# Patient Record
Sex: FEMALE | Race: WHITE | NOT HISPANIC OR LATINO | Employment: OTHER | URBAN - METROPOLITAN AREA
[De-identification: names, ages, dates, MRNs, and addresses within clinical notes are randomized per-mention and may not be internally consistent; named-entity substitution may affect disease eponyms.]

---

## 2023-10-05 ENCOUNTER — HOSPITAL ENCOUNTER (INPATIENT)
Facility: HOSPITAL | Age: 78
LOS: 7 days | Discharge: HOME | DRG: 690 | End: 2023-10-12
Attending: PHYSICAL MEDICINE & REHABILITATION | Admitting: PHYSICAL MEDICINE & REHABILITATION
Payer: MEDICARE

## 2023-10-05 DIAGNOSIS — M79.605 PAIN IN BOTH LOWER EXTREMITIES: Chronic | ICD-10-CM

## 2023-10-05 DIAGNOSIS — M79.604 PAIN IN BOTH LOWER EXTREMITIES: Chronic | ICD-10-CM

## 2023-10-05 DIAGNOSIS — R31.9 HEMATURIA, UNSPECIFIED TYPE: Chronic | ICD-10-CM

## 2023-10-05 DIAGNOSIS — R53.81 DEBILITY: Primary | ICD-10-CM

## 2023-10-05 PROCEDURE — 1180000001 HC REHAB PRIVATE ROOM DAILY

## 2023-10-05 SDOH — SOCIAL STABILITY: SOCIAL INSECURITY: DO YOU FEEL ANYONE HAS EXPLOITED OR TAKEN ADVANTAGE OF YOU FINANCIALLY OR OF YOUR PERSONAL PROPERTY?: NO

## 2023-10-05 SDOH — SOCIAL STABILITY: SOCIAL INSECURITY: DO YOU FEEL UNSAFE GOING BACK TO THE PLACE WHERE YOU ARE LIVING?: NO

## 2023-10-05 SDOH — SOCIAL STABILITY: SOCIAL INSECURITY: ABUSE: ADULT

## 2023-10-05 SDOH — SOCIAL STABILITY: SOCIAL INSECURITY: ARE YOU OR HAVE YOU BEEN THREATENED OR ABUSED PHYSICALLY, EMOTIONALLY, OR SEXUALLY BY ANYONE?: NO

## 2023-10-05 SDOH — SOCIAL STABILITY: SOCIAL INSECURITY: HAS ANYONE EVER THREATENED TO HURT YOUR FAMILY OR YOUR PETS?: NO

## 2023-10-05 SDOH — SOCIAL STABILITY: SOCIAL INSECURITY: ARE THERE ANY APPARENT SIGNS OF INJURIES/BEHAVIORS THAT COULD BE RELATED TO ABUSE/NEGLECT?: NO

## 2023-10-05 SDOH — SOCIAL STABILITY: SOCIAL INSECURITY: HAVE YOU HAD THOUGHTS OF HARMING ANYONE ELSE?: NO

## 2023-10-05 SDOH — SOCIAL STABILITY: SOCIAL INSECURITY: WERE YOU ABLE TO COMPLETE ALL THE BEHAVIORAL HEALTH SCREENINGS?: YES

## 2023-10-05 SDOH — SOCIAL STABILITY: SOCIAL INSECURITY: DOES ANYONE TRY TO KEEP YOU FROM HAVING/CONTACTING OTHER FRIENDS OR DOING THINGS OUTSIDE YOUR HOME?: NO

## 2023-10-05 ASSESSMENT — LIFESTYLE VARIABLES
HOW MANY STANDARD DRINKS CONTAINING ALCOHOL DO YOU HAVE ON A TYPICAL DAY: PATIENT DOES NOT DRINK
AUDIT-C TOTAL SCORE: 0
SUBSTANCE_ABUSE_PAST_12_MONTHS: NO
SKIP TO QUESTIONS 9-10: 1
AUDIT-C TOTAL SCORE: 0
HOW OFTEN DO YOU HAVE 6 OR MORE DRINKS ON ONE OCCASION: NEVER
PRESCIPTION_ABUSE_PAST_12_MONTHS: NO
HOW OFTEN DO YOU HAVE A DRINK CONTAINING ALCOHOL: NEVER

## 2023-10-05 ASSESSMENT — COLUMBIA-SUICIDE SEVERITY RATING SCALE - C-SSRS
2. HAVE YOU ACTUALLY HAD ANY THOUGHTS OF KILLING YOURSELF?: NO
1. IN THE PAST MONTH, HAVE YOU WISHED YOU WERE DEAD OR WISHED YOU COULD GO TO SLEEP AND NOT WAKE UP?: NO
6. HAVE YOU EVER DONE ANYTHING, STARTED TO DO ANYTHING, OR PREPARED TO DO ANYTHING TO END YOUR LIFE?: NO

## 2023-10-05 ASSESSMENT — PATIENT HEALTH QUESTIONNAIRE - PHQ9
SUM OF ALL RESPONSES TO PHQ9 QUESTIONS 1 & 2: 0
1. LITTLE INTEREST OR PLEASURE IN DOING THINGS: NOT AT ALL
2. FEELING DOWN, DEPRESSED OR HOPELESS: NOT AT ALL

## 2023-10-05 ASSESSMENT — PAIN SCALES - GENERAL: PAINLEVEL_OUTOF10: 2

## 2023-10-05 ASSESSMENT — PAIN - FUNCTIONAL ASSESSMENT: PAIN_FUNCTIONAL_ASSESSMENT: 0-10

## 2023-10-05 NOTE — PREADMISSION SCREENING NOTE
Physical Medicine and Rehabilitation  Preadmission Screening    Rehab Physician's Review and Admission Determination:  Gabriella Haskins is a 78 y.o. female who needs acute inpatient rehabilitation in order to achieve the functional goals outlined below. She requires close rehabilitation physician monitoring and management due to her complex medical conditions and co-morbidities with the primary indication of:  Rehab Admission Indication: 0009 Cardiac: CARDIAC ARREST . Comorbid conditions that will impact course of rehabilitation: Comorbid Conditions in addition to those listed above GOUT,HTN,HYPOTHYROIDISM,OSTEOPENIA, . She requires 24-hour rehabilitation nursing to manage bowel and bladder function, nutrition and fluid intake, pulmonary hygiene, pain control, safety, and medication management. In addition, rehabilitation nursing will reiterate and reinforce therapy skills and equipment use, including ADLs, as well as provide education to the patient and family. Gabriella Haskins is willing to participate in and is able to tolerate the proposed plan of care.    History of Present Illness: 78 YR OLD PRESENTS TO ED W/ C/O HEMTURIA AND RT FLANF PAIN. FOUND TO HAVE A UTI. CYSTOSCOPY SHOWED CYSTITIS,CYSTOCELE,MILD RT HYDRONEPHROSIS. SHE WENT INTO A FIB AND WAS CARDIOVERTED. NEXT MORING BECAME BRAYCARDIC AND WENT INTO CARDIAC ARREST.ECHO FAIRLY NORMAL. NUCLEAR STESS TEST NEG.    Prior Functional Status:  Lives with Spouse  Self-Care: IND    Ambulation: IND  Stairs: IND  Cognition: AOX3  Wheelchair: N/A  Devices: N/A    Current Functional Status:  Eating: set up  Oral hygiene: min  Toileting: MAX-MOCK  Bathing: UPPER MIN, LOWER MAX  Upper body dressing: MIN  Lower body dressing: MAX  Bed Mobility: MIN  Transfers: MIN,AMB 40 FEET MIN W/ WHEELED WALKER  Bladder and Bowel: UNKNOWN  Cognition: A&OX3    Rehabilitation Goals and Plan:  Expected level of improvement: EXCELLENT  Likelihood of reaching these goals:  excellent.  Therapy treatments needed to achieve these goals: physical therapy, occupational therapy, nursing, aide, and  .  Barriers to achieving these goals:  NONE    Expected length of stay: 1 week(s)    When medically stable, anticipated discharge disposition: home with home health care  The potential to achieve that is excellent.

## 2023-10-05 NOTE — LETTER
2023    Patient: Gabriella Haskins   YOB: 1945   Admission Date: 10/5/2023 11:51 PM     The attached team conference was conducted while Gabriella Haskins was admitted at our facility.     Please call Dept: [phone] with any questions or concerns.    No name on file  [facility] PHYSICAL MEDICINE AND REHABILITATION  [address] [facility] [id number] [phone]    The following plan is in draft form.  Please refer to the current version for the most up-to-date information.                Inpatient Rehab Team Conference 10/6/23   Effective from: 10/6/2023  Effective to: 10/12/2023    Draft  Plan ID: [id number]               Participants as of 10/6/2023      Name Type Comments Contact Info    Sirisha Soto MD Attending Provider  [phone]          Patient Demographics       Patient Name  Gabriella Haskins Legal Sex  Female   1945 [facility]  [id number] Address  [address] [place] [id number] Phone  [phone] (Home)          Current Problems             Noted    Debility 10/6/2023     Care Plan Problems/Goals       0 of 8 Goals Met 0 of 8 Met       Progressing (8)        Not fall by end of shift (Fall/Injury)      Be free from injury by end of the shift (Fall/Injury)      Verbalize understanding of personal risk factors for fall in the hospital (Fall/Injury)      Takes deep breaths with improved pain control throughout the shift (Pain)      Turns in bed with improved pain control throughout the shift (Pain)      Prevent/minimize sheer/friction injuries (Skin)      Promote/optimize nutrition (Skin)      Promote skin healing (Skin)                          Team Discussion     No Team Discussion note has been created for the current plan.

## 2023-10-06 PROBLEM — R53.81 DEBILITY: Status: ACTIVE | Noted: 2023-10-06

## 2023-10-06 LAB
ANION GAP SERPL CALC-SCNC: 13 MMOL/L (ref 10–20)
BUN SERPL-MCNC: 22 MG/DL (ref 6–23)
CALCIUM SERPL-MCNC: 8.6 MG/DL (ref 8.6–10.3)
CHLORIDE SERPL-SCNC: 99 MMOL/L (ref 98–107)
CO2 SERPL-SCNC: 28 MMOL/L (ref 21–32)
CREAT SERPL-MCNC: 1.38 MG/DL (ref 0.5–1.05)
ERYTHROCYTE [DISTWIDTH] IN BLOOD BY AUTOMATED COUNT: 12.3 % (ref 11.5–14.5)
GFR SERPL CREATININE-BSD FRML MDRD: 40 ML/MIN/1.73M*2
GLUCOSE SERPL-MCNC: 121 MG/DL (ref 74–99)
HCT VFR BLD AUTO: 30.5 % (ref 36–46)
HGB BLD-MCNC: 9.7 G/DL (ref 12–16)
MCH RBC QN AUTO: 28.8 PG (ref 26–34)
MCHC RBC AUTO-ENTMCNC: 31.8 G/DL (ref 32–36)
MCV RBC AUTO: 91 FL (ref 80–100)
NRBC BLD-RTO: 0 /100 WBCS (ref 0–0)
PLATELET # BLD AUTO: 292 X10*3/UL (ref 150–450)
PMV BLD AUTO: 9.1 FL (ref 7.5–11.5)
POTASSIUM SERPL-SCNC: 4.8 MMOL/L (ref 3.5–5.3)
RBC # BLD AUTO: 3.37 X10*6/UL (ref 4–5.2)
SODIUM SERPL-SCNC: 135 MMOL/L (ref 136–145)
WBC # BLD AUTO: 16.1 X10*3/UL (ref 4.4–11.3)

## 2023-10-06 PROCEDURE — 82374 ASSAY BLOOD CARBON DIOXIDE: CPT | Performed by: PHYSICAL MEDICINE & REHABILITATION

## 2023-10-06 PROCEDURE — 97530 THERAPEUTIC ACTIVITIES: CPT | Mod: GP

## 2023-10-06 PROCEDURE — 97166 OT EVAL MOD COMPLEX 45 MIN: CPT | Mod: GO

## 2023-10-06 PROCEDURE — 1180000001 HC REHAB PRIVATE ROOM DAILY

## 2023-10-06 PROCEDURE — 36415 COLL VENOUS BLD VENIPUNCTURE: CPT | Performed by: PHYSICAL MEDICINE & REHABILITATION

## 2023-10-06 PROCEDURE — 97116 GAIT TRAINING THERAPY: CPT | Mod: GP

## 2023-10-06 PROCEDURE — 2500000001 HC RX 250 WO HCPCS SELF ADMINISTERED DRUGS (ALT 637 FOR MEDICARE OP): Performed by: PHYSICAL MEDICINE & REHABILITATION

## 2023-10-06 PROCEDURE — 97530 THERAPEUTIC ACTIVITIES: CPT | Mod: GO

## 2023-10-06 PROCEDURE — 2500000004 HC RX 250 GENERAL PHARMACY W/ HCPCS (ALT 636 FOR OP/ED): Performed by: PHYSICAL MEDICINE & REHABILITATION

## 2023-10-06 PROCEDURE — 97535 SELF CARE MNGMENT TRAINING: CPT | Mod: GO

## 2023-10-06 PROCEDURE — 97162 PT EVAL MOD COMPLEX 30 MIN: CPT | Mod: GP

## 2023-10-06 PROCEDURE — 97110 THERAPEUTIC EXERCISES: CPT | Mod: GP

## 2023-10-06 PROCEDURE — 99222 1ST HOSP IP/OBS MODERATE 55: CPT | Performed by: PHYSICAL MEDICINE & REHABILITATION

## 2023-10-06 PROCEDURE — 3490 HC RX 250 GENERAL PHARMACY W/ HCPCS (ALT 636 FOR OP/ED): Performed by: PHYSICAL MEDICINE & REHABILITATION

## 2023-10-06 PROCEDURE — 85027 COMPLETE CBC AUTOMATED: CPT | Performed by: PHYSICAL MEDICINE & REHABILITATION

## 2023-10-06 RX ORDER — ACETAMINOPHEN 160 MG/5ML
650 SOLUTION ORAL EVERY 4 HOURS PRN
Status: DISCONTINUED | OUTPATIENT
Start: 2023-10-06 | End: 2023-10-12 | Stop reason: HOSPADM

## 2023-10-06 RX ORDER — KETOCONAZOLE 20 MG/G
CREAM TOPICAL 2 TIMES DAILY
Status: DISCONTINUED | OUTPATIENT
Start: 2023-10-06 | End: 2023-10-12 | Stop reason: HOSPADM

## 2023-10-06 RX ORDER — ALUMINUM HYDROXIDE, MAGNESIUM HYDROXIDE, AND SIMETHICONE 1200; 120; 1200 MG/30ML; MG/30ML; MG/30ML
30 SUSPENSION ORAL EVERY 6 HOURS PRN
Status: DISCONTINUED | OUTPATIENT
Start: 2023-10-06 | End: 2023-10-12 | Stop reason: HOSPADM

## 2023-10-06 RX ORDER — OXYCODONE AND ACETAMINOPHEN 5; 325 MG/1; MG/1
1 TABLET ORAL EVERY 6 HOURS PRN
COMMUNITY
Start: 2023-10-05 | End: 2023-10-12 | Stop reason: HOSPADM

## 2023-10-06 RX ORDER — TALC
3 POWDER (GRAM) TOPICAL NIGHTLY PRN
Status: DISCONTINUED | OUTPATIENT
Start: 2023-10-06 | End: 2023-10-12 | Stop reason: HOSPADM

## 2023-10-06 RX ORDER — CIPROFLOXACIN 250 MG/1
250 TABLET, FILM COATED ORAL 2 TIMES DAILY
Status: ON HOLD | COMMUNITY
Start: 2023-10-05 | End: 2023-10-11 | Stop reason: SDUPTHER

## 2023-10-06 RX ORDER — LEVOTHYROXINE SODIUM 112 UG/1
112 TABLET ORAL
Status: DISCONTINUED | OUTPATIENT
Start: 2023-10-06 | End: 2023-10-12 | Stop reason: HOSPADM

## 2023-10-06 RX ORDER — BISACODYL 5 MG
10 TABLET, DELAYED RELEASE (ENTERIC COATED) ORAL DAILY PRN
Status: DISCONTINUED | OUTPATIENT
Start: 2023-10-06 | End: 2023-10-12 | Stop reason: HOSPADM

## 2023-10-06 RX ORDER — LEVOTHYROXINE SODIUM 112 UG/1
TABLET ORAL
COMMUNITY
Start: 2023-05-09

## 2023-10-06 RX ORDER — METOPROLOL TARTRATE 25 MG/1
12.5 TABLET, FILM COATED ORAL 2 TIMES DAILY
Status: DISCONTINUED | OUTPATIENT
Start: 2023-10-06 | End: 2023-10-12 | Stop reason: HOSPADM

## 2023-10-06 RX ORDER — LORATADINE 10 MG/1
10 TABLET ORAL DAILY
Status: DISCONTINUED | OUTPATIENT
Start: 2023-10-06 | End: 2023-10-12 | Stop reason: HOSPADM

## 2023-10-06 RX ORDER — COLCHICINE 0.6 MG/1
1 TABLET ORAL ONCE
COMMUNITY
Start: 2023-10-06 | End: 2024-06-06 | Stop reason: ALTCHOICE

## 2023-10-06 RX ORDER — FERROUS SULFATE 325(65) MG
65 TABLET ORAL EVERY OTHER DAY
Status: DISCONTINUED | OUTPATIENT
Start: 2023-10-06 | End: 2023-10-12 | Stop reason: HOSPADM

## 2023-10-06 RX ORDER — METOPROLOL TARTRATE 25 MG/1
12.5 TABLET, FILM COATED ORAL 2 TIMES DAILY
COMMUNITY
Start: 2023-10-05 | End: 2023-10-12 | Stop reason: HOSPADM

## 2023-10-06 RX ORDER — SODIUM CHLORIDE 50 MG/ML
1 SOLUTION/ DROPS OPHTHALMIC EVERY 4 HOURS
Status: DISCONTINUED | OUTPATIENT
Start: 2023-10-06 | End: 2023-10-08

## 2023-10-06 RX ORDER — BISACODYL 10 MG/1
10 SUPPOSITORY RECTAL DAILY PRN
Status: DISCONTINUED | OUTPATIENT
Start: 2023-10-06 | End: 2023-10-12 | Stop reason: HOSPADM

## 2023-10-06 RX ORDER — SODIUM CHLORIDE 50 MG/ML
SOLUTION/ DROPS OPHTHALMIC
COMMUNITY
End: 2024-06-06 | Stop reason: ALTCHOICE

## 2023-10-06 RX ORDER — COLCHICINE 0.6 MG/1
0.6 TABLET ORAL ONCE
Status: DISCONTINUED | OUTPATIENT
Start: 2023-10-06 | End: 2023-10-12 | Stop reason: HOSPADM

## 2023-10-06 RX ORDER — ACETAMINOPHEN 325 MG/1
650 TABLET ORAL EVERY 6 HOURS PRN
Status: DISCONTINUED | OUTPATIENT
Start: 2023-10-06 | End: 2023-10-12 | Stop reason: HOSPADM

## 2023-10-06 RX ORDER — PREDNISONE 20 MG/1
20 TABLET ORAL DAILY
Status: DISCONTINUED | OUTPATIENT
Start: 2023-10-06 | End: 2023-10-12 | Stop reason: HOSPADM

## 2023-10-06 RX ORDER — SENNOSIDES 8.6 MG/1
1 TABLET ORAL NIGHTLY
Status: DISCONTINUED | OUTPATIENT
Start: 2023-10-06 | End: 2023-10-12 | Stop reason: HOSPADM

## 2023-10-06 RX ORDER — PREDNISONE 20 MG/1
20 TABLET ORAL 2 TIMES DAILY
COMMUNITY
Start: 2023-10-05 | End: 2023-10-12 | Stop reason: HOSPADM

## 2023-10-06 RX ORDER — OXYCODONE AND ACETAMINOPHEN 5; 325 MG/1; MG/1
1 TABLET ORAL EVERY 6 HOURS PRN
Status: DISCONTINUED | OUTPATIENT
Start: 2023-10-06 | End: 2023-10-12 | Stop reason: HOSPADM

## 2023-10-06 RX ORDER — CIPROFLOXACIN 500 MG/1
250 TABLET ORAL 2 TIMES DAILY
Status: DISCONTINUED | OUTPATIENT
Start: 2023-10-06 | End: 2023-10-12 | Stop reason: HOSPADM

## 2023-10-06 RX ORDER — FERROUS SULFATE 325(65) MG
325 TABLET ORAL EVERY OTHER DAY
COMMUNITY
Start: 2023-10-06 | End: 2024-06-06 | Stop reason: ALTCHOICE

## 2023-10-06 RX ADMIN — Medication 3 MG: at 22:04

## 2023-10-06 RX ADMIN — LORATADINE 10 MG: 10 TABLET ORAL at 09:13

## 2023-10-06 RX ADMIN — METOPROLOL TARTRATE 12.5 MG: 25 TABLET, FILM COATED ORAL at 09:15

## 2023-10-06 RX ADMIN — SENNOSIDES 8.6 MG: 8.6 TABLET, FILM COATED ORAL at 22:00

## 2023-10-06 RX ADMIN — OXYCODONE HYDROCHLORIDE AND ACETAMINOPHEN 1 TABLET: 5; 325 TABLET ORAL at 06:43

## 2023-10-06 RX ADMIN — LEVOTHYROXINE SODIUM 112 MCG: 0.11 TABLET ORAL at 09:16

## 2023-10-06 RX ADMIN — KETOCONAZOLE: 20 CREAM TOPICAL at 22:01

## 2023-10-06 RX ADMIN — CIPROFLOXACIN 250 MG: 500 TABLET, FILM COATED ORAL at 18:15

## 2023-10-06 RX ADMIN — PREDNISONE 20 MG: 20 TABLET ORAL at 09:14

## 2023-10-06 RX ADMIN — METOPROLOL TARTRATE 12.5 MG: 25 TABLET, FILM COATED ORAL at 22:00

## 2023-10-06 RX ADMIN — SODIUM CHLORIDE 1 DROP: 50 SOLUTION OPHTHALMIC at 14:41

## 2023-10-06 RX ADMIN — FERROUS SULFATE TAB 325 MG (65 MG ELEMENTAL FE) 65 MG OF IRON: 325 (65 FE) TAB at 09:13

## 2023-10-06 RX ADMIN — SODIUM CHLORIDE 1 DROP: 50 SOLUTION OPHTHALMIC at 09:15

## 2023-10-06 RX ADMIN — SODIUM CHLORIDE 1 DROP: 50 SOLUTION OPHTHALMIC at 18:19

## 2023-10-06 RX ADMIN — CIPROFLOXACIN 250 MG: 500 TABLET, FILM COATED ORAL at 09:12

## 2023-10-06 RX ADMIN — ACETAMINOPHEN 650 MG: 325 TABLET ORAL at 18:17

## 2023-10-06 RX ADMIN — SODIUM CHLORIDE 1 DROP: 50 SOLUTION OPHTHALMIC at 22:01

## 2023-10-06 SDOH — ECONOMIC STABILITY: TRANSPORTATION INSECURITY
IN THE PAST 12 MONTHS, HAS LACK OF TRANSPORTATION KEPT YOU FROM MEETINGS, WORK, OR FROM GETTING THINGS NEEDED FOR DAILY LIVING?: PATIENT DECLINED

## 2023-10-06 SDOH — ECONOMIC STABILITY: INCOME INSECURITY: HOW HARD IS IT FOR YOU TO PAY FOR THE VERY BASICS LIKE FOOD, HOUSING, MEDICAL CARE, AND HEATING?: PATIENT DECLINED

## 2023-10-06 SDOH — ECONOMIC STABILITY: TRANSPORTATION INSECURITY
IN THE PAST 12 MONTHS, HAS THE LACK OF TRANSPORTATION KEPT YOU FROM MEDICAL APPOINTMENTS OR FROM GETTING MEDICATIONS?: PATIENT DECLINED

## 2023-10-06 SDOH — ECONOMIC STABILITY: HOUSING INSECURITY
IN THE LAST 12 MONTHS, WAS THERE A TIME WHEN YOU DID NOT HAVE A STEADY PLACE TO SLEEP OR SLEPT IN A SHELTER (INCLUDING NOW)?: PATIENT REFUSED

## 2023-10-06 SDOH — ECONOMIC STABILITY: INCOME INSECURITY: IN THE LAST 12 MONTHS, WAS THERE A TIME WHEN YOU WERE NOT ABLE TO PAY THE MORTGAGE OR RENT ON TIME?: PATIENT REFUSED

## 2023-10-06 SDOH — ECONOMIC STABILITY: HOUSING INSECURITY: IN THE LAST 12 MONTHS, HOW MANY PLACES HAVE YOU LIVED?: 0

## 2023-10-06 ASSESSMENT — ENCOUNTER SYMPTOMS
WEAKNESS: 1
FREQUENCY: 0
NUMBNESS: 0
HEADACHES: 0
FEVER: 0
ENDOCRINE NEGATIVE: 1
PALPITATIONS: 0
DIZZINESS: 0
PSYCHIATRIC NEGATIVE: 1
JOINT SWELLING: 0
DYSURIA: 0
CONSTIPATION: 0
CHEST TIGHTNESS: 0
COUGH: 0
MYALGIAS: 0
SHORTNESS OF BREATH: 0
SPEECH DIFFICULTY: 0
FATIGUE: 0
VOMITING: 0
TROUBLE SWALLOWING: 0
ACTIVITY CHANGE: 1
ABDOMINAL PAIN: 0
DIARRHEA: 0
DIFFICULTY URINATING: 0
NAUSEA: 0
ARTHRALGIAS: 0

## 2023-10-06 ASSESSMENT — BRIEF INTERVIEW FOR MENTAL STATUS (BIMS)
GENERAL FUNCTIONAL COGNITION RATING: INDEPENDENT
ASKED TO RECALL BLUE: YES, NO CUE REQUIRED
WHAT YEAR IS IT: CORRECT
BIMS SUMMARY SCORE: 14
WHAT DAY OF THE WEEK IS IT: INCORRECT
WHAT MONTH IS IT: ACCURATE WITHIN 5 DAYS
GENERAL MEMORY AND RECALL ABILITY: CURRENT SEASON;LOCATION OF OWN ROOM;STAFF NAMES AND FACES;RECOGNIZES APPROPRIATE HEALTHCARE SETTING
ASKED TO RECALL BED: YES, NO CUE REQUIRED
ASKED TO RECALL SOCK: YES, NO CUE REQUIRED
COGNITIVE PATTERN ASSESSMENT USED: STAFF ASSESSMENT
INITIAL REPETITION OF BED BLUE SOCK - FIRST ATTEMPT: 3

## 2023-10-06 ASSESSMENT — PAIN SCALES - GENERAL
PAINLEVEL_OUTOF10: 7
PAINLEVEL_OUTOF10: 0 - NO PAIN

## 2023-10-06 ASSESSMENT — PAIN - FUNCTIONAL ASSESSMENT
PAIN_FUNCTIONAL_ASSESSMENT: 0-10

## 2023-10-06 ASSESSMENT — ACTIVITIES OF DAILY LIVING (ADL)
BATHING_ASSISTANCE: MODERATE
HOME_MANAGEMENT_TIME_ENTRY: 35

## 2023-10-06 NOTE — PROGRESS NOTES
Occupational Therapy    OT Treatment    Patient Name: Gabriella Haskins  MRN: [id number]  Today's Date: 10/6/2023  Time Calculation  Start Time: 0915  Stop Time: 1000  Time Calculation (min): 45 min         Subjective   General:  OT Received On: 10/06/23    Family/Caregiver Present: No  Prior to Session Communication: Bedside nurse  Patient Position Received: Up in chair       Pain:  Pain Assessment: 0-10  Pain Score: 0 - No pain  Pain Location:  (rt flank)        Objective    Activities of Daily Living: Grooming  Grooming Level of Assistance:  (sba at sink seated in w/c, completes oral care, brushing hair, deodorant application)  Grooming Comments: slow process    UE Bathing  UE Bathing Level of Assistance: Minimum assistance (assist needed for bathing back also)    LE Dressing  LE Dressing: Yes (pt needed min a for socks donning, had difficulty maintaining rt le crossed to reach foot; doffing sba; shoes lle min/mod a and shoe horn, rt shoe unable to fully fit d/t swelling in feet, rt shoe felt tight and removed, as well.)    Functional Standing Tolerance:  Time: 3:00  Functional Standing Tolerance Comments: static standing at table top with bilat ue support at cga to promote indep in adl's    Transfers: Transfers  Transfer:  (sit to stand min/mod a and mod cues for reducing retropulsion)          EDUCATION:  Education  Individual(s) Educated: Patient  Education Provided: Fall precautons (on adaptive equipment for le dressing- dressing stick, shoe horn, reacher)  Patient Response to Education: Patient/Caregiver Verbalized Understanding of Information  Education Comment:  (needs review and cues for application)    Goals:  Encounter Problems       Encounter Problems (Active)       Bathing       LTG - Patient will utilize adaptive techniques to bathe body sba (Progressing)       Start:  10/06/23    Expected End:  10/13/23            STG - Patient will bathe body min a (Progressing)       Start:  10/06/23    Expected End:   10/11/23               Dressing Upper Extremities       LTG - Patient will complete upper body dressing setup (Progressing)       Start:  10/06/23    Expected End:  10/13/23            STG - Patient will dress upper body sba (Progressing)       Start:  10/06/23    Expected End:  10/11/23               Dressings Lower Extremities       LTG - Patient will dress lower body sba (Progressing)       Start:  10/06/23    Expected End:  10/13/23            STG - Patient will complete lower body dressing min a (Progressing)       Start:  10/06/23    Expected End:  10/11/23               Grooming       LTG - Patient will complete daily grooming tasks setup (Progressing)       Start:  10/06/23    Expected End:  10/13/23            STG - Patient completes grooming supervision (Progressing)       Start:  10/06/23    Expected End:  10/11/23               Instrumental Activities of Daily Living       LTG - Patient will complete simple meal preparation activities sba at walker level (Progressing)       Start:  10/06/23    Expected End:  10/13/23                 Mobility       LTG - Pt to complete functional mobility via sba using wwalker (Progressing)       Start:  10/06/23    Expected End:  10/13/23            STG -Pt to complete functional mobility via cga (Progressing)       Start:  10/06/23    Expected End:  10/11/23               Toileting       LTG - Patient will complete daily toileting tasks sba (Progressing)       Start:  10/06/23    Expected End:  10/13/23            STG - Patient will complete toileting tasks with min a (Progressing)       Start:  10/06/23    Expected End:  10/11/23               Transfers       LTG - Patient will transfer to commode sba (Progressing)       Start:  10/06/23    Expected End:  10/13/23            LTG - Patient will transfer to tub/shower/dme cga (Progressing)       Start:  10/06/23    Expected End:  10/13/23            STG - Patient will perform toilet transfer cga (Progressing)        Start:  10/06/23    Expected End:  10/11/23            STG - Patient will perform tub/shower transfer min/mod a (Progressing)       Start:  10/06/23    Expected End:  10/11/23

## 2023-10-06 NOTE — PROGRESS NOTES
10/6/23:  Spoke with patient bedside, introduced self and explained role. She [place]  [place] laundry in the basement.  Patient states her  is in good health and can assist her at discharge.  They both drive.  They have a son that lives in the area but works during the day. Prior to this admission the patient was independent at home without a device.  Her PCP is Dr. Eric Hodge with [facility].  Discussed average LOS 7-10 days depending on progress and goals.  Discussed HHC at DC if necessary.  Will continue to update patient regarding DC planning. -Darek Cope RN    10/9/23:  Spoke with patient bedside and discussed DC Thursday 10/12. She is agreeable.  Spoke with patient's  Ritchie per patient request.  Scheduled therapy teaching for Wednesday 10/11 at 1pm. -Darek Cope RN

## 2023-10-06 NOTE — PROGRESS NOTES
Physical Therapy    Physical Therapy Evaluation    Patient Name: Gabriella Haskins  MRN: [id number]  Today's Date: 10/6/2023   Time Calculation  Start Time: 0830  Stop Time: 0915  Time Calculation (min): 45 min    Assessment/Plan   PT Assessment  PT Assessment Results: Decreased strength, Decreased endurance, Impaired balance, Decreased mobility, Decreased safety awareness, Decreased cognition  Rehab Prognosis: Good  Barriers to Discharge:  (Impaired memory and cognition)  Evaluation/Treatment Tolerance: Patient tolerated treatment well  Strengths: Housing layout, Premorbid level of function, Rehab experience, Support of Caregivers  End of Session Communication: Bedside nurse  Assessment Comment:  (Anticipate pt to be discharged home with SBA to supervision at the walker level.)  End of Session Patient Position: Up in chair, Alarm on  IP OR SWING BED PT PLAN  Inpatient or Swing Bed: Inpatient  PT Plan  Treatment/Interventions: Bed mobility, Transfer training, Gait training, Stair training, Balance training, Strengthening, Endurance training, Therapeutic exercise, Therapeutic activity, Home exercise program  PT Plan: Skilled PT  PT Frequency: 5 times per week (6 times prn)  PT Discharge Recommendations:  (Home health care PT as needed.  Will require spouse assist with higher level functional activities.)  Equipment Recommended upon Discharge: Wheeled walker  PT Recommended Transfer Status:  (At this time, pt requires mod assist overall)      Subjective   General Visit Information:  General  Reason for Referral: Debility  Referred By: Marii  Past Medical History Relevant to Rehab:  (Pt adm to acute 9/28/23 with right flank pain, hematuria and weakness.  Dx with UTI.  During acute stay, pt went into a-fib and was cardioverted.  Next morning, pt became bradycardic and went into cardiac arrest.)  Patient Position Received: Bed, 2 rail up  General Comment:  (Pt is forgetful and requires cues for following  directions.)  Home Living:  Home Living  Type of Home:  (Ranch style home)  Lives With:  (Pt lives with spouse who is in good health and does not use a device.  Spouse does drive.  Son lives nearby and works full-time.)  Home Adaptive Equipment: Cane  Home Access:  (Front entrance has 3 entry stairs with rail, a landing and then one step into home.  Back entry has 2 + 2 stairs with no rails.)  Prior Level of Function:  Prior Function Per Pt/Caregiver Report  Level of [place]:  (Pt independent in the home PTA.)  Prior Function Comments:  (Pt was driving occasionally prior to admission.  Did get out of the house on occasion.)  Precautions:  Precautions  Precautions Comment:  (Fall precautions)  Vital Signs:       Objective   Pain:  Pain Assessment  Pain Score:  (Pt reporting no pain at start of session.  1/10 pain after session. Pt did receive pain medication prior to session.)  Cognition:  Cognition  Overall Cognitive Status:  (Pt requires repetition and cues for following directions.)  Memory:  (Memory impairment noted)    General Assessments:                     Sensation  Sensation Comment:  (Pt reports tingling at hands due to medication side-effect.)    Coordination  Movements are Fluid and Coordinated: Yes    Static Sitting Balance  Static Sitting-Level of Assistance: Independent  Dynamic Sitting Balance  Dynamic Sitting-Comments: CGA    Static Standing Balance  Static Standing-Level of Assistance:  (Up to mod assist with walker due to retro LOB)  Dynamic Standing Balance  Dynamic Standing-Comments:  (With walker and min/mod assist)  Functional Assessments:  Bed Mobility  Bed Mobility:  (Bed mobility with mod assist.)    Transfers  Transfer:  (Sit to stand and pivot transfers with up to mod assist due to repeated loss of balance posteriorly.)    Ambulation/Gait Training  Ambulation/Gait Training Performed:  (Pt amb 20 ft with wheeled walker and min assist.  Slow gait speed.)    Stairs  Stairs:  (Unsafe to  attempt at eval.)  Extremity/Trunk Assessments:  RUE   RUE : Exceptions to WFL (Active right shld flex to ~115 degrees.  Distally, RUE ROM is WFL.  Right shld strength 3+/5 at available range.  Distally, RUE strength is 4-/5.)  LUE   LUE:  (Left shld flex to ~130 degrees.  Distally, LUE ROM WFL.  Left shld strength 4-/5 at available range.  Distally, LUE strength is grossly 4-/5)  RLE   RLE :  (RLE ROM WFL.  Right hip strength 4-/5, knee strength WFL and right ankle 4-/5.)  LLE   LLE :  (LLE ROM WFL.  Left hip strength 4-/5, knee strength WFL and ankle 3+/5.)    Encounter Problems       Encounter Problems (Active)       Mobility       LTG - Bed mobility mod independent (Progressing)       Start:  10/06/23    Expected End:  10/13/23            LTG - Transfers with supervision. (Progressing)       Start:  10/06/23    Expected End:  10/13/23            LTG - Pt will amb 100 ft with wheeled walker and supervision (Progressing)       Start:  10/06/23    Expected End:  10/13/23            LTG - Pt will negotiate 5 stairs with rails and CGA (Progressing)       Start:  10/06/23    Expected End:  10/13/23            STG - Bed mobility with min assist. (Progressing)       Start:  10/06/23    Expected End:  10/10/23            STG - Transfers with CGA (Progressing)       Start:  10/06/23    Expected End:  10/10/23            STG - Pt will amb 50 ft x3 with wheeled walker and CGA (Progressing)       Start:  10/06/23    Expected End:  10/10/23            STG -Pt will negotiate 3 stairs with rails and min assist. (Progressing)       Start:  10/06/23                   Education Documentation  Mobility Training, taught by Yamilet Contreras, PT at 10/6/2023 10:15 AM.  Learner: Patient  Readiness: Acceptance  Method: Explanation, Demonstration  Response: Needs Reinforcement

## 2023-10-06 NOTE — PROGRESS NOTES
Physical Therapy    Physical Therapy Treatment    Patient Name: Gabriella Haskins  MRN: [id number]  Today's Date: 10/6/2023  Time Calculation  Start Time: 1045  Stop Time: 1130  Time Calculation (min): 45 min       Assessment/Plan   PT Assessment  PT Assessment Results: Decreased strength, Decreased endurance, Impaired balance, Decreased mobility, Decreased safety awareness, Decreased cognition  Rehab Prognosis: Good  Barriers to Discharge:  (Impaired memory and cognition)  Evaluation/Treatment Tolerance: Patient tolerated treatment well  Strengths: Housing layout, Premorbid level of function, Rehab experience, Support of Caregivers  Assessment Comment:  (Anticipate pt to be discharged home with SBA to supervision at the walker level.)  End of Session Patient Position: Up in chair, Alarm on     PT Plan  Treatment/Interventions: Bed mobility, Transfer training, Gait training, Stair training, Balance training, Strengthening, Endurance training, Therapeutic exercise, Therapeutic activity, Home exercise program  PT Plan: Skilled PT  PT Frequency: 5 times per week (6 times prn)  PT Discharge Recommendations:  (Home health care PT as needed.  Will require spouse assist with higher level functional activities.)  Equipment Recommended upon Discharge: Wheeled walker  PT Recommended Transfer Status:  (At this time, pt requires mod assist overall)      General Visit Information:      General  Reason for Referral: Debility  Referred By: Marii  Past Medical History Relevant to Rehab:  (Pt adm to acute 9/28/23 with right flank pain, hematuria and weakness.  Dx with UTI.  During acute stay, pt went into a-fib and was cardioverted.  Next morning, pt became bradycardic and went into cardiac arrest.)  General Comment:  (Pt is forgetful and requires cues for following directions.)    Subjective   Precautions:  Precautions  Precautions Comment:  (Fall precautions)  Vital Signs:       Objective   Pain:  Pain Assessment  Pain Score: 0 -  No pain       Treatments:  Therapeutic Exercise  Therapeutic Exercise Performed:  (Pt performed seated BLE ther ex 2 x 10 reps each.  Ex performed to improve strength and mobility.)    Ambulation/Gait Training  Ambulation/Gait Training Performed:  (Pt amb ~35 ft x 3 with wheeled walker and min assist.  Focusing on approach steps.)  Transfers  Transfer:  (Sit to stand transfer training from various surfaces and heights with min assist.)    EDUCATION:  Education  Education Comment:  (Pt educated on condition, recovery process, rehab process, safe mobility techniques, treatment intervention and goals of treatment.)    Encounter Problems       Encounter Problems (Active)       Mobility       LTG - Bed mobility mod independent (Progressing)       Start:  10/06/23            LTG - Transfers with supervision. (Progressing)       Start:  10/06/23            LTG - Pt will amb 100 ft with wheeled walker and supervision (Progressing)       Start:  10/06/23            LTG - Pt will negotiate 5 stairs with rails and CGA (Progressing)       Start:  10/06/23            STG - Bed mobility with min assist. (Progressing)       Start:  10/06/23            STG - Transfers with CGA (Progressing)       Start:  10/06/23            STG - Pt will amb 50 ft x3 with wheeled walker and CGA (Progressing)       Start:  10/06/23            STG -Pt will negotiate 3 stairs with rails and min assist. (Progressing)       Start:  10/06/23

## 2023-10-06 NOTE — CONSULTS
Wound Ostomy Consultation        1. Chief Complaint: wound check and evaluation   2. History of Present Illness:   Bilateral buttock/sacrum fungal infection with satellite lesions    3. Past Medical History: Reviewed   4. Past Surgical History: Reviewed  5. Allergies:     Reviewed  6. Social/Family History: Reviewed  7. Medications Reviewed:    Reviewed  8. System Review: system review was performed with these findings:   Integumentary: Will be described below    9. Nutritional Status:   Appetite: fair   Diet: regular with supplements     10.  Physical Examination:   Constitutional: About stated age and well nourished; No weight loss/fevers/chills.    Psych: Alert, oriented to person/place/time, intact memory; normal limit affect/judgment/insight   Respiratory: Symmetrical expansion/effort; No cough/shortness of breath   Cardiovascular: No chest pain;   Neuro: Normal limit sensation  Musculoskeletal: Normal limit symmetry/range of motion;   Integumentary: Abnormal skin color, No dry/scaly/cracked skin; No ecchymotic areas; No friable skin; + fungal rash with satellite lesions, red intact skin, patient complains of pruritus. No rash/lesions/excoriation/burns; No diaphoresis; No jaundice, kenn, pallor skin;    Ears: Normal limit hearing  Neck: Normal limit appearance/movements; Trachea midline;     11. Wound Pain: No pain/discomfort    12. Data Reviewed: chart reviewed      Assessment/Plan/Treatment Recommendations:     Bilateral buttock/sacrum: wash with soap and water, apply ketoconazole 2% cream, 2 times a day   Turn as per policy offloading from pressure sites(s)  Nutrition following   Interventions as per [place] Scale are in place    Education provided; Treatment demonstrated; Questions answered  D/W RN / MD  Orders received     I have spent 10 minutes with the patient for physical and wound assessment, wound cleaning, dressing demonstration and answering questions. More than 50% of the time spent with the  patient included education/counselling/coordination of care.     Danielle Herrera RN WOCN

## 2023-10-06 NOTE — CARE PLAN
Problem: Dressings Lower Extremities  Goal: LTG - Patient will dress lower body sba  Outcome: Progressing  Goal: STG - Patient will complete lower body dressing min a  Outcome: Progressing     Problem: Dressing Upper Extremities  Goal: LTG - Patient will complete upper body dressing setup  Outcome: Progressing  Goal: STG - Patient will dress upper body sba  Outcome: Progressing     Problem: Grooming  Goal: LTG - Patient will complete daily grooming tasks setup  Outcome: Progressing  Goal: STG - Patient completes grooming supervision  Outcome: Progressing     Problem: Instrumental Activities of Daily Living  Goal: LTG - Patient will complete simple meal preparation activities sba at walker level  Outcome: Progressing     Problem: Toileting  Goal: LTG - Patient will complete daily toileting tasks sba  Outcome: Progressing  Goal: STG - Patient will complete toileting tasks with min a  Outcome: Progressing     Problem: Transfers  Goal: LTG - Patient will transfer to commode sba  Outcome: Progressing  Goal: LTG - Patient will transfer to tub/shower/dme cga  Outcome: Progressing  Goal: STG - Patient will perform toilet transfer cga  Outcome: Progressing  Goal: STG - Patient will perform tub/shower transfer min/mod a  Outcome: Progressing     Problem: Mobility  Goal: LTG - Pt to complete functional mobility via sba using wwalker  Outcome: Progressing  Goal: STG -Pt to complete functional mobility via cga  Outcome: Progressing     Problem: Bathing  Goal: LTG - Patient will utilize adaptive techniques to bathe body sba  Outcome: Progressing  Goal: STG - Patient will bathe body min a  Outcome: Progressing

## 2023-10-06 NOTE — H&P
Admission diagnosis:    History Of Present Illness  Gabriella Haskins is a 78 y.o. female presenting with debility.    78 YR OLD PRESENTS TO ED W/ C/O HEMTURIA AND RT FLANF PAIN. FOUND TO HAVE A UTI. CYSTOSCOPY SHOWED CYSTITIS,CYSTOCELE,MILD RT HYDRONEPHROSIS. SHE WENT INTO A FIB AND WAS CARDIOVERTED. NEXT MORING BECAME BRAYCARDIC AND WENT INTO CARDIAC ARREST.ECHO FAIRLY NORMAL. NUCLEAR STESS TEST NEG.     Prior Functional Status:  Lives with Spouse  Self-Care: IND     Ambulation: IND  Stairs: IND  Cognition: AOX3    In the hospital she was min to max a for functional mobility.    Past Medical History  She has a past medical history of Arthritis and History of transfusion.    Surgical History  She has a past surgical history that includes Shoulder surgery.     Social History  She reports that she has never smoked. She has never used smokeless tobacco. No history on file for alcohol use and drug use.     Allergies  Azithromycin, Codeine, Conjugated estrogens, Erythromycin, Lisinopril, Cephalexin, Doxycycline, Indomethacin, Bactrim [sulfamethoxazole-trimethoprim], and Amoxicillin    Medications  Current Facility-Administered Medications   Medication Dose Route Frequency Provider Last Rate Last Admin    acetaminophen (Tylenol) oral liquid 650 mg  650 mg oral q4h PRN Sirisha Soto MD        Or    acetaminophen (Tylenol) tablet 650 mg  650 mg oral q6h PRN Sirisha Soto MD        alum-mag hydroxide-simeth (Mylanta) 200-200-20 mg/5 mL oral suspension 30 mL  30 mL oral q6h PRN Sirisha Soot MD        bisacodyl (Dulcolax) EC tablet 10 mg  10 mg oral Daily PRN Sirisha Soto MD        bisacodyl (Dulcolax) suppository 10 mg  10 mg rectal Daily PRN Sirisha Soto MD        ciprofloxacin (Cipro) tablet 250 mg  250 mg oral BID Sirisha Soto MD   250 mg at 10/06/23 0912    colchicine (gout) tablet 0.6 mg  0.6 mg oral Once Sirisha HELMS  MD Brittany        ferrous sulfate 325 (65 Fe) MG tablet 65 mg of iron  65 mg of iron oral Every other day Sirisha SCOOTER Soto MD   65 mg of iron at 10/06/23 0913    levothyroxine (Synthroid, Levoxyl) tablet 112 mcg  112 mcg oral Daily Sirisha A MD Brittany   112 mcg at 10/06/23 0916    loratadine (Claritin) tablet 10 mg  10 mg oral Daily Sirisha A MD Brittany   10 mg at 10/06/23 0913    melatonin tablet 3 mg  3 mg oral Nightly PRN Sirisha Soto MD        metoprolol tartrate (Lopressor) tablet 12.5 mg  12.5 mg oral BID Sirisha SCOOTER Soto MD   12.5 mg at 10/06/23 0915    oxyCODONE-acetaminophen (Percocet) 5-325 mg per tablet 1 tablet  1 tablet oral q6h PRN Sirisha Soto MD   1 tablet at 10/06/23 0643    predniSONE (Deltasone) tablet 20 mg  20 mg oral Daily Sirisha SCOOTER Soto MD   20 mg at 10/06/23 0914    sennosides (Senokot) tablet 8.6 mg  1 tablet oral Nightly Sirishaalyssa Soto MD        sodium chloride (Adelita 128) 5 % ophthalmic solution 1 drop  1 drop Both Eyes q4h Sirishaizabela Soto MD   1 drop at 10/06/23 1441        Labs  Admission on 10/05/2023   Component Date Value Ref Range Status    WBC 10/06/2023 16.1 (H)  4.4 - 11.3 x10*3/uL Final    nRBC 10/06/2023 0.0  0.0 - 0.0 /100 WBCs Final    RBC 10/06/2023 3.37 (L)  4.00 - 5.20 x10*6/uL Final    Hemoglobin 10/06/2023 9.7 (L)  12.0 - 16.0 g/dL Final    Hematocrit 10/06/2023 30.5 (L)  36.0 - 46.0 % Final    MCV 10/06/2023 91  80 - 100 fL Final    MCH 10/06/2023 28.8  26.0 - 34.0 pg Final    MCHC 10/06/2023 31.8 (L)  32.0 - 36.0 g/dL Final    RDW 10/06/2023 12.3  11.5 - 14.5 % Final    Platelets 10/06/2023 292  150 - 450 x10*3/uL Final    MPV 10/06/2023 9.1  7.5 - 11.5 fL Final    Glucose 10/06/2023 121 (H)  74 - 99 mg/dL Final    Sodium 10/06/2023 135 (L)  136 - 145 mmol/L Final    Potassium 10/06/2023 4.8  3.5 - 5.3 mmol/L Final    MILD  "HEMOLYSIS DETECTED. The result may be falsely elevated due to hemolysis or other interferents. Clinical correlation is recommended. Repeat testing may be considered.    Chloride 10/06/2023 99  98 - 107 mmol/L Final    Bicarbonate 10/06/2023 28  21 - 32 mmol/L Final    Anion Gap 10/06/2023 13  10 - 20 mmol/L Final    Urea Nitrogen 10/06/2023 22  6 - 23 mg/dL Final    Creatinine 10/06/2023 1.38 (H)  0.50 - 1.05 mg/dL Final    eGFR 10/06/2023 40 (L)  >60 mL/min/1.73m*2 Final    Calculations of estimated GFR are performed using the 2021 CKD-EPI Study Refit equation without the race variable for the IDMS-Traceable creatinine methods.  https://jasn.asnjournals.org/content/early/2021/09/22/ASN.7988148192    Calcium 10/06/2023 8.6  8.6 - 10.3 mg/dL Final        Last Recorded Vitals  Blood pressure 176/82, pulse 76, temperature 36.8 °C (98.2 °F), resp. rate 18, height 1.676 m (5' 5.98\"), weight 75.5 kg (166 lb 7.2 oz), SpO2 93 %.    Review of Systems   Constitutional:  Positive for activity change. Negative for fatigue and fever.   HENT: Negative.  Negative for trouble swallowing.    Respiratory:  Negative for cough, chest tightness and shortness of breath.    Cardiovascular:  Negative for chest pain, palpitations and leg swelling.   Gastrointestinal:  Negative for abdominal pain, constipation, diarrhea, nausea and vomiting.   Endocrine: Negative.    Genitourinary:  Negative for difficulty urinating, dysuria, frequency and urgency.   Musculoskeletal:  Negative for arthralgias, gait problem, joint swelling and myalgias.   Skin: Negative.    Neurological:  Positive for weakness. Negative for dizziness, speech difficulty, numbness and headaches.   Psychiatric/Behavioral: Negative.          Physical Exam  Constitutional:       Appearance: Normal appearance. She is normal weight.   HENT:      Head: Normocephalic and atraumatic.   Eyes:      Extraocular Movements: Extraocular movements intact.      Conjunctiva/sclera: Conjunctivae " normal.      Pupils: Pupils are equal, round, and reactive to light.   Cardiovascular:      Rate and Rhythm: Normal rate and regular rhythm.   Pulmonary:      Effort: Pulmonary effort is normal.      Breath sounds: Normal breath sounds.   Abdominal:      General: Bowel sounds are normal. There is no distension.      Palpations: Abdomen is soft.      Tenderness: There is no abdominal tenderness. There is no guarding.   Musculoskeletal:         General: No swelling or tenderness.      Comments: ROM is WFL   Skin:     General: Skin is warm and dry.   Neurological:      General: No focal deficit present.      Mental Status: She is alert and oriented to person, place, and time. Mental status is at baseline.   Psychiatric:         Mood and Affect: Mood normal.     Mccarthy in place. Hematuria present    Assessment/Plan   Principal Problem:    Debility    UTI  -continue with Cipro and complete treatment    H/o gout  -colchicine treatment due to ankle pain and presumed gout  Prednisone for pain    Hypothyroidism  -continue with synthroid    HTN  -continue with Beta blocker and follow BP         Bowel/Bladder  -facilitate daily active BM   -continence of bladder per timed void schedule and rehab nursing  -check PVRs and treat appropriately    DVT prophylaxis  -SCDs and ambulation  -Chemoprophylaxis for DVT until ambulating >200 feet    FEN  -follow BMP and treat appropriately  -monitor oral intake daily    Physician Physical Assessment/ Post admission Evaluation (RODDY)     I have reviewed the preadmission rehabilitation screening. There have been no relevant changes since the preadmission screening;    Rehab Plan of Care   The Interdisciplinary Team will work collaboratively to address the patient problems and goals to be managed by the Individualized Interdisciplinary Plan of Care. See the IPOC note for a complete review of the plan of care.    Medical Necessity:  Gabriella Jozef requires, and is capable of participating in,  an intensive and coordinated interdisciplinary acute inpatient rehabilitation program. She requires close rehabilitation physician monitoring and management to monitor her complex medical conditions and coordinate rehabilitation care. The patient's rehabilitation goals and medical complexity cannot adequately be managed in a less intensive setting. Potential risks for clinical complications include: UTI with the mejia and DVT prohylaxis.    LEONELA is 7 days home at mod I.    Medical Prognosis:  Good for continued progress and participation with therapy.      Total time spent with patient 65 minutes with discussion with family, ambulation around the unit, review of history physical exam and documentation along with plan of care.    Plan of care developed today after review of team notes.  Discussed plan of care with patient and family.      Sirisha Soto MD

## 2023-10-06 NOTE — PROGRESS NOTES
Occupational Therapy    Evaluation    Patient Name: Gabriella Haskins  MRN: [id number]  Today's Date: 10/6/2023  Time Calculation  Start Time: 0830  Stop Time: 0915  Time Calculation (min): 45 min        Assessment:  Prognosis: Good    Plan:  Treatment Interventions:  (adl training, functional transfer/mobility training, balance/standing tolerance, strengthening/ther ex, activity tolerance, pt/family education, safety training)  OT Frequency: 5 times per week  OT Discharge Recommendations:  (and Saturday prn)  Equipment Recommended upon Discharge: Wheeled walker, Bedside commode  Treatment Interventions:  (adl training, functional transfer/mobility training, balance/standing tolerance, strengthening/ther ex, activity tolerance, pt/family education, safety training)      Subjective     General:  General  Reason for Referral: OT eval and treat d/t Debility  Referred By: Dr. Colvin  Past Medical History Relevant to Rehab: hx: gout, htn, hypothyroidism, osteopenia, rt shoulder arthroplasty   Pt adm to acute 9/28/23 with right flank pain, hematuria and weakness. Dx with UTI.Cystoscopy. During acute stay, pt went into a-fib and was cardioverted. Next morning, pt became bradycardic and went into cardiac arrest    Family/Caregiver Present: No  Prior to Session Communication: Bedside nurse  General Comment: cues needed to follow some directions, forgetful    Precautions:  Medical Precautions: Cardiac precautions, Fall precautions  Precautions Comment: jackie       Pain:  Pain Assessment  Pain Assessment: 0-10  Pain Score: 0 - No pain  Pain Location:  (rt side/flank)  Clinical Progression:  (1-2/10 after movement)  Pain Interventions:  (rn had issued pain meds)    Objective   Cognition:  Overall Cognitive Status:  (repetition needed for following directions at times; O x3, not to day of week or date)           Home Living:  Type of Home: House, 1 floor with basement laundry; 1+1 step to enter, no rails  Lives With: Spouse  Home  Adaptive Equipment: Cane, Reacher  Bathroom Shower/Tub: Tub/shower unit, Curtain  Bathroom Toilet: Handicapped height toilet  Bathroom Equipment: Tub transfer bench available, Hand-held shower hose    Prior Function:  Level of [place]: Independent with ADLs and functional transfers  Homemaking Assistance:  (shares clean/cooking/laundry with tracey;  does grocery shopping and most driving)  Ambulatory Assistance: Independent  Vocational: Retired  Hand Dominance: Right         ADL:  Eating Assistance: Independent  Grooming Assistance: Stand by assist  Bathing Assistance: Moderate assist  UE Dressing Assistance: Minimal assist  LE Dressing Assistance: Maximal max a  Toileting Assistance with Device:  mod/max a  Activity Tolerance:   fair     Bed Mobility/Transfers: Bed Mobility  Bed Mobility:  (mod a)   and Transfers  Transfer:  (sit to stand mod a from edge of bed, multiple posterior lob noted; pivot/chair/toilet mod a via wwalker, mod cues for techs and hand placement)        Vision:Vision - Basic Assessment  Current Vision: Wears glasses all the time  Visual History:  (reports needs cataract sx)  Sensation:  Sensation Comment: reports tinging in hands d/t medication      Extremities:  RUE :  (rt shoulder arom~115 with h/o rt shoulder arthroplasty; distally wfl; rt shoulder 3+/5 within available rom, elbow 4-/5) and LUE   LUE:  (lt shoulder arom ~130 deg with 4-/5 mmt, elbow wfl arom, 4-/5 mmt)        Education Documentation  Precautions, taught by Sylvia Brooks OT at 10/6/2023 10:50 AM.  Learner: Patient  Readiness: Acceptance  Method: Explanation, Demonstration  Response: Needs Reinforcement, Verbalizes Understanding        Education Comments  Pt reports no concerns regarding sex/intimacy as related to diagnosis         EDUCATION:  Education  Individual(s) Educated: Patient  Education Provided: POC discussed and agreed upon, Fall precautons (transfer safety techs)    Goals:  Encounter Problems        Encounter Problems (Active)       Bathing       LTG - Patient will utilize adaptive techniques to bathe body sba (Progressing)       Start:  10/06/23    Expected End:  10/13/23            STG - Patient will bathe body min a (Progressing)       Start:  10/06/23    Expected End:  10/11/23               Dressing Upper Extremities       LTG - Patient will complete upper body dressing setup (Progressing)       Start:  10/06/23    Expected End:  10/13/23            STG - Patient will dress upper body sba (Progressing)       Start:  10/06/23    Expected End:  10/11/23               Dressings Lower Extremities       LTG - Patient will dress lower body sba (Progressing)       Start:  10/06/23    Expected End:  10/13/23            STG - Patient will complete lower body dressing min a (Progressing)       Start:  10/06/23    Expected End:  10/11/23               Grooming       LTG - Patient will complete daily grooming tasks setup (Progressing)       Start:  10/06/23    Expected End:  10/13/23            STG - Patient completes grooming supervision (Progressing)       Start:  10/06/23    Expected End:  10/11/23               Instrumental Activities of Daily Living       LTG - Patient will complete simple meal preparation activities sba at walker level (Progressing)       Start:  10/06/23    Expected End:  10/13/23                 Mobility       LTG - Pt to complete functional mobility via sba using wwalker (Progressing)       Start:  10/06/23    Expected End:  10/13/23            STG -Pt to complete functional mobility via cga (Progressing)       Start:  10/06/23    Expected End:  10/11/23               Toileting       LTG - Patient will complete daily toileting tasks sba (Progressing)       Start:  10/06/23    Expected End:  10/13/23            STG - Patient will complete toileting tasks with min a (Progressing)       Start:  10/06/23    Expected End:  10/11/23               Transfers       LTG - Patient will transfer to  commode sba (Progressing)       Start:  10/06/23    Expected End:  10/13/23            LTG - Patient will transfer to tub/shower/dme cga (Progressing)       Start:  10/06/23    Expected End:  10/13/23            STG - Patient will perform toilet transfer cga (Progressing)       Start:  10/06/23    Expected End:  10/11/23            STG - Patient will perform tub/shower transfer min/mod a (Progressing)       Start:  10/06/23    Expected End:  10/11/23

## 2023-10-06 NOTE — PROGRESS NOTES
Physical Therapy    Pivot transfer training with walker and min assist.  Mod cues for technique.  Sit to supine with mod assist x1 and max cues for technique.  Pt educated on mobility techniques with visual demonstration provided.

## 2023-10-06 NOTE — CONSULTS
"Nutrition Assessment Note  Assessment    Assessment:  Reason for Assessment  Reason for Assessment: Admission nursing screening (acute rehab; MST=0)    Pt was sitting in chair at bedside.  Admitted for debility; s/p Cardiac arrest;   Pmhx includes gout, HTN, hypothyroidism, osteopenia, UTI  Pt reports she intentionally lost weight over the past year by not snacking as much.   Denies chewing or swallowing problems     RECOMMENDATIONS:  1) Diet as ordered   2) Ensure high protein daily until intakes established (For an additional 160 kcals, 16 gm protein each)    3) consider daily MVI for 100% RDIs vitamins and minerals   4) Reweigh at least weekly       History:  s/p Cardiac arrest;   Pmhx includes gout, HTN, hypothyroidism, osteopenia, UTI  Food and Nutrient History  Energy Intake:  (not established yet; pt reports fair appetite)  Food and Nutrient History: Denies chewing or swallowing problems. Pt reports she would drink Gatorade at home.     Anthropometrics:  Height: 167.6 cm (5' 5.98\")  Weight: 75.5 kg (166 lb 7.2 oz)  BMI (Calculated): 26.88    Weight Change: 0    Weight Change  Weight History / % Weight Change: Pt reports a year ago she stopped snacking as much to help lose weight.  She notes she was losing ~1-2lbs per month and notes last week she weighed 157lbs.  Significant Weight Loss: No       IBW/kg (Dietitian Calculated): 59.1 kg  Percent of IBW: 127 %     Energy Needs:  Calculated Energy Needs Using Equations  Height: 167.6 cm (5' 5.98\")    Estimated Energy Needs  Total Energy Estimated Needs (kCal):  (0929-8825 kcals)  Total Estimated Energy Need per Day (kCal/kg):  (27-30 IBW)    Estimated Protein Needs  Total Protein Estimated Needs (g):  (65-77 g)  Total Protein Estimated Needs (g/kg):  (1.1-1.3 g/kg IBW)    Estimated Fluid Needs  Method for Estimating Needs: 1ml/kcal or per MD      Nutrition Focused Physical Findings:  Subcutaneous Fat Loss  Orbital Fat Pads: Well nourshed (slightly bulging fat " pads)  Buccal Fat Pads: Well nourished (full, rounded cheeks)  Triceps: Well nourished (ample fat tissue)    Muscle Wasting  Temporalis: Well nourished (well-defined muscle)  Pectoralis (Clavicular Region): Mild-Moderate (some protrusion of clavicle)  Deltoid/Trapezius: Mild-Moderate (slight protrusion of acromion process)  Interosseous: Well nourished (muscle bulges)  Trapezius/Infraspinatus/Supraspinatus (Scapular Region): Well nourished (bones not prominent, muscle taut)  Quadriceps: Well nourished (well developed, well rounded)  Gastrocnemius: Well nourished (well developed bulbous muscle)    Edema  Edema: none      Physical Findings (Nutrition Deficiency/Toxicity)  Skin:  (stage 1 coccyx)    Diagnosis   Diagnosis:  Malnutrition Diagnosis  Patient has Malnutrition Diagnosis: No    Patient has Nutrition Diagnosis: Yes  Nutrition Diagnosis 1: Increased nutrient needs  Diagnosis Status (1): New  Related to (1): physiological causes increasing nutrient needs  As Evidenced by (1): acute rehab demands and to maintain skin integrity     Interventions/Recommendations   Interventions/Recommendations:  Nutrition Prescription  Individualized Nutrition Prescription Provided for : Regular diet    Food and/or Nutrient Delivery Interventions  Interventions: Meals and snacks, Vitamin supplement therapy    Meals and Snacks: General healthful diet  Goal: consume >75% of meals       Vitamin Supplement Therapy: Other (Comment) (FeSO4 ordered)    Additional Interventions: will order ONS until meal intakes established       Coordination of Nutrition Care by a Nutrition Professional  Collaboration and Referral of Nutrition Care:  (patient)    Education Documentation  No documentation found.      Monitoring and Evaluation   Monitoring/Evaluation:  Food and Nutrient Related History    Other: monitor skin integrity    Follow Up  Time Spent (min): 45 minutes  Last Date of Nutrition Visit: 10/06/23  Nutrition Follow-Up Needed?: 5-7  days  Follow up Comment: 10/13 TG

## 2023-10-06 NOTE — CARE PLAN
Problem: Mobility  Goal: LTG - Bed mobility mod independent  Outcome: Progressing  Goal: LTG - Transfers with supervision.  Outcome: Progressing  Goal: LTG - Pt will amb 100 ft with wheeled walker and supervision  Outcome: Progressing  Goal: LTG - Pt will negotiate 5 stairs with rails and CGA  Outcome: Progressing  Goal: STG - Bed mobility with min assist.  Outcome: Progressing  Goal: STG - Transfers with CGA  Outcome: Progressing  Goal: STG - Pt will amb 50 ft x3 with wheeled walker and CGA  Outcome: Progressing  Goal: STG -Pt will negotiate 3 stairs with rails and min assist.  Outcome: Progressing

## 2023-10-07 PROBLEM — M79.606 LEG PAIN: Chronic | Status: ACTIVE | Noted: 2023-10-07

## 2023-10-07 PROBLEM — R31.9 HEMATURIA: Chronic | Status: ACTIVE | Noted: 2023-10-07

## 2023-10-07 PROCEDURE — 2500000004 HC RX 250 GENERAL PHARMACY W/ HCPCS (ALT 636 FOR OP/ED): Performed by: PHYSICAL MEDICINE & REHABILITATION

## 2023-10-07 PROCEDURE — 97110 THERAPEUTIC EXERCISES: CPT | Mod: GP

## 2023-10-07 PROCEDURE — 97110 THERAPEUTIC EXERCISES: CPT | Mod: GO

## 2023-10-07 PROCEDURE — 97116 GAIT TRAINING THERAPY: CPT | Mod: GP

## 2023-10-07 PROCEDURE — 1280000001 HC REHAB SEMI-PRIVATE ROOM DAILY

## 2023-10-07 PROCEDURE — 99231 SBSQ HOSP IP/OBS SF/LOW 25: CPT | Performed by: PHYSICAL MEDICINE & REHABILITATION

## 2023-10-07 PROCEDURE — 2500000001 HC RX 250 WO HCPCS SELF ADMINISTERED DRUGS (ALT 637 FOR MEDICARE OP): Performed by: PHYSICAL MEDICINE & REHABILITATION

## 2023-10-07 PROCEDURE — 97535 SELF CARE MNGMENT TRAINING: CPT | Mod: GO

## 2023-10-07 PROCEDURE — 97530 THERAPEUTIC ACTIVITIES: CPT | Mod: GO

## 2023-10-07 PROCEDURE — 3490 HC RX 250 GENERAL PHARMACY W/ HCPCS (ALT 636 FOR OP/ED): Performed by: PHYSICAL MEDICINE & REHABILITATION

## 2023-10-07 RX ADMIN — OXYCODONE HYDROCHLORIDE AND ACETAMINOPHEN 1 TABLET: 5; 325 TABLET ORAL at 23:08

## 2023-10-07 RX ADMIN — KETOCONAZOLE: 20 CREAM TOPICAL at 21:01

## 2023-10-07 RX ADMIN — CIPROFLOXACIN 250 MG: 500 TABLET, FILM COATED ORAL at 17:24

## 2023-10-07 RX ADMIN — Medication 3 MG: at 21:01

## 2023-10-07 RX ADMIN — PREDNISONE 20 MG: 20 TABLET ORAL at 08:26

## 2023-10-07 RX ADMIN — LORATADINE 10 MG: 10 TABLET ORAL at 08:26

## 2023-10-07 RX ADMIN — CIPROFLOXACIN 250 MG: 500 TABLET, FILM COATED ORAL at 06:25

## 2023-10-07 RX ADMIN — SENNOSIDES 8.6 MG: 8.6 TABLET, FILM COATED ORAL at 21:01

## 2023-10-07 RX ADMIN — KETOCONAZOLE: 20 CREAM TOPICAL at 08:30

## 2023-10-07 RX ADMIN — SODIUM CHLORIDE 1 DROP: 50 SOLUTION OPHTHALMIC at 21:01

## 2023-10-07 RX ADMIN — SODIUM CHLORIDE 1 DROP: 50 SOLUTION OPHTHALMIC at 10:00

## 2023-10-07 RX ADMIN — LEVOTHYROXINE SODIUM 112 MCG: 0.11 TABLET ORAL at 06:25

## 2023-10-07 RX ADMIN — SODIUM CHLORIDE 1 DROP: 50 SOLUTION OPHTHALMIC at 15:28

## 2023-10-07 RX ADMIN — METOPROLOL TARTRATE 12.5 MG: 25 TABLET, FILM COATED ORAL at 21:01

## 2023-10-07 RX ADMIN — METOPROLOL TARTRATE 12.5 MG: 25 TABLET, FILM COATED ORAL at 08:25

## 2023-10-07 RX ADMIN — SODIUM CHLORIDE 1 DROP: 50 SOLUTION OPHTHALMIC at 06:26

## 2023-10-07 RX ADMIN — SODIUM CHLORIDE 1 DROP: 50 SOLUTION OPHTHALMIC at 17:24

## 2023-10-07 ASSESSMENT — ACTIVITIES OF DAILY LIVING (ADL)
BATHING_LEVEL_OF_ASSISTANCE: MINIMUM ASSISTANCE
HOME_MANAGEMENT_TIME_ENTRY: 35
BATHING_COMMENTS: BUTTOCKS AND PERI AREA

## 2023-10-07 ASSESSMENT — PAIN - FUNCTIONAL ASSESSMENT
PAIN_FUNCTIONAL_ASSESSMENT: 0-10

## 2023-10-07 ASSESSMENT — PAIN SCALES - GENERAL
PAINLEVEL_OUTOF10: 0 - NO PAIN
PAINLEVEL_OUTOF10: 6
PAINLEVEL_OUTOF10: 0 - NO PAIN
PAINLEVEL_OUTOF10: 0 - NO PAIN

## 2023-10-07 ASSESSMENT — PAIN DESCRIPTION - DESCRIPTORS: DESCRIPTORS: DULL

## 2023-10-07 NOTE — PROGRESS NOTES
"Gabriella Haskins is a 78 y.o. female on day 2 of admission presenting with Debility.    Subjective   Patient seen today in therapy.  Ambulating with a walker and going up and down stairs.  Will need education regards to utilization of a Mejia catheter throughout her stay and for discharge.    Pain improved in the right lower extremity.  Continue current medications.       Objective         Physical Exam  Constitutional:       Appearance: Normal appearance. She is normal weight.   HENT:      Head: Normocephalic and atraumatic.   Eyes:      Extraocular Movements: Extraocular movements intact.      Conjunctiva/sclera: Conjunctivae normal.      Pupils: Pupils are equal, round, and reactive to light.   Cardiovascular:      Rate and Rhythm: Normal rate and regular rhythm.   Pulmonary:      Effort: Pulmonary effort is normal.      Breath sounds: Normal breath sounds.   Abdominal:      General: Bowel sounds are normal. There is no distension.      Palpations: Abdomen is soft.      Tenderness: There is no abdominal tenderness. There is no guarding.   Musculoskeletal:         General: No swelling or tenderness.      Comments: ROM is WFL   Skin:     General: Skin is warm and dry.   Neurological:      General: No focal deficit present.      Mental Status: She is alert and oriented to person, place, and time. Mental status is at baseline.   Psychiatric:         Mood and Affect: Mood normal.      Mejia in place. Hematuria present  Function: CGA to min A for mejia management   Assessment/Plan        Last Recorded Vitals  Blood pressure 135/64, pulse 67, temperature 36.9 °C (98.4 °F), resp. rate 16, height 1.676 m (5' 5.98\"), weight 75.5 kg (166 lb 7.2 oz), SpO2 95 %.    Therapy notes from last 24 hours reviewed.    Relevant Results              No results found for this or any previous visit (from the past 24 hour(s)).      Scheduled medications  ciprofloxacin, 250 mg, oral, BID  colchicine (gout), 0.6 mg, oral, Once  ferrous " sulfate, 65 mg of iron, oral, Every other day  ketoconazole, , Topical, BID  levothyroxine, 112 mcg, oral, Daily  loratadine, 10 mg, oral, Daily  metoprolol tartrate, 12.5 mg, oral, BID  predniSONE, 20 mg, oral, Daily  sennosides, 1 tablet, oral, Nightly  sodium chloride, 1 drop, Both Eyes, q4h      Continuous medications     PRN medications  PRN medications: acetaminophen **OR** acetaminophen, alum-mag hydroxide-simeth, bisacodyl, bisacodyl, melatonin, oxyCODONE-acetaminophen     No results found for this or any previous visit (from the past 24 hour(s)).              Assessment/Plan   Principal Problem:    Debility  Active Problems:    Hematuria    Leg pain      7/7  -Patient here due to general debility after hospital stay with a Mccarthy catheter.  -Will be discharged home with the Mccarthy catheter.  Education regarding care  -Hematuria of unclear etiology.  Recheck H&H  -Pain in the left lower extremity improved with the prednisone and colchicine.       I spent 25 minutes in the professional and overall care of this patient.      Sirisha Soto MD

## 2023-10-07 NOTE — PROGRESS NOTES
Occupational Therapy    OT Treatment    Patient Name: Gabriella Haskins  MRN: [id number]  Today's Date: 10/7/2023  Time Calculation  Start Time: 1000  Stop Time: 1045  Time Calculation (min): 45 min             Subjective   General:  OT Received On: 10/07/23  Patient Position Received: Up in chair  General Comment: mejia catheter    Pain:  Pain Assessment  Pain Assessment: 0-10  Pain Score: 0 - No pain    Objective    Activities of Daily Living: Toileting  Toileting Level of Assistance: Minimum assistance, Moderate assistance  Toileting Comments: for clothing management and hygiene while standing    Functional Standing Tolerance:  Time: 5:10  Activity:  (bilateral ue support; cga)    Bed Mobility/Transfers: Transfers  Transfer:  (min assist sit to stand transfers)            Therapy/Activity: Therapeutic Exercise  Therapeutic Exercise Performed: Yes  Patient completes bilateral UE therapeutic exercise program using one pound weights, 10 reps x 3 sets x 4 exercises, with supervision to promote improved independence with ADL's and transfers.         EDUCATION:  Education  Individual(s) Educated: Patient  Education Provided:  (energy conservation techniques)  Patient Response to Education: Patient/Caregiver Performed Return Demonstration of Exercises/Activities, Patient/Caregiver Verbalized Understanding of Information  Education Comment: patient educated on bilateral UE HEP    Goals:  Encounter Problems       Encounter Problems (Active)       Bathing       LTG - Patient will utilize adaptive techniques to bathe body sba (Progressing)       Start:  10/06/23    Expected End:  10/13/23            STG - Patient will bathe body min a (Progressing)       Start:  10/06/23    Expected End:  10/11/23               Dressing Upper Extremities       LTG - Patient will complete upper body dressing setup (Progressing)       Start:  10/06/23    Expected End:  10/13/23            STG - Patient will dress upper body sba (Progressing)   Pt reports SROM at home around 1400 clear fluids and later noticed the fluid was green-tinged. Fluid on chux noted to be Meconium.         Start:  10/06/23    Expected End:  10/11/23               Dressings Lower Extremities       LTG - Patient will dress lower body sba (Progressing)       Start:  10/06/23    Expected End:  10/13/23            STG - Patient will complete lower body dressing min a (Progressing)       Start:  10/06/23    Expected End:  10/11/23               Grooming       LTG - Patient will complete daily grooming tasks setup (Progressing)       Start:  10/06/23    Expected End:  10/13/23            STG - Patient completes grooming supervision (Progressing)       Start:  10/06/23    Expected End:  10/11/23               Instrumental Activities of Daily Living       LTG - Patient will complete simple meal preparation activities sba at walker level (Progressing)       Start:  10/06/23    Expected End:  10/13/23                 Mobility       LTG - Pt to complete functional mobility via sba using wwalker (Progressing)       Start:  10/06/23    Expected End:  10/13/23            STG -Pt to complete functional mobility via cga (Progressing)       Start:  10/06/23    Expected End:  10/11/23               Toileting       LTG - Patient will complete daily toileting tasks sba (Progressing)       Start:  10/06/23    Expected End:  10/13/23            STG - Patient will complete toileting tasks with min a (Progressing)       Start:  10/06/23    Expected End:  10/11/23               Transfers       LTG - Patient will transfer to commode sba (Progressing)       Start:  10/06/23    Expected End:  10/13/23            LTG - Patient will transfer to tub/shower/dme cga (Progressing)       Start:  10/06/23    Expected End:  10/13/23            STG - Patient will perform toilet transfer cga (Progressing)       Start:  10/06/23    Expected End:  10/11/23            STG - Patient will perform tub/shower transfer min/mod a (Progressing)       Start:  10/06/23    Expected End:  10/11/23

## 2023-10-07 NOTE — NURSING NOTE
7:53 PM Assumed care of pt. Nursing report received from GEETA Tomas. Pt resting comfortably in recliner at this time.     11:04 PM Nursing report given to JOHANNA Monique.

## 2023-10-07 NOTE — PROGRESS NOTES
Occupational Therapy    OT Treatment    Patient Name: Gabriella Haskins  MRN: [id number]  Today's Date: 10/7/2023  Time Calculation  Start Time: 0830  Stop Time: 0915  Time Calculation (min): 45 min                Subjective   General:  OT Received On: 10/07/23  Patient Position Received: Up in chair  General Comment: mejia catheter       Pain:  Pain Assessment  Pain Assessment: 0-10  Pain Score: 0 - No pain    Objective    Activities of Daily Living: Grooming  Grooming Level of Assistance: Setup  Grooming Where Assessed: Wheelchair  Grooming Comments: to brush teeth, wash face, comb hair, and apply deodorant    UE Bathing  UE Bathing Level of Assistance: Close supervision  UE Bathing Where Assessed: Wheelchair    LE Bathing  LE Bathing Level of Assistance: Minimum assistance  LE Bathing Where Assessed:  (standing at commode using wall grab bar to steady self)  LE Bathing Comments: buttocks and jeb area    UE Dressing  UE Dressing Level of Assistance: Setup  UE Dressing Where Assessed: Wheelchair    LE Dressing  LE Dressing:  (min assist to don pants over feet while seated in wheelchair; dependent to thread catheter through pant leg; min assist to pull pants up/down over hips while standing)    Toileting  Toileting Level of Assistance: Minimum assistance, Moderate assistance  Toileting Comments: for clothing management and hygiene while standing       Bed Mobility/Transfers: Transfers  Transfer:  (min assist for transfers to/from 3-in-1 commode and wheelchair)            EDUCATION:  Education  Individual(s) Educated: Patient  Patient Response to Education: Patient/Caregiver Verbalized Understanding of Information, Patient/Caregiver   Education Comment: patient educated on safe transfers and ADLs    Goals:  Encounter Problems       Encounter Problems (Active)       Bathing       LTG - Patient will utilize adaptive techniques to bathe body sba (Progressing)       Start:  10/06/23    Expected End:  10/13/23             STG - Patient will bathe body min a (Progressing)       Start:  10/06/23    Expected End:  10/11/23               Dressing Upper Extremities       LTG - Patient will complete upper body dressing setup (Progressing)       Start:  10/06/23    Expected End:  10/13/23            STG - Patient will dress upper body sba (Progressing)       Start:  10/06/23    Expected End:  10/11/23               Dressings Lower Extremities       LTG - Patient will dress lower body sba (Progressing)       Start:  10/06/23    Expected End:  10/13/23            STG - Patient will complete lower body dressing min a (Progressing)       Start:  10/06/23    Expected End:  10/11/23               Grooming       LTG - Patient will complete daily grooming tasks setup (Progressing)       Start:  10/06/23    Expected End:  10/13/23            STG - Patient completes grooming supervision (Progressing)       Start:  10/06/23    Expected End:  10/11/23               Instrumental Activities of Daily Living       LTG - Patient will complete simple meal preparation activities sba at walker level (Progressing)       Start:  10/06/23    Expected End:  10/13/23                 Mobility       LTG - Pt to complete functional mobility via sba using wwalker (Progressing)       Start:  10/06/23    Expected End:  10/13/23            STG -Pt to complete functional mobility via cga (Progressing)       Start:  10/06/23    Expected End:  10/11/23               Toileting       LTG - Patient will complete daily toileting tasks sba (Progressing)       Start:  10/06/23    Expected End:  10/13/23            STG - Patient will complete toileting tasks with min a (Progressing)       Start:  10/06/23    Expected End:  10/11/23               Transfers       LTG - Patient will transfer to commode sba (Progressing)       Start:  10/06/23    Expected End:  10/13/23            LTG - Patient will transfer to tub/shower/dme cga (Progressing)       Start:  10/06/23    Expected End:   10/13/23            STG - Patient will perform toilet transfer cga (Progressing)       Start:  10/06/23    Expected End:  10/11/23            STG - Patient will perform tub/shower transfer min/mod a (Progressing)       Start:  10/06/23    Expected End:  10/11/23

## 2023-10-07 NOTE — PROGRESS NOTES
Physical Therapy    Physical Therapy Treatment    Patient Name: Gabriella Haskins  MRN: [id number]  Today's Date: 10/7/2023  Time Calculation  Start Time: 1045  Stop Time: 1130  Time Calculation (min): 45 min       Assessment/Plan   PT Assessment  PT Assessment Results: Decreased strength, Decreased endurance, Impaired balance, Decreased mobility, Decreased safety awareness, Decreased cognition  Rehab Prognosis: Good  Barriers to Discharge:  (Impaired memory and cognition)  Evaluation/Treatment Tolerance: Patient tolerated treatment well  Strengths: Housing layout, Premorbid level of function, Rehab experience, Support of Caregivers  End of Session Communication: Bedside nurse  Assessment Comment:  (Anticipate pt to be discharged home with SBA to supervision at the walker level.)  End of Session Patient Position: Up in chair, Alarm on     PT Plan  Treatment/Interventions: Bed mobility, Transfer training, Gait training, Stair training, Balance training, Strengthening, Endurance training, Therapeutic exercise, Therapeutic activity, Home exercise program  PT Plan: Skilled PT  PT Frequency: 5 times per week (6 times prn)  PT Discharge Recommendations:  (Home health care PT as needed.  Will require spouse assist with higher level functional activities.)  Equipment Recommended upon Discharge: Wheeled walker  PT Recommended Transfer Status:  (At this time, pt requires mod assist overall)      General Visit Information:      General  Patient Position Received: Up in chair  General Comment: mejia catheter    Subjective   Precautions:  Precautions  Precautions Comment:  (Fall precautions)  Vital Signs:       Objective   Pain:  Pain Assessment  Pain Assessment: 0-10  Pain Score: 0 - No pain  Cognition:  Cognition  Overall Cognitive Status: Within Functional Limits  Postural Control:     Extremity/Trunk Assessments:                    Activity Tolerance:     Treatments:  Therapeutic Exercise  Therapeutic Exercise Performed: Yes  "(Seated HR/TR, ADD ball squeeze, LAQ, glute set, seated march x20 B.)                                  Ambulation/Gait Training  Ambulation/Gait Training Performed: Yes (Ambulated 100' x3, with 2WW, with min Ax1.)  Transfers  Transfer: Yes (Sit to stand CGA. Stand to sit CGA.)    Stairs  Stairs: Yes (2x nonreciporcal ascend/descend 3x 4\" steps, with mod Ax1, with VC for sequencing. Attempted reciporcal stair negotiation, but patient unable to complete this date secondary to LE weakness, retropulsion, and fear of falling.)                Outcome Measures:           Education Documentation  No documentation found.  Education Comments  No comments found.        OP EDUCATION:  Education  Individual(s) Educated: Patient  Education Provided: Body Mechanics, Fall Risk, Home Safety, Posture  Education Comment:  (Pt educated on condition, recovery process, rehab process, safe mobility techniques, treatment intervention and goals of treatment.)    Encounter Problems       Encounter Problems (Active)       Mobility       LTG - Bed mobility mod independent (Progressing)       Start:  10/06/23    Expected End:  10/13/23            LTG - Transfers with supervision. (Progressing)       Start:  10/06/23    Expected End:  10/13/23            LTG - Pt will amb 100 ft with wheeled walker and supervision (Progressing)       Start:  10/06/23    Expected End:  10/13/23            LTG - Pt will negotiate 5 stairs with rails and CGA (Progressing)       Start:  10/06/23    Expected End:  10/13/23            STG - Bed mobility with min assist. (Progressing)       Start:  10/06/23    Expected End:  10/10/23            STG - Transfers with CGA (Progressing)       Start:  10/06/23    Expected End:  10/10/23            STG - Pt will amb 50 ft x3 with wheeled walker and CGA (Progressing)       Start:  10/06/23    Expected End:  10/10/23            STG -Pt will negotiate 3 stairs with rails and min assist. (Progressing)       Start:  10/06/23    " Expected End:  10/10/23

## 2023-10-07 NOTE — PROGRESS NOTES
Physical Therapy    Physical Therapy Treatment    Patient Name: Gabriella Haskins  MRN: [id number]  Today's Date: 10/7/2023  Time Calculation  Start Time: 0915  Stop Time: 1000  Time Calculation (min): 45 min       Assessment/Plan   PT Assessment  PT Assessment Results: Decreased strength, Decreased endurance, Impaired balance, Decreased mobility, Decreased safety awareness, Decreased cognition  Rehab Prognosis: Good  Barriers to Discharge:  (Impaired memory and cognition)  Evaluation/Treatment Tolerance: Patient tolerated treatment well  Strengths: Housing layout, Premorbid level of function, Rehab experience, Support of Caregivers  End of Session Communication: Bedside nurse  Assessment Comment:  (Anticipate pt to be discharged home with SBA to supervision at the walker level.)  End of Session Patient Position: Up in chair, Alarm on     PT Plan  Treatment/Interventions: Bed mobility, Transfer training, Gait training, Stair training, Balance training, Strengthening, Endurance training, Therapeutic exercise, Therapeutic activity, Home exercise program  PT Plan: Skilled PT  PT Frequency: 5 times per week (6 times prn)  PT Discharge Recommendations:  (Home health care PT as needed.  Will require spouse assist with higher level functional activities.)  Equipment Recommended upon Discharge: Wheeled walker  PT Recommended Transfer Status:  (At this time, pt requires mod assist overall)      General Visit Information:      General  Patient Position Received: Up in chair  General Comment: mejia catheter    Subjective   Precautions:  Precautions  Precautions Comment:  (Fall precautions)  Vital Signs:       Objective   Pain:  Pain Assessment  Pain Assessment: 0-10  Pain Score: 0 - No pain  Cognition:  Cognition  Overall Cognitive Status: Within Functional Limits  Postural Control:     Extremity/Trunk Assessments:                    Activity Tolerance:     Treatments:  Therapeutic Exercise  Therapeutic Exercise Performed: Yes  "(Seated glute set, HR/TR, LAQ, march, ADD ball squeeze x20 B.)                             Ambulation/Gait Training  Ambulation/Gait Training Performed: Yes (Patient ambulated 100' x2, with 2WW, with min A x1.)  Transfers  Transfer: Yes (Sit to stand with CGA, with 2WW. Stand to sit with CGA, with 2WW.)    Stairs  Stairs: Yes (2x nonreciporcal ascend/descend 4\" stairs, with B rail, with mod Ax1, with VC for sequencing, with line management for mejia catheter.)                Outcome Measures:           Education Documentation  No documentation found.  Education Comments  No comments found.        OP EDUCATION:  Education  Individual(s) Educated: Patient  Education Provided: Body Mechanics, Fall Risk, Home Safety, Posture  Education Comment:  (Pt educated on condition, recovery process, rehab process, safe mobility techniques, treatment intervention and goals of treatment.)    Encounter Problems       Encounter Problems (Active)       Mobility       LTG - Bed mobility mod independent (Progressing)       Start:  10/06/23    Expected End:  10/13/23            LTG - Transfers with supervision. (Progressing)       Start:  10/06/23    Expected End:  10/13/23            LTG - Pt will amb 100 ft with wheeled walker and supervision (Progressing)       Start:  10/06/23    Expected End:  10/13/23            LTG - Pt will negotiate 5 stairs with rails and CGA (Progressing)       Start:  10/06/23    Expected End:  10/13/23            STG - Bed mobility with min assist. (Progressing)       Start:  10/06/23    Expected End:  10/10/23            STG - Transfers with CGA (Progressing)       Start:  10/06/23    Expected End:  10/10/23            STG - Pt will amb 50 ft x3 with wheeled walker and CGA (Progressing)       Start:  10/06/23    Expected End:  10/10/23            STG -Pt will negotiate 3 stairs with rails and min assist. (Progressing)       Start:  10/06/23    Expected End:  10/10/23               Mobility       LTG - Pt to " complete functional mobility via sba using wwalker (Progressing)       Start:  10/06/23    Expected End:  10/13/23            STG -Pt to complete functional mobility via cga (Progressing)       Start:  10/06/23    Expected End:  10/11/23               Transfers       LTG - Patient will transfer to commode sba (Progressing)       Start:  10/06/23    Expected End:  10/13/23            LTG - Patient will transfer to tub/shower/dme cga (Progressing)       Start:  10/06/23    Expected End:  10/13/23            STG - Patient will perform toilet transfer cga (Progressing)       Start:  10/06/23    Expected End:  10/11/23            STG - Patient will perform tub/shower transfer min/mod a (Progressing)       Start:  10/06/23    Expected End:  10/11/23

## 2023-10-07 NOTE — CARE PLAN
The patient's goals for the shift include      The clinical goals for the shift include WILL USE CALL LIGHT FOR ALL MOBILITY/ASSIST NEEDS AND WAIT FOR STAFF TO RESPOND.      Problem: Fall/Injury  Goal: Not fall by end of shift  Outcome: Met  Goal: Be free from injury by end of the shift  Outcome: Met  Goal: Verbalize understanding of personal risk factors for fall in the hospital  Outcome: Met     Problem: Pain  Goal: Takes deep breaths with improved pain control throughout the shift  Outcome: Met  Goal: Turns in bed with improved pain control throughout the shift  Outcome: Met     Problem: Skin  Goal: Prevent/minimize sheer/friction injuries  Outcome: Met  Goal: Promote/optimize nutrition  Outcome: Met  Goal: Promote skin healing  Outcome: Met

## 2023-10-08 LAB
ANION GAP SERPL CALC-SCNC: 9 MMOL/L (ref 10–20)
BASOPHILS # BLD MANUAL: 0 X10*3/UL (ref 0–0.1)
BASOPHILS NFR BLD MANUAL: 0 %
BUN SERPL-MCNC: 25 MG/DL (ref 6–23)
CALCIUM SERPL-MCNC: 8.6 MG/DL (ref 8.6–10.3)
CHLORIDE SERPL-SCNC: 100 MMOL/L (ref 98–107)
CO2 SERPL-SCNC: 30 MMOL/L (ref 21–32)
CREAT SERPL-MCNC: 1.57 MG/DL (ref 0.5–1.05)
EOSINOPHIL # BLD MANUAL: 0.17 X10*3/UL (ref 0–0.4)
EOSINOPHIL NFR BLD MANUAL: 1 %
ERYTHROCYTE [DISTWIDTH] IN BLOOD BY AUTOMATED COUNT: 12.6 % (ref 11.5–14.5)
GFR SERPL CREATININE-BSD FRML MDRD: 34 ML/MIN/1.73M*2
GLUCOSE SERPL-MCNC: 88 MG/DL (ref 74–99)
HCT VFR BLD AUTO: 26.7 % (ref 36–46)
HGB BLD-MCNC: 8.4 G/DL (ref 12–16)
IMM GRANULOCYTES # BLD AUTO: 1.13 X10*3/UL (ref 0–0.5)
IMM GRANULOCYTES NFR BLD AUTO: 6.7 % (ref 0–0.9)
LYMPHOCYTES # BLD MANUAL: 1.51 X10*3/UL (ref 0.8–3)
LYMPHOCYTES NFR BLD MANUAL: 9 %
MCH RBC QN AUTO: 28.2 PG (ref 26–34)
MCHC RBC AUTO-ENTMCNC: 31.5 G/DL (ref 32–36)
MCV RBC AUTO: 90 FL (ref 80–100)
METAMYELOCYTES # BLD MANUAL: 0.34 X10*3/UL
METAMYELOCYTES NFR BLD MANUAL: 2 %
MONOCYTES # BLD MANUAL: 1.01 X10*3/UL (ref 0.05–0.8)
MONOCYTES NFR BLD MANUAL: 6 %
MYELOCYTES # BLD MANUAL: 0.34 X10*3/UL
MYELOCYTES NFR BLD MANUAL: 2 %
NEUTROPHILS # BLD MANUAL: 13.44 X10*3/UL (ref 1.6–5.5)
NEUTS BAND # BLD MANUAL: 0.5 X10*3/UL (ref 0–0.5)
NEUTS BAND NFR BLD MANUAL: 3 %
NEUTS SEG # BLD MANUAL: 12.94 X10*3/UL (ref 1.6–5)
NEUTS SEG NFR BLD MANUAL: 77 %
NRBC BLD-RTO: 0 /100 WBCS (ref 0–0)
OVALOCYTES BLD QL SMEAR: ABNORMAL
PLATELET # BLD AUTO: 290 X10*3/UL (ref 150–450)
PMV BLD AUTO: 8.6 FL (ref 7.5–11.5)
POTASSIUM SERPL-SCNC: 4.1 MMOL/L (ref 3.5–5.3)
RBC # BLD AUTO: 2.98 X10*6/UL (ref 4–5.2)
RBC MORPH BLD: ABNORMAL
SODIUM SERPL-SCNC: 135 MMOL/L (ref 136–145)
TOTAL CELLS COUNTED BLD: 100
WBC # BLD AUTO: 16.8 X10*3/UL (ref 4.4–11.3)

## 2023-10-08 PROCEDURE — 36415 COLL VENOUS BLD VENIPUNCTURE: CPT | Performed by: PHYSICAL MEDICINE & REHABILITATION

## 2023-10-08 PROCEDURE — 85027 COMPLETE CBC AUTOMATED: CPT | Performed by: PHYSICAL MEDICINE & REHABILITATION

## 2023-10-08 PROCEDURE — 2500000004 HC RX 250 GENERAL PHARMACY W/ HCPCS (ALT 636 FOR OP/ED): Performed by: PHYSICAL MEDICINE & REHABILITATION

## 2023-10-08 PROCEDURE — 2500000001 HC RX 250 WO HCPCS SELF ADMINISTERED DRUGS (ALT 637 FOR MEDICARE OP): Performed by: PHYSICAL MEDICINE & REHABILITATION

## 2023-10-08 PROCEDURE — 3490 HC RX 250 GENERAL PHARMACY W/ HCPCS (ALT 636 FOR OP/ED): Performed by: PHYSICAL MEDICINE & REHABILITATION

## 2023-10-08 PROCEDURE — 1280000001 HC REHAB SEMI-PRIVATE ROOM DAILY

## 2023-10-08 PROCEDURE — 82374 ASSAY BLOOD CARBON DIOXIDE: CPT | Performed by: PHYSICAL MEDICINE & REHABILITATION

## 2023-10-08 PROCEDURE — 85007 BL SMEAR W/DIFF WBC COUNT: CPT | Performed by: PHYSICAL MEDICINE & REHABILITATION

## 2023-10-08 RX ADMIN — SENNOSIDES 8.6 MG: 8.6 TABLET, FILM COATED ORAL at 20:42

## 2023-10-08 RX ADMIN — SODIUM CHLORIDE 1 DROP: 50 SOLUTION OPHTHALMIC at 02:19

## 2023-10-08 RX ADMIN — OXYCODONE HYDROCHLORIDE AND ACETAMINOPHEN 1 TABLET: 5; 325 TABLET ORAL at 06:02

## 2023-10-08 RX ADMIN — METOPROLOL TARTRATE 12.5 MG: 25 TABLET, FILM COATED ORAL at 09:10

## 2023-10-08 RX ADMIN — CIPROFLOXACIN 250 MG: 500 TABLET, FILM COATED ORAL at 18:32

## 2023-10-08 RX ADMIN — LORATADINE 10 MG: 10 TABLET ORAL at 09:10

## 2023-10-08 RX ADMIN — FERROUS SULFATE TAB 325 MG (65 MG ELEMENTAL FE) 65 MG OF IRON: 325 (65 FE) TAB at 09:10

## 2023-10-08 RX ADMIN — SODIUM CHLORIDE 1 DROP: 50 SOLUTION OPHTHALMIC at 05:50

## 2023-10-08 RX ADMIN — SODIUM CHLORIDE 1 DROP: 20 SOLUTION OPHTHALMIC at 22:45

## 2023-10-08 RX ADMIN — LEVOTHYROXINE SODIUM 112 MCG: 0.11 TABLET ORAL at 05:50

## 2023-10-08 RX ADMIN — SODIUM CHLORIDE 1 DROP: 20 SOLUTION OPHTHALMIC at 14:45

## 2023-10-08 RX ADMIN — KETOCONAZOLE: 20 CREAM TOPICAL at 09:11

## 2023-10-08 RX ADMIN — METOPROLOL TARTRATE 12.5 MG: 25 TABLET, FILM COATED ORAL at 20:45

## 2023-10-08 RX ADMIN — CIPROFLOXACIN 250 MG: 500 TABLET, FILM COATED ORAL at 05:50

## 2023-10-08 RX ADMIN — Medication 3 MG: at 20:42

## 2023-10-08 RX ADMIN — PREDNISONE 20 MG: 20 TABLET ORAL at 09:10

## 2023-10-08 RX ADMIN — KETOCONAZOLE: 20 CREAM TOPICAL at 20:47

## 2023-10-08 ASSESSMENT — PAIN - FUNCTIONAL ASSESSMENT
PAIN_FUNCTIONAL_ASSESSMENT: 0-10

## 2023-10-08 ASSESSMENT — PAIN SCALES - GENERAL
PAINLEVEL_OUTOF10: 5 - MODERATE PAIN
PAINLEVEL_OUTOF10: 0 - NO PAIN
PAINLEVEL_OUTOF10: 0 - NO PAIN
PAINLEVEL_OUTOF10: 7

## 2023-10-08 NOTE — CARE PLAN
The patient's goals for the shift include  decreased right flank pain  Problem: Fall/Injury  Goal: Not fall by end of shift  Outcome: Progressing  Goal: Be free from injury by end of the shift  Outcome: Progressing  Goal: Verbalize understanding of personal risk factors for fall in the hospital  Outcome: Progressing  Goal: Verbalize understanding of risk factor reduction measures to prevent injury from fall in the home  Outcome: Progressing  Goal: Use assistive devices by end of the shift  Outcome: Progressing  Goal: Pace activities to prevent fatigue by end of the shift  Outcome: Progressing     Problem: Indwelling Catheter Maintenance  Goal: I will have no complications from indwelling catheter  Outcome: Progressing  Goal: Absence of fever/infection during anticipated neutropenic period  Outcome: Progressing     Problem: Pain - Adult  Goal: Verbalizes/displays adequate comfort level or baseline comfort level  Outcome: Progressing     Problem: Infection - Adult  Goal: Absence of infection at discharge  Outcome: Progressing  Goal: Absence of infection during hospitalization  Outcome: Progressing  Goal: Absence of fever/infection during anticipated neutropenic period  Outcome: Progressing     Problem: Safety - Adult  Goal: Free from fall injury  Outcome: Progressing     Problem: Chronic Conditions and Co-morbidities  Goal: Patient's chronic conditions and co-morbidity symptoms are monitored and maintained or improved  Outcome: Progressing       The clinical goals for the shift include pt. will maintain patent indwelling mejia catheter thruout shift

## 2023-10-08 NOTE — CARE PLAN
Problem: Dressings Lower Extremities  Goal: LTG - Patient will dress lower body sba  Outcome: Progressing     Problem: Dressing Upper Extremities  Goal: LTG - Patient will complete upper body dressing setup  Outcome: Progressing  Goal: STG - Patient will dress upper body sba  Outcome: Progressing     Problem: Grooming  Goal: LTG - Patient will complete daily grooming tasks setup  Outcome: Progressing  Goal: STG - Patient completes grooming supervision  Outcome: Progressing     Problem: Toileting  Goal: LTG - Patient will complete daily toileting tasks sba  Outcome: Progressing  Goal: STG - Patient will complete toileting tasks with min a  Outcome: Progressing     Problem: Indwelling Catheter Maintenance  Goal: I will have no complications from indwelling catheter  Outcome: Progressing  Goal: Absence of fever/infection during anticipated neutropenic period  Outcome: Progressing      The clinical goals for the shift include Pain management and safety

## 2023-10-09 PROCEDURE — 2500000004 HC RX 250 GENERAL PHARMACY W/ HCPCS (ALT 636 FOR OP/ED): Performed by: PHYSICAL MEDICINE & REHABILITATION

## 2023-10-09 PROCEDURE — 3490 HC RX 250 GENERAL PHARMACY W/ HCPCS (ALT 636 FOR OP/ED): Performed by: PHYSICAL MEDICINE & REHABILITATION

## 2023-10-09 PROCEDURE — 97116 GAIT TRAINING THERAPY: CPT | Mod: GP

## 2023-10-09 PROCEDURE — 2500000001 HC RX 250 WO HCPCS SELF ADMINISTERED DRUGS (ALT 637 FOR MEDICARE OP): Performed by: PHYSICAL MEDICINE & REHABILITATION

## 2023-10-09 PROCEDURE — 97530 THERAPEUTIC ACTIVITIES: CPT | Mod: GO

## 2023-10-09 PROCEDURE — 97110 THERAPEUTIC EXERCISES: CPT | Mod: GO

## 2023-10-09 PROCEDURE — 97110 THERAPEUTIC EXERCISES: CPT | Mod: GP

## 2023-10-09 PROCEDURE — 97535 SELF CARE MNGMENT TRAINING: CPT | Mod: GO

## 2023-10-09 PROCEDURE — 1280000001 HC REHAB SEMI-PRIVATE ROOM DAILY

## 2023-10-09 RX ADMIN — LEVOTHYROXINE SODIUM 112 MCG: 0.11 TABLET ORAL at 06:32

## 2023-10-09 RX ADMIN — LORATADINE 10 MG: 10 TABLET ORAL at 08:29

## 2023-10-09 RX ADMIN — SODIUM CHLORIDE 1 DROP: 20 SOLUTION OPHTHALMIC at 18:45

## 2023-10-09 RX ADMIN — SODIUM CHLORIDE 1 DROP: 20 SOLUTION OPHTHALMIC at 14:45

## 2023-10-09 RX ADMIN — CIPROFLOXACIN 250 MG: 500 TABLET, FILM COATED ORAL at 17:55

## 2023-10-09 RX ADMIN — CIPROFLOXACIN 250 MG: 500 TABLET, FILM COATED ORAL at 06:32

## 2023-10-09 RX ADMIN — SENNOSIDES 8.6 MG: 8.6 TABLET, FILM COATED ORAL at 20:35

## 2023-10-09 RX ADMIN — SODIUM CHLORIDE 1 DROP: 20 SOLUTION OPHTHALMIC at 06:45

## 2023-10-09 RX ADMIN — Medication 3 MG: at 20:35

## 2023-10-09 RX ADMIN — SODIUM CHLORIDE 1 DROP: 20 SOLUTION OPHTHALMIC at 02:45

## 2023-10-09 RX ADMIN — KETOCONAZOLE: 20 CREAM TOPICAL at 08:30

## 2023-10-09 RX ADMIN — METOPROLOL TARTRATE 12.5 MG: 25 TABLET, FILM COATED ORAL at 08:29

## 2023-10-09 RX ADMIN — PREDNISONE 20 MG: 20 TABLET ORAL at 08:29

## 2023-10-09 RX ADMIN — SODIUM CHLORIDE 1 DROP: 20 SOLUTION OPHTHALMIC at 10:45

## 2023-10-09 RX ADMIN — METOPROLOL TARTRATE 12.5 MG: 25 TABLET, FILM COATED ORAL at 20:35

## 2023-10-09 RX ADMIN — SODIUM CHLORIDE 1 DROP: 20 SOLUTION OPHTHALMIC at 22:45

## 2023-10-09 ASSESSMENT — COGNITIVE AND FUNCTIONAL STATUS - GENERAL: MOBILITY SCORE: 24

## 2023-10-09 ASSESSMENT — PAIN - FUNCTIONAL ASSESSMENT
PAIN_FUNCTIONAL_ASSESSMENT: 0-10

## 2023-10-09 ASSESSMENT — ACTIVITIES OF DAILY LIVING (ADL): HOME_MANAGEMENT_TIME_ENTRY: 30

## 2023-10-09 ASSESSMENT — PAIN SCALES - GENERAL
PAINLEVEL_OUTOF10: 0 - NO PAIN

## 2023-10-09 NOTE — PROGRESS NOTES
Occupational Therapy    OT Treatment    Patient Name: Gabriella Haskins  MRN: [id number]  Today's Date: 10/9/2023  Time Calculation  Start Time: 0755  Stop Time: 0825  Time Calculation (min): 30 min       Subjective   General:  OT Received On: 10/09/23  General Comment: catheter    Objective    Activities of Daily Living: Grooming  Grooming Comments:  (brushes teeth, washes face, and brushes hair with supervision)    UE Dressing  UE Dressing Comments: Supervision    LE Dressing  LE Dressing:  (Dons pants over feet/up legs with Min A; dependent for threading catheter through, pulls up over hips with Min A standing at grab bar in bathroom)  Functional Standing Tolerance:     Bed Mobility/Transfers: Transfers  Transfer:  (CGA)    EDUCATION:  Education provided on safety during dressing.  Goals:  Encounter Problems       Encounter Problems (Active)       Bathing       LTG - Patient will utilize adaptive techniques to bathe body sba (Progressing)       Start:  10/06/23    Expected End:  10/13/23            STG - Patient will bathe body min a (Progressing)       Start:  10/06/23    Expected End:  10/11/23               Dressing Upper Extremities       LTG - Patient will complete upper body dressing setup (Progressing)       Start:  10/06/23    Expected End:  10/13/23            STG - Patient will dress upper body sba (Progressing)       Start:  10/06/23    Expected End:  10/11/23               Dressings Lower Extremities       LTG - Patient will dress lower body sba (Progressing)       Start:  10/06/23    Expected End:  10/13/23            STG - Patient will complete lower body dressing min a (Progressing)       Start:  10/06/23    Expected End:  10/11/23               Grooming       LTG - Patient will complete daily grooming tasks setup (Progressing)       Start:  10/06/23    Expected End:  10/13/23            STG - Patient completes grooming supervision (Progressing)       Start:  10/06/23    Expected End:  10/11/23                Instrumental Activities of Daily Living       LTG - Patient will complete simple meal preparation activities sba at walker level (Progressing)       Start:  10/06/23    Expected End:  10/13/23                     Mobility       LTG - Pt to complete functional mobility via sba using wwalker (Progressing)       Start:  10/06/23    Expected End:  10/13/23            STG -Pt to complete functional mobility via cga (Progressing)       Start:  10/06/23    Expected End:  10/11/23               Toileting       LTG - Patient will complete daily toileting tasks sba (Progressing)       Start:  10/06/23    Expected End:  10/13/23            STG - Patient will complete toileting tasks with min a (Progressing)       Start:  10/06/23    Expected End:  10/11/23               Transfers       LTG - Patient will transfer to commode sba (Progressing)       Start:  10/06/23    Expected End:  10/13/23            LTG - Patient will transfer to tub/shower/dme cga (Progressing)       Start:  10/06/23    Expected End:  10/13/23            STG - Patient will perform toilet transfer cga (Progressing)       Start:  10/06/23    Expected End:  10/11/23            STG - Patient will perform tub/shower transfer min/mod a (Progressing)       Start:  10/06/23    Expected End:  10/11/23

## 2023-10-09 NOTE — PROGRESS NOTES
Physical Therapy    Physical Therapy Treatment    Patient Name: Gabriella Haskins  MRN: [id number]  Today's Date: 10/9/2023  Time Calculation  Start Time: 1300  Stop Time: 1345  Time Calculation (min): 45 min       Assessment/Plan   PT Assessment  PT Assessment Results: Decreased strength, Decreased endurance, Impaired balance, Decreased mobility, Decreased safety awareness, Decreased cognition  Rehab Prognosis: Good  Barriers to Discharge:  (Impaired memory and cognition)  Evaluation/Treatment Tolerance: Patient tolerated treatment well  Strengths: Housing layout, Premorbid level of function, Rehab experience, Support of Caregivers  End of Session Communication: Bedside nurse  Assessment Comment:  (Anticipate pt to be discharged home with SBA to supervision at the walker level.)  End of Session Patient Position: Up in chair, Alarm on     PT Plan  Treatment/Interventions: Bed mobility, Transfer training, Gait training, Stair training, Balance training, Strengthening, Endurance training, Therapeutic exercise, Therapeutic activity, Home exercise program  PT Plan: Skilled PT  PT Frequency: 5 times per week (6 times prn)  PT Discharge Recommendations:  (Home health care PT as needed.  Will require spouse assist with higher level functional activities.)  Equipment Recommended upon Discharge: Wheeled walker  PT Recommended Transfer Status:  (At this time, pt requires mod assist overall)      General Visit Information:   PT  Visit  PT Received On: 10/09/23  Response to Previous Treatment: Patient with no complaints from previous session.  General  General Comment: Pt received in therapy gym sitting in W/C. Pleasant and agreeable to therapy, reports being slightly fatigued. Mccarthy attached throughout.    Subjective   Precautions:  Precautions  Precautions Comment: Fall risk, Mccarthy  Vital Signs:       Objective   Pain:  Pain Assessment  Pain Assessment: 0-10  Pain Score: 0 - No pain  Clinical Progression: Not changed               Treatments:  Therapeutic Exercise  Therapeutic Exercise Performed: Yes (Seated BLE therex x15 reps each with 2#: heel/toe raises, LAQs, March, hip add ball squeeze, GS. Standing BLE therex x 10 reps R/L with BUE holding onto hemibar: hip abd, flex, ext; (seated rest break); heel raises, toe raises, HS curls, partial squats.)    Ambulation/Gait Training  Ambulation/Gait Training Performed: Yes (150'x1 with FWW at SBA/CGA; demo fluid, step through gait, staying within frame of walker.)  Transfers  Transfer: Yes (SBA/CGA for multiple sit<>stand transfers at W/C with proper hand placement noted. Pt using momentum to assist into standing.)          Education Documentation  No documentation found.  Education Comments  No comments found.        EDUCATION:  Education  Individual(s) Educated: Patient  Education Provided: Body Mechanics, Fall Risk, Home Safety, Posture  Education Comment:  (Provided cues/demo for proper form with standing therex and edu on benefit)    Encounter Problems       Encounter Problems (Active)       Mobility       LTG - Bed mobility mod independent (Progressing)       Start:  10/06/23    Expected End:  10/13/23            LTG - Transfers with supervision. (Progressing)       Start:  10/06/23    Expected End:  10/13/23            LTG - Pt will amb 100 ft with wheeled walker and supervision (Progressing)       Start:  10/06/23    Expected End:  10/13/23            LTG - Pt will negotiate 5 stairs with rails and CGA (Progressing)       Start:  10/06/23    Expected End:  10/13/23            STG - Bed mobility with min assist. (Progressing)       Start:  10/06/23    Expected End:  10/10/23            STG - Transfers with CGA (Progressing)       Start:  10/06/23    Expected End:  10/10/23            STG - Pt will amb 50 ft x3 with wheeled walker and CGA (Progressing)       Start:  10/06/23    Expected End:  10/10/23            STG -Pt will negotiate 3 stairs with rails and min assist. (Progressing)        Start:  10/06/23    Expected End:  10/10/23

## 2023-10-09 NOTE — PROGRESS NOTES
Occupational Therapy    OT Treatment    Patient Name: Gabriella Haskins  MRN: [id number]  Today's Date: 10/9/2023  Time Calculation  Start Time: 1345  Stop Time: 1430  Time Calculation (min): 45 min                Subjective   General:  OT Received On: 10/09/23  Patient Position Received:  (patient received in wheelchair from PT; at EOS, patient seated in wheelchair, chair alarm engaged, call bell within reach, all needs met)  General Comment: mejia catheter in place throughout    Pain:  Pain Assessment  Pain Assessment: 0-10  Pain Score: 0 - No pain    Objective        Transfers  Transfer:  (completed SPT from wheelchair to recliner chair with WW and CGA and then stand step transfer from recliner chair to wheelchair with support of WW and CGA for safety)       Therapy/Activity: Therapeutic Exercise  Therapeutic Exercise Performed: Yes  Therapeutic Exercise Activity 1: patient completed BUE ther ex with 2 lb free weights and 15 reps x 2 for all planes completed including bicep curls. internal/external rotation, shoulder flexion, shoulder crossovers, and chest punches. tolerated well overall, required rest breaks between sets due to fatigue. completed to increase UB strength for ADLs and transfers/mobility.      Education Documentation  No documentation found.  Education Comments  No comments found.        OP EDUCATION:  Education  Individual(s) Educated: Patient  Education Provided:  (transfer training)  Patient Response to Education: Patient/Caregiver Performed Return Demonstration of Exercises/Activities, Patient/Caregiver Verbalized Understanding of Information      Goals:  Encounter Problems       Encounter Problems (Active)       Bathing       LTG - Patient will utilize adaptive techniques to bathe body sba (Progressing)       Start:  10/06/23    Expected End:  10/13/23            STG - Patient will bathe body min a (Progressing)       Start:  10/06/23    Expected End:  10/11/23               Dressing Upper  Extremities       LTG - Patient will complete upper body dressing setup (Progressing)       Start:  10/06/23    Expected End:  10/13/23            STG - Patient will dress upper body sba (Progressing)       Start:  10/06/23    Expected End:  10/11/23               Dressings Lower Extremities       LTG - Patient will dress lower body sba (Progressing)       Start:  10/06/23    Expected End:  10/13/23            STG - Patient will complete lower body dressing min a (Progressing)       Start:  10/06/23    Expected End:  10/11/23               Grooming       LTG - Patient will complete daily grooming tasks setup (Progressing)       Start:  10/06/23    Expected End:  10/13/23            STG - Patient completes grooming supervision (Progressing)       Start:  10/06/23    Expected End:  10/11/23               Instrumental Activities of Daily Living       LTG - Patient will complete simple meal preparation activities sba at walker level (Progressing)       Start:  10/06/23    Expected End:  10/13/23                       Mobility       LTG - Pt to complete functional mobility via sba using wwalker (Progressing)       Start:  10/06/23    Expected End:  10/13/23            STG -Pt to complete functional mobility via cga (Progressing)       Start:  10/06/23    Expected End:  10/11/23               Toileting       LTG - Patient will complete daily toileting tasks sba (Progressing)       Start:  10/06/23    Expected End:  10/13/23            STG - Patient will complete toileting tasks with min a (Progressing)       Start:  10/06/23    Expected End:  10/11/23               Transfers       LTG - Patient will transfer to commode sba (Progressing)       Start:  10/06/23    Expected End:  10/13/23            LTG - Patient will transfer to tub/shower/dme cga (Progressing)       Start:  10/06/23    Expected End:  10/13/23            STG - Patient will perform toilet transfer cga (Progressing)       Start:  10/06/23    Expected End:   10/11/23            STG - Patient will perform tub/shower transfer min/mod a (Progressing)       Start:  10/06/23    Expected End:  10/11/23

## 2023-10-09 NOTE — CARE PLAN
Problem: Dressings Lower Extremities  Goal: LTG - Patient will dress lower body sba  Outcome: Progressing  Goal: STG - Patient will complete lower body dressing min a  Outcome: Progressing     Problem: Dressing Upper Extremities  Goal: LTG - Patient will complete upper body dressing setup  Outcome: Progressing  Goal: STG - Patient will dress upper body sba  Outcome: Progressing     Problem: Grooming  Goal: LTG - Patient will complete daily grooming tasks setup  Outcome: Progressing  Goal: STG - Patient completes grooming supervision  Outcome: Progressing     Problem: Instrumental Activities of Daily Living  Goal: LTG - Patient will complete simple meal preparation activities sba at walker level  Outcome: Progressing     Problem: Toileting  Goal: LTG - Patient will complete daily toileting tasks sba  Outcome: Progressing  Goal: STG - Patient will complete toileting tasks with min a  Outcome: Progressing     Problem: Bathing  Goal: LTG - Patient will utilize adaptive techniques to bathe body sba  Outcome: Progressing  Goal: STG - Patient will bathe body min a  Outcome: Progressing     Problem: Indwelling Catheter Maintenance  Goal: I will have no complications from indwelling catheter  Outcome: Progressing  Goal: Absence of fever/infection during anticipated neutropenic period  Outcome: Progressing     Problem: Fall/Injury  Goal: Not fall by end of shift  Outcome: Progressing  Goal: Be free from injury by end of the shift  Outcome: Progressing  Goal: Verbalize understanding of personal risk factors for fall in the hospital  Outcome: Progressing  Goal: Verbalize understanding of risk factor reduction measures to prevent injury from fall in the home  Outcome: Progressing  Goal: Use assistive devices by end of the shift  Outcome: Progressing  Goal: Pace activities to prevent fatigue by end of the shift  Outcome: Progressing     Problem: Pain - Adult  Goal: Verbalizes/displays adequate comfort level or baseline comfort  level  Outcome: Progressing     Problem: Infection - Adult  Goal: Absence of infection at discharge  Outcome: Progressing  Goal: Absence of infection during hospitalization  Outcome: Progressing  Goal: Absence of fever/infection during anticipated neutropenic period  Outcome: Progressing     Problem: Safety - Adult  Goal: Free from fall injury  Outcome: Progressing     Problem: Discharge Planning  Goal: Discharge to home or other facility with appropriate resources  Outcome: Progressing     Problem: Chronic Conditions and Co-morbidities  Goal: Patient's chronic conditions and co-morbidity symptoms are monitored and maintained or improved  Outcome: Progressing   The patient's goals for the shift include      The clinical goals for the shift include pt. will maintain patent indwelling mejia catheter thruout shift    Over the shift, the patient did not make progress toward the following goals. Barriers to progression include medical barrier. Recommendations to address these barriers include: continue education q shift.

## 2023-10-09 NOTE — PROGRESS NOTES
Physical Therapy    Physical Therapy Treatment    Patient Name: Gabriella Haskins  MRN: [id number]  Today's Date: 10/9/2023  Time Calculation  Start Time: 0915  Stop Time: 1000  Time Calculation (min): 45 min       Assessment/Plan   PT Assessment  PT Assessment Results: Decreased strength, Decreased endurance, Impaired balance, Decreased mobility, Decreased safety awareness, Decreased cognition  Rehab Prognosis: Good  Barriers to Discharge:  (Impaired memory and cognition)  Evaluation/Treatment Tolerance: Patient tolerated treatment well  Strengths: Housing layout, Premorbid level of function, Rehab experience, Support of Caregivers  End of Session Communication: Bedside nurse  Assessment Comment:  (Anticipate pt to be discharged home with SBA to supervision at the walker level.)  End of Session Patient Position: Up in chair, Alarm on     PT Plan  Treatment/Interventions: Bed mobility, Transfer training, Gait training, Stair training, Balance training, Strengthening, Endurance training, Therapeutic exercise, Therapeutic activity, Home exercise program  PT Plan: Skilled PT  PT Frequency: 5 times per week (6 times prn)  PT Discharge Recommendations:  (Home health care PT as needed.  Will require spouse assist with higher level functional activities.)  Equipment Recommended upon Discharge: Wheeled walker  PT Recommended Transfer Status:  (At this time, pt requires mod assist overall)      General Visit Information:   PT  Visit  PT Received On: 10/09/23  Response to Previous Treatment: Patient with no complaints from previous session.  General  General Comment: Pt received in therapy gym sitting in W/C. Pleasant and agreeable to therapy. Mccarthy attached throughout.    Subjective   Precautions:  Precautions  Precautions Comment: Fall risk, Mccarthy  Vital Signs:       Objective   Pain:  Pain Assessment  Pain Assessment: 0-10  Pain Score: 0 - No pain  Cognition:          Treatments:  Therapeutic Exercise  Therapeutic Exercise  Performed: Yes (Seated BLE therex x 10 reps with 2# ankle weights: heel/toe raises, LAQs, Marches)         Ambulation/Gait Training  Ambulation/Gait Training Performed: Yes (CGA with FWW, amb 200'x1 in gym and then 100'x1 after stair training. Pt demo fluid, step through gait pattern.)  Transfers  Transfer: Yes (CGA for sit<>stand transfers at W/C to FWW; demo proper hand placement. Using momentum to get into standing.)    Stairs  Stairs: Yes (Pt able to negotiate 5 steps x 2 using B HRs and non recip stair pattern, leading up with stronger RLE and weaker LLE down. Pt states that she would prefer using back entrance to get into her house with 2+2 steps while holding onto doorframe for assist.)          Education Documentation  No documentation found.  Education Comments  No comments found.      EDUCATION:  Education  Individual(s) Educated: Patient  Education Provided: Body Mechanics, Fall Risk, Home Safety, Posture  Education Comment: Reviewed proper stair negotiation sequencing and technique for most efficent and safe entry into pt's home.    Encounter Problems       Encounter Problems (Active)       Mobility       LTG - Bed mobility mod independent (Progressing)       Start:  10/06/23    Expected End:  10/13/23            LTG - Transfers with supervision. (Progressing)       Start:  10/06/23    Expected End:  10/13/23            LTG - Pt will amb 100 ft with wheeled walker and supervision (Progressing)       Start:  10/06/23    Expected End:  10/13/23            LTG - Pt will negotiate 5 stairs with rails and CGA (Progressing)       Start:  10/06/23    Expected End:  10/13/23            STG - Bed mobility with min assist. (Progressing)       Start:  10/06/23    Expected End:  10/10/23            STG - Transfers with CGA (Progressing)       Start:  10/06/23    Expected End:  10/10/23            STG - Pt will amb 50 ft x3 with wheeled walker and CGA (Progressing)       Start:  10/06/23    Expected End:  10/10/23             STG -Pt will negotiate 3 stairs with rails and min assist. (Progressing)       Start:  10/06/23    Expected End:  10/10/23               Mobility       LTG - Pt to complete functional mobility via sba using wwalker (Progressing)       Start:  10/06/23    Expected End:  10/13/23            STG -Pt to complete functional mobility via cga (Progressing)       Start:  10/06/23    Expected End:  10/11/23

## 2023-10-09 NOTE — CARE PLAN
Problem: Bathing  Goal: LTG - Patient will utilize adaptive techniques to bathe body sba  Outcome: Progressing  Goal: STG - Patient will bathe body min a  Outcome: Progressing     Problem: Indwelling Catheter Maintenance  Goal: I will have no complications from indwelling catheter  Outcome: Progressing  Goal: Absence of fever/infection during anticipated neutropenic period  Outcome: Progressing     Problem: Fall/Injury  Goal: Not fall by end of shift  Outcome: Progressing  Goal: Be free from injury by end of the shift  Outcome: Progressing  Goal: Verbalize understanding of personal risk factors for fall in the hospital  Outcome: Progressing  Goal: Verbalize understanding of risk factor reduction measures to prevent injury from fall in the home  Outcome: Progressing  Goal: Use assistive devices by end of the shift  Outcome: Progressing  Goal: Pace activities to prevent fatigue by end of the shift  Outcome: Progressing     Problem: Pain - Adult  Goal: Verbalizes/displays adequate comfort level or baseline comfort level  Outcome: Progressing     Problem: Infection - Adult  Goal: Absence of infection at discharge  Outcome: Progressing  Goal: Absence of infection during hospitalization  Outcome: Progressing  Goal: Absence of fever/infection during anticipated neutropenic period  Outcome: Progressing     Problem: Safety - Adult  Goal: Free from fall injury  Outcome: Progressing     Problem: Discharge Planning  Goal: Discharge to home or other facility with appropriate resources  Outcome: Progressing     Problem: Chronic Conditions and Co-morbidities  Goal: Patient's chronic conditions and co-morbidity symptoms are monitored and maintained or improved  Outcome: Progressing     Problem: Fall/Injury  Goal: Not fall by end of shift  Outcome: Progressing  Goal: Be free from injury by end of the shift  Outcome: Progressing  Goal: Verbalize understanding of personal risk factors for fall in the hospital  Outcome:  Progressing  Goal: Verbalize understanding of risk factor reduction measures to prevent injury from fall in the home  Outcome: Progressing  Goal: Use assistive devices by end of the shift  Outcome: Progressing  Goal: Pace activities to prevent fatigue by end of the shift  Outcome: Progressing     Problem: Pain  Goal: Takes deep breaths with improved pain control throughout the shift  Outcome: Progressing  Goal: Turns in bed with improved pain control throughout the shift  Outcome: Progressing  Goal: Walks with improved pain control throughout the shift  Outcome: Progressing  Goal: Performs ADL's with improved pain control throughout shift  Outcome: Progressing  Goal: Participates in PT with improved pain control throughout the shift  Outcome: Progressing  Goal: Free from opioid side effects throughout the shift  Outcome: Progressing  Goal: Free from acute confusion related to pain meds throughout the shift  Outcome: Progressing     Problem: Skin  Goal: Decreased wound size/increased tissue granulation at next dressing change  Outcome: Progressing  Goal: Participates in plan/prevention/treatment measures  Outcome: Progressing  Goal: Prevent/manage excess moisture  Outcome: Progressing  Goal: Prevent/minimize sheer/friction injuries  Outcome: Progressing  Goal: Promote/optimize nutrition  Outcome: Progressing  Goal: Promote skin healing  Outcome: Progressing   The patient's goals for the shift include      The clinical goals for the shift include Pain management and safety

## 2023-10-09 NOTE — PROGRESS NOTES
"Gabriella Haskins is a 78 y.o. female on day 4 of admission presenting with Debility.    Subjective   Patient seen today in room.  She is not in any distress or pain.  She is concerned because she is still having hematuria in her Mejia catheter.  She denies chest pain or shortness of breath.  10 review of systems are negative       Objective       Physical Exam  Constitutional:       Appearance: Normal appearance.  She has a Mejia catheter draining bloody urine  HENT:      Head: Normocephalic and atraumatic.   Eyes:      Extraocular Movements: Extraocular movements intact.   No scleral redness  Cardiovascular:      Rate and Rhythm: Normal rate and regular rhythm, no gallops or murmurs  Pulmonary:      Effort: Pulmonary effort is normal.      Breath sounds: Normal breath sounds.   Abdominal:      General: Bowel sounds are normal. There is no distension.      Palpations: Abdomen is soft. ,  There is no tenderness or organomegaly    Musculoskeletal:         General: No swelling or tenderness.      Comments: ROM is WFL  1+ lower extremity edema is present  Skin:     General: Skin is warm and dry.   Neurological:      General: No focal deficit present.      Mental Status: She is alert and oriented to person, place, and time. Mental status is at baseline.   Psychiatric:         Mood and Affect: Mood normal.      Mejia in place. Hematuria present  Function: CGA to min A for mejia management   Assessment/Plan        Last Recorded Vitals  Blood pressure 150/70, pulse 74, temperature 36.4 °C (97.5 °F), temperature source Temporal, resp. rate 18, height 1.676 m (5' 5.98\"), weight 75.5 kg (166 lb 7.2 oz), SpO2 96 %.    Therapy notes from last 24 hours reviewed.    Relevant Results        Scheduled medications  ciprofloxacin, 250 mg, oral, BID  colchicine (gout), 0.6 mg, oral, Once  ferrous sulfate, 65 mg of iron, oral, Every other day  ketoconazole, , Topical, BID  levothyroxine, 112 mcg, oral, Daily  loratadine, 10 mg, oral, " Daily  metoprolol tartrate, 12.5 mg, oral, BID  predniSONE, 20 mg, oral, Daily  sennosides, 1 tablet, oral, Nightly  sodium chloride, 1 drop, Both Eyes, q4h      Continuous medications     PRN medications  PRN medications: acetaminophen **OR** acetaminophen, alum-mag hydroxide-simeth, bisacodyl, bisacodyl, melatonin, oxyCODONE-acetaminophen     No results found for this or any previous visit (from the past 24 hour(s)).              Assessment/Plan   Principal Problem:    Debility  Active Problems:    Hematuria    Leg pain      7/7  -Patient here due to general debility after hospital stay with a Mccarthy catheter.  -Will be discharged home with the Mccarthy catheter.  Education regarding care  -Hematuria of unclear etiology.  Recheck H&H  -Pain in the left lower extremity improved with the prednisone and colchicine.       I spent 25 minutes in the professional and overall care of this patient.      Kary Gallardo, DO    10/9  Patient is doing well.  She continues with hematuria.  She does have 1+ edema however her creatinine is elevated.  We will repeat a BMP and use BRANDY hose or Tubigrip's as tolerated  BP is 150/70, continue Metroprolol  Right lower extremity pain continue colchicine  Urinary tract infection continue Cipro  She is overall functioning at a contact-guard to min assist level  Time spent reviewing records, examining patient, and documentation is 25 minutes

## 2023-10-09 NOTE — PROGRESS NOTES
Occupational Therapy    OT Treatment    Patient Name: Gabriella Haskins  MRN: [id number]  Today's Date: 10/9/2023  Time Calculation  Start Time: 0830  Stop Time: 0915  Time Calculation (min): 45 min         Assessment:        Plan:          Subjective   General:  OT Received On: 10/09/23     Vital Signs:     Pain:  Pain Assessment  Pain Assessment: 0-10  Pain Score: 0 - No pain    Objective        Education  Individual(s) Educated: Patient  Education Provided: Completed walker safety with verbal/ written instructions and demonstrations expressing good understanding and carryover.     Goals:  Encounter Problems       Encounter Problems (Active)       Bathing       LTG - Patient will utilize adaptive techniques to bathe body sba (Progressing)       Start:  10/06/23    Expected End:  10/13/23            STG - Patient will bathe body min a (Progressing)       Start:  10/06/23    Expected End:  10/11/23               Dressing Upper Extremities       LTG - Patient will complete upper body dressing setup (Progressing)       Start:  10/06/23    Expected End:  10/13/23            STG - Patient will dress upper body sba (Progressing)       Start:  10/06/23    Expected End:  10/11/23               Dressings Lower Extremities       LTG - Patient will dress lower body sba (Progressing)       Start:  10/06/23    Expected End:  10/13/23            STG - Patient will complete lower body dressing min a (Progressing)       Start:  10/06/23    Expected End:  10/11/23               Grooming       LTG - Patient will complete daily grooming tasks setup (Progressing)       Start:  10/06/23    Expected End:  10/13/23            STG - Patient completes grooming supervision (Progressing)       Start:  10/06/23    Expected End:  10/11/23               Instrumental Activities of Daily Living       LTG - Patient will complete simple meal preparation activities sba at walker level (Progressing)       Start:  10/06/23    Expected End:  10/13/23                        Mobility       LTG - Pt to complete functional mobility via sba using wwalker (Progressing)       Start:  10/06/23    Expected End:  10/13/23            STG -Pt to complete functional mobility via cga (Progressing)       Start:  10/06/23    Expected End:  10/11/23               Toileting       LTG - Patient will complete daily toileting tasks sba (Progressing)       Start:  10/06/23    Expected End:  10/13/23            STG - Patient will complete toileting tasks with min a (Progressing)       Start:  10/06/23    Expected End:  10/11/23               Transfers       LTG - Patient will transfer to commode sba (Progressing)       Start:  10/06/23    Expected End:  10/13/23            LTG - Patient will transfer to tub/shower/dme cga (Progressing)       Start:  10/06/23    Expected End:  10/13/23            STG - Patient will perform toilet transfer cga (Progressing)       Start:  10/06/23    Expected End:  10/11/23            STG - Patient will perform tub/shower transfer min/mod a (Progressing)       Start:  10/06/23    Expected End:  10/11/23

## 2023-10-10 LAB
ANION GAP SERPL CALC-SCNC: 11 MMOL/L (ref 10–20)
BUN SERPL-MCNC: 22 MG/DL (ref 6–23)
CALCIUM SERPL-MCNC: 8.6 MG/DL (ref 8.6–10.3)
CHLORIDE SERPL-SCNC: 101 MMOL/L (ref 98–107)
CO2 SERPL-SCNC: 30 MMOL/L (ref 21–32)
CREAT SERPL-MCNC: 1.3 MG/DL (ref 0.5–1.05)
GFR SERPL CREATININE-BSD FRML MDRD: 42 ML/MIN/1.73M*2
GLUCOSE SERPL-MCNC: 86 MG/DL (ref 74–99)
POTASSIUM SERPL-SCNC: 3.9 MMOL/L (ref 3.5–5.3)
SODIUM SERPL-SCNC: 138 MMOL/L (ref 136–145)

## 2023-10-10 PROCEDURE — 36415 COLL VENOUS BLD VENIPUNCTURE: CPT | Performed by: PHYSICAL MEDICINE & REHABILITATION

## 2023-10-10 PROCEDURE — 97530 THERAPEUTIC ACTIVITIES: CPT | Mod: GO

## 2023-10-10 PROCEDURE — 1280000001 HC REHAB SEMI-PRIVATE ROOM DAILY

## 2023-10-10 PROCEDURE — 97116 GAIT TRAINING THERAPY: CPT | Mod: GP

## 2023-10-10 PROCEDURE — 97535 SELF CARE MNGMENT TRAINING: CPT | Mod: GO

## 2023-10-10 PROCEDURE — 97110 THERAPEUTIC EXERCISES: CPT | Mod: GP

## 2023-10-10 PROCEDURE — 80048 BASIC METABOLIC PNL TOTAL CA: CPT | Performed by: PHYSICAL MEDICINE & REHABILITATION

## 2023-10-10 PROCEDURE — 3490 HC RX 250 GENERAL PHARMACY W/ HCPCS (ALT 636 FOR OP/ED): Performed by: PHYSICAL MEDICINE & REHABILITATION

## 2023-10-10 PROCEDURE — 99231 SBSQ HOSP IP/OBS SF/LOW 25: CPT | Performed by: PHYSICAL MEDICINE & REHABILITATION

## 2023-10-10 PROCEDURE — 51702 INSERT TEMP BLADDER CATH: CPT

## 2023-10-10 PROCEDURE — 97530 THERAPEUTIC ACTIVITIES: CPT | Mod: GP

## 2023-10-10 PROCEDURE — 97110 THERAPEUTIC EXERCISES: CPT | Mod: GO

## 2023-10-10 PROCEDURE — 2500000001 HC RX 250 WO HCPCS SELF ADMINISTERED DRUGS (ALT 637 FOR MEDICARE OP): Performed by: PHYSICAL MEDICINE & REHABILITATION

## 2023-10-10 PROCEDURE — 2500000004 HC RX 250 GENERAL PHARMACY W/ HCPCS (ALT 636 FOR OP/ED): Performed by: PHYSICAL MEDICINE & REHABILITATION

## 2023-10-10 RX ADMIN — SODIUM CHLORIDE 1 DROP: 20 SOLUTION OPHTHALMIC at 16:00

## 2023-10-10 RX ADMIN — KETOCONAZOLE: 20 CREAM TOPICAL at 21:03

## 2023-10-10 RX ADMIN — METOPROLOL TARTRATE 12.5 MG: 25 TABLET, FILM COATED ORAL at 08:20

## 2023-10-10 RX ADMIN — CIPROFLOXACIN 250 MG: 500 TABLET, FILM COATED ORAL at 06:05

## 2023-10-10 RX ADMIN — LEVOTHYROXINE SODIUM 112 MCG: 0.11 TABLET ORAL at 06:05

## 2023-10-10 RX ADMIN — SODIUM CHLORIDE 1 DROP: 20 SOLUTION OPHTHALMIC at 22:45

## 2023-10-10 RX ADMIN — PREDNISONE 20 MG: 20 TABLET ORAL at 08:21

## 2023-10-10 RX ADMIN — SODIUM CHLORIDE 1 DROP: 20 SOLUTION OPHTHALMIC at 06:45

## 2023-10-10 RX ADMIN — SODIUM CHLORIDE 1 DROP: 20 SOLUTION OPHTHALMIC at 02:45

## 2023-10-10 RX ADMIN — LORATADINE 10 MG: 10 TABLET ORAL at 08:21

## 2023-10-10 RX ADMIN — METOPROLOL TARTRATE 12.5 MG: 25 TABLET, FILM COATED ORAL at 21:08

## 2023-10-10 RX ADMIN — KETOCONAZOLE: 20 CREAM TOPICAL at 08:23

## 2023-10-10 RX ADMIN — CIPROFLOXACIN 250 MG: 500 TABLET, FILM COATED ORAL at 15:57

## 2023-10-10 RX ADMIN — SENNOSIDES 8.6 MG: 8.6 TABLET, FILM COATED ORAL at 21:00

## 2023-10-10 RX ADMIN — FERROUS SULFATE TAB 325 MG (65 MG ELEMENTAL FE) 65 MG OF IRON: 325 (65 FE) TAB at 08:20

## 2023-10-10 SDOH — ECONOMIC STABILITY: TRANSPORTATION INSECURITY
IN THE PAST 12 MONTHS, HAS LACK OF TRANSPORTATION KEPT YOU FROM MEETINGS, WORK, OR FROM GETTING THINGS NEEDED FOR DAILY LIVING?: NO

## 2023-10-10 SDOH — ECONOMIC STABILITY: TRANSPORTATION INSECURITY
IN THE PAST 12 MONTHS, HAS THE LACK OF TRANSPORTATION KEPT YOU FROM MEDICAL APPOINTMENTS OR FROM GETTING MEDICATIONS?: NO

## 2023-10-10 ASSESSMENT — BRIEF INTERVIEW FOR MENTAL STATUS (BIMS)
WHAT MONTH IS IT: ACCURATE WITHIN 5 DAYS
WHAT DAY OF THE WEEK IS IT: CORRECT
BIMS SUMMARY SCORE: 99
COGNITIVE PATTERN ASSESSMENT USED: STAFF ASSESSMENT
WHAT YEAR IS IT: CORRECT

## 2023-10-10 ASSESSMENT — COGNITIVE AND FUNCTIONAL STATUS - GENERAL
CLIMB 3 TO 5 STEPS WITH RAILING: A LOT
STANDING UP FROM CHAIR USING ARMS: A LITTLE
WALKING IN HOSPITAL ROOM: A LITTLE
MOBILITY SCORE: 19
MOVING TO AND FROM BED TO CHAIR: A LITTLE
MOBILITY SCORE: 22
WALKING IN HOSPITAL ROOM: A LITTLE
CLIMB 3 TO 5 STEPS WITH RAILING: A LITTLE

## 2023-10-10 ASSESSMENT — PAIN - FUNCTIONAL ASSESSMENT
PAIN_FUNCTIONAL_ASSESSMENT: 0-10

## 2023-10-10 ASSESSMENT — PAIN SCALES - GENERAL
PAINLEVEL_OUTOF10: 2
PAINLEVEL_OUTOF10: 0 - NO PAIN

## 2023-10-10 ASSESSMENT — ACTIVITIES OF DAILY LIVING (ADL)
BATHING_LEVEL_OF_ASSISTANCE: CONTACT GUARD
HOME_MANAGEMENT_TIME_ENTRY: 30

## 2023-10-10 ASSESSMENT — PAIN DESCRIPTION - DESCRIPTORS: DESCRIPTORS: ACHING

## 2023-10-10 NOTE — PROGRESS NOTES
Physical Therapy    Physical Therapy Treatment    Patient Name: Gabriella Haskins  MRN: [id number]  Today's Date: 10/10/2023  Time Calculation  Start Time: 0915  Stop Time: 1000  Time Calculation (min): 45 min       Assessment/Plan   PT Assessment  PT Assessment Results: Decreased strength, Decreased endurance, Impaired balance, Decreased mobility, Decreased safety awareness, Decreased cognition  Rehab Prognosis: Good  Barriers to Discharge:  (Impaired memory and cognition)  Evaluation/Treatment Tolerance: Patient tolerated treatment well  Strengths: Housing layout, Premorbid level of function, Rehab experience, Support of Caregivers  End of Session Communication: Bedside nurse  Assessment Comment:  (Anticipate pt to be discharged home with SBA to supervision at the walker level.)  End of Session Patient Position: Up in chair, Alarm on     PT Plan  Treatment/Interventions: Bed mobility, Transfer training, Gait training, Stair training, Balance training, Strengthening, Endurance training, Therapeutic exercise, Therapeutic activity, Home exercise program  PT Plan: Skilled PT  PT Frequency: 5 times per week (6 times prn)  PT Discharge Recommendations:  (Home health care PT as needed.  Will require spouse assist with higher level functional activities.)  Equipment Recommended upon Discharge: Wheeled walker  PT Recommended Transfer Status:  (At this time, pt requires mod assist overall)      General Visit Information:      General  Patient Position Received: Up in chair, Alarm on  General Comment: Pt reports she feels ready for upcoming discharge, no concerns.  Will need walker for home going.  PT to order for pt    Subjective   Precautions:  Precautions  Medical Precautions:  (mejia)  Precautions Comment: Fall risk, Mejia  Vital Signs:       Objective   Pain:  Pain Assessment  Pain Assessment: 0-10  Pain Score: 0 - No pain  Cognition:     Treatments:  Therapeutic Exercise  Therapeutic Exercise Performed: Yes  Pt performs  seated BLE therex 10 x 2 reps: ankle pumps, LAQs, marching, hip abd with ball; in order to improve strength related to transfers, gait, and balance.      Ambulation/Gait Training  Ambulation/Gait Training Performed:  (Pt ambulates 200 ft with FWW and supervision.  Pt ambulates 150 ft x 2 over tile, carpet and threhsolds with FWW and supervision.  Pt ambulates 50 ft x 2 without assistive device with CGA. Pt reports feeling increased unsteadiness without FWW.)  Transfers  Transfer:  (supervision)      Education Documentation  Pt educated on therapeutic exercises, including optimal techniques to maximize function.  Pt educated on techniques for transfers and gait training with device in order to maximize safety and independence.    Education  Individual(s) Educated: Patient  Education Provided: Body Mechanics, Fall Risk, Home Safety, Posture  Education Comment:  (Provided cues/demo for proper form with standing therex and edu on benefit)    Encounter Problems       Encounter Problems (Active)       Mobility       LTG - Bed mobility mod independent (Progressing)       Start:  10/06/23    Expected End:  10/13/23            LTG - Transfers with supervision. (Progressing)       Start:  10/06/23    Expected End:  10/13/23            LTG - Pt will amb 100 ft with wheeled walker and supervision (Progressing)       Start:  10/06/23    Expected End:  10/13/23            LTG - Pt will negotiate 5 stairs with rails and CGA (Progressing)       Start:  10/06/23    Expected End:  10/13/23            STG - Bed mobility with min assist. (Progressing)       Start:  10/06/23    Expected End:  10/10/23            STG - Transfers with CGA (Progressing)       Start:  10/06/23    Expected End:  10/10/23            STG - Pt will amb 50 ft x3 with wheeled walker and CGA (Progressing)       Start:  10/06/23    Expected End:  10/10/23            STG -Pt will negotiate 3 stairs with rails and min assist. (Progressing)       Start:  10/06/23     Expected End:  10/10/23

## 2023-10-10 NOTE — PROGRESS NOTES
Occupational Therapy    OT Treatment    Patient Name: Gabriella Haskins  MRN: [id number]  Today's Date: 10/10/2023  Time Calculation  Start Time: 1345  Stop Time: 1430  Time Calculation (min): 45 min         Subjective   General:  OT Received On: 10/10/23    Family/Caregiver Present: No    Patient Position Received: Up in chair, Alarm on       Pain:  Pain Assessment  Pain Assessment: 0-10  Pain Score: 0 - No pain  Pain Location:  (rt flank)    Objective           Therapy/Activity: Therapeutic Exercise  Therapeutic Exercise Performed: Yes  Therapeutic Exercise Activity 1: patient completed BUE ther ex with 2 lb free weights,10 reps x1-3 sets for 9 planes completed including bicep curls. internal/external rotation, shoulder flexion, shoulder crossovers, and chest punches. tolerated well overall, required rest breaks between sets due to fatigue, cues for modifications as needed; completed to increase UB strength for ADLs and transfers/transfers/mobility for home going.             EDUCATION:  Education  Individual(s) Educated: Patient  Education Provided:  (home ther ex program; dme/ae hand outs, on commode setup/easy clean up)  Patient Response to Education: Patient/Caregiver Verbalized Understanding of Information  Education Comment: will continue to review further for discharge    Goals:  Encounter Problems       Encounter Problems (Active)       Bathing       LTG - Patient will utilize adaptive techniques to bathe body sba (Progressing)       Start:  10/06/23    Expected End:  10/13/23            STG - Patient will bathe body min a (Progressing)       Start:  10/06/23    Expected End:  10/11/23               Dressing Upper Extremities       LTG - Patient will complete upper body dressing setup (Progressing)       Start:  10/06/23    Expected End:  10/13/23            STG - Patient will dress upper body sba (Progressing)       Start:  10/06/23    Expected End:  10/11/23               Dressings Lower Extremities        LTG - Patient will dress lower body sba (Progressing)       Start:  10/06/23    Expected End:  10/13/23            STG - Patient will complete lower body dressing min a (Progressing)       Start:  10/06/23    Expected End:  10/11/23               Grooming       LTG - Patient will complete daily grooming tasks setup (Progressing)       Start:  10/06/23    Expected End:  10/13/23            STG - Patient completes grooming supervision (Progressing)       Start:  10/06/23    Expected End:  10/11/23               Instrumental Activities of Daily Living       LTG - Patient will complete simple meal preparation activities sba at walker level (Progressing)       Start:  10/06/23    Expected End:  10/13/23                 Mobility       LTG - Pt to complete functional mobility via sba using wwalker (Progressing)       Start:  10/06/23    Expected End:  10/13/23            STG -Pt to complete functional mobility via cga (Progressing)       Start:  10/06/23    Expected End:  10/11/23               Toileting       LTG - Patient will complete daily toileting tasks sba (Progressing)       Start:  10/06/23    Expected End:  10/13/23            STG - Patient will complete toileting tasks with min a (Progressing)       Start:  10/06/23    Expected End:  10/11/23               Transfers       LTG - Patient will transfer to commode sba (Progressing)       Start:  10/06/23    Expected End:  10/13/23            LTG - Patient will transfer to tub/shower/dme cga (Progressing)       Start:  10/06/23    Expected End:  10/13/23            STG - Patient will perform toilet transfer cga (Progressing)       Start:  10/06/23    Expected End:  10/11/23            STG - Patient will perform tub/shower transfer min/mod a (Progressing)       Start:  10/06/23    Expected End:  10/11/23

## 2023-10-10 NOTE — PROGRESS NOTES
Physical Therapy    Physical Therapy Treatment    Patient Name: Gabriella Haskins  MRN: [id number]  Today's Date: 10/10/2023  Time Calculation  Start Time: 1300  Stop Time: 1345  Time Calculation (min): 45 min       Assessment/Plan   PT Assessment  PT Assessment Results: Decreased strength, Decreased endurance, Impaired balance, Decreased mobility, Decreased safety awareness, Decreased cognition  Rehab Prognosis: Good  Barriers to Discharge:  (Impaired memory and cognition)  Evaluation/Treatment Tolerance: Patient tolerated treatment well  Strengths: Housing layout, Premorbid level of function, Rehab experience, Support of Caregivers  End of Session Communication: Bedside nurse  Assessment Comment:  (Anticipate pt to be discharged home with SBA to supervision at the walker level.)  End of Session Patient Position: Up in chair, Alarm on     PT Plan  Treatment/Interventions: Bed mobility, Transfer training, Gait training, Stair training, Balance training, Strengthening, Endurance training, Therapeutic exercise, Therapeutic activity, Home exercise program  PT Plan: Skilled PT  PT Frequency: 5 times per week (6 times prn)  PT Discharge Recommendations:  (Home health care PT as needed.  Will require spouse assist with higher level functional activities.)  Equipment Recommended upon Discharge: Wheeled walker  PT Recommended Transfer Status:  (At this time, pt requires mod assist overall)      General Visit Information:      General  Patient Position Received: Up in chair, Alarm on  General Comment: Pt reports she feels ready for upcoming discharge, no concerns.  Will need walker for home going.  PT to order for pt    Subjective   Precautions:  Precautions  Medical Precautions:  (mejia)  Precautions Comment: Fall risk, Mejia  Vital Signs:       Objective   Pain:  Pain Assessment  Pain Assessment: 0-10  Pain Score: 0 - No pain  Cognition:       Treatments:  Therapeutic Activity  Therapeutic Activity Performed:  (Pt ambulates  with FWW finding 10/10 cones with supervision.  Pt completes task in 5:39 minutes.  Pt ambulates 50 ft with FWW and picks up 3 rings from floor with reacher with supervision.)    Bed Mobility  Bed Mobility:  (Pt performs sit/supine on ADL bed with supervision, min cues throughout.)    Ambulation/Gait Training  Ambulation/Gait Training Performed:  (Pt ambulates 200 ft, 200 ft, 200 ft with FWW and supervision.)  Transfers  Transfer:  (supervision)        Education Documentation  Pt educated on techniques for transfers and gait training with device in order to maximize safety and independence.    Education  Individual(s) Educated: Patient  Education Provided: Body Mechanics, Fall Risk, Home Safety, Posture  Education Comment:  (Provided cues/demo for proper form with standing therex and edu on benefit)    Encounter Problems       Encounter Problems (Active)       Mobility       LTG - Bed mobility mod independent (Progressing)       Start:  10/06/23    Expected End:  10/13/23            LTG - Transfers with supervision. (Progressing)       Start:  10/06/23    Expected End:  10/13/23            LTG - Pt will amb 100 ft with wheeled walker and supervision (Progressing)       Start:  10/06/23    Expected End:  10/13/23            LTG - Pt will negotiate 5 stairs with rails and CGA (Progressing)       Start:  10/06/23    Expected End:  10/13/23            STG - Bed mobility with min assist. (Progressing)       Start:  10/06/23    Expected End:  10/10/23            STG - Transfers with CGA (Progressing)       Start:  10/06/23    Expected End:  10/10/23            STG - Pt will amb 50 ft x3 with wheeled walker and CGA (Progressing)       Start:  10/06/23    Expected End:  10/10/23            STG -Pt will negotiate 3 stairs with rails and min assist. (Progressing)       Start:  10/06/23    Expected End:  10/10/23

## 2023-10-10 NOTE — PROGRESS NOTES
"  Gabriella Haskins is a 78 y.o. female on day 5 of admission presenting with Debility.    Subjective   Patient was seen today in physical therapy.  Ambulating with Supervision.  She is able to manage with the catheter.  We did discuss follow-up with urology after discharge at the [facility].    Patient denies chest pain or shortness of breath.  No lightheadedness or dizziness.  No nausea or vomiting.       Objective         Physical Exam  Constitutional:       Appearance: Normal appearance. She is normal weight.   HENT:      Head: Normocephalic and atraumatic.   Eyes:      Extraocular Movements: Extraocular movements intact.      Conjunctiva/sclera: Conjunctivae normal.      Pupils: Pupils are equal, round, and reactive to light.   Cardiovascular:      Rate and Rhythm: Normal rate and regular rhythm.   Pulmonary:      Effort: Pulmonary effort is normal.      Breath sounds: Normal breath sounds.   Abdominal:      General: Bowel sounds are normal. There is no distension.      Palpations: Abdomen is soft.      Tenderness: There is no abdominal tenderness. There is no guarding.   Musculoskeletal:         General: No swelling or tenderness.      Comments: ROM is WFL   Skin:     General: Skin is warm and dry.   Neurological:      General: No focal deficit present.      Mental Status: She is alert and oriented to person, place, and time. Mental status is at baseline.   Psychiatric:         Mood and Affect: Mood normal.      Mccarthy in place. Hematuria present     Function: Supervision  Assessment/Plan        Last Recorded Vitals  Blood pressure 156/67, pulse 75, temperature 36.7 °C (98.1 °F), temperature source Temporal, resp. rate 18, height 1.676 m (5' 5.98\"), weight 75.5 kg (166 lb 7.2 oz), SpO2 95 %.    Therapy notes from last 24 hours reviewed.    Relevant Results                  Scheduled medications  ciprofloxacin, 250 mg, oral, BID  colchicine (gout), 0.6 mg, oral, Once  ferrous sulfate, 65 mg of iron, oral, " Every other day  ketoconazole, , Topical, BID  levothyroxine, 112 mcg, oral, Daily  loratadine, 10 mg, oral, Daily  metoprolol tartrate, 12.5 mg, oral, BID  predniSONE, 20 mg, oral, Daily  sennosides, 1 tablet, oral, Nightly  sodium chloride, 1 drop, Both Eyes, q4h      Continuous medications     PRN medications  PRN medications: acetaminophen **OR** acetaminophen, alum-mag hydroxide-simeth, bisacodyl, bisacodyl, melatonin, oxyCODONE-acetaminophen     Results for orders placed or performed during the hospital encounter of 10/05/23 (from the past 24 hour(s))   Basic Metabolic Panel   Result Value Ref Range    Glucose 86 74 - 99 mg/dL    Sodium 138 136 - 145 mmol/L    Potassium 3.9 3.5 - 5.3 mmol/L    Chloride 101 98 - 107 mmol/L    Bicarbonate 30 21 - 32 mmol/L    Anion Gap 11 10 - 20 mmol/L    Urea Nitrogen 22 6 - 23 mg/dL    Creatinine 1.30 (H) 0.50 - 1.05 mg/dL    eGFR 42 (L) >60 mL/min/1.73m*2    Calcium 8.6 8.6 - 10.3 mg/dL                 Assessment/Plan   Principal Problem:    Debility  Active Problems:    Hematuria    Leg pain      Principal Problem:    Debility     UTI  -continue with Cipro and complete treatment     H/o gout  -colchicine treatment due to ankle pain and presumed gout  Prednisone for pain     Hypothyroidism  -continue with synthroid     HTN  -continue with Beta blocker and follow BP              Bowel/Bladder  -facilitate daily active BM   -continence of bladder per timed void schedule and rehab nursing  -check PVRs and treat appropriately     DVT prophylaxis  -SCDs and ambulation  -Chemoprophylaxis for DVT until ambulating >200 feet     FEN  -follow BMP and treat appropriately  -monitor oral intake daily    10/10  -Patient treated for debility due to generalized weakness.  -Functioning in a supervised level  -Able to manage her Mccarthy catheter  -We will need chronic follow-up with urology after discharge due to ongoing hematuria  -H&H evaluation.       I spent 2 5 minutes in the professional  and overall care of this patient.      Sirisha Soto MD

## 2023-10-10 NOTE — PROGRESS NOTES
Physical Therapy    Bed mobility training:  Supine to sit with SBA with head of bed elevated and using bed rail.  Increased time and effort.  Pivot transfer training with walker and up to min assist due to loss of balance posteriorly.  Pt educated on safe mobility techniques.

## 2023-10-10 NOTE — PROGRESS NOTES
Occupational Therapy    OT Treatment    Patient Name: Gabriella Haskins  MRN: [id number]  Today's Date: 10/10/2023  Time Calculation  Start Time: 0755  Stop Time: 0830  Time Calculation (min): 35 min           Subjective   General:  OT Received On: 10/10/23    Family/Caregiver Present: No  Prior to Session Communication: Bedside nurse  Patient Position Received: Alarm on, Up in chair  General Comment: cues needed to follow some directions, forgetful       Pain:  Pain Assessment  Pain Assessment: 0-10  Pain Score: 0 - No pain      Objective    Activities of Daily Living: Grooming  Grooming Level of Assistance: Setup  Grooming Where Assessed: Sitting sinkside (brushing teeth, brushing hair)  Grooming Comments: slow process    UE Bathing  UE Bathing Level of Assistance: Setup    LE Bathing  LE Bathing Level of Assistance: Contact guard    UE Dressing  UE Dressing Level of Assistance: Setup (bra and overhead shirt)    LE Dressing  LE Dressing:  (undergarment and pants over feet and threading catheter min a and mod cues for tech, sba over hips)    Toileting  Toileting Level of Assistance:  (cga except for catheter care)    Bed Mobility/Transfers: Transfers  Transfer:  (sit to stand sba, multiple attempts during adl's)             EDUCATION:  Education  Individual(s) Educated: Patient  Education Provided:  (adl techs, catheter mgmt during dressing; jeb hygiene importance)  Patient Response to Education:  (pt still asking for directions to manange catheter)  Education Comment: still requires cues    Goals:  Encounter Problems       Encounter Problems (Active)       Bathing       LTG - Patient will utilize adaptive techniques to bathe body sba (Progressing)       Start:  10/06/23    Expected End:  10/13/23            STG - Patient will bathe body min a (Progressing)       Start:  10/06/23    Expected End:  10/11/23               Dressing Upper Extremities       LTG - Patient will complete upper body dressing setup  (Progressing)       Start:  10/06/23    Expected End:  10/13/23            STG - Patient will dress upper body sba (Progressing)       Start:  10/06/23    Expected End:  10/11/23               Dressings Lower Extremities       LTG - Patient will dress lower body sba (Progressing)       Start:  10/06/23    Expected End:  10/13/23            STG - Patient will complete lower body dressing min a (Progressing)       Start:  10/06/23    Expected End:  10/11/23               Grooming       LTG - Patient will complete daily grooming tasks setup (Progressing)       Start:  10/06/23    Expected End:  10/13/23            STG - Patient completes grooming supervision (Progressing)       Start:  10/06/23    Expected End:  10/11/23               Instrumental Activities of Daily Living       LTG - Patient will complete simple meal preparation activities sba at walker level (Progressing)       Start:  10/06/23    Expected End:  10/13/23                 Mobility       LTG - Pt to complete functional mobility via sba using wwalker (Progressing)       Start:  10/06/23    Expected End:  10/13/23            STG -Pt to complete functional mobility via cga (Progressing)       Start:  10/06/23    Expected End:  10/11/23               Toileting       LTG - Patient will complete daily toileting tasks sba (Progressing)       Start:  10/06/23    Expected End:  10/13/23            STG - Patient will complete toileting tasks with min a (Progressing)       Start:  10/06/23    Expected End:  10/11/23               Transfers       LTG - Patient will transfer to commode sba (Progressing)       Start:  10/06/23    Expected End:  10/13/23            LTG - Patient will transfer to tub/shower/dme cga (Progressing)       Start:  10/06/23    Expected End:  10/13/23            STG - Patient will perform toilet transfer cga (Progressing)       Start:  10/06/23    Expected End:  10/11/23            STG - Patient will perform tub/shower transfer min/mod a  (Progressing)       Start:  10/06/23    Expected End:  10/11/23

## 2023-10-10 NOTE — PROGRESS NOTES
Physical Therapy    Bed mobility training:  Supine to sit with SBA with head of bed elevated and using bed rail.  Pivot transfer training with walker and up to min assist due to loss of balance posteriorly.  Pt educated on safe mobility techniques.

## 2023-10-10 NOTE — CARE PLAN
The patient's goals for the shift include      The clinical goals for the shift include Pain management and safety    Over the shift, the patient did not make progress toward the following goals. Barriers to progression include hip fx. Recommendations to address these barriers include provide comfort.

## 2023-10-10 NOTE — PROGRESS NOTES
Occupational Therapy    OT Treatment    Patient Name: Gabriella Haskins  MRN: [id number]  Today's Date: 10/10/2023  Time Calculation  Start Time: 1000  Stop Time: 1045  Time Calculation (min): 45 min         Subjective   General:  OT Received On: 10/10/23    Family/Caregiver Present: No  Position Received: Alarm on, Up in chair  General Comment:  (at end of session, pt up in chair, call light and phone in reach, all needs met)     Pain:  Pain Assessment  Pain Assessment: 0-10  Pain Score: 0 - No pain      Objective    Bed Mobility/Transfers: Transfers  Transfer:  (sit to stand supervision)    Toilet Transfers  Toilet Transfers Comments: supervision via wwalker, commode used (discussed home going needs, has higher toilet but no rail/grab bars, pt reports would like to have commode ordered, OT to order today)  Tub Transfers  Tub Transfers Comments: cga (tub bench with tub mounted grab bar used, pt has bench, may have tub grab bar (reports will have  look for it), min cues for tech overall)       IADL's:  Pt completes kitchen access, fridge/beverage access, transport using walker bag via wwalker, sba and min cues for walker safety techs overall; problem solving home setup and recommendations; reports  will assist.       Therapy/Activity: Therapeutic Activity  Therapeutic Activity Performed:  (funct mobility via ww during item retrieval off floor using reacher sba overall, min cues for walker safety techs; educated on 26 vs 30 inch reacher, pt reports has an old reacher, may need to purchase new one. Will issue info on vendor options)       EDUCATION:  Education  Individual(s) Educated: Patient  Education Provided:  (on dme/ae, walker safety techs, home going recommendations)  Patient Response to Education: Patient/Caregiver Verbalized Understanding of Information  Education Comment: will continue to review further for discharge    Goals:  Encounter Problems       Encounter Problems (Active)       Bathing        LTG - Patient will utilize adaptive techniques to bathe body sba (Progressing)       Start:  10/06/23    Expected End:  10/13/23            STG - Patient will bathe body min a (Progressing)       Start:  10/06/23    Expected End:  10/11/23               Dressing Upper Extremities       LTG - Patient will complete upper body dressing setup (Progressing)       Start:  10/06/23    Expected End:  10/13/23            STG - Patient will dress upper body sba (Progressing)       Start:  10/06/23    Expected End:  10/11/23               Dressings Lower Extremities       LTG - Patient will dress lower body sba (Progressing)       Start:  10/06/23    Expected End:  10/13/23            STG - Patient will complete lower body dressing min a (Progressing)       Start:  10/06/23    Expected End:  10/11/23               Grooming       LTG - Patient will complete daily grooming tasks setup (Progressing)       Start:  10/06/23    Expected End:  10/13/23            STG - Patient completes grooming supervision (Progressing)       Start:  10/06/23    Expected End:  10/11/23               Instrumental Activities of Daily Living       LTG - Patient will complete simple meal preparation activities sba at walker level (Progressing)       Start:  10/06/23    Expected End:  10/13/23                 Mobility       LTG - Pt to complete functional mobility via sba using wwalker (Progressing)       Start:  10/06/23    Expected End:  10/13/23            STG -Pt to complete functional mobility via cga (Progressing)       Start:  10/06/23    Expected End:  10/11/23               Toileting       LTG - Patient will complete daily toileting tasks sba (Progressing)       Start:  10/06/23    Expected End:  10/13/23            STG - Patient will complete toileting tasks with min a (Progressing)       Start:  10/06/23    Expected End:  10/11/23               Transfers       LTG - Patient will transfer to commode sba (Progressing)       Start:  10/06/23     Expected End:  10/13/23            LTG - Patient will transfer to tub/shower/dme cga (Progressing)       Start:  10/06/23    Expected End:  10/13/23            STG - Patient will perform toilet transfer cga (Progressing)       Start:  10/06/23    Expected End:  10/11/23            STG - Patient will perform tub/shower transfer min/mod a (Progressing)       Start:  10/06/23    Expected End:  10/11/23

## 2023-10-10 NOTE — CARE PLAN
The patient's goals for the shift include      The clinical goals for the shift include pt will not fall by the end of the shift.      Problem: Fall/Injury  Goal: Not fall by end of shift  Outcome: Progressing  Goal: Be free from injury by end of the shift  Outcome: Progressing  Goal: Verbalize understanding of personal risk factors for fall in the hospital  Outcome: Progressing  Goal: Verbalize understanding of risk factor reduction measures to prevent injury from fall in the home  Outcome: Progressing  Goal: Use assistive devices by end of the shift  Outcome: Progressing  Goal: Pace activities to prevent fatigue by end of the shift  Outcome: Progressing     Problem: Pain  Goal: Takes deep breaths with improved pain control throughout the shift  Outcome: Progressing  Goal: Turns in bed with improved pain control throughout the shift  Outcome: Progressing  Goal: Walks with improved pain control throughout the shift  Outcome: Progressing  Goal: Performs ADL's with improved pain control throughout shift  Outcome: Progressing  Goal: Participates in PT with improved pain control throughout the shift  Outcome: Progressing  Goal: Free from opioid side effects throughout the shift  Outcome: Progressing  Goal: Free from acute confusion related to pain meds throughout the shift  Outcome: Progressing     Problem: Skin  Goal: Prevent/minimize sheer/friction injuries  Recent Flowsheet Documentation  Taken 10/10/2023 1411 by Dianne Leo RN  Prevent/minimize sheer/friction injuries:   Use pull sheet   Turn/reposition every 2 hours/use positioning/transfer devices   Increase activity/out of bed for meals   HOB 30 degrees or less  Goal: Promote/optimize nutrition  Recent Flowsheet Documentation  Taken 10/10/2023 1411 by Dianne Leo RN  Promote/optimize nutrition:   Offer water/supplements/favorite foods   Consume > 50% meals/supplements   Monitor/record intake including meals     Problem: Skin  Goal: Decreased  wound size/increased tissue granulation at next dressing change  Outcome: Progressing  Flowsheets (Taken 10/10/2023 1411)  Decreased wound size/increased tissue granulation at next dressing change:   Promote sleep for wound healing   Utilize specialty bed per algorithm  Goal: Participates in plan/prevention/treatment measures  Outcome: Progressing  Flowsheets (Taken 10/10/2023 1411)  Participates in plan/prevention/treatment measures:   Increase activity/out of bed for meals   Discuss with provider PT/OT consult   Elevate heels  Goal: Prevent/manage excess moisture  Outcome: Progressing  Flowsheets (Taken 10/10/2023 1411)  Prevent/manage excess moisture:   Moisturize dry skin   Follow provider orders for dressing changes  Goal: Prevent/minimize sheer/friction injuries  Outcome: Progressing  Flowsheets (Taken 10/10/2023 1411)  Prevent/minimize sheer/friction injuries:   Use pull sheet   Turn/reposition every 2 hours/use positioning/transfer devices   Increase activity/out of bed for meals   HOB 30 degrees or less  Goal: Promote/optimize nutrition  Outcome: Progressing  Flowsheets (Taken 10/10/2023 1411)  Promote/optimize nutrition:   Offer water/supplements/favorite foods   Consume > 50% meals/supplements   Monitor/record intake including meals  Goal: Promote skin healing  Outcome: Progressing  Flowsheets (Taken 10/6/2023 0158 by Renetta Ramsay RN)  Promote skin healing: Turn/reposition every 2 hours/use positioning/transfer devices     Problem: Indwelling Catheter Maintenance  Goal: I will have no complications from indwelling catheter  Outcome: Progressing  Flowsheets (Taken 10/10/2023 1411)  Resident will have no complications from indwelling catheter:   Resident will take medications as prescribed   Keep call light in easy reach   Nursing staff will observe for signs and symptoms of urinary tract infection: change in color and clarity of urine, foul smelling urine, reports of pain or burning at catheter site,  and report to nurse   Offer and encourage fluids during and between meals for hydration support   Nursing staff to offer toilet every two hours and as needed   Nursing staff will ensure mejia catheter leg strap is in use   Nursing staff will change mejia catheter and urinary bag as needed  Goal: Absence of fever/infection during anticipated neutropenic period  Outcome: Progressing  Flowsheets (Taken 10/10/2023 1411)  Absence of fever/infection during anticipated neutropenic period: Monitor white blood cell count     Problem: Infection - Adult  Goal: Absence of infection at discharge  Outcome: Progressing  Goal: Absence of infection during hospitalization  Outcome: Progressing  Goal: Absence of fever/infection during anticipated neutropenic period  Outcome: Progressing  Flowsheets (Taken 10/10/2023 1411)  Absence of fever/infection during anticipated neutropenic period: Monitor white blood cell count     Problem: Safety - Adult  Goal: Free from fall injury  Outcome: Progressing     Problem: Chronic Conditions and Co-morbidities  Goal: Patient's chronic conditions and co-morbidity symptoms are monitored and maintained or improved  Outcome: Progressing

## 2023-10-11 LAB
BASOPHILS # BLD AUTO: 0.09 X10*3/UL (ref 0–0.1)
BASOPHILS NFR BLD AUTO: 0.5 %
EOSINOPHIL # BLD AUTO: 0.13 X10*3/UL (ref 0–0.4)
EOSINOPHIL NFR BLD AUTO: 0.7 %
ERYTHROCYTE [DISTWIDTH] IN BLOOD BY AUTOMATED COUNT: 13.7 % (ref 11.5–14.5)
HCT VFR BLD AUTO: 31.9 % (ref 36–46)
HGB BLD-MCNC: 9.8 G/DL (ref 12–16)
IMM GRANULOCYTES # BLD AUTO: 0.78 X10*3/UL (ref 0–0.5)
IMM GRANULOCYTES NFR BLD AUTO: 4 % (ref 0–0.9)
LYMPHOCYTES # BLD AUTO: 2.97 X10*3/UL (ref 0.8–3)
LYMPHOCYTES NFR BLD AUTO: 15.1 %
MCH RBC QN AUTO: 29 PG (ref 26–34)
MCHC RBC AUTO-ENTMCNC: 30.7 G/DL (ref 32–36)
MCV RBC AUTO: 94 FL (ref 80–100)
MONOCYTES # BLD AUTO: 1.07 X10*3/UL (ref 0.05–0.8)
MONOCYTES NFR BLD AUTO: 5.5 %
NEUTROPHILS # BLD AUTO: 14.58 X10*3/UL (ref 1.6–5.5)
NEUTROPHILS NFR BLD AUTO: 74.2 %
NRBC BLD-RTO: 0 /100 WBCS (ref 0–0)
PLATELET # BLD AUTO: 393 X10*3/UL (ref 150–450)
PMV BLD AUTO: 8.2 FL (ref 7.5–11.5)
RBC # BLD AUTO: 3.38 X10*6/UL (ref 4–5.2)
WBC # BLD AUTO: 19.6 X10*3/UL (ref 4.4–11.3)

## 2023-10-11 PROCEDURE — 97110 THERAPEUTIC EXERCISES: CPT | Mod: GP

## 2023-10-11 PROCEDURE — 97530 THERAPEUTIC ACTIVITIES: CPT | Mod: GO

## 2023-10-11 PROCEDURE — 85025 COMPLETE CBC W/AUTO DIFF WBC: CPT | Performed by: PHYSICAL MEDICINE & REHABILITATION

## 2023-10-11 PROCEDURE — 36415 COLL VENOUS BLD VENIPUNCTURE: CPT | Performed by: PHYSICAL MEDICINE & REHABILITATION

## 2023-10-11 PROCEDURE — 2500000004 HC RX 250 GENERAL PHARMACY W/ HCPCS (ALT 636 FOR OP/ED): Performed by: PHYSICAL MEDICINE & REHABILITATION

## 2023-10-11 PROCEDURE — 2500000001 HC RX 250 WO HCPCS SELF ADMINISTERED DRUGS (ALT 637 FOR MEDICARE OP): Performed by: PHYSICAL MEDICINE & REHABILITATION

## 2023-10-11 PROCEDURE — 97530 THERAPEUTIC ACTIVITIES: CPT | Mod: GP

## 2023-10-11 PROCEDURE — 97535 SELF CARE MNGMENT TRAINING: CPT | Mod: GO

## 2023-10-11 PROCEDURE — 51702 INSERT TEMP BLADDER CATH: CPT

## 2023-10-11 PROCEDURE — 1280000001 HC REHAB SEMI-PRIVATE ROOM DAILY

## 2023-10-11 PROCEDURE — 3490 HC RX 250 GENERAL PHARMACY W/ HCPCS (ALT 636 FOR OP/ED): Performed by: PHYSICAL MEDICINE & REHABILITATION

## 2023-10-11 PROCEDURE — 97116 GAIT TRAINING THERAPY: CPT | Mod: GP

## 2023-10-11 RX ORDER — KETOCONAZOLE 20 MG/G
CREAM TOPICAL 2 TIMES DAILY
Start: 2023-10-11

## 2023-10-11 RX ORDER — CIPROFLOXACIN 250 MG/1
250 TABLET, FILM COATED ORAL 2 TIMES DAILY
Qty: 4 TABLET | Refills: 0 | Status: SHIPPED | OUTPATIENT
Start: 2023-10-11 | End: 2023-10-13

## 2023-10-11 RX ORDER — METOPROLOL TARTRATE 25 MG/1
12.5 TABLET, FILM COATED ORAL 2 TIMES DAILY
Qty: 30 TABLET | Refills: 0 | Status: SHIPPED | OUTPATIENT
Start: 2023-10-11 | End: 2024-06-06

## 2023-10-11 RX ADMIN — SODIUM CHLORIDE 1 DROP: 20 SOLUTION OPHTHALMIC at 14:45

## 2023-10-11 RX ADMIN — KETOCONAZOLE: 20 CREAM TOPICAL at 21:07

## 2023-10-11 RX ADMIN — SENNOSIDES 8.6 MG: 8.6 TABLET, FILM COATED ORAL at 21:00

## 2023-10-11 RX ADMIN — CIPROFLOXACIN 250 MG: 500 TABLET, FILM COATED ORAL at 06:25

## 2023-10-11 RX ADMIN — CIPROFLOXACIN 250 MG: 500 TABLET, FILM COATED ORAL at 17:51

## 2023-10-11 RX ADMIN — LORATADINE 10 MG: 10 TABLET ORAL at 09:15

## 2023-10-11 RX ADMIN — Medication 3 MG: at 21:00

## 2023-10-11 RX ADMIN — METOPROLOL TARTRATE 12.5 MG: 25 TABLET, FILM COATED ORAL at 09:15

## 2023-10-11 RX ADMIN — SODIUM CHLORIDE 1 DROP: 20 SOLUTION OPHTHALMIC at 10:45

## 2023-10-11 RX ADMIN — SODIUM CHLORIDE 1 DROP: 20 SOLUTION OPHTHALMIC at 06:45

## 2023-10-11 RX ADMIN — KETOCONAZOLE: 20 CREAM TOPICAL at 11:53

## 2023-10-11 RX ADMIN — SODIUM CHLORIDE 1 DROP: 20 SOLUTION OPHTHALMIC at 22:45

## 2023-10-11 RX ADMIN — METOPROLOL TARTRATE 12.5 MG: 25 TABLET, FILM COATED ORAL at 21:00

## 2023-10-11 RX ADMIN — PREDNISONE 20 MG: 20 TABLET ORAL at 09:16

## 2023-10-11 RX ADMIN — LEVOTHYROXINE SODIUM 112 MCG: 0.11 TABLET ORAL at 06:26

## 2023-10-11 ASSESSMENT — ACTIVITIES OF DAILY LIVING (ADL)
BATHING_LEVEL_OF_ASSISTANCE: DISTANT SUPERVISION
BATHING_WHERE_ASSESSED: SHOWER
HOME_MANAGEMENT_TIME_ENTRY: 30
HOME_MANAGEMENT_TIME_ENTRY: 13

## 2023-10-11 ASSESSMENT — PAIN - FUNCTIONAL ASSESSMENT
PAIN_FUNCTIONAL_ASSESSMENT: 0-10

## 2023-10-11 ASSESSMENT — PAIN SCALES - GENERAL
PAINLEVEL_OUTOF10: 0 - NO PAIN

## 2023-10-11 NOTE — PROGRESS NOTES
Occupational Therapy    OT Treatment    Patient Name: Gabriella Haskins  MRN: [id number]  Today's Date: 10/11/2023  Time Calculation  Start Time: 1000  Stop Time: 1045  Time Calculation (min): 45 min           Subjective   General:  OT Received On: 10/11/23    General Comment: catheter in place       Pain:  Pain Assessment  Pain Assessment: 0-10  Pain Score: 0 - No pain        Objective    Activities of Daily Living: Grooming  Grooming Level of Assistance: mod I (sitting)        UE Bathing  UE Bathing Level of Assistance: Modified independent  UE Bathing Where Assessed: Shower (sitting on bench)    LE Bathing  LE Bathing Level of Assistance: Distant supervision  LE Bathing Where Assessed: Shower (sitting on bench)    UE Dressing  UE Dressing Level of Assistance: Mod I    LE Dressing  LE Dressing:  (supervision with catheter threading) for pants and undergarments; socks doffing and donning setup; shoes too tight to attempt; donning knee hi sagar hose max a    Toileting  Toileting Level of Assistance:  (supervision with exception of catheter)    Bed Mobility/Transfers: Shower Transfers  Shower Transfers Comments: sba/cga with grab bar/bench        EDUCATION:  Education  Individual(s) Educated: Patient  Education Provided: Fall precautons (dme for bathroom/safe techs/catheter mgmt)  Patient Response to Education: Patient/Caregiver Verbalized Understanding of Information      Goals:  Encounter Problems       Encounter Problems (Active)       Bathing       LTG - Patient will utilize adaptive techniques to bathe body sba (Progressing)       Start:  10/06/23    Expected End:  10/13/23            STG - Patient will bathe body min a (Progressing)       Start:  10/06/23    Expected End:  10/11/23               Dressing Upper Extremities       LTG - Patient will complete upper body dressing setup (Progressing)       Start:  10/06/23    Expected End:  10/13/23            STG - Patient will dress upper body sba (Progressing)        Start:  10/06/23    Expected End:  10/11/23               Dressings Lower Extremities       LTG - Patient will dress lower body sba (Progressing)       Start:  10/06/23    Expected End:  10/13/23            STG - Patient will complete lower body dressing min a (Progressing)       Start:  10/06/23    Expected End:  10/11/23               Grooming       LTG - Patient will complete daily grooming tasks setup (Progressing)       Start:  10/06/23    Expected End:  10/13/23            STG - Patient completes grooming supervision (Progressing)       Start:  10/06/23    Expected End:  10/11/23               Instrumental Activities of Daily Living       LTG - Patient will complete simple meal preparation activities sba at walker level (Progressing)       Start:  10/06/23    Expected End:  10/13/23                 Mobility       LTG - Pt to complete functional mobility via sba using wwalker (Progressing)       Start:  10/06/23    Expected End:  10/13/23            STG -Pt to complete functional mobility via cga (Progressing)       Start:  10/06/23    Expected End:  10/11/23               Toileting       LTG - Patient will complete daily toileting tasks sba (Progressing)       Start:  10/06/23    Expected End:  10/13/23            STG - Patient will complete toileting tasks with min a (Progressing)       Start:  10/06/23    Expected End:  10/11/23               Transfers       LTG - Patient will transfer to commode sba (Progressing)       Start:  10/06/23    Expected End:  10/13/23            LTG - Patient will transfer to tub/shower/dme cga (Progressing)       Start:  10/06/23    Expected End:  10/13/23            STG - Patient will perform toilet transfer cga (Progressing)       Start:  10/06/23    Expected End:  10/11/23            STG - Patient will perform tub/shower transfer min/mod a (Progressing)       Start:  10/06/23    Expected End:  10/11/23

## 2023-10-11 NOTE — CARE PLAN
The patient's goals for the shift include      The clinical goals for the shift include pt. will begin to  verbalize understanding of care for mejia catheter at home    Over the shift, the patient MADE progress toward  PREVIOUS goals.

## 2023-10-11 NOTE — PROGRESS NOTES
Physical Therapy    Physical Therapy Treatment    Patient Name: Gabriella Haskins  MRN: [id number]  Today's Date: 10/11/2023  Time Calculation  Start Time: 0915  Stop Time: 1000  Time Calculation (min): 45 min       Assessment/Plan   PT Assessment  PT Assessment Results: Decreased strength, Decreased endurance, Impaired balance, Decreased mobility, Decreased safety awareness, Decreased cognition  Rehab Prognosis: Good  Barriers to Discharge:  (Impaired memory and cognition)  Evaluation/Treatment Tolerance: Patient tolerated treatment well  Strengths: Housing layout, Premorbid level of function, Rehab experience, Support of Caregivers  End of Session Communication: Bedside nurse  Assessment Comment:  (Anticipate pt to be discharged home with SBA to supervision at the walker level.)  End of Session Patient Position: Up in chair, Alarm on     PT Plan  Treatment/Interventions: Bed mobility, Transfer training, Gait training, Stair training, Balance training, Strengthening, Endurance training, Therapeutic exercise, Therapeutic activity, Home exercise program  PT Plan: Skilled PT  PT Frequency: 5 times per week (6 times prn)  PT Discharge Recommendations:  (Home health care PT as needed.  Will require spouse assist with higher level functional activities.)  Equipment Recommended upon Discharge: Wheeled walker  PT Recommended Transfer Status:  (At this time, pt requires mod assist overall)      General Visit Information:      General  Patient Position Received: Up in chair, Alarm on  General Comment: Pt reports she feels good today, no complaints. Feeling ready for d/c. FWW issued to pt and adjusted by PT to appropriate height    Subjective   Precautions:  Precautions  Medical Precautions:  (jackie)  Precautions Comment: jackie    Objective   Pain:  Pain Assessment  Pain Assessment: 0-10  Pain Score: 0 - No pain  Cognition:       Activity Tolerance:     Treatments:  Therapeutic Exercise  Therapeutic Exercise Performed: Yes  Pt  performs BLE seated AROM exercises: marching, LAQ, hip add isometrics with ball, hs curls, AP 2 x 10 reps each in order to improve strength and endurance for improvement in functional mobility and transfers.       Bed Mobility  Bed Mobility:  (Pt performs sit/supine independently)    Ambulation/Gait Training  Ambulation/Gait Training Performed:  (Pt ambulates 200 ft, 200 ft over tile carpet and thresholds; 200 ft with FWW and mod I; Pt ambulates 50 ft with FWW and weaves in and out of 5 standing bolsters x 2 with mod I.)  Transfers  Transfer:  (Pt performs sit/stand with mod I)      Education Documentation  Pt educated on therapeutic exercises, including optimal techniques to maximize function.  Pt give handout for seated home exercise program for home going.  Pt educated on frequent ambulation at home to continue to improve strength and endurance.  Pt verbalizes understanding.         OP EDUCATION:  Education  Individual(s) Educated: Patient  Education Provided: Body Mechanics, Fall Risk, Home Safety, Posture  Education Comment:  (Provided cues/demo for proper form with standing therex and edu on benefit)    Encounter Problems       Encounter Problems (Active)       Mobility       LTG - Bed mobility mod independent (Progressing)       Start:  10/06/23    Expected End:  10/13/23            LTG - Transfers with supervision. (Progressing)       Start:  10/06/23    Expected End:  10/13/23            LTG - Pt will amb 100 ft with wheeled walker and supervision (Progressing)       Start:  10/06/23    Expected End:  10/13/23            LTG - Pt will negotiate 5 stairs with rails and CGA (Progressing)       Start:  10/06/23    Expected End:  10/13/23            STG - Bed mobility with min assist. (Met)       Start:  10/06/23    Expected End:  10/10/23    Resolved:  10/11/23         STG - Transfers with CGA (Met)       Start:  10/06/23    Expected End:  10/10/23    Resolved:  10/11/23         STG - Pt will amb 50 ft x3 with  wheeled walker and CGA (Met)       Start:  10/06/23    Expected End:  10/10/23    Resolved:  10/11/23         STG -Pt will negotiate 3 stairs with rails and min assist. (Met)       Start:  10/06/23    Expected End:  10/10/23    Resolved:  10/11/23

## 2023-10-11 NOTE — PROGRESS NOTES
Physical Therapy    Physical Therapy Treatment    Patient Name: Gabriella Haskins  MRN: [id number]  Today's Date: 10/11/2023  Time Calculation  Start Time: 0750  Stop Time: 0758  Time Calculation (min): 8 min       Assessment/Plan   PT Assessment  PT Assessment Results: Decreased strength, Decreased endurance, Impaired balance, Decreased mobility, Decreased safety awareness, Decreased cognition  Rehab Prognosis: Good  Barriers to Discharge:  (Impaired memory and cognition)  Evaluation/Treatment Tolerance: Patient tolerated treatment well  Strengths: Housing layout, Premorbid level of function, Rehab experience, Support of Caregivers  End of Session Communication: Bedside nurse  Assessment Comment:  (Anticipate pt to be discharged home with SBA to supervision at the walker level.)  End of Session Patient Position: Up in chair, Alarm on     PT Plan  Treatment/Interventions: Bed mobility, Transfer training, Gait training, Stair training, Balance training, Strengthening, Endurance training, Therapeutic exercise, Therapeutic activity, Home exercise program  PT Plan: Skilled PT  PT Frequency: 5 times per week (6 times prn)  PT Discharge Recommendations:  (Home health care PT as needed.  Will require spouse assist with higher level functional activities.)  Equipment Recommended upon Discharge: Wheeled walker  PT Recommended Transfer Status:  (At this time, pt requires mod assist overall)      General Visit Information:           Subjective   Precautions:  Precautions  Medical Precautions:  (mejia)           Treatments:  Bed Mobility  Bed Mobility:  (bed mob supine to sit indep with bed flat and without rail.)    Transfers  Transfer:  (Transfer training stand pivot bed to w/c with ww and supervision.)        Education Documentation  Patient educated in correct bed mobility with instruction for proper trunk positioning, upper and lower body placement and positioning.  Patient educated in safe and proper transfer techniques  including proper positioning and hand/foot placement to maximize safety and functional independence.           Encounter Problems       Encounter Problems (Active)       Mobility       LTG - Bed mobility mod independent (Progressing)       Start:  10/06/23    Expected End:  10/13/23            LTG - Transfers with supervision. (Progressing)       Start:  10/06/23    Expected End:  10/13/23            LTG - Pt will amb 100 ft with wheeled walker and supervision (Progressing)       Start:  10/06/23    Expected End:  10/13/23            LTG - Pt will negotiate 5 stairs with rails and CGA (Progressing)       Start:  10/06/23    Expected End:  10/13/23            STG - Bed mobility with min assist. (Met)       Start:  10/06/23    Expected End:  10/10/23    Resolved:  10/11/23         STG - Transfers with CGA (Met)       Start:  10/06/23    Expected End:  10/10/23    Resolved:  10/11/23         STG - Pt will amb 50 ft x3 with wheeled walker and CGA (Met)       Start:  10/06/23    Expected End:  10/10/23    Resolved:  10/11/23         STG -Pt will negotiate 3 stairs with rails and min assist. (Met)       Start:  10/06/23    Expected End:  10/10/23    Resolved:  10/11/23

## 2023-10-11 NOTE — CARE PLAN
Problem: Skin  Goal: Decreased wound size/increased tissue granulation at next dressing change  Outcome: Progressing  Flowsheets (Taken 10/10/2023 1411 by Dianne Leo RN)  Decreased wound size/increased tissue granulation at next dressing change:   Promote sleep for wound healing   Utilize specialty bed per algorithm  Goal: Participates in plan/prevention/treatment measures  Outcome: Progressing  Flowsheets (Taken 10/10/2023 1411 by Dianne Loe RN)  Participates in plan/prevention/treatment measures:   Increase activity/out of bed for meals   Discuss with provider PT/OT consult   Elevate heels  Goal: Prevent/manage excess moisture  Outcome: Progressing  Flowsheets (Taken 10/10/2023 1411 by Dianne Leo RN)  Prevent/manage excess moisture:   Moisturize dry skin   Follow provider orders for dressing changes  Goal: Prevent/minimize sheer/friction injuries  Outcome: Progressing  Flowsheets (Taken 10/10/2023 1411 by Dianne Leo RN)  Prevent/minimize sheer/friction injuries:   Use pull sheet   Turn/reposition every 2 hours/use positioning/transfer devices   Increase activity/out of bed for meals   HOB 30 degrees or less  Goal: Promote/optimize nutrition  Outcome: Progressing  Flowsheets (Taken 10/10/2023 1411 by Dianne Leo RN)  Promote/optimize nutrition:   Offer water/supplements/favorite foods   Consume > 50% meals/supplements   Monitor/record intake including meals  Goal: Promote skin healing  Outcome: Progressing  Flowsheets (Taken 10/6/2023 0158 by Renetta Ramsay RN)  Promote skin healing: Turn/reposition every 2 hours/use positioning/transfer devices

## 2023-10-11 NOTE — PROGRESS NOTES
Occupational Therapy    OT Treatment    Patient Name: Gabriella Haskins  MRN: [id number]  Today's Date: 10/11/2023  Time Calculation  Start Time: 1345  Stop Time: 1420  Time Calculation (min): 35 min         Subjective   General:  OT Received On: 10/11/23    Family/Caregiver Present: Yes    Patient Position Received: Up in chair, Alarm on  General Comment:  in for discharge teaching    Pain:  Pain Assessment  Pain Assessment: 0-10  Pain Score: 0 - No pain        Objective    Transfers:   Toilet Transfers: Modified independence  Toilet Transfers Comments:  (educated pt/ on commode uses, adjustments, setup, easy clean up if next to bed or over toilet; commode issued)    Tub Transfers: Supervision (using bench and tub mounted grab bar via wwalker)  Tub Transfers Comments:  ( educated on dme and vendor options, issued coupon for tub mounted grab bar, has extended tub bench)       EDUCATION:  Education  Individual(s) Educated: Patient, Spouse  Education Provided: Fall precautons (home safety, walker safety/use of walker bag and reacher for safe retrieval;   on all adl levels of assist, ue indep; le supervision, occas cues for threading catheter properly; recommend reacher purchase.); functional mobility via wwalker distal supervision, simple mobility mod I;  Home Program: [facility], Strengthening (bilat ue ther ex program handout issued)  Patient/Caregiver Demonstrated Understanding: yes  Patient Response to Education: Patient/Caregiver Verbalized Understanding of Information  Education Comment: will continue to review further for discharge    Goals:  Encounter Problems       Encounter Problems (Active)       Bathing       LTG - Patient will utilize adaptive techniques to bathe body sba (Progressing)       Start:  10/06/23    Expected End:  10/13/23            STG - Patient will bathe body min a (Progressing)       Start:  10/06/23    Expected End:  10/11/23               Dressing Upper Extremities       LTG  - Patient will complete upper body dressing setup (Progressing)       Start:  10/06/23    Expected End:  10/13/23            STG - Patient will dress upper body sba (Progressing)       Start:  10/06/23    Expected End:  10/11/23               Dressings Lower Extremities       LTG - Patient will dress lower body sba (Progressing)       Start:  10/06/23    Expected End:  10/13/23            STG - Patient will complete lower body dressing min a (Progressing)       Start:  10/06/23    Expected End:  10/11/23               Grooming       LTG - Patient will complete daily grooming tasks setup (Progressing)       Start:  10/06/23    Expected End:  10/13/23            STG - Patient completes grooming supervision (Progressing)       Start:  10/06/23    Expected End:  10/11/23               Instrumental Activities of Daily Living       LTG - Patient will complete simple meal preparation activities sba at walker level (Progressing)       Start:  10/06/23    Expected End:  10/13/23                 Mobility       LTG - Pt to complete functional mobility via sba using wwalker (Progressing)       Start:  10/06/23    Expected End:  10/13/23            STG -Pt to complete functional mobility via cga (Progressing)       Start:  10/06/23    Expected End:  10/11/23               Toileting       LTG - Patient will complete daily toileting tasks sba (Progressing)       Start:  10/06/23    Expected End:  10/13/23            STG - Patient will complete toileting tasks with min a (Progressing)       Start:  10/06/23    Expected End:  10/11/23               Transfers       LTG - Patient will transfer to commode sba (Progressing)       Start:  10/06/23    Expected End:  10/13/23            LTG - Patient will transfer to tub/shower/dme cga (Progressing)       Start:  10/06/23    Expected End:  10/13/23            STG - Patient will perform toilet transfer cga (Progressing)       Start:  10/06/23    Expected End:  10/11/23            STG -  Patient will perform tub/shower transfer min/mod a (Progressing)       Start:  10/06/23    Expected End:  10/11/23

## 2023-10-11 NOTE — PROGRESS NOTES
Occupational Therapy    OT Treatment    Patient Name: Gabriella Haskins  MRN: [id number]  Today's Date: 10/11/2023  Time Calculation  Start Time: 0755  Stop Time: 0818  Time Calculation (min): 23 min           Subjective   General:  OT Received On: 10/11/23    Family/Caregiver Present: No    Patient Position Received: Up in chair, Alarm on  General Comment:  (pt with call light and phone within reach at end of session)       Pain:  Pain Assessment  Pain Assessment: 0-10  Pain Score: 0 - No pain      Objective    Activities of Daily Living: Grooming  Grooming Level of Assistance:  (supervision at sink, wwwalker level; oral care, brushing hair, washing face)  Grooming Where Assessed: Standing sinkside  Grooming Comments: slow process    LE Dressing  LE Dressing: Yes (supervision for donning pants and mgmt/threading of catheter)    Bed Mobility/Transfers: Transfers  Transfer:  (sit to stand supervision)    Toilet Transfers  Toilet Transfers Comments: supervision via wwalker            Therapy/Activity: Therapeutic Activity  Therapeutic Activity Performed:  (completes wwalker functional mobility in room, accessing closet for clothing retrieval, transport supervision)         EDUCATION:  Education  Individual(s) Educated: Patient  Education Provided: Fall precautons (homegoing recommendations; commode issued and educated on setup)  Patient Response to Education: Patient/Caregiver Verbalized Understanding of Information      Goals:  Encounter Problems       Encounter Problems (Active)       Bathing       LTG - Patient will utilize adaptive techniques to bathe body sba (Progressing)       Start:  10/06/23    Expected End:  10/13/23            STG - Patient will bathe body min a (Progressing)       Start:  10/06/23    Expected End:  10/11/23               Dressing Upper Extremities       LTG - Patient will complete upper body dressing setup (Progressing)       Start:  10/06/23    Expected End:  10/13/23            STG -  Patient will dress upper body sba (Progressing)       Start:  10/06/23    Expected End:  10/11/23               Dressings Lower Extremities       LTG - Patient will dress lower body sba (Progressing)       Start:  10/06/23    Expected End:  10/13/23            STG - Patient will complete lower body dressing min a (Progressing)       Start:  10/06/23    Expected End:  10/11/23               Grooming       LTG - Patient will complete daily grooming tasks setup (Progressing)       Start:  10/06/23    Expected End:  10/13/23            STG - Patient completes grooming supervision (Progressing)       Start:  10/06/23    Expected End:  10/11/23               Instrumental Activities of Daily Living       LTG - Patient will complete simple meal preparation activities sba at walker level (Progressing)       Start:  10/06/23    Expected End:  10/13/23                 Mobility       LTG - Pt to complete functional mobility via sba using wwalker (Progressing)       Start:  10/06/23    Expected End:  10/13/23            STG -Pt to complete functional mobility via cga (Progressing)       Start:  10/06/23    Expected End:  10/11/23               Toileting       LTG - Patient will complete daily toileting tasks sba (Progressing)       Start:  10/06/23    Expected End:  10/13/23            STG - Patient will complete toileting tasks with min a (Progressing)       Start:  10/06/23    Expected End:  10/11/23               Transfers       LTG - Patient will transfer to commode sba (Progressing)       Start:  10/06/23    Expected End:  10/13/23            LTG - Patient will transfer to tub/shower/dme cga (Progressing)       Start:  10/06/23    Expected End:  10/13/23            STG - Patient will perform toilet transfer cga (Progressing)       Start:  10/06/23    Expected End:  10/11/23            STG - Patient will perform tub/shower transfer min/mod a (Progressing)       Start:  10/06/23    Expected End:  10/11/23

## 2023-10-11 NOTE — INDIVIDUALIZED OVERALL PLAN OF CARE NOTE
Individualized Plan of Care:     Primary rehabilitation diagnosis: debility     [facility] code:  R53.81     Estimated length of stay:   1 week     Medical functional prognosis is is good to be discharged home in 1 week at an overall modified independent level and up to contact-guard assist for higher functional mobility       Patient/family anticipated outcome:  Home as independent as possible     Functional outcome/goal:  Bed mobility: Modified independent  Transfer sit to stand : Modified independent  Ambulation: Modified independent  Upper extremity dressing: Modified independent  Lower extremity dressing: Modified independent  Stairs: 5 rails with contact-guard assist  communicate needs and wants as independent as able.       Anticipated interventions:  Therapeutic exercises  Gait Training  Transfer training  Exercises to improve balance and mobility  ADL retraining for grooming, bathing, ADL activities  Exercises to improve strength and endurance            Required therapy:  Physical therapy 90 minutes 5 days/week.  Occupational therapy 90 minutes 5 days/week.       Patient has good potential to be discharged home at a modified independent level.  She does have urinary retention and will require urology follow-up after discharge.  She will be seen by a physician to manage her medical problems    Individualized physician plan of care  Principal Problem:    Debility     UTI  -continue with Cipro and complete treatment     H/o gout  -colchicine treatment due to ankle pain and presumed gout  Prednisone for pain     Hypothyroidism  -continue with synthroid     HTN  -continue with Beta blocker and follow BP              Bowel/Bladder  -facilitate daily active BM   -continence of bladder per timed void schedule and rehab nursing  -check PVRs and treat appropriately     DVT prophylaxis  -SCDs and ambulation  -Chemoprophylaxis for DVT until ambulating >200 feet     FEN  -follow BMP and treat appropriately  -monitor oral  intake daily

## 2023-10-11 NOTE — PROGRESS NOTES
"  Gabriella Haskins is a 78 y.o. female on day 6 of admission presenting with Debility.    Subjective   Patient was seen today in physical therapy.  Ambulating with Supervision.  She is able to manage with the catheter.  We did discuss follow-up with urology after discharge at the [facility].  She has completed a course of Cipro as well as prednisone.    Patient denies chest pain or shortness of breath.  No lightheadedness or dizziness.  No nausea or vomiting.       Objective         Physical Exam  Constitutional:       Appearance: Normal appearance.  She is not in any distress extraocular muscles are intact  HENT:      Head: Normocephalic and atraumatic.   Eyes:   , Extraocular muscles are intact, there is no scleral redness  Cardiovascular:      Rate and Rhythm: Normal rate and regular rhythm, there is no murmurs or gallops  Pulmonary:      Effort: Pulmonary effort is normal.      Breath sounds: Normal breath sounds.   Abdominal:      General: Bowel sounds are normal. There is no distension.   No abdominal pain, no guarding or rigidity  Musculoskeletal:         General: No swelling or tenderness.      Comments: ROM is WFL   Skin:     General: Skin is warm and dry, no rashes  Neurological:      General: No focal deficit present.      Mental Status: She is alert and oriented to person, place, and time. Mental status is at baseline.   Psychiatric:         Mood and Affect: Mood normal.      Mccarthy in place. Hematuria present     Function: Supervision  Assessment/Plan        Last Recorded Vitals  Blood pressure 124/57, pulse 62, temperature 37.8 °C (100 °F), resp. rate 18, height 1.676 m (5' 5.98\"), weight 75.5 kg (166 lb 7.2 oz), SpO2 97 %.    Therapy notes from last 24 hours reviewed.    Relevant Results                  Scheduled medications  ciprofloxacin, 250 mg, oral, BID  colchicine (gout), 0.6 mg, oral, Once  ferrous sulfate, 65 mg of iron, oral, Every other day  ketoconazole, , Topical, BID  levothyroxine, " 112 mcg, oral, Daily  loratadine, 10 mg, oral, Daily  metoprolol tartrate, 12.5 mg, oral, BID  predniSONE, 20 mg, oral, Daily  sennosides, 1 tablet, oral, Nightly  sodium chloride, 1 drop, Both Eyes, q4h      Continuous medications     PRN medications  PRN medications: acetaminophen **OR** acetaminophen, alum-mag hydroxide-simeth, bisacodyl, bisacodyl, melatonin, oxyCODONE-acetaminophen     Results for orders placed or performed during the hospital encounter of 10/05/23 (from the past 24 hour(s))   CBC and Auto Differential   Result Value Ref Range    WBC 19.6 (H) 4.4 - 11.3 x10*3/uL    nRBC 0.0 0.0 - 0.0 /100 WBCs    RBC 3.38 (L) 4.00 - 5.20 x10*6/uL    Hemoglobin 9.8 (L) 12.0 - 16.0 g/dL    Hematocrit 31.9 (L) 36.0 - 46.0 %    MCV 94 80 - 100 fL    MCH 29.0 26.0 - 34.0 pg    MCHC 30.7 (L) 32.0 - 36.0 g/dL    RDW 13.7 11.5 - 14.5 %    Platelets 393 150 - 450 x10*3/uL    MPV 8.2 7.5 - 11.5 fL    Neutrophils % 74.2 40.0 - 80.0 %    Immature Granulocytes %, Automated 4.0 (H) 0.0 - 0.9 %    Lymphocytes % 15.1 13.0 - 44.0 %    Monocytes % 5.5 2.0 - 10.0 %    Eosinophils % 0.7 0.0 - 6.0 %    Basophils % 0.5 0.0 - 2.0 %    Neutrophils Absolute 14.58 (H) 1.60 - 5.50 x10*3/uL    Immature Granulocytes Absolute, Automated 0.78 (H) 0.00 - 0.50 x10*3/uL    Lymphocytes Absolute 2.97 0.80 - 3.00 x10*3/uL    Monocytes Absolute 1.07 (H) 0.05 - 0.80 x10*3/uL    Eosinophils Absolute 0.13 0.00 - 0.40 x10*3/uL    Basophils Absolute 0.09 0.00 - 0.10 x10*3/uL   SST TOP   Result Value Ref Range    Extra Tube Hold for add-ons.                  Assessment/Plan   Principal Problem:    Debility  Active Problems:    Hematuria    Leg pain      Principal Problem:    Debility     UTI  -continue with Cipro and complete treatment     H/o gout  -colchicine treatment due to ankle pain and presumed gout  Prednisone for pain     Hypothyroidism  -continue with synthroid     HTN  -continue with Beta blocker and follow BP               Bowel/Bladder  -facilitate daily active BM   -continence of bladder per timed void schedule and rehab nursing  -check PVRs and treat appropriately     DVT prophylaxis  -SCDs and ambulation  -Chemoprophylaxis for DVT until ambulating >200 feet     FEN  -follow BMP and treat appropriately  -monitor oral intake daily    10/10  -Patient treated for debility due to generalized weakness.  -Functioning in a supervised level  -Able to manage her Mccarthy catheter  -We will need chronic follow-up with urology after discharge due to ongoing hematuria  -H&H evaluation.       I spent 2 5 minutes in the professional and overall care of this patient.      Kary NIA Gallardo, DO  10/11/23  Patient to be discharged in a.m., will follow-up with urology for further management.  She knows to seek emergent care if she does develop a fever  We will recheck her CBC and a BMP in 1 week with results to her family doctor  Hypertension BP is 124/67 significant will continue Metroprolol 12.5 twice daily  We will continue colchicine after discharge has completed the prednisone course  Time spent reviewing records, examining patient, and documentation is 26 minutes

## 2023-10-11 NOTE — CARE PLAN
Problem: Indwelling Catheter Maintenance  Goal: Absence of fever/infection during anticipated neutropenic period  Outcome: Progressing     Problem: Pain - Adult  Goal: Verbalizes/displays adequate comfort level or baseline comfort level  Outcome: Progressing     Problem: Infection - Adult  Goal: Absence of infection at discharge  Outcome: Progressing  Goal: Absence of infection during hospitalization  Outcome: Progressing  Goal: Absence of fever/infection during anticipated neutropenic period  Outcome: Progressing     Problem: Safety - Adult  Goal: Free from fall injury  Outcome: Progressing     Problem: Discharge Planning  Goal: Discharge to home or other facility with appropriate resources  Outcome: Progressing   The patient's goals for the shift include  to participate in scheduled therapy sessions    The clinical goals for the shift include up in chair for all meals

## 2023-10-11 NOTE — PROGRESS NOTES
Physical Therapy    Physical Therapy Treatment    Patient Name: Gabriella Haskins  MRN: [id number]  Today's Date: 10/11/2023  Time Calculation  Start Time: 1300  Stop Time: 1345  Time Calculation (min): 45 min       Assessment/Plan   PT Assessment  PT Assessment Results: Decreased strength, Decreased endurance, Impaired balance, Decreased mobility, Decreased safety awareness, Decreased cognition  Rehab Prognosis: Good  Barriers to Discharge:  (Impaired memory and cognition)  Evaluation/Treatment Tolerance: Patient tolerated treatment well  Strengths: Housing layout, Premorbid level of function, Rehab experience, Support of Caregivers  End of Session Communication: Bedside nurse  Assessment Comment:  (Anticipate pt to be discharged home with SBA to supervision at the walker level.)  End of Session Patient Position: Up in chair, Alarm on     PT Plan  Treatment/Interventions: Bed mobility, Transfer training, Gait training, Stair training, Balance training, Strengthening, Endurance training, Therapeutic exercise, Therapeutic activity, Home exercise program  PT Plan: Skilled PT  PT Frequency: 5 times per week (6 times prn)  PT Discharge Recommendations:  (Home health care PT as needed.  Will require spouse assist with higher level functional activities.)  Equipment Recommended upon Discharge: Wheeled walker  PT Recommended Transfer Status:  (At this time, pt requires mod assist overall)      General Visit Information:      General  Family/Caregiver Present: Yes  Caregiver Feedback:  (Pt's  Ritchie present for family training this date.)  Patient Position Received: Up in chair, Alarm on  General Comment: Pt reports she feels good today, no complaints. Feeling ready for d/c. FWW issued to pt and adjusted by PT to appropriate height    Subjective   Precautions:  Precautions  Medical Precautions:  (jackie)  Precautions Comment: jackie    Objective   Pain:  Pain Assessment  Pain Assessment: 0-10  Pain Score: 0 - No  pain    Treatments:  Bed Mobility  Bed Mobility:  (Pt performs bed mobility independently on/off ADL bed)    Ambulation/Gait Training  Ambulation/Gait Training Performed:  (Pt ambulates 150 ft, 200 ft, 150 ft with FWW and mod I)  Transfers  Transfer:  (Pt performs transfers with mod I)    Stairs  Stairs:  (Pt ascends/descends 5 steps x 3 with BHR and SBA)      Education Documentation  Family teaching completed with spouse. Spouse educated on safety recommendations as well as safe mobility techniques related to chair transfers, car transfers, gait, and stairs. Spouse appears to have good understanding of pt's needs at discharge.  Educated on management of catheter with regards to mobility for home going.  Discussed with pt and  no further need for follow up PT at this time.     Education  Individual(s) Educated: Patient, Spouse  Education Provided: Home Exercise Program, Home Safety      Encounter Problems       Encounter Problems (Active)       Mobility       LTG - Bed mobility mod independent (Progressing)       Start:  10/06/23    Expected End:  10/13/23            LTG - Transfers with supervision. (Progressing)       Start:  10/06/23    Expected End:  10/13/23            LTG - Pt will amb 100 ft with wheeled walker and supervision (Progressing)       Start:  10/06/23    Expected End:  10/13/23            LTG - Pt will negotiate 5 stairs with rails and CGA (Progressing)       Start:  10/06/23    Expected End:  10/13/23            STG - Bed mobility with min assist. (Met)       Start:  10/06/23    Expected End:  10/10/23    Resolved:  10/11/23         STG - Transfers with CGA (Met)       Start:  10/06/23    Expected End:  10/10/23    Resolved:  10/11/23         STG - Pt will amb 50 ft x3 with wheeled walker and CGA (Met)       Start:  10/06/23    Expected End:  10/10/23    Resolved:  10/11/23         STG -Pt will negotiate 3 stairs with rails and min assist. (Met)       Start:  10/06/23    Expected End:   10/10/23    Resolved:  10/11/23

## 2023-10-11 NOTE — CARE PLAN
Patient achieved 4/4 LTGs and 4/4 STGs established at initial evaluation.  Patient discharged home at mod I walker level with SBA for stair negotiation.  Family teaching done with spouse on 10/11/23.  No further follow up PT needed at this time.     Problem: Mobility  Goal: LTG - Bed mobility mod independent  Outcome: Met  Goal: LTG - Transfers with supervision.  Outcome: Met  Goal: LTG - Pt will amb 100 ft with wheeled walker and supervision  Outcome: Met  Goal: LTG - Pt will negotiate 5 stairs with rails and CGA  Outcome: Met  Goal: STG - Bed mobility with min assist.  Outcome: Met  Goal: STG - Transfers with CGA  Outcome: Met  Goal: STG - Pt will amb 50 ft x3 with wheeled walker and CGA  Outcome: Met  Goal: STG -Pt will negotiate 3 stairs with rails and min assist.  Outcome: Met

## 2023-10-12 VITALS
OXYGEN SATURATION: 96 % | WEIGHT: 166.45 LBS | SYSTOLIC BLOOD PRESSURE: 142 MMHG | RESPIRATION RATE: 18 BRPM | HEART RATE: 65 BPM | DIASTOLIC BLOOD PRESSURE: 63 MMHG | BODY MASS INDEX: 26.75 KG/M2 | TEMPERATURE: 97.2 F | HEIGHT: 66 IN

## 2023-10-12 PROCEDURE — 97530 THERAPEUTIC ACTIVITIES: CPT | Mod: GP

## 2023-10-12 PROCEDURE — 51702 INSERT TEMP BLADDER CATH: CPT

## 2023-10-12 PROCEDURE — 2500000004 HC RX 250 GENERAL PHARMACY W/ HCPCS (ALT 636 FOR OP/ED): Performed by: PHYSICAL MEDICINE & REHABILITATION

## 2023-10-12 PROCEDURE — 2500000001 HC RX 250 WO HCPCS SELF ADMINISTERED DRUGS (ALT 637 FOR MEDICARE OP): Performed by: PHYSICAL MEDICINE & REHABILITATION

## 2023-10-12 PROCEDURE — 99239 HOSP IP/OBS DSCHRG MGMT >30: CPT | Performed by: PHYSICAL MEDICINE & REHABILITATION

## 2023-10-12 PROCEDURE — 3490 HC RX 250 GENERAL PHARMACY W/ HCPCS (ALT 636 FOR OP/ED): Performed by: PHYSICAL MEDICINE & REHABILITATION

## 2023-10-12 RX ORDER — FUROSEMIDE 20 MG/1
10 TABLET ORAL DAILY
Qty: 15 TABLET | Refills: 0 | Status: SHIPPED | OUTPATIENT
Start: 2023-10-12 | End: 2023-11-07

## 2023-10-12 RX ADMIN — SODIUM CHLORIDE 1 DROP: 20 SOLUTION OPHTHALMIC at 02:45

## 2023-10-12 RX ADMIN — FERROUS SULFATE TAB 325 MG (65 MG ELEMENTAL FE) 65 MG OF IRON: 325 (65 FE) TAB at 09:27

## 2023-10-12 RX ADMIN — LEVOTHYROXINE SODIUM 112 MCG: 0.11 TABLET ORAL at 05:34

## 2023-10-12 RX ADMIN — CIPROFLOXACIN 250 MG: 500 TABLET, FILM COATED ORAL at 05:33

## 2023-10-12 RX ADMIN — SODIUM CHLORIDE 1 DROP: 20 SOLUTION OPHTHALMIC at 11:01

## 2023-10-12 RX ADMIN — SODIUM CHLORIDE 1 DROP: 20 SOLUTION OPHTHALMIC at 06:28

## 2023-10-12 RX ADMIN — METOPROLOL TARTRATE 12.5 MG: 25 TABLET, FILM COATED ORAL at 09:27

## 2023-10-12 RX ADMIN — KETOCONAZOLE: 20 CREAM TOPICAL at 09:27

## 2023-10-12 RX ADMIN — LORATADINE 10 MG: 10 TABLET ORAL at 09:26

## 2023-10-12 RX ADMIN — PREDNISONE 20 MG: 20 TABLET ORAL at 09:26

## 2023-10-12 ASSESSMENT — PAIN - FUNCTIONAL ASSESSMENT: PAIN_FUNCTIONAL_ASSESSMENT: 0-10

## 2023-10-12 ASSESSMENT — PAIN SCALES - GENERAL: PAINLEVEL_OUTOF10: 0 - NO PAIN

## 2023-10-12 NOTE — PROGRESS NOTES
Physical Therapy    Time in: 1215  Time out: 1225     Patient performs sit/stand from wheelchair, ambulates 5 ft without assistive device to car over concrete with CGA.  Patient performs car transfer with supervision and  present.  Cues needed for proper technique and safety.

## 2023-10-12 NOTE — CARE PLAN
The patient's goals for the shift include  discharge home today    The clinical goals for the shift include patient's urine will be less hematuria by the end of the shift.

## 2023-10-12 NOTE — CARE PLAN
The patient's goals for the shift include      The clinical goals for the shift include up in chair for all meals    Over the shift, the patient did not make progress toward the following goals. Barriers to progression include patient is still on PT. Recommendations to address these barriers include continue education.

## 2023-10-12 NOTE — CARE PLAN
Problem: Fall/Injury  Goal: Not fall by end of shift  Outcome: Progressing  Goal: Be free from injury by end of the shift  Outcome: Progressing  Goal: Verbalize understanding of personal risk factors for fall in the hospital  Outcome: Progressing  Goal: Verbalize understanding of risk factor reduction measures to prevent injury from fall in the home  Outcome: Progressing  Goal: Use assistive devices by end of the shift  Outcome: Progressing  Goal: Pace activities to prevent fatigue by end of the shift  Outcome: Progressing     Problem: Fall/Injury  Goal: Not fall by end of shift  Outcome: Progressing  Goal: Be free from injury by end of the shift  Outcome: Progressing  Goal: Verbalize understanding of personal risk factors for fall in the hospital  Outcome: Progressing  Goal: Verbalize understanding of risk factor reduction measures to prevent injury from fall in the home  Outcome: Progressing  Goal: Use assistive devices by end of the shift  Outcome: Progressing  Goal: Pace activities to prevent fatigue by end of the shift  Outcome: Progressing   The patient's goals for the shift include      The clinical goals for the shift include up in chair for all meals    Over the shift, the patient did not make progress toward the following goals. Barriers to progression include patient is still receiving therapy. Recommendations to address these barriers include continue with Physical therapy.

## 2023-10-12 NOTE — DISCHARGE SUMMARY
Discharge Diagnosis  Debility    Issues Requiring Follow-Up  none    Discharge Meds     Your medication list        START taking these medications        Instructions Last Dose Given Next Dose Due   ketoconazole 2 % cream  Commonly known as: NIZOral      Apply topically 2 times a day.              CHANGE how you take these medications        Instructions Last Dose Given Next Dose Due   metoprolol tartrate 25 mg tablet  Commonly known as: Lopressor  What changed: when to take this      Take 0.5 tablets (12.5 mg) by mouth 2 times a day.              CONTINUE taking these medications        Instructions Last Dose Given Next Dose Due   ciprofloxacin 250 mg tablet  Commonly known as: Cipro      Take 1 tablet (250 mg) by mouth 2 times a day for 2 days.       colchicine (gout) 0.6 mg tablet           ferrous sulfate 325 (65 Fe) MG tablet           levothyroxine 112 mcg tablet  Commonly known as: Synthroid, Levoxyl           sodium chloride 5 % ophthalmic solution  Commonly known as: Adelita 128           ZyrTEC 10 mg capsule  Generic drug: cetirizine                  STOP taking these medications      oxyCODONE-acetaminophen 5-325 mg tablet  Commonly known as: Percocet        predniSONE 20 mg tablet  Commonly known as: Deltasone                  Where to Get Your Medications        These medications were sent to [facility]/pharmacy #[id number] [address] AT [address] OF [place] [address]      Phone: [phone]   ciprofloxacin 250 mg tablet  metoprolol tartrate 25 mg tablet       Information about where to get these medications is not yet available    Ask your nurse or doctor about these medications  ketoconazole 2 % cream         Test Results Pending At Discharge  Pending Labs       Order Current Status    Extra Tubes In process    Lavender Top In process    Extra Tubes Preliminary result    SST TOP Preliminary result            Hospital Course   Gabriella Haskins is a 78 y.o. female  presenting with debility.     78 YR OLD PRESENTS TO ED W/ C/O HEMTURIA AND RT FLANF PAIN. FOUND TO HAVE A UTI. CYSTOSCOPY SHOWED CYSTITIS,CYSTOCELE,MILD RT HYDRONEPHROSIS. SHE WENT INTO A FIB AND WAS CARDIOVERTED. NEXT MORING BECAME BRAYCARDIC AND WENT INTO CARDIAC ARREST.ECHO FAIRLY NORMAL. NUCLEAR STESS TEST NEG.    She participated in 3 hours of PT/OT daily and improved to the point she was mod I overall. She remained with a mejia with hematuria that will be followed up as an outpatient with urology. Her H/H remained stable. Ongoing monitoring of her BNP and CBC.     She will have [facility] for overall management at the time of DC. She reached her goals at DC home.    Pertinent Physical Exam At Time of Discharge    Physical Exam  Constitutional:       Appearance: Normal appearance. She is normal weight.   HENT:      Head: Normocephalic and atraumatic.   Eyes:      Extraocular Movements: Extraocular movements intact.      Conjunctiva/sclera: Conjunctivae normal.      Pupils: Pupils are equal, round, and reactive to light.   Cardiovascular:      Rate and Rhythm: Normal rate and regular rhythm.   Pulmonary:      Effort: Pulmonary effort is normal.      Breath sounds: Normal breath sounds.   Abdominal:      General: Bowel sounds are normal. There is no distension.      Palpations: Abdomen is soft.      Tenderness: There is no abdominal tenderness. There is no guarding.   Musculoskeletal:         General: No swelling or tenderness.      Comments: ROM is WFL   Skin:     General: Skin is warm and dry.   Neurological:      General: No focal deficit present.      Mental Status: She is alert and oriented to person, place, and time. Mental status is at baseline.   Psychiatric:         Mood and Affect: Mood normal.     Outpatient Follow-Up  Follow up with urology and PCP at the time of DC.      Sirisha Soto MD

## 2023-10-12 NOTE — CARE PLAN
Pt met 9/9 ltg from [facility], pt discharged home at mod I for commode transfers and simple mobility, sba/s for tub transfers, functional mobility at wwalker, and le adl's; discharge teaching completed with  on 10/11/23; no OT follow up recommended at this time.    Problem: Dressings Lower Extremities  Goal: LTG - Patient will dress lower body sba  Outcome: Met  Goal: STG - Patient will complete lower body dressing min a  Outcome: Met     Problem: Dressing Upper Extremities  Goal: LTG - Patient will complete upper body dressing setup  Outcome: Met  Goal: STG - Patient will dress upper body sba  Outcome: Met     Problem: Instrumental Activities of Daily Living  Goal: LTG - Patient will complete simple meal preparation activities sba at walker level  Outcome: Met     Problem: Toileting  Goal: LTG - Patient will complete daily toileting tasks sba  Outcome: Met  Goal: STG - Patient will complete toileting tasks with min a  Outcome: Met     Problem: Transfers  Goal: LTG - Patient will transfer to commode sba  Outcome: Met  Goal: LTG - Patient will transfer to tub/shower/dme cga  Outcome: Met  Goal: STG - Patient will perform toilet transfer cga  Outcome: Met  Goal: STG - Patient will perform tub/shower transfer min/mod a  Outcome: Met     Problem: Mobility  Goal: LTG - Pt to complete functional mobility via sba using wwalker  Outcome: Met  Goal: STG -Pt to complete functional mobility via cga  Outcome: Met     Problem: Bathing  Goal: LTG - Patient will utilize adaptive techniques to bathe body sba  Outcome: Met  Goal: STG - Patient will bathe body min a  Outcome: Met

## 2023-10-15 PROBLEM — M10.9 GOUT: Status: ACTIVE | Noted: 2023-10-15

## 2023-10-15 PROBLEM — I48.91 ATRIAL FIBRILLATION (MULTI): Status: ACTIVE | Noted: 2023-10-15

## 2023-11-03 DIAGNOSIS — M79.605 PAIN IN BOTH LOWER EXTREMITIES: Chronic | ICD-10-CM

## 2023-11-03 DIAGNOSIS — M79.604 PAIN IN BOTH LOWER EXTREMITIES: Chronic | ICD-10-CM

## 2023-11-07 RX ORDER — FUROSEMIDE 20 MG/1
TABLET ORAL
Qty: 45 TABLET | Refills: 1 | Status: SHIPPED | OUTPATIENT
Start: 2023-11-07 | End: 2024-06-06 | Stop reason: ALTCHOICE

## 2023-12-13 LAB
HOLD SPECIMEN: NORMAL
HOLD SPECIMEN: NORMAL

## 2024-05-29 ENCOUNTER — HOSPITAL ENCOUNTER (INPATIENT)
Facility: HOSPITAL | Age: 79
LOS: 2 days | Discharge: HOME HEALTH | DRG: 087 | End: 2024-05-31
Attending: STUDENT IN AN ORGANIZED HEALTH CARE EDUCATION/TRAINING PROGRAM | Admitting: SURGERY
Payer: MEDICARE

## 2024-05-29 ENCOUNTER — APPOINTMENT (OUTPATIENT)
Dept: RADIOLOGY | Facility: HOSPITAL | Age: 79
DRG: 087 | End: 2024-05-29
Payer: MEDICARE

## 2024-05-29 DIAGNOSIS — S06.5XAA SDH (SUBDURAL HEMATOMA) (MULTI): Primary | ICD-10-CM

## 2024-05-29 DIAGNOSIS — S51.012A SKIN TEAR OF LEFT ELBOW WITHOUT COMPLICATION, INITIAL ENCOUNTER: ICD-10-CM

## 2024-05-29 DIAGNOSIS — R91.1 LUNG NODULE: ICD-10-CM

## 2024-05-29 DIAGNOSIS — S00.12XA: ICD-10-CM

## 2024-05-29 DIAGNOSIS — S81.012A LACERATION OF LEFT KNEE, INITIAL ENCOUNTER: ICD-10-CM

## 2024-05-29 LAB
ABO GROUP (TYPE) IN BLOOD: NORMAL
ALBUMIN SERPL BCP-MCNC: 3.9 G/DL (ref 3.4–5)
ALP SERPL-CCNC: 44 U/L (ref 33–136)
ALT SERPL W P-5'-P-CCNC: 8 U/L (ref 7–45)
ANION GAP SERPL CALC-SCNC: 12 MMOL/L (ref 10–20)
ANTIBODY SCREEN: NORMAL
APTT PPP: 26 SECONDS (ref 27–38)
AST SERPL W P-5'-P-CCNC: 18 U/L (ref 9–39)
BASOPHILS # BLD AUTO: 0.05 X10*3/UL (ref 0–0.1)
BASOPHILS NFR BLD AUTO: 0.4 %
BILIRUB SERPL-MCNC: 0.5 MG/DL (ref 0–1.2)
BUN SERPL-MCNC: 17 MG/DL (ref 6–23)
CALCIUM SERPL-MCNC: 9.6 MG/DL (ref 8.6–10.3)
CHLORIDE SERPL-SCNC: 101 MMOL/L (ref 98–107)
CO2 SERPL-SCNC: 29 MMOL/L (ref 21–32)
CREAT SERPL-MCNC: 0.75 MG/DL (ref 0.5–1.05)
EGFRCR SERPLBLD CKD-EPI 2021: 82 ML/MIN/1.73M*2
EOSINOPHIL # BLD AUTO: 0.09 X10*3/UL (ref 0–0.4)
EOSINOPHIL NFR BLD AUTO: 0.8 %
ERYTHROCYTE [DISTWIDTH] IN BLOOD BY AUTOMATED COUNT: 13 % (ref 11.5–14.5)
GLUCOSE SERPL-MCNC: 115 MG/DL (ref 74–99)
HCT VFR BLD AUTO: 39.1 % (ref 36–46)
HGB BLD-MCNC: 12.9 G/DL (ref 12–16)
IMM GRANULOCYTES # BLD AUTO: 0.06 X10*3/UL (ref 0–0.5)
IMM GRANULOCYTES NFR BLD AUTO: 0.5 % (ref 0–0.9)
INR PPP: 1.1 (ref 0.9–1.1)
LYMPHOCYTES # BLD AUTO: 1.25 X10*3/UL (ref 0.8–3)
LYMPHOCYTES NFR BLD AUTO: 10.7 %
MCH RBC QN AUTO: 29.3 PG (ref 26–34)
MCHC RBC AUTO-ENTMCNC: 33 G/DL (ref 32–36)
MCV RBC AUTO: 89 FL (ref 80–100)
MONOCYTES # BLD AUTO: 0.69 X10*3/UL (ref 0.05–0.8)
MONOCYTES NFR BLD AUTO: 5.9 %
NEUTROPHILS # BLD AUTO: 9.58 X10*3/UL (ref 1.6–5.5)
NEUTROPHILS NFR BLD AUTO: 81.7 %
NRBC BLD-RTO: 0 /100 WBCS (ref 0–0)
PLATELET # BLD AUTO: 212 X10*3/UL (ref 150–450)
POTASSIUM SERPL-SCNC: 3 MMOL/L (ref 3.5–5.3)
PROT SERPL-MCNC: 6.7 G/DL (ref 6.4–8.2)
PROTHROMBIN TIME: 12.1 SECONDS (ref 9.8–12.8)
RBC # BLD AUTO: 4.41 X10*6/UL (ref 4–5.2)
RH FACTOR (ANTIGEN D): NORMAL
SODIUM SERPL-SCNC: 139 MMOL/L (ref 136–145)
WBC # BLD AUTO: 11.7 X10*3/UL (ref 4.4–11.3)

## 2024-05-29 PROCEDURE — 2500000004 HC RX 250 GENERAL PHARMACY W/ HCPCS (ALT 636 FOR OP/ED): Performed by: STUDENT IN AN ORGANIZED HEALTH CARE EDUCATION/TRAINING PROGRAM

## 2024-05-29 PROCEDURE — 76377 3D RENDER W/INTRP POSTPROCES: CPT | Performed by: RADIOLOGY

## 2024-05-29 PROCEDURE — 36415 COLL VENOUS BLD VENIPUNCTURE: CPT | Performed by: STUDENT IN AN ORGANIZED HEALTH CARE EDUCATION/TRAINING PROGRAM

## 2024-05-29 PROCEDURE — 90715 TDAP VACCINE 7 YRS/> IM: CPT | Performed by: STUDENT IN AN ORGANIZED HEALTH CARE EDUCATION/TRAINING PROGRAM

## 2024-05-29 PROCEDURE — 70486 CT MAXILLOFACIAL W/O DYE: CPT | Performed by: RADIOLOGY

## 2024-05-29 PROCEDURE — 73564 X-RAY EXAM KNEE 4 OR MORE: CPT | Mod: LEFT SIDE | Performed by: RADIOLOGY

## 2024-05-29 PROCEDURE — 96374 THER/PROPH/DIAG INJ IV PUSH: CPT

## 2024-05-29 PROCEDURE — 86901 BLOOD TYPING SEROLOGIC RH(D): CPT | Performed by: STUDENT IN AN ORGANIZED HEALTH CARE EDUCATION/TRAINING PROGRAM

## 2024-05-29 PROCEDURE — 2500000001 HC RX 250 WO HCPCS SELF ADMINISTERED DRUGS (ALT 637 FOR MEDICARE OP): Performed by: STUDENT IN AN ORGANIZED HEALTH CARE EDUCATION/TRAINING PROGRAM

## 2024-05-29 PROCEDURE — 99291 CRITICAL CARE FIRST HOUR: CPT | Mod: 25 | Performed by: STUDENT IN AN ORGANIZED HEALTH CARE EDUCATION/TRAINING PROGRAM

## 2024-05-29 PROCEDURE — 85610 PROTHROMBIN TIME: CPT | Performed by: STUDENT IN AN ORGANIZED HEALTH CARE EDUCATION/TRAINING PROGRAM

## 2024-05-29 PROCEDURE — 76377 3D RENDER W/INTRP POSTPROCES: CPT

## 2024-05-29 PROCEDURE — 2020000001 HC ICU ROOM DAILY

## 2024-05-29 PROCEDURE — 85025 COMPLETE CBC W/AUTO DIFF WBC: CPT | Performed by: STUDENT IN AN ORGANIZED HEALTH CARE EDUCATION/TRAINING PROGRAM

## 2024-05-29 PROCEDURE — 80053 COMPREHEN METABOLIC PANEL: CPT | Performed by: STUDENT IN AN ORGANIZED HEALTH CARE EDUCATION/TRAINING PROGRAM

## 2024-05-29 PROCEDURE — 96375 TX/PRO/DX INJ NEW DRUG ADDON: CPT

## 2024-05-29 PROCEDURE — 73564 X-RAY EXAM KNEE 4 OR MORE: CPT | Mod: LT

## 2024-05-29 PROCEDURE — 72125 CT NECK SPINE W/O DYE: CPT

## 2024-05-29 PROCEDURE — 70450 CT HEAD/BRAIN W/O DYE: CPT | Performed by: RADIOLOGY

## 2024-05-29 PROCEDURE — 70450 CT HEAD/BRAIN W/O DYE: CPT

## 2024-05-29 PROCEDURE — 70486 CT MAXILLOFACIAL W/O DYE: CPT

## 2024-05-29 PROCEDURE — 0JQP3ZZ REPAIR LEFT LOWER LEG SUBCUTANEOUS TISSUE AND FASCIA, PERCUTANEOUS APPROACH: ICD-10-PCS | Performed by: STUDENT IN AN ORGANIZED HEALTH CARE EDUCATION/TRAINING PROGRAM

## 2024-05-29 PROCEDURE — 90471 IMMUNIZATION ADMIN: CPT | Performed by: STUDENT IN AN ORGANIZED HEALTH CARE EDUCATION/TRAINING PROGRAM

## 2024-05-29 PROCEDURE — 72125 CT NECK SPINE W/O DYE: CPT | Performed by: RADIOLOGY

## 2024-05-29 RX ORDER — SODIUM CHLORIDE, SODIUM LACTATE, POTASSIUM CHLORIDE, CALCIUM CHLORIDE 600; 310; 30; 20 MG/100ML; MG/100ML; MG/100ML; MG/100ML
75 INJECTION, SOLUTION INTRAVENOUS CONTINUOUS
Status: DISCONTINUED | OUTPATIENT
Start: 2024-05-29 | End: 2024-05-30

## 2024-05-29 RX ORDER — ONDANSETRON HYDROCHLORIDE 2 MG/ML
4 INJECTION, SOLUTION INTRAVENOUS ONCE
Status: COMPLETED | OUTPATIENT
Start: 2024-05-29 | End: 2024-05-29

## 2024-05-29 RX ORDER — ONDANSETRON HYDROCHLORIDE 2 MG/ML
4 INJECTION, SOLUTION INTRAVENOUS EVERY 8 HOURS PRN
Status: DISCONTINUED | OUTPATIENT
Start: 2024-05-29 | End: 2024-05-31 | Stop reason: HOSPADM

## 2024-05-29 RX ORDER — METOPROLOL TARTRATE 25 MG/1
12.5 TABLET, FILM COATED ORAL 2 TIMES DAILY
Status: DISCONTINUED | OUTPATIENT
Start: 2024-05-29 | End: 2024-05-31 | Stop reason: HOSPADM

## 2024-05-29 RX ORDER — ACETAMINOPHEN 650 MG/1
650 SUPPOSITORY RECTAL EVERY 6 HOURS
Status: DISCONTINUED | OUTPATIENT
Start: 2024-05-29 | End: 2024-05-30

## 2024-05-29 RX ORDER — ACETAMINOPHEN 160 MG/5ML
650 SOLUTION ORAL EVERY 6 HOURS
Status: DISCONTINUED | OUTPATIENT
Start: 2024-05-29 | End: 2024-05-30

## 2024-05-29 RX ORDER — LEVOTHYROXINE SODIUM 112 UG/1
112 TABLET ORAL DAILY
Status: DISCONTINUED | OUTPATIENT
Start: 2024-05-30 | End: 2024-05-31 | Stop reason: HOSPADM

## 2024-05-29 RX ORDER — MORPHINE SULFATE 4 MG/ML
4 INJECTION, SOLUTION INTRAMUSCULAR; INTRAVENOUS ONCE
Status: COMPLETED | OUTPATIENT
Start: 2024-05-29 | End: 2024-05-29

## 2024-05-29 RX ORDER — DOCUSATE SODIUM 100 MG/1
100 CAPSULE, LIQUID FILLED ORAL 2 TIMES DAILY
Status: DISCONTINUED | OUTPATIENT
Start: 2024-05-29 | End: 2024-05-31 | Stop reason: HOSPADM

## 2024-05-29 RX ORDER — LABETALOL HYDROCHLORIDE 5 MG/ML
10 INJECTION, SOLUTION INTRAVENOUS ONCE
Status: COMPLETED | OUTPATIENT
Start: 2024-05-30 | End: 2024-05-29

## 2024-05-29 RX ORDER — ACETAMINOPHEN 325 MG/1
650 TABLET ORAL EVERY 6 HOURS
Status: DISCONTINUED | OUTPATIENT
Start: 2024-05-29 | End: 2024-05-31 | Stop reason: HOSPADM

## 2024-05-29 RX ORDER — ONDANSETRON 4 MG/1
4 TABLET, ORALLY DISINTEGRATING ORAL EVERY 8 HOURS PRN
Status: DISCONTINUED | OUTPATIENT
Start: 2024-05-29 | End: 2024-05-31 | Stop reason: HOSPADM

## 2024-05-29 RX ADMIN — LABETALOL HYDROCHLORIDE 10 MG: 5 INJECTION INTRAVENOUS at 23:49

## 2024-05-29 RX ADMIN — TETANUS TOXOID, REDUCED DIPHTHERIA TOXOID AND ACELLULAR PERTUSSIS VACCINE, ADSORBED 0.5 ML: 5; 2.5; 8; 8; 2.5 SUSPENSION INTRAMUSCULAR at 20:22

## 2024-05-29 RX ADMIN — ACETAMINOPHEN 650 MG: 325 TABLET ORAL at 23:39

## 2024-05-29 RX ADMIN — MORPHINE SULFATE 4 MG: 4 INJECTION, SOLUTION INTRAMUSCULAR; INTRAVENOUS at 20:22

## 2024-05-29 RX ADMIN — SODIUM CHLORIDE, POTASSIUM CHLORIDE, SODIUM LACTATE AND CALCIUM CHLORIDE 75 ML/HR: 600; 310; 30; 20 INJECTION, SOLUTION INTRAVENOUS at 23:44

## 2024-05-29 RX ADMIN — DOCUSATE SODIUM 100 MG: 100 CAPSULE, LIQUID FILLED ORAL at 23:43

## 2024-05-29 RX ADMIN — ONDANSETRON 4 MG: 2 INJECTION INTRAMUSCULAR; INTRAVENOUS at 20:22

## 2024-05-29 RX ADMIN — METOPROLOL TARTRATE 12.5 MG: 25 TABLET, FILM COATED ORAL at 23:39

## 2024-05-29 SDOH — SOCIAL STABILITY: SOCIAL INSECURITY: WERE YOU ABLE TO COMPLETE ALL THE BEHAVIORAL HEALTH SCREENINGS?: YES

## 2024-05-29 SDOH — SOCIAL STABILITY: SOCIAL INSECURITY: ARE YOU OR HAVE YOU BEEN THREATENED OR ABUSED PHYSICALLY, EMOTIONALLY, OR SEXUALLY BY ANYONE?: NO

## 2024-05-29 SDOH — SOCIAL STABILITY: SOCIAL INSECURITY: DOES ANYONE TRY TO KEEP YOU FROM HAVING/CONTACTING OTHER FRIENDS OR DOING THINGS OUTSIDE YOUR HOME?: NO

## 2024-05-29 SDOH — SOCIAL STABILITY: SOCIAL INSECURITY: HAVE YOU HAD THOUGHTS OF HARMING ANYONE ELSE?: NO

## 2024-05-29 SDOH — SOCIAL STABILITY: SOCIAL INSECURITY: ABUSE: ADULT

## 2024-05-29 SDOH — SOCIAL STABILITY: SOCIAL INSECURITY: ARE THERE ANY APPARENT SIGNS OF INJURIES/BEHAVIORS THAT COULD BE RELATED TO ABUSE/NEGLECT?: NO

## 2024-05-29 SDOH — SOCIAL STABILITY: SOCIAL INSECURITY: DO YOU FEEL ANYONE HAS EXPLOITED OR TAKEN ADVANTAGE OF YOU FINANCIALLY OR OF YOUR PERSONAL PROPERTY?: NO

## 2024-05-29 SDOH — SOCIAL STABILITY: SOCIAL INSECURITY: DO YOU FEEL UNSAFE GOING BACK TO THE PLACE WHERE YOU ARE LIVING?: NO

## 2024-05-29 SDOH — SOCIAL STABILITY: SOCIAL INSECURITY: HAS ANYONE EVER THREATENED TO HURT YOUR FAMILY OR YOUR PETS?: NO

## 2024-05-29 ASSESSMENT — COGNITIVE AND FUNCTIONAL STATUS - GENERAL
TURNING FROM BACK TO SIDE WHILE IN FLAT BAD: A LITTLE
HELP NEEDED FOR BATHING: A LITTLE
MOVING TO AND FROM BED TO CHAIR: A LITTLE
CLIMB 3 TO 5 STEPS WITH RAILING: A LITTLE
STANDING UP FROM CHAIR USING ARMS: A LITTLE
PATIENT BASELINE BEDBOUND: NO
EATING MEALS: A LITTLE
PERSONAL GROOMING: A LITTLE
WALKING IN HOSPITAL ROOM: A LITTLE
DAILY ACTIVITIY SCORE: 18
DRESSING REGULAR LOWER BODY CLOTHING: A LITTLE
MOVING FROM LYING ON BACK TO SITTING ON SIDE OF FLAT BED WITH BEDRAILS: A LITTLE
TOILETING: A LITTLE
MOBILITY SCORE: 18
DRESSING REGULAR UPPER BODY CLOTHING: A LITTLE

## 2024-05-29 ASSESSMENT — LIFESTYLE VARIABLES
AUDIT-C TOTAL SCORE: 0
SKIP TO QUESTIONS 9-10: 1
HOW OFTEN DO YOU HAVE 6 OR MORE DRINKS ON ONE OCCASION: NEVER
HOW OFTEN DO YOU HAVE A DRINK CONTAINING ALCOHOL: NEVER
AUDIT-C TOTAL SCORE: 0
HOW MANY STANDARD DRINKS CONTAINING ALCOHOL DO YOU HAVE ON A TYPICAL DAY: PATIENT DOES NOT DRINK

## 2024-05-29 ASSESSMENT — ACTIVITIES OF DAILY LIVING (ADL)
DRESSING YOURSELF: NEEDS ASSISTANCE
FEEDING YOURSELF: NEEDS ASSISTANCE
HEARING - RIGHT EAR: FUNCTIONAL
JUDGMENT_ADEQUATE_SAFELY_COMPLETE_DAILY_ACTIVITIES: YES
BATHING: NEEDS ASSISTANCE
ADEQUATE_TO_COMPLETE_ADL: NO
TOILETING: NEEDS ASSISTANCE
LACK_OF_TRANSPORTATION: NO
PATIENT'S MEMORY ADEQUATE TO SAFELY COMPLETE DAILY ACTIVITIES?: YES
HEARING - LEFT EAR: FUNCTIONAL
WALKS IN HOME: INDEPENDENT
GROOMING: NEEDS ASSISTANCE

## 2024-05-29 ASSESSMENT — PAIN SCALES - GENERAL: PAINLEVEL_OUTOF10: 5 - MODERATE PAIN

## 2024-05-29 ASSESSMENT — COLUMBIA-SUICIDE SEVERITY RATING SCALE - C-SSRS
1. IN THE PAST MONTH, HAVE YOU WISHED YOU WERE DEAD OR WISHED YOU COULD GO TO SLEEP AND NOT WAKE UP?: NO
2. HAVE YOU ACTUALLY HAD ANY THOUGHTS OF KILLING YOURSELF?: NO
6. HAVE YOU EVER DONE ANYTHING, STARTED TO DO ANYTHING, OR PREPARED TO DO ANYTHING TO END YOUR LIFE?: NO

## 2024-05-29 ASSESSMENT — PATIENT HEALTH QUESTIONNAIRE - PHQ9
SUM OF ALL RESPONSES TO PHQ9 QUESTIONS 1 & 2: 0
2. FEELING DOWN, DEPRESSED OR HOPELESS: NOT AT ALL
1. LITTLE INTEREST OR PLEASURE IN DOING THINGS: NOT AT ALL

## 2024-05-29 ASSESSMENT — PAIN DESCRIPTION - ORIENTATION: ORIENTATION: LEFT

## 2024-05-29 ASSESSMENT — PAIN DESCRIPTION - LOCATION: LOCATION: OTHER (COMMENT)

## 2024-05-29 ASSESSMENT — PAIN - FUNCTIONAL ASSESSMENT: PAIN_FUNCTIONAL_ASSESSMENT: 0-10

## 2024-05-30 ENCOUNTER — APPOINTMENT (OUTPATIENT)
Dept: CARDIOLOGY | Facility: HOSPITAL | Age: 79
DRG: 087 | End: 2024-05-30
Payer: MEDICARE

## 2024-05-30 ENCOUNTER — APPOINTMENT (OUTPATIENT)
Dept: RADIOLOGY | Facility: HOSPITAL | Age: 79
DRG: 087 | End: 2024-05-30
Payer: MEDICARE

## 2024-05-30 DIAGNOSIS — S06.5XAA SDH (SUBDURAL HEMATOMA) (MULTI): Primary | ICD-10-CM

## 2024-05-30 LAB
ANION GAP SERPL CALC-SCNC: 11 MMOL/L (ref 10–20)
BUN SERPL-MCNC: 16 MG/DL (ref 6–23)
CALCIUM SERPL-MCNC: 9.3 MG/DL (ref 8.6–10.3)
CHLORIDE SERPL-SCNC: 103 MMOL/L (ref 98–107)
CO2 SERPL-SCNC: 30 MMOL/L (ref 21–32)
CREAT SERPL-MCNC: 0.7 MG/DL (ref 0.5–1.05)
EGFRCR SERPLBLD CKD-EPI 2021: 89 ML/MIN/1.73M*2
ERYTHROCYTE [DISTWIDTH] IN BLOOD BY AUTOMATED COUNT: 12.9 % (ref 11.5–14.5)
GLUCOSE SERPL-MCNC: 136 MG/DL (ref 74–99)
HCT VFR BLD AUTO: 35.1 % (ref 36–46)
HGB BLD-MCNC: 11.4 G/DL (ref 12–16)
MAGNESIUM SERPL-MCNC: 1.57 MG/DL (ref 1.6–2.4)
MCH RBC QN AUTO: 28.9 PG (ref 26–34)
MCHC RBC AUTO-ENTMCNC: 32.5 G/DL (ref 32–36)
MCV RBC AUTO: 89 FL (ref 80–100)
NRBC BLD-RTO: 0 /100 WBCS (ref 0–0)
PLATELET # BLD AUTO: 194 X10*3/UL (ref 150–450)
POTASSIUM SERPL-SCNC: 3.1 MMOL/L (ref 3.5–5.3)
RBC # BLD AUTO: 3.94 X10*6/UL (ref 4–5.2)
SODIUM SERPL-SCNC: 141 MMOL/L (ref 136–145)
WBC # BLD AUTO: 11.6 X10*3/UL (ref 4.4–11.3)

## 2024-05-30 PROCEDURE — 85027 COMPLETE CBC AUTOMATED: CPT | Performed by: STUDENT IN AN ORGANIZED HEALTH CARE EDUCATION/TRAINING PROGRAM

## 2024-05-30 PROCEDURE — 80048 BASIC METABOLIC PNL TOTAL CA: CPT | Performed by: STUDENT IN AN ORGANIZED HEALTH CARE EDUCATION/TRAINING PROGRAM

## 2024-05-30 PROCEDURE — 2500000001 HC RX 250 WO HCPCS SELF ADMINISTERED DRUGS (ALT 637 FOR MEDICARE OP)

## 2024-05-30 PROCEDURE — 2500000001 HC RX 250 WO HCPCS SELF ADMINISTERED DRUGS (ALT 637 FOR MEDICARE OP): Performed by: STUDENT IN AN ORGANIZED HEALTH CARE EDUCATION/TRAINING PROGRAM

## 2024-05-30 PROCEDURE — 93005 ELECTROCARDIOGRAM TRACING: CPT

## 2024-05-30 PROCEDURE — 70450 CT HEAD/BRAIN W/O DYE: CPT | Performed by: RADIOLOGY

## 2024-05-30 PROCEDURE — 83735 ASSAY OF MAGNESIUM: CPT | Performed by: STUDENT IN AN ORGANIZED HEALTH CARE EDUCATION/TRAINING PROGRAM

## 2024-05-30 PROCEDURE — 99221 1ST HOSP IP/OBS SF/LOW 40: CPT | Performed by: NURSE PRACTITIONER

## 2024-05-30 PROCEDURE — 72128 CT CHEST SPINE W/O DYE: CPT

## 2024-05-30 PROCEDURE — 99291 CRITICAL CARE FIRST HOUR: CPT

## 2024-05-30 PROCEDURE — 93010 ELECTROCARDIOGRAM REPORT: CPT | Performed by: INTERNAL MEDICINE

## 2024-05-30 PROCEDURE — 2500000004 HC RX 250 GENERAL PHARMACY W/ HCPCS (ALT 636 FOR OP/ED)

## 2024-05-30 PROCEDURE — 2500000004 HC RX 250 GENERAL PHARMACY W/ HCPCS (ALT 636 FOR OP/ED): Performed by: STUDENT IN AN ORGANIZED HEALTH CARE EDUCATION/TRAINING PROGRAM

## 2024-05-30 PROCEDURE — 72128 CT CHEST SPINE W/O DYE: CPT | Performed by: RADIOLOGY

## 2024-05-30 PROCEDURE — 36415 COLL VENOUS BLD VENIPUNCTURE: CPT | Performed by: STUDENT IN AN ORGANIZED HEALTH CARE EDUCATION/TRAINING PROGRAM

## 2024-05-30 PROCEDURE — 97161 PT EVAL LOW COMPLEX 20 MIN: CPT | Mod: GP

## 2024-05-30 PROCEDURE — 97165 OT EVAL LOW COMPLEX 30 MIN: CPT | Mod: GO

## 2024-05-30 PROCEDURE — 70450 CT HEAD/BRAIN W/O DYE: CPT

## 2024-05-30 PROCEDURE — 1100000001 HC PRIVATE ROOM DAILY

## 2024-05-30 PROCEDURE — 99223 1ST HOSP IP/OBS HIGH 75: CPT | Performed by: SURGERY

## 2024-05-30 RX ORDER — POTASSIUM CHLORIDE 1.5 G/1.58G
40 POWDER, FOR SOLUTION ORAL ONCE
Status: COMPLETED | OUTPATIENT
Start: 2024-05-30 | End: 2024-05-30

## 2024-05-30 RX ORDER — MAGNESIUM SULFATE HEPTAHYDRATE 40 MG/ML
2 INJECTION, SOLUTION INTRAVENOUS ONCE
Status: COMPLETED | OUTPATIENT
Start: 2024-05-30 | End: 2024-05-30

## 2024-05-30 RX ORDER — ERYTHROMYCIN 5 MG/G
1 OINTMENT OPHTHALMIC EVERY 6 HOURS SCHEDULED
Status: DISCONTINUED | OUTPATIENT
Start: 2024-05-30 | End: 2024-05-30

## 2024-05-30 RX ORDER — ERYTHROMYCIN 5 MG/G
1 OINTMENT OPHTHALMIC EVERY 6 HOURS SCHEDULED
Status: DISCONTINUED | OUTPATIENT
Start: 2024-05-30 | End: 2024-05-31 | Stop reason: HOSPADM

## 2024-05-30 RX ADMIN — METOPROLOL TARTRATE 12.5 MG: 25 TABLET, FILM COATED ORAL at 08:33

## 2024-05-30 RX ADMIN — LEVOTHYROXINE SODIUM 112 MCG: 112 TABLET ORAL at 05:01

## 2024-05-30 RX ADMIN — ACETAMINOPHEN 650 MG: 325 TABLET ORAL at 22:43

## 2024-05-30 RX ADMIN — DOCUSATE SODIUM 100 MG: 100 CAPSULE, LIQUID FILLED ORAL at 20:20

## 2024-05-30 RX ADMIN — POTASSIUM CHLORIDE 40 MEQ: 1.5 POWDER, FOR SOLUTION ORAL at 07:14

## 2024-05-30 RX ADMIN — METOPROLOL TARTRATE 12.5 MG: 25 TABLET, FILM COATED ORAL at 20:20

## 2024-05-30 RX ADMIN — ACETAMINOPHEN 650 MG: 325 TABLET ORAL at 05:01

## 2024-05-30 RX ADMIN — DOCUSATE SODIUM 100 MG: 100 CAPSULE, LIQUID FILLED ORAL at 08:33

## 2024-05-30 RX ADMIN — ACETAMINOPHEN 650 MG: 325 TABLET ORAL at 17:26

## 2024-05-30 RX ADMIN — ACETAMINOPHEN 650 MG: 325 TABLET ORAL at 10:10

## 2024-05-30 RX ADMIN — ERYTHROMYCIN 1 CM: 5 OINTMENT OPHTHALMIC at 13:20

## 2024-05-30 RX ADMIN — MAGNESIUM SULFATE HEPTAHYDRATE 2 G: 40 INJECTION, SOLUTION INTRAVENOUS at 07:14

## 2024-05-30 RX ADMIN — ERYTHROMYCIN 1 CM: 5 OINTMENT OPHTHALMIC at 17:26

## 2024-05-30 ASSESSMENT — PAIN DESCRIPTION - LOCATION: LOCATION: HEAD

## 2024-05-30 ASSESSMENT — COGNITIVE AND FUNCTIONAL STATUS - GENERAL
MOVING FROM LYING ON BACK TO SITTING ON SIDE OF FLAT BED WITH BEDRAILS: A LITTLE
STANDING UP FROM CHAIR USING ARMS: A LITTLE
MOVING TO AND FROM BED TO CHAIR: A LITTLE
MOBILITY SCORE: 19
DRESSING REGULAR UPPER BODY CLOTHING: A LITTLE
DRESSING REGULAR LOWER BODY CLOTHING: A LITTLE
DRESSING REGULAR UPPER BODY CLOTHING: A LITTLE
TOILETING: A LITTLE
HELP NEEDED FOR BATHING: A LITTLE
DAILY ACTIVITIY SCORE: 20
STANDING UP FROM CHAIR USING ARMS: A LITTLE
HELP NEEDED FOR BATHING: A LITTLE
WALKING IN HOSPITAL ROOM: A LITTLE
DRESSING REGULAR LOWER BODY CLOTHING: A LITTLE
EATING MEALS: A LITTLE
MOVING TO AND FROM BED TO CHAIR: A LITTLE
TURNING FROM BACK TO SIDE WHILE IN FLAT BAD: A LITTLE
CLIMB 3 TO 5 STEPS WITH RAILING: A LITTLE
MOBILITY SCORE: 18
CLIMB 3 TO 5 STEPS WITH RAILING: A LITTLE
TURNING FROM BACK TO SIDE WHILE IN FLAT BAD: A LITTLE
DAILY ACTIVITIY SCORE: 18
PERSONAL GROOMING: A LITTLE
TOILETING: A LITTLE
WALKING IN HOSPITAL ROOM: A LITTLE

## 2024-05-30 ASSESSMENT — PAIN SCALES - GENERAL
PAINLEVEL_OUTOF10: 0 - NO PAIN
PAINLEVEL_OUTOF10: 0 - NO PAIN
PAINLEVEL_OUTOF10: 3

## 2024-05-30 ASSESSMENT — ACTIVITIES OF DAILY LIVING (ADL): LACK_OF_TRANSPORTATION: NO

## 2024-05-30 NOTE — CONSULTS
"Reason For Consult  S/p fall from standing position    History Of Present Illness  Gabriella Haskins is a 78 y.o. female presenting to ED s/p fall forward from standing position when walking around her  in the kitchen.  Denies LOC.  Found to have acute SDH along falx cerebri measuring 6mm in thickness/ 8mm R paramidline frontal extraaxial hyperdensity/ L periorbital/infraorbital soft tissue hemorrhage.    Tetanus vaccine dosed while in ED.     Past Medical History  Afib s/o cardioversion (not on AC), HTN, gout,hypothyroid, osteopenia, psoriasis    Surgical History  She has a past surgical history that includes Shoulder surgery.     Social History  She reports that she has never smoked. She has never used smokeless tobacco. She reports that she does not drink alcohol and does not use drugs.    Family History  No family history on file.     Allergies  Azithromycin, Codeine, Conjugated estrogens, Erythromycin, Lisinopril, Cephalexin, Doxycycline, Indomethacin, Bactrim [sulfamethoxazole-trimethoprim], and Amoxicillin    Review of Systems  14 point ROS otherwise negative, denies CP/SOB/HA/N/V/dizziness     Physical Exam  L orbital edema with ecchymosis, R eye and vision intact, MM with crusted blood, L inner lip laceration  S1S2 RR  B/l air entry no RRW  Soft, NT ND BS +  No edema b/l LE, LLE with repaired laceration over the anterior knee  AA+Ox3, otherwise NAD, moves all ext, follow commands     Last Recorded Vitals  Blood pressure 161/70, pulse 89, temperature 36.8 °C (98.2 °F), temperature source Skin, resp. rate 16, height 1.702 m (5' 7\"), weight 75.3 kg (166 lb), SpO2 94%.    Relevant Results  Results for orders placed or performed during the hospital encounter of 05/29/24 (from the past 24 hour(s))   CBC and Auto Differential   Result Value Ref Range    WBC 11.7 (H) 4.4 - 11.3 x10*3/uL    nRBC 0.0 0.0 - 0.0 /100 WBCs    RBC 4.41 4.00 - 5.20 x10*6/uL    Hemoglobin 12.9 12.0 - 16.0 g/dL    Hematocrit 39.1 36.0 " - 46.0 %    MCV 89 80 - 100 fL    MCH 29.3 26.0 - 34.0 pg    MCHC 33.0 32.0 - 36.0 g/dL    RDW 13.0 11.5 - 14.5 %    Platelets 212 150 - 450 x10*3/uL    Neutrophils % 81.7 40.0 - 80.0 %    Immature Granulocytes %, Automated 0.5 0.0 - 0.9 %    Lymphocytes % 10.7 13.0 - 44.0 %    Monocytes % 5.9 2.0 - 10.0 %    Eosinophils % 0.8 0.0 - 6.0 %    Basophils % 0.4 0.0 - 2.0 %    Neutrophils Absolute 9.58 (H) 1.60 - 5.50 x10*3/uL    Immature Granulocytes Absolute, Automated 0.06 0.00 - 0.50 x10*3/uL    Lymphocytes Absolute 1.25 0.80 - 3.00 x10*3/uL    Monocytes Absolute 0.69 0.05 - 0.80 x10*3/uL    Eosinophils Absolute 0.09 0.00 - 0.40 x10*3/uL    Basophils Absolute 0.05 0.00 - 0.10 x10*3/uL   Comprehensive metabolic panel   Result Value Ref Range    Glucose 115 (H) 74 - 99 mg/dL    Sodium 139 136 - 145 mmol/L    Potassium 3.0 (L) 3.5 - 5.3 mmol/L    Chloride 101 98 - 107 mmol/L    Bicarbonate 29 21 - 32 mmol/L    Anion Gap 12 10 - 20 mmol/L    Urea Nitrogen 17 6 - 23 mg/dL    Creatinine 0.75 0.50 - 1.05 mg/dL    eGFR 82 >60 mL/min/1.73m*2    Calcium 9.6 8.6 - 10.3 mg/dL    Albumin 3.9 3.4 - 5.0 g/dL    Alkaline Phosphatase 44 33 - 136 U/L    Total Protein 6.7 6.4 - 8.2 g/dL    AST 18 9 - 39 U/L    Bilirubin, Total 0.5 0.0 - 1.2 mg/dL    ALT 8 7 - 45 U/L   Coagulation Screen   Result Value Ref Range    Protime 12.1 9.8 - 12.8 seconds    INR 1.1 0.9 - 1.1    aPTT 26 (L) 27 - 38 seconds   Type and Screen   Result Value Ref Range    ABO TYPE O     Rh TYPE POS      CT head wo IV contrast    Result Date: 5/29/2024  Interpreted By:  Tariq Ruelas, STUDY: CT HEAD WO IV CONTRAST; CT FACIAL BONES WO IV CONTRAST; CT 3D RECONSTRUCTION;  5/29/2024 8:42 pm   INDICATION: Signs/Symptoms:fall; Signs/Symptoms:fall significant left facial structures swelling; Signs/Symptoms:TRAUMA.   COMPARISON: None   ACCESSION NUMBER(S): [id number]; [id number]; [id number]   ORDERING CLINICIAN: ALEXANDER TIJERINA   TECHNIQUE: Contiguous axial images of  the head and maxillofacial bones were obtained without intravenous contrast. Coronal and sagittal reformatted images were obtained from the axial images. 3D reconstructions were performed on an independent workstation.   FINDINGS: CT HEAD:   The examination is limited secondary to patient motion. There is cerebral atrophy and chronic periventricular white matter small vessel ischemic change. There is acute subdural hematoma along the falx cerebri measuring 6 mm in thickness. 8 mm right paramidline frontal extraaxial hyperdensity may correspond to an additional area of hemorrhage, however there is question of calcification in a meningioma is other consideration. Hemorrhage no hydrocephalus. No midline shift. The basal cisterns are preserved. No evidence of depressed calvarial fracture     CT MAXILLOFACIAL:   No evidence of acute displaced maxillofacial fracture. There is large periorbital and infraorbital soft tissue hematoma and soft tissue hemorrhage for the left cheek. The paranasal sinuses are clear and well pneumatized. No sinus air-fluid level.       Acute subdural hematoma along the falx cerebri measuring 6 mm in thickness.   8 mm right paramidline frontal extraaxial hyperdensity may correspond to additional area of hemorrhage, however there is question of calcification superiorly, and a meningioma is other consideration and attention on progress imaging recommended   Large left periorbital and infraorbital soft tissue hemorrhage and hemorrhage over the left cheek.   No evidence of acute displaced maxillofacial fracture.   MACRO: Tariq Ruelas discussed the significance and urgency of this critical finding by telephone with  ALEXANDER TIJERINA on 5/29/2024 at 9:10 pm. (**-RCF-**) Findings:  See findings.   Signed by: Tariq Ruelas 5/29/2024 9:14 PM Dictation workstation:   [id number]    CT maxillofacial bones wo IV contrast    Result Date: 5/29/2024  Interpreted By:  Tariq Ruelas, STUDY: CT HEAD WO IV  CONTRAST; CT FACIAL BONES WO IV CONTRAST; CT 3D RECONSTRUCTION;  5/29/2024 8:42 pm   INDICATION: Signs/Symptoms:fall; Signs/Symptoms:fall significant left facial structures swelling; Signs/Symptoms:TRAUMA.   COMPARISON: None   ACCESSION NUMBER(S): [id number]; [id number]; [id number]   ORDERING CLINICIAN: ALEXANDER TIJERINA   TECHNIQUE: Contiguous axial images of the head and maxillofacial bones were obtained without intravenous contrast. Coronal and sagittal reformatted images were obtained from the axial images. 3D reconstructions were performed on an independent workstation.   FINDINGS: CT HEAD:   The examination is limited secondary to patient motion. There is cerebral atrophy and chronic periventricular white matter small vessel ischemic change. There is acute subdural hematoma along the falx cerebri measuring 6 mm in thickness. 8 mm right paramidline frontal extraaxial hyperdensity may correspond to an additional area of hemorrhage, however there is question of calcification in a meningioma is other consideration. Hemorrhage no hydrocephalus. No midline shift. The basal cisterns are preserved. No evidence of depressed calvarial fracture     CT MAXILLOFACIAL:   No evidence of acute displaced maxillofacial fracture. There is large periorbital and infraorbital soft tissue hematoma and soft tissue hemorrhage for the left cheek. The paranasal sinuses are clear and well pneumatized. No sinus air-fluid level.       Acute subdural hematoma along the falx cerebri measuring 6 mm in thickness.   8 mm right paramidline frontal extraaxial hyperdensity may correspond to additional area of hemorrhage, however there is question of calcification superiorly, and a meningioma is other consideration and attention on progress imaging recommended   Large left periorbital and infraorbital soft tissue hemorrhage and hemorrhage over the left cheek.   No evidence of acute displaced maxillofacial fracture.   MACRO: Tariq Ruelas  discussed the significance and urgency of this critical finding by telephone with  ALEXANDER TIJERINA on 5/29/2024 at 9:10 pm. (**-RCF-**) Findings:  See findings.   Signed by: Tariq Ruelas 5/29/2024 9:14 PM Dictation workstation:   [id number]    CT 3D reconstruction    Result Date: 5/29/2024  Interpreted By:  Tariq Ruelas, STUDY: CT HEAD WO IV CONTRAST; CT FACIAL BONES WO IV CONTRAST; CT 3D RECONSTRUCTION;  5/29/2024 8:42 pm   INDICATION: Signs/Symptoms:fall; Signs/Symptoms:fall significant left facial structures swelling; Signs/Symptoms:TRAUMA.   COMPARISON: None   ACCESSION NUMBER(S): [id number]; [id number]; [id number]   ORDERING CLINICIAN: ALEXANDER TIJERINA   TECHNIQUE: Contiguous axial images of the head and maxillofacial bones were obtained without intravenous contrast. Coronal and sagittal reformatted images were obtained from the axial images. 3D reconstructions were performed on an independent workstation.   FINDINGS: CT HEAD:   The examination is limited secondary to patient motion. There is cerebral atrophy and chronic periventricular white matter small vessel ischemic change. There is acute subdural hematoma along the falx cerebri measuring 6 mm in thickness. 8 mm right paramidline frontal extraaxial hyperdensity may correspond to an additional area of hemorrhage, however there is question of calcification in a meningioma is other consideration. Hemorrhage no hydrocephalus. No midline shift. The basal cisterns are preserved. No evidence of depressed calvarial fracture     CT MAXILLOFACIAL:   No evidence of acute displaced maxillofacial fracture. There is large periorbital and infraorbital soft tissue hematoma and soft tissue hemorrhage for the left cheek. The paranasal sinuses are clear and well pneumatized. No sinus air-fluid level.       Acute subdural hematoma along the falx cerebri measuring 6 mm in thickness.   8 mm right paramidline frontal extraaxial hyperdensity may correspond to  additional area of hemorrhage, however there is question of calcification superiorly, and a meningioma is other consideration and attention on progress imaging recommended   Large left periorbital and infraorbital soft tissue hemorrhage and hemorrhage over the left cheek.   No evidence of acute displaced maxillofacial fracture.   MACRO: Tariq Ruelas discussed the significance and urgency of this critical finding by telephone with  ALEXANDER TIJERINA on 5/29/2024 at 9:10 pm. (**-RCF-**) Findings:  See findings.   Signed by: Tariq Ruelas 5/29/2024 9:14 PM Dictation workstation:   [id number]    CT cervical spine wo IV contrast    Result Date: 5/29/2024  Interpreted By:  Tariq Ruelas, STUDY: CT CERVICAL SPINE WO IV CONTRAST;  5/29/2024 8:42 pm   INDICATION: Signs/Symptoms:fall.   COMPARISON: None.   ACCESSION NUMBER(S): [id number]   ORDERING CLINICIAN: ALEXANDER TIJERINA   TECHNIQUE: Contiguous axial images of the cervical spine were obtained without intravenous contrast. Coronal and sagittal reformatted images were obtained from the axial images.   FINDINGS: No acute fracture of the cervical spine. There is multiple degenerative change of the cervical spine. There is mild grade 1 retrolisthesis of C4 on C5. There is multilevel intervertebral disc space narrowing and degenerative disc disease. There is limited evaluation of the soft tissues of the spinal canal. There is multilevel posterior osseous spurring and disc protrusion. There is multilevel facet and uncovertebral arthropathy. No significant prevertebral soft tissue edema.       No evidence of acute fracture of the cervical spine.   Multilevel degenerative change of the cervical spine.   MACRO: None   Signed by: Tariq Ruelas 5/29/2024 9:10 PM Dictation workstation:   [id number]    XR knee left 4+ views    Result Date: 5/29/2024  Interpreted By:  Tariq Ruelas, STUDY: XR KNEE LEFT 4+ VIEWS; ;  5/29/2024 8:29 pm   INDICATION: Signs/Symptoms:fall large  laceration.   COMPARISON: None.   ACCESSION NUMBER(S): [id number]   ORDERING CLINICIAN: ALEXANDER TIJERINA   FINDINGS: No acute fracture or dislocation of the left knee. There is advanced left knee osteoarthrosis. There is narrowing of the medial greater than lateral compartment and chondrocalcinosis. There is marginal osseous spurring. Advanced patellofemoral osteoarthrosis with joint space loss and prominent osseous spurring. Suprapatellar knee effusion. There is evidence of laceration anterior soft tissues of the knee and soft tissue swelling.       No acute fracture or dislocation of the left knee.   Laceration of anterior soft tissues of the knee and soft tissue swelling. Evaluation of depth laceration is limited on plain radiography and clinical correlation with physical examination recommended.   Small knee effusion.   Advanced left knee osteoarthrosis.       MACRO: None   Signed by: Tariq Ruelas 5/29/2024 9:08 PM Dictation workstation:   [id number]        Assessment/Plan   S/p fall, acute 6mm SDH along falx ceribri, L periorbital and infraorbital soft tissue hemorrhage, LLE knee lac    Recs: hemodynamic monitoring, supplemental O2 PRN, GI snd DVT prophylaxis, local wound care, monitor temps and WBCs, monitor for bleeding, replace lytes as needed, pain control, neuro checks, NS eval, follow repeat CT head, consider rehab eval.    I spent 40 minutes in the professional and overall care of this patient.      Richie Clarke DO

## 2024-05-30 NOTE — PROGRESS NOTES
05/30/24 0914   Discharge Planning   Living Arrangements Spouse/significant other   Support Systems Children;Spouse/significant other;Friends/neighbors   Assistance Needed Independent and driving PTA   Type of Residence Private residence   Number of Stairs to Enter Residence 2   Number of Stairs Within Residence 8   Do you have animals or pets at home? No   Home or Post Acute Services None   Patient expects to be discharged to: Home   Does the patient need discharge transport arranged? No   Financial Resource Strain   How hard is it for you to pay for the very basics like food, housing, medical care, and heating? Not hard   Housing Stability   In the last 12 months, was there a time when you were not able to pay the mortgage or rent on time? N   In the last 12 months, how many places have you lived? 1   In the last 12 months, was there a time when you did not have a steady place to sleep or slept in a shelter (including now)? N   Transportation Needs   In the past 12 months, has lack of transportation kept you from medical appointments or from getting medications? no   In the past 12 months, has lack of transportation kept you from meetings, work, or from getting things needed for daily living? No     This TCC met with patient at bedside, introduced self and explained role.  Demographic information and insurance verified.  Patient is from home with spouse.  Independent and driving PTA.  Denies SW needs at this time.  Patient plans to return home at discharge.  Disposition pending hospital course and therapy evals.  Patient's family is available to transport home.  PCP:  Dr. Eric Hodge - [facility]  Pharmacy:  [address]  Care Transitions will continue to follow.    2:40 pm Addendum  Met with patient at bedside for discharge planning.  PT/OT recommending HHC.  Patient agreeable.  Freedom of choice explained, HHC list given to patient.  Care Transitions will continue to follow.    5:20 pm   Addendum  Met with patient and son at bedside for HHC choices.  Patient would like to discuss with spouse tomorrow.  Care Transitions will continue to follow.    6:10 pm Addendum  IMM letter explained and copy given to patient.  Signed copy placed inpatient's medical record.  Care Transitions will continue to follow.    7:30 pm Addendum  Followed up with patient and spouse for HHC choices.  Referrals sent to [facility], [facility], and [facility].  No preference was indicated.  Care Transitions will continue to follow.

## 2024-05-30 NOTE — ED TRIAGE NOTES
Pt arrived to the ED with c/o fall. Pt had facial injury to the left eye, left knee skin tear, back ain, and mouth injury. Pt denies LOC and blood thinners. Pt is alert and oriented x4.

## 2024-05-30 NOTE — CARE PLAN
The clinical goals for the shift include Patient to maintain level of consciousness    Patient met this goal. Patient repeat Head CT was stable. Patient facial swelling has decrease since admission       Problem: Pain  Goal: My pain/discomfort is manageable  Outcome: Progressing     Problem: Safety  Goal: Patient will be injury free during hospitalization  Outcome: Progressing  Goal: I will remain free of falls  Outcome: Progressing     Problem: Daily Care  Goal: Daily care needs are met  Outcome: Progressing     Problem: Psychosocial Needs  Goal: Demonstrates ability to cope with hospitalization/illness  Outcome: Progressing  Goal: Collaborate with me, my family, and caregiver to identify my specific goals  Outcome: Progressing  Flowsheets (Taken 5/29/2024 1921)  Cultural Requests During Hospitalization: None  Spiritual Requests During Hospitalization: None     Problem: Discharge Barriers  Goal: My discharge needs are met  Outcome: Progressing     Problem: Pain  Goal: Takes deep breaths with improved pain control throughout the shift  Outcome: Progressing  Goal: Turns in bed with improved pain control throughout the shift  Outcome: Progressing  Goal: Performs ADL's with improved pain control throughout shift  Outcome: Progressing  Goal: Free from opioid side effects throughout the shift  Outcome: Progressing     Problem: Fall/Injury  Goal: Not fall by end of shift  Outcome: Met  Goal: Be free from injury by end of the shift  Outcome: Met  Goal: Verbalize understanding of personal risk factors for fall in the hospital  Outcome: Met

## 2024-05-30 NOTE — PROGRESS NOTES
Gabriella Haskins is a 78 y.o. female on day 1 of admission presenting with SDH (subdural hematoma) (Multi).      Subjective   Patient seen and examined this am. No acute complaints on bedside interview this morning. Electrolytes repleted. Seen by neuro surg and cleared for dc and follow up in 2-3 weeks with repeated ct head.        Objective     Last Recorded Vitals  /58   Pulse 61   Temp 36.5 °C (97.7 °F) (Temporal)   Resp 25   Wt 75.3 kg (166 lb)   SpO2 99%   Intake/Output last 3 Shifts:    Intake/Output Summary (Last 24 hours) at 5/30/2024 1333  Last data filed at 5/30/2024 1122  Gross per 24 hour   Intake 573.75 ml   Output --   Net 573.75 ml       Admission Weight  Weight: 75.3 kg (166 lb) (05/29/24 2006)    Daily Weight  05/30/24 : 75.3 kg (166 lb)    Image Results  CT head wo IV contrast  Narrative: Interpreted By:  Theodore Huang,   STUDY:  CT HEAD WO IV CONTRAST;  5/30/2024 3:09 am      INDICATION:  Signs/Symptoms:SDH interval scan following fall from standing..      COMPARISON:  None.      ACCESSION NUMBER(S):  [id number]      ORDERING CLINICIAN:  ALEXANDER TIJERINA      TECHNIQUE:  Noncontrast axial CT scan of head was performed. Angled reformats in  brain and bone windows were generated. The images were reviewed in  bone, brain, blood and soft tissue windows.      FINDINGS:  Unchanged small anterior subdural hematoma measuring about 5-6 mm. No  new hemorrhage detected. Senescent changes seen in the brain.. No  mass effect. No midline shift. No intraventricular hemorrhage. Left  periorbital soft tissue swelling again seen.      Impression: Stable small subdural hematoma in the parafalcine region      Senescent changes.      MACRO:  None      Signed by: Theodore Huang 5/30/2024 4:03 AM  Dictation workstation:   [id number]      Physical Exam  Physical Exam:   General appearance: no acute distress  HEENT: Diffuse ecchymosis of left face and neck,  moist mucosa  Neck: supple without obvious  goiter, JVD not appreciated  Respiratory: good air movement, appropriate respiratory effort, no wheezing or crackles  Cardiovascular: regular rate, regular rhythm, no peripheral edema  Abdomen: no organomegaly, no tenderness to palpation in all quadrants  Extremities: strong peripheral pulses, no grossly obvious deformities   Skin: intact, no rashes   Neurologic: Alert and oriented x 3, No obvious focal deficit  Psych: appropriate mood & affect, cooperative   Relevant Results               Assessment/Plan        Principal Problem:    SDH (subdural hematoma) (Multi)      Assessment & Plan:      Neurological System:  #SDH   #Lt periorbital hemorrhage  #Lt subconjunctival hemorrhage  #Lt inner canthus laceration  No further bleeding seen on CT head x2  Continue neuro checks q4h   Maintain SBP<160  Seen by neurosug, cleared for discharge with follow up and repeat head ct 2-3 weeks   PT/OT prior to discharge   Avoid sedating medication  Avoid excessive caths   Maintain normal sleep/wake cycle     Cardiovascular System:  #HTN  #Hx Afib not on AC  Lopressor 12.5 bid  Maintain sbp <160      Respiratory System:  Encourage use if Incentive Spirometer,  q1  HOB > 30 degrees      Gastrointestinal System:  Diet: Regular   Prophylaxis: -  Zofran prn nausea       Endocrine System:  #Hypothyroidism   Continue home synthroid       Renal System:  Monitor I&Os  Trend renal panel and replace lectrolytes    Hematological System:  H&H stable, trend  DVT ppx with SCDs      Infection Disease System:  Monitor for s&s of infection  No current issues    Vascular system & Extremities:  PIV        Case to be discussed with attending, A&P above reflect tentative plan. Please await for final signature from attending physician on service.                Sascha Woodall,

## 2024-05-30 NOTE — PROGRESS NOTES
Physical Therapy    Physical Therapy Evaluation    Patient Name: Gabriella Haskins  MRN: [id number]  Today's Date: 5/30/2024   Time Calculation  Start Time: 0932  Stop Time: 1002  Time Calculation (min): 30 min  176/176-A    Assessment/Plan   PT Assessment  PT Assessment Results: Decreased mobility, Impaired balance  End of Session Communication: Bedside nurse  End of Session Patient Position: Up in chair, Alarm off, not on at start of session (All needs in reach, no complaints noted)  IP OR SWING BED PT PLAN  Inpatient or Swing Bed: Inpatient  PT Plan  Treatment/Interventions: Bed mobility, Transfer training, Gait training  PT Plan: Skilled PT  PT Frequency: 4 times per week  PT Discharge Recommendations: Low intensity level of continued care (with 24hr initial SUP)  PT - OK to Discharge: Yes (once cleared by medical team)    Subjective     Current Problem:  1. SDH (subdural hematoma) (Multi)        2. Skin tear of left elbow without complication, initial encounter        3. Laceration of left knee, initial encounter          Patient Active Problem List   Diagnosis    Debility    Hematuria    Leg pain    Gout    Atrial fibrillation (Multi)    SDH (subdural hematoma) (Multi)     General Visit Information:  General  Reason for Referral: PT Eval and Treat  Referred By: Jyoti Sol PA-C  Past Medical History Relevant to Rehab: 78 y.o. female presenting to [facility] ED on 05/29/2024, after falling forward while walking in her kitchen at home. CT Head imaging x 2 reviewed and by my interpretation, there is right frontal and anterior falx SDH.  Prior to Session Communication: Bedside nurse  Patient Position Received: Bed, 2 rail up, Alarm off, not on at start of session (Agreeable to PT)    Home Living:  Home Living  Home Adaptive Equipment:  (RW, SPC, transfer bench, BSC)  Home Living Comments: Pt lives with her  in a 1 story house with 1 BIGG and laundry in the basement with HR. Bathroom has a tub/shower  "with a transfer bench and a standard toilet.    Prior Level of Function:  Prior Function Per Pt/Caregiver Report  Level of [place]: Independent with ADLs and functional transfers, Independent with homemaking with ambulation (Ind with amb, shares IADLs with , pt and  both drive. Pt reports her  has been managing all of the household work since she has been admitted and will be able to help her s/p discharge as well.)    Precautions:  Precautions  Precautions Comment: Fall precautions, tele, PIV, L knee bandaged    Objective     Pain:  Pain Assessment  Pain Assessment:  (0/10 at rest, L eye \"uncomfortable\", 3-4/10 abdominal pain with supine to sitting transition)    Cognition:  Cognition  Overall Cognitive Status: Within Functional Limits    General Assessments:  Sensation  Light Touch: No apparent deficits  Strength  Strength Comments: R LE ROM and strength WFL, L LE ROM WFL, L LE strength at least 3+/5 (not formally assessesed 2/2 sutures)  Dynamic Standing Balance  Dynamic Standing-Comments: Fair to Fair-    Functional Assessments:  Bed Mobility  Bed Mobility:  (supine to sitting: SBA)  Transfers  Transfer:  (STS from EOB: CGA, STS from low toilet: min A with use of grab bar.)  Ambulation/Gait Training  Ambulation/Gait Training Performed:  (Pt was able to amb throughout her room about 20' x 2 using RW with CGA and good balance/safety awareness noted)    Outcome Measures:  [facility] Basic Mobility  Turning from your back to your side while in a flat bed without using bedrails: None  Moving from lying on your back to sitting on the side of a flat bed without using bedrails: A little  Moving to and from bed to chair (including a wheelchair): A little  Standing up from a chair using your arms (e.g. wheelchair or bedside chair): A little  To walk in hospital room: A little  Climbing 3-5 steps with railing: A little  Basic Mobility - Total Score: 19    Goals:  Encounter Problems       Encounter " Problems (Active)       PT Problem       STG - Pt will transition supine <> sitting with mod I  (Progressing)       Start:  05/30/24    Expected End:  06/13/24            STG - Pt will transfer STS with SUP  (Progressing)       Start:  05/30/24    Expected End:  06/13/24            STG - Pt will amb 50' using no AD or LRD with SUP  (Progressing)       Start:  05/30/24    Expected End:  06/13/24                 Education Documentation  Precautions, taught by Chinyere Harper PT at 5/30/2024 12:34 PM.  Learner: Patient  Readiness: Acceptance  Method: Explanation  Response: Verbalizes Understanding    Mobility Training, taught by Chinyere Harper PT at 5/30/2024 12:34 PM.  Learner: Patient  Readiness: Acceptance  Method: Explanation  Response: Verbalizes Understanding    Education Comments  No comments found.

## 2024-05-30 NOTE — H&P
History Of Present Illness  Gabriella Haskins is a 78 y.o. female who fell from a standing position while in her kitchen yesterday around 5 PM, striking her face and left knee on the floor.  She denied loss of consciousness.  She came to the emergency room and was diagnosed with a subdural hematoma.  She was admitted to a critical care unit.  This morning she complains of some mild soreness of the left side of her face and left knee.  She has some mild discomfort of her upper back only with movement.    Past medical history:  Atrial fibrillation status post cardioversion, not on anticoagulation  Hypertension  Hypothyroid  Gout  Thyroidectomy for benign disease  Hysterectomy for benign disease    Past Medical History  Past Medical History:   Diagnosis Date    Arthritis     History of transfusion        Surgical History  Past Surgical History:   Procedure Laterality Date    SHOULDER SURGERY      TITANIUM IMPLANT        Social History  She reports that she has never smoked. She has never used smokeless tobacco. She reports that she does not drink alcohol and does not use drugs.    Family History  No family history on file.     Allergies  Azithromycin, Codeine, Conjugated estrogens, Erythromycin, Lisinopril, Cephalexin, Doxycycline, Indomethacin, Bactrim [sulfamethoxazole-trimethoprim], and Amoxicillin       Physical Exam  Constitutional: Well-developed, well-nourished, alert and oriented, no acute distress  Skin: Warm and dry, no lesions, no rashes, no jaundice  HEENT: Left periorbital and facial edema and ecchymosis.  The area is minimally tender.  The rest of her head is nontender with no other apparent injury.  Neck: Soft, nontender, no mass or adenopathy  Cardiac: Regular rate and rhythm, no murmur  Chest: Patent airway, clear to auscultation, normal breath sounds with good chest expansion, no wheezes or rales or rhonchi noted, thorax symmetric.  Nontender  Back: Nontender, no ecchymosis  Abdomen: Nondistended,  "positive bowel sounds, soft, nontender, no mass  Rectal: Not performed  Extremities: Left knee anterior L-shaped laceration which has been sutured closed.  Mild tenderness of the left knee.  No lower extremity edema or calf tenderness  Lymphatic: No cervical adenopathy  Musculoskeletal: Range of motion intact, no joint swelling, normal strength  Neurological: Alert and oriented x3, intact sensory and motor function, no obvious focal neurologic abnormalities  Psychological: Appropriate mood and behavior    Last Recorded Vitals  Blood pressure 103/51, pulse 67, temperature 36.8 °C (98.2 °F), temperature source Skin, resp. rate (!) 37, height 1.702 m (5' 7\"), weight 75.3 kg (166 lb), SpO2 100%.    Relevant Results  Admission labs: WBC 11.7, hemoglobin 12.9, platelet 212  Potassium 3.0.  Other electrolytes normal.  BUN/creatinine normal.  Glucose 115.  Repeat potassium this morning 3.1     I reviewed the CT head report and images from May 29, 2024:  IMPRESSION:  Acute subdural hematoma along the falx cerebri measuring 6 mm in  thickness.    8 mm right paramidline frontal extraaxial hyperdensity may correspond  to additional area of hemorrhage, however there is question of  calcification superiorly, and a meningioma is other consideration and  attention on progress imaging recommended    Large left periorbital and infraorbital soft tissue hemorrhage and  hemorrhage over the left cheek.    No evidence of acute displaced maxillofacial fracture.    I reviewed the CT neck report and images from May 29, 2024:  IMPRESSION:  No evidence of acute fracture of the cervical spine.    Multilevel degenerative change of the cervical spine.    I reviewed the CT head report and images from this morning.  IMPRESSION:  Stable small subdural hematoma in the parafalcine region    Senescent changes.      I reviewed the left knee x-ray report and images from last night.  IMPRESSION:  No acute fracture or dislocation of the left knee.    " Laceration of anterior soft tissues of the knee and soft tissue  swelling. Evaluation of depth laceration is limited on plain  radiography and clinical correlation with physical examination  recommended.    Small knee effusion.    Advanced left knee osteoarthrosis.      Assessment/Plan   Principal Problem:    SDH (subdural hematoma) (Multi)  Blunt trauma with the following injuries:  1.  Subdural hematoma.  Hematoma not expanding on repeat CT head.    Neurosurgery following.  They recommend PT/OT for discharge planning.  Outpatient follow-up in 2 to 3 weeks with repeat CT head.  2.  Left knee laceration.  Sutured by ER physician.  Remove sutures in 10 to 14 days.  3.  Left periorbital ecchymosis and edema.    Additional medical problems:  1.  Question of meningioma on initial CT head which was not mentioned on follow-up CT.  Evaluation per neurosurgery.  They have ordered repeat CT head in 2 to 3 weeks.  2.  Additional medical management per intensivist team.    Sreekanth Chaudhry MD

## 2024-05-30 NOTE — H&P
General Surgery  H&P Note    CC:   Chief Complaint   Patient presents with    Fall     Pt arrived to the ED with c/o fall. Pt had facial injury to the left eye, left knee skin tear, back ain, and mouth injury. Pt denies LOC and blood thinners. Pt is alert and oriented x4.     Back Pain    Facial Injury    Knee Injury     HPI: Gabriella Haskins is a 78 y.o. female with a PMH significant for gout, HTN, hypothyroid, osteopenia, psoriasis, and Afib s/p cardioversion who presented to the ED as a trauma activation following a fall from standing.    Patient states she was walking in the kitchen and tripped behind her  as she was passing by him. She fell to the floor with impact to the left knee and face. Denies LOC and thinners. Patient with left periorbital hematoma and swelling, left upper inner lip laceration, and left knee skin tear and laceration. Upon evaluation by head CT: acute 6mm SDH along falx cerebri. Unsure of last tetanus, given BoostRIX in ED.     Endorses headache, back pain, facial pain, left knee pain.     Denies lightheadedness, dizziness, blurred vision, loss of vision, double vision, palpitations, CP, SOB, abd pain, wrist pain, dysuria, hematuria, or hematochezia.     ROS: 12pt ROS otherwise negative unless stated above in HPI    Past Medical History:   Diagnosis Date    Arthritis     History of transfusion      Past Surgical History:   Procedure Laterality Date    SHOULDER SURGERY      TITANIUM IMPLANT     Social History     Socioeconomic History    Marital status:      Spouse name: None    Number of children: None    Years of education: None    Highest education level: None   Occupational History    None   Tobacco Use    Smoking status: Never    Smokeless tobacco: Never   Substance and Sexual Activity    Alcohol use: Never    Drug use: Never    Sexual activity: None   Other Topics Concern    None   Social History Narrative    None     Social Determinants of Health     Financial  "Resource Strain: Patient Declined (10/6/2023)    Overall Financial Resource Strain (CARDIA)     Difficulty of Paying Living Expenses: Patient declined   Food Insecurity: Not on file   Transportation Needs: No Transportation Needs (10/10/2023)    PRAPARE - Transportation     Lack of Transportation (Medical): No     Lack of Transportation (Non-Medical): No   Physical Activity: Not on file   Stress: Not on file   Social Connections: Not on file   Intimate Partner Violence: Not on file   Housing Stability: Unknown (10/6/2023)    Housing Stability Vital Sign     Unable to Pay for Housing in the Last Year: Patient refused     Number of Places Lived in the Last Year: 0     Unstable Housing in the Last Year: Patient refused     No family history on file.  Allergies   Allergen Reactions    Azithromycin Rash     blisters in mouth    Codeine GI Upset and Unknown    Conjugated Estrogens Swelling and Unknown     legs swelled    Erythromycin Diarrhea    Lisinopril Cough    Cephalexin Itching    Doxycycline Itching    Indomethacin Itching    Bactrim [Sulfamethoxazole-Trimethoprim] Hives    Amoxicillin Rash       Objective                                                                                                                                   Physical Exam                                                                                                                          10/11/2023     9:18 AM 10/11/2023     4:08 PM 10/12/2023     5:34 AM 10/12/2023     9:25 AM 5/29/2024     8:06 PM 5/29/2024     9:30 PM 5/29/2024     9:45 PM   Vitals   Systolic 124 139 149 142 155  161   Diastolic 57 65 68 63 88  70   Heart Rate 62 68 78 65 90 88 89   Temp  36.6 °C (97.9 °F) 36.2 °C (97.2 °F)  36.8 °C (98.2 °F)     Resp   18  16 16 16   Height (in)     1.702 m (5' 7\")     Weight (lb)     166     BMI     26 kg/m2     BSA (m2)     1.89 m2         Gen: NAD, sitting on gurney  Neuro: AAOx3.   Skin: Non-jaundiced, senile skin. Superficial L " knee laceration and skin tear  Eyes: Bilateral pupillary miosis however reactive to light. Pupils round. Sclera clear bilaterally. EOMI bilaterally via H-test. Large left periorbital hematoma and edema limiting eyelid opening. Left eye with bloody tearing.   HENT:  Dentition intact without chipping or missing teeth. L facial edema, impeding symmetrical smile. ~1cm left upper inner lip laceration without active hemorrhage. Bilateral TM clear.   CV:  Regular rate  Resp:  Non-labored breathing on RA  Abd:  Soft, nTTP, non-distended, no guarding/rebound/peritonitis  Extremities: No cyanosis or clubbing. MAEx4.   Psych: appropriate, normal affect    Results for orders placed or performed during the hospital encounter of 05/29/24 (from the past 24 hour(s))   CBC and Auto Differential   Result Value Ref Range    WBC 11.7 (H) 4.4 - 11.3 x10*3/uL    nRBC 0.0 0.0 - 0.0 /100 WBCs    RBC 4.41 4.00 - 5.20 x10*6/uL    Hemoglobin 12.9 12.0 - 16.0 g/dL    Hematocrit 39.1 36.0 - 46.0 %    MCV 89 80 - 100 fL    MCH 29.3 26.0 - 34.0 pg    MCHC 33.0 32.0 - 36.0 g/dL    RDW 13.0 11.5 - 14.5 %    Platelets 212 150 - 450 x10*3/uL    Neutrophils % 81.7 40.0 - 80.0 %    Immature Granulocytes %, Automated 0.5 0.0 - 0.9 %    Lymphocytes % 10.7 13.0 - 44.0 %    Monocytes % 5.9 2.0 - 10.0 %    Eosinophils % 0.8 0.0 - 6.0 %    Basophils % 0.4 0.0 - 2.0 %    Neutrophils Absolute 9.58 (H) 1.60 - 5.50 x10*3/uL    Immature Granulocytes Absolute, Automated 0.06 0.00 - 0.50 x10*3/uL    Lymphocytes Absolute 1.25 0.80 - 3.00 x10*3/uL    Monocytes Absolute 0.69 0.05 - 0.80 x10*3/uL    Eosinophils Absolute 0.09 0.00 - 0.40 x10*3/uL    Basophils Absolute 0.05 0.00 - 0.10 x10*3/uL   Comprehensive metabolic panel   Result Value Ref Range    Glucose 115 (H) 74 - 99 mg/dL    Sodium 139 136 - 145 mmol/L    Potassium 3.0 (L) 3.5 - 5.3 mmol/L    Chloride 101 98 - 107 mmol/L    Bicarbonate 29 21 - 32 mmol/L    Anion Gap 12 10 - 20 mmol/L    Urea Nitrogen 17 6 - 23  mg/dL    Creatinine 0.75 0.50 - 1.05 mg/dL    eGFR 82 >60 mL/min/1.73m*2    Calcium 9.6 8.6 - 10.3 mg/dL    Albumin 3.9 3.4 - 5.0 g/dL    Alkaline Phosphatase 44 33 - 136 U/L    Total Protein 6.7 6.4 - 8.2 g/dL    AST 18 9 - 39 U/L    Bilirubin, Total 0.5 0.0 - 1.2 mg/dL    ALT 8 7 - 45 U/L   Coagulation Screen   Result Value Ref Range    Protime 12.1 9.8 - 12.8 seconds    INR 1.1 0.9 - 1.1    aPTT 26 (L) 27 - 38 seconds       Imaging                                                                                                                                 CT Head and maxillofacial w/o contrast (05/29/24):  IMPRESSION:  Acute subdural hematoma along the falx cerebri measuring 6 mm in  thickness.  8 mm right paramidline frontal extraaxial hyperdensity may correspond  to additional area of hemorrhage, however there is question of  calcification superiorly, and a meningioma is other consideration and  attention on progress imaging recommended  Large left periorbital and infraorbital soft tissue hemorrhage and  hemorrhage over the left cheek.  No evidence of acute displaced maxillofacial fracture.    XR Knee left (05/29/24)  IMPRESSION:  No acute fracture or dislocation of the left knee.  Laceration of anterior soft tissues of the knee and soft tissue  swelling. Evaluation of depth laceration is limited on plain  radiography and clinical correlation with physical examination  recommended.    Small knee effusion.  Advanced left knee osteoarthrosis.    CT C-Spine (05/29/24)  IMPRESSION:  No evidence of acute fracture of the cervical spine.  Multilevel degenerative change of the cervical spine.      Assessment & Plan                                                                                                              Gabriella Haskins is a 78 y.o. female with a PMH significant for gout, HTN, hypothyroid, osteopenia, psoriasis, and Afib s/p cardioversion who presented to the ED as a trauma activation following a  fall from standing. -LOC. -Thinners.     Trauma inventory:  - Acute 6mm SDH along falx cerebri  - Large left periorbital and infraorbital soft tissue hemorrhage  - Left knee skin tear and laceration (primarily repaired in ED)  - Left upper inner lip laceration (to heal by primary intention)    Plan for admission to ICU for acute monitoring.    Neuro: NSGY consult. Q1h Neuro checks. Repeat non-contrast CT Head 6hours following initial scan (~3am). Head of bed >30degrees. SBP goals 100-150 with MAP >65.   Feeding: NPO  Analgesia: Tylenol scheduled. Breakthrough pain control per ICU.   Volume/Renal: LR at 75. Holding home Lasix. sI&O. No indication for mejia at this time.   Respiratory: IS.   ID: No abx at this time. Trend mild leukocytosis.   Transfusion/Heme: No indication for transfusion. AM CBC.   Embolic ppx: SCD only. Hold chemoppx 2/2 SDH.   Tubes/drains: None.   Heart: Continue home Metop 12.5mg bid.   Ulcer ppx: Not indicated   Glycemic control: Normoglycemic  Special/statin/spine/steroid/synthroid: Continue home Synthroid 112 mcg every day. Tetanus booster given in ED.   Code/Dispo: Full, ICU    Discussed with Dr. Isidoro Francisco MD

## 2024-05-30 NOTE — ED PROCEDURE NOTE
Procedure  Laceration Repair    Performed by: Winston Schmitz DO  Authorized by: Winston Schmitz DO    Consent:     Consent obtained:  Verbal    Consent given by:  Patient    Risks, benefits, and alternatives were discussed: yes      Risks discussed:  Infection, need for additional repair, nerve damage, poor cosmetic result, pain, retained foreign body, tendon damage, vascular damage and poor wound healing    Alternatives discussed:  No treatment, delayed treatment, observation and referral  Universal protocol:     Procedure explained and questions answered to patient or proxy's satisfaction: yes      Relevant documents present and verified: yes      Test results available: yes      Imaging studies available: yes      Required blood products, implants, devices, and special equipment available: yes      Site/side marked: yes      Immediately prior to procedure, a time out was called: yes      Patient identity confirmed:  Verbally with patient and arm band  Anesthesia:     Anesthesia method:  Local infiltration    Local anesthetic:  Lidocaine 1% WITH epi  Laceration details:     Location: knee.    Length (cm):  10    Depth (mm):  2  Pre-procedure details:     Preparation:  Patient was prepped and draped in usual sterile fashion and imaging obtained to evaluate for foreign bodies  Exploration:     Limited defect created (wound extended): no      Hemostasis achieved with:  Direct pressure    Imaging outcome: foreign body not noted      Wound extent: no areolar tissue violation noted, no fascia violation noted, no foreign bodies/material noted, no muscle damage noted, no nerve damage noted, no tendon damage noted, no underlying fracture noted and no vascular damage noted      Contaminated: no    Treatment:     Area cleansed with:  Saline    Amount of cleaning:  Extensive    Irrigation solution:  Sterile saline    Irrigation volume:  1L    Irrigation method:  Pressure wash    Visualized foreign bodies/material  removed: no      Debridement:  None    Undermining:  None    Scar revision: no      Layers/structures repaired:  Deep subcutaneous (skin)  Deep subcutaneous:     Suture size:  3-0    Suture material:  Chromic gut    Suture technique:  Simple interrupted    Number of sutures:  3  Skin repair:     Repair method:  Sutures    Suture size:  3-0    Suture technique:  Simple interrupted    Number of sutures:  9  Approximation:     Approximation:  Close  Repair type:     Repair type:  Intermediate  Post-procedure details:     Dressing:  Sterile dressing    Procedure completion:  Tolerated well, no immediate complications               Winston Schmitz DO  05/29/24 [id number]

## 2024-05-30 NOTE — HOSPITAL COURSE
Gabriella Haskins is a 78 y.o. female with a PMH significant for gout, HTN, hypothyroid, osteopenia, psoriasis, and Afib s/p cardioversion who presented to [facility] ED on 5/29 as a limited trauma activation following a fall from standing. Patient states she was walking in the kitchen and tripped behind her  as she was passing by him. She fell to the floor with impact to the left knee and face. Denies LOC and thinners. Endorses headache, back pain, facial pain, left knee pain. Denies lightheadedness, dizziness, blurred vision, loss of vision, double vision, palpitations, CP, SOB, abd pain, wrist pain, dysuria, hematuria, or hematochezia.    List of injuries:  - Acute 6mm SDH along falx cerebri  - Large left periorbital and infraorbital soft tissue hemorrhage  - Lt sunconjuctival hemorrhage  - Lt inner canthus laceration  - Left knee skin tear and laceration (primarily repaired in ED)  - Left upper inner lip laceration (to heal by primary intention)    Pt was seen by NSGY, non-operative management. Will need repeat CT and follow-up in clinic in 2-3 weeks. Lt eye/lacrimal apparatus laceration treated with topical erythromycin ointment, will need 3-5 day duration and close follow-up with ophthalmology. Lt knee sutured in ED with dissolvable sutures, not removal needed. Pt complained of upper back pain and had midline tenderness, so CT T-spine was ordered which revealed no acute traumatic injury.   Pt was assessed by PT/OT and deemed appropriate for discharge home with [facility].

## 2024-05-30 NOTE — PROGRESS NOTES
Gabriella Haskins is a 78 y.o. female on day 1 of admission presenting with SDH (subdural hematoma) (Multi).    Subjective   Patient doing well. She endorses some mild pain in Lt knee and Lt side of face, as well as upper back with movement. She denies changes to her vision, or pain with eye movement. Bloody lacrimation noted yesterday has resolved. She is pending PT/OT assessment.        Objective     Physical Exam  Constitutional:       General: She is not in acute distress.     Appearance: She is not ill-appearing.   HENT:      Head:      Jaw: There is normal jaw occlusion.      Comments: Ecchymosis and edema tracking down Lt cheek, chin, and neck - non-tender     Right Ear: External ear normal.      Left Ear: External ear normal.      Nose: Nose normal. No nasal deformity.      Right Nostril: No epistaxis.   Eyes:      Extraocular Movements: Extraocular movements intact.      Conjunctiva/sclera:      Left eye: Hemorrhage present.      Pupils: Pupils are equal, round, and reactive to light.      Comments: Significant Lt periorbital hematoma and ecchymosis. No hemolacria, but small laceration to inner canthus - hemostatic. No lid lacerations seen, but unable to edgardo eyelids d/t swelling. Significant subconjunctival hemorrhage.    Cardiovascular:      Rate and Rhythm: Normal rate and regular rhythm.      Heart sounds: Normal heart sounds.   Pulmonary:      Effort: Pulmonary effort is normal. No respiratory distress.      Breath sounds: Normal breath sounds.      Comments: RA  Abdominal:      General: Bowel sounds are normal. There is no distension.      Palpations: Abdomen is soft.      Tenderness: There is no abdominal tenderness.   Musculoskeletal:         General: No deformity. Normal range of motion.      Cervical back: Normal, normal range of motion and neck supple. No deformity or tenderness.      Thoracic back: Tenderness (mid-thoracic, mild) present. No deformity.      Lumbar back: Normal. No deformity or  "tenderness.      Right lower leg: No edema.      Left lower leg: No edema.   Skin:     General: Skin is warm and dry.      Comments: Superficial L-shaped laceration to Lt knee, sutured in ED, hemostatic   Neurological:      Mental Status: She is alert and oriented to person, place, and time.      GCS: GCS eye subscore is 4. GCS verbal subscore is 5. GCS motor subscore is 6.      Cranial Nerves: No cranial nerve deficit.      Sensory: Sensation is intact.      Motor: Motor function is intact.   Psychiatric:         Mood and Affect: Mood normal.         Behavior: Behavior normal.         Last Recorded Vitals  Blood pressure 112/53, pulse 55, temperature 36.5 °C (97.7 °F), temperature source Temporal, resp. rate 16, height 1.702 m (5' 7\"), weight 75.3 kg (166 lb), SpO2 96%.  Intake/Output last 3 Shifts:  I/O last 3 completed shifts:  In: 523.8 (7 mL/kg) [I.V.:523.8 (7 mL/kg)]  Out: - (0 mL/kg)   Weight: 75.3 kg     Relevant Results  Results for orders placed or performed during the hospital encounter of 05/29/24 (from the past 24 hour(s))   CBC and Auto Differential   Result Value Ref Range    WBC 11.7 (H) 4.4 - 11.3 x10*3/uL    nRBC 0.0 0.0 - 0.0 /100 WBCs    RBC 4.41 4.00 - 5.20 x10*6/uL    Hemoglobin 12.9 12.0 - 16.0 g/dL    Hematocrit 39.1 36.0 - 46.0 %    MCV 89 80 - 100 fL    MCH 29.3 26.0 - 34.0 pg    MCHC 33.0 32.0 - 36.0 g/dL    RDW 13.0 11.5 - 14.5 %    Platelets 212 150 - 450 x10*3/uL    Neutrophils % 81.7 40.0 - 80.0 %    Immature Granulocytes %, Automated 0.5 0.0 - 0.9 %    Lymphocytes % 10.7 13.0 - 44.0 %    Monocytes % 5.9 2.0 - 10.0 %    Eosinophils % 0.8 0.0 - 6.0 %    Basophils % 0.4 0.0 - 2.0 %    Neutrophils Absolute 9.58 (H) 1.60 - 5.50 x10*3/uL    Immature Granulocytes Absolute, Automated 0.06 0.00 - 0.50 x10*3/uL    Lymphocytes Absolute 1.25 0.80 - 3.00 x10*3/uL    Monocytes Absolute 0.69 0.05 - 0.80 x10*3/uL    Eosinophils Absolute 0.09 0.00 - 0.40 x10*3/uL    Basophils Absolute 0.05 0.00 - 0.10 " x10*3/uL   Comprehensive metabolic panel   Result Value Ref Range    Glucose 115 (H) 74 - 99 mg/dL    Sodium 139 136 - 145 mmol/L    Potassium 3.0 (L) 3.5 - 5.3 mmol/L    Chloride 101 98 - 107 mmol/L    Bicarbonate 29 21 - 32 mmol/L    Anion Gap 12 10 - 20 mmol/L    Urea Nitrogen 17 6 - 23 mg/dL    Creatinine 0.75 0.50 - 1.05 mg/dL    eGFR 82 >60 mL/min/1.73m*2    Calcium 9.6 8.6 - 10.3 mg/dL    Albumin 3.9 3.4 - 5.0 g/dL    Alkaline Phosphatase 44 33 - 136 U/L    Total Protein 6.7 6.4 - 8.2 g/dL    AST 18 9 - 39 U/L    Bilirubin, Total 0.5 0.0 - 1.2 mg/dL    ALT 8 7 - 45 U/L   Coagulation Screen   Result Value Ref Range    Protime 12.1 9.8 - 12.8 seconds    INR 1.1 0.9 - 1.1    aPTT 26 (L) 27 - 38 seconds   Type and Screen   Result Value Ref Range    ABO TYPE O     Rh TYPE POS     ANTIBODY SCREEN NEG    CBC   Result Value Ref Range    WBC 11.6 (H) 4.4 - 11.3 x10*3/uL    nRBC 0.0 0.0 - 0.0 /100 WBCs    RBC 3.94 (L) 4.00 - 5.20 x10*6/uL    Hemoglobin 11.4 (L) 12.0 - 16.0 g/dL    Hematocrit 35.1 (L) 36.0 - 46.0 %    MCV 89 80 - 100 fL    MCH 28.9 26.0 - 34.0 pg    MCHC 32.5 32.0 - 36.0 g/dL    RDW 12.9 11.5 - 14.5 %    Platelets 194 150 - 450 x10*3/uL   Basic metabolic panel   Result Value Ref Range    Glucose 136 (H) 74 - 99 mg/dL    Sodium 141 136 - 145 mmol/L    Potassium 3.1 (L) 3.5 - 5.3 mmol/L    Chloride 103 98 - 107 mmol/L    Bicarbonate 30 21 - 32 mmol/L    Anion Gap 11 10 - 20 mmol/L    Urea Nitrogen 16 6 - 23 mg/dL    Creatinine 0.70 0.50 - 1.05 mg/dL    eGFR 89 >60 mL/min/1.73m*2    Calcium 9.3 8.6 - 10.3 mg/dL   Magnesium   Result Value Ref Range    Magnesium 1.57 (L) 1.60 - 2.40 mg/dL         Assessment/Plan   Gabriella Haskins is a 78 y.o. female with a PMH significant for gout, HTN, hypothyroid, osteopenia, psoriasis, and Afib s/p cardioversion who presented to the ED as a limited trauma activation following a fall from standing. -LOC. -Thinners.      List of injuries:  - Acute 6mm SDH along falx  cerebri  - Large left periorbital and infraorbital soft tissue hemorrhage  - Lt sunconjuctival hemorrhage  - Lt inner canthus laceration  - Left knee skin tear and laceration (primarily repaired in ED)  - Left upper inner lip laceration (to heal by primary intention)    Assessment and Plan:    #SDH  - Repeat CT stable  - HOB >30  - Q4 neuro checks after CT x24 hrs  - Maintain SBP between 100-160  - Minimize metabolic demands    > Avoid fevers/treat infection    > Avoid hyponatremia     > Maintain euglycemia    > Avoid hypoxia  - NSGY consult, appreciate recs    > Okay for PT/OT    > Follow-up with Jaylin Hathaway in clinic in 2-3 weeks, repeat CT head before follow-up  - DVT ppx w/ SCDs only    #Lt periorbital hemorrhage  #Lt subconjunctival hemorrhage  #Lt inner canthus laceration  - Ice Q4  - Monitor for gross visual changes or pain with eye movement  - Pt already has an established ophthalmologist from recent cataract surgery, instructed pt to call upon discharge and schedule follow-up regarding laceration and subconjunctival hemorrhage  - Will add erythromycin ointment QID for 3 days for inner canthus laceration since it appears to involve the lacrimal apparatus     #Lt knee lac s/p suturing in ED  - Change dressing daily - non-adherent dressing or bandaid    > Gently cleanse with saline or soapy water to remove dried blood  - Dissolvable sutures, no need for removal  - Tetanus updated in ED    PT/OT pending    DVT: SCDs only    Dispo: PT/OT eval pending, but likely discharge to home.     Patient discussed with attending surgeon, Dr. Isidoro Sol PA-C

## 2024-05-30 NOTE — ED PROVIDER NOTES
EMERGENCY DEPARTMENT ENCOUNTER      Pt Name: Gabriella Haskins  MRN: [id number]  Birthdate 1945  Date of evaluation: 5/29/2024  Provider: Winston Schmitz DO    CHIEF COMPLAINT       Chief Complaint   Patient presents with    Fall     Pt arrived to the ED with c/o fall. Pt had facial injury to the left eye, left knee skin tear, back ain, and mouth injury. Pt denies LOC and blood thinners. Pt is alert and oriented x4.     Back Pain    Facial Injury    Knee Injury       HISTORY OF PRESENT ILLNESS    Gabriella Haskins is a 78 y.o. female who presents to the emergency department with Her self for closed head injury.  Patient states that this was strictly mechanical she was trying to step around her  while working in the kitchen and subsequently fell with closed head injury denies any loss of consciousness is not on anticoagulation.  She also notes that she landed on her left knee when coming down.  She caused a large skin tear to this area.  She is uncertain of her last tetanus vaccination.  Endorses some left facial swelling but no changes in vision.  No further associated symptoms at this time.            Nursing Notes were reviewed.    REVIEW OF SYSTEMS     CONSTITUTIONAL: Denies fever, sweats, chills.   NEURO: Denies difficulty walking, numbness, weakness, tingling, headache.   HEENT: Denies sore throat, rhinorrhea, changes in vision.   CARDIO: Denies chest pain, palpitations.  PULM: Denies shortness of breath, cough.   GI: Denies abdominal pain, nausea, vomiting, diarrhea, constipation, melena, hematochezia.  : Denies painful urination, frequency, hematuria.   MSK: Endorses closed head injury with fall.   SKIN: Endorses skin tears as well as scattered abrasions.  ENDOCRINE: Denies unexpected weight-loss.   HEME: Denies bleeding disorder.     PAST MEDICAL HISTORY     Past Medical History:   Diagnosis Date    Arthritis     History of transfusion        SURGICAL HISTORY       Past Surgical History:    Procedure Laterality Date    SHOULDER SURGERY      TITANIUM IMPLANT       ALLERGIES     Azithromycin, Codeine, Conjugated estrogens, Erythromycin, Lisinopril, Cephalexin, Doxycycline, Indomethacin, Bactrim [sulfamethoxazole-trimethoprim], and Amoxicillin    FAMILY HISTORY     No family history on file.     SOCIAL HISTORY       Social History     Socioeconomic History    Marital status:      Spouse name: None    Number of children: None    Years of education: None    Highest education level: None   Occupational History    None   Tobacco Use    Smoking status: Never    Smokeless tobacco: Never   Substance and Sexual Activity    Alcohol use: Never    Drug use: Never    Sexual activity: None   Other Topics Concern    None   Social History Narrative    None     Social Determinants of Health     Financial Resource Strain: Low Risk  (5/30/2024)    Overall Financial Resource Strain (CARDIA)     Difficulty of Paying Living Expenses: Not hard at all   Food Insecurity: Not on file   Transportation Needs: No Transportation Needs (5/30/2024)    PRAPARE - Transportation     Lack of Transportation (Medical): No     Lack of Transportation (Non-Medical): No   Physical Activity: Not on file   Stress: Not on file   Social Connections: Not on file   Intimate Partner Violence: Not on file   Housing Stability: Low Risk  (5/30/2024)    Housing Stability Vital Sign     Unable to Pay for Housing in the Last Year: No     Number of Places Lived in the Last Year: 1     Unstable Housing in the Last Year: No       PHYSICAL EXAM   VS: As documented in the triage note from today's date and EMR flowsheet were reviewed.  Gen: Well developed. No acute distress. Seated in bed. Appears nontoxic.   Skin: Warm. Dry.  Large skin tear to the left knee no evidence of joint capsule compromise no tendinous injury either.  Eyes: Pupils equally round and reactive to light. Clear sclera. EOMI.  HENT: Endorses left facial swelling minimal laceration to  the left upper lip no indication for closure at this time well-approximated. Mucosal membranes moist. No oral lesions, uvula midline, airway patent.  TMs clear bilaterally nares clear bilaterally.  Trachea is midline.  No evidence of basilar skull fracture.  CV: Regular rate and regular rhythm. S1, S2. No pedal edema. Warm extremities.  Resp: Nonlabored breathing Clear to auscultation bilaterally. No increased work of breathing.   GI: Soft and nontender. No rebound or guarding. Bowel sounds x4 present.   MSK: Symmetric muscle bulk. No joint swelling in the extremities. Compartments are soft. Neurovascularly intact x4 extremities. Radial pulses +2 equal bilaterally.  Pedal pulses +2 equal bilaterally.  5 out of 5 strength at the hip knee ankle toe joints.  No fibular head tenderness Achilles is intact.  Patellar reflex +2 equal bilaterally.  No CTL spine tenderness or step-offs.  Neuro: Alert. CN II - XII intact. Speech fluent. Moving all extremities. No focal deficits. NIH 0 Van negative.  Psych: Appropriate. Kempt.    DIAGNOSTIC RESULTS   RADIOLOGY:   Non-plain film images such as CT, Ultrasound and MRI are read by the radiologist. Plain radiographic images are visualized and preliminarily interpreted by the emergency physician with the below findings: X-ray imaging negative for fracture or dislocation of the knee.      Interpretation per the Radiologist below, if available at the time of this note:    CT head wo IV contrast   Final Result   Acute subdural hematoma along the falx cerebri measuring 6 mm in   thickness.        8 mm right paramidline frontal extraaxial hyperdensity may correspond   to additional area of hemorrhage, however there is question of   calcification superiorly, and a meningioma is other consideration and   attention on progress imaging recommended        Large left periorbital and infraorbital soft tissue hemorrhage and   hemorrhage over the left cheek.        No evidence of acute displaced  maxillofacial fracture.        MACRO:   Tariq Ruelas discussed the significance and urgency of this critical   finding by telephone with  ALEXANDER TIJERINA on 5/29/2024 at 9:10 pm.   (**-RCF-**) Findings:  See findings.        Signed by: Tariq Ruelas 5/29/2024 9:14 PM   Dictation workstation:   [id number]      CT cervical spine wo IV contrast   Final Result   No evidence of acute fracture of the cervical spine.        Multilevel degenerative change of the cervical spine.        MACRO:   None        Signed by: Tariq Ruelas 5/29/2024 9:10 PM   Dictation workstation:   [id number]      CT maxillofacial bones wo IV contrast   Final Result   Acute subdural hematoma along the falx cerebri measuring 6 mm in   thickness.        8 mm right paramidline frontal extraaxial hyperdensity may correspond   to additional area of hemorrhage, however there is question of   calcification superiorly, and a meningioma is other consideration and   attention on progress imaging recommended        Large left periorbital and infraorbital soft tissue hemorrhage and   hemorrhage over the left cheek.        No evidence of acute displaced maxillofacial fracture.        MACRO:   Tariq Ruelas discussed the significance and urgency of this critical   finding by telephone with  ALEXANDER TIJERINA on 5/29/2024 at 9:10 pm.   (**-RCF-**) Findings:  See findings.        Signed by: Tariq Ruelas 5/29/2024 9:14 PM   Dictation workstation:   [id number]      CT 3D reconstruction   Final Result   Acute subdural hematoma along the falx cerebri measuring 6 mm in   thickness.        8 mm right paramidline frontal extraaxial hyperdensity may correspond   to additional area of hemorrhage, however there is question of   calcification superiorly, and a meningioma is other consideration and   attention on progress imaging recommended        Large left periorbital and infraorbital soft tissue hemorrhage and   hemorrhage over the left cheek.        No evidence of  acute displaced maxillofacial fracture.        MACRO:   Tariq Ruelas discussed the significance and urgency of this critical   finding by telephone with  ALEXANDER TIJERINA on 5/29/2024 at 9:10 pm.   (**-RCF-**) Findings:  See findings.        Signed by: Tariq Ruelas 5/29/2024 9:14 PM   Dictation workstation:   [id number]      XR knee left 4+ views   Final Result   No acute fracture or dislocation of the left knee.        Laceration of anterior soft tissues of the knee and soft tissue   swelling. Evaluation of depth laceration is limited on plain   radiography and clinical correlation with physical examination   recommended.        Small knee effusion.        Advanced left knee osteoarthrosis.                  MACRO:   None        Signed by: Tariq Ruelas 5/29/2024 9:08 PM   Dictation workstation:   [id number]      CT thoracic spine wo IV contrast    (Results Pending)         ED BEDSIDE ULTRASOUND:   Performed by ED Physician - none    LABS:  Labs Reviewed   CBC WITH AUTO DIFFERENTIAL - Abnormal       Result Value    WBC 11.7 (*)     nRBC 0.0      RBC 4.41      Hemoglobin 12.9      Hematocrit 39.1      MCV 89      MCH 29.3      MCHC 33.0      RDW 13.0      Platelets 212      Neutrophils % 81.7      Immature Granulocytes %, Automated 0.5      Lymphocytes % 10.7      Monocytes % 5.9      Eosinophils % 0.8      Basophils % 0.4      Neutrophils Absolute 9.58 (*)     Immature Granulocytes Absolute, Automated 0.06      Lymphocytes Absolute 1.25      Monocytes Absolute 0.69      Eosinophils Absolute 0.09      Basophils Absolute 0.05     COMPREHENSIVE METABOLIC PANEL - Abnormal    Glucose 115 (*)     Sodium 139      Potassium 3.0 (*)     Chloride 101      Bicarbonate 29      Anion Gap 12      Urea Nitrogen 17      Creatinine 0.75      eGFR 82      Calcium 9.6      Albumin 3.9      Alkaline Phosphatase 44      Total Protein 6.7      AST 18      Bilirubin, Total 0.5      ALT 8     COAGULATION SCREEN - Abnormal    Protime  12.1      INR 1.1      aPTT 26 (*)     Narrative:     The APTT is no longer used for monitoring Unfractionated Heparin Therapy. For monitoring Heparin Therapy, use the Heparin Assay.   CBC - Abnormal    WBC 11.6 (*)     nRBC 0.0      RBC 3.94 (*)     Hemoglobin 11.4 (*)     Hematocrit 35.1 (*)     MCV 89      MCH 28.9      MCHC 32.5      RDW 12.9      Platelets 194     BASIC METABOLIC PANEL - Abnormal    Glucose 136 (*)     Sodium 141      Potassium 3.1 (*)     Chloride 103      Bicarbonate 30      Anion Gap 11      Urea Nitrogen 16      Creatinine 0.70      eGFR 89      Calcium 9.3     MAGNESIUM - Abnormal    Magnesium 1.57 (*)    TYPE AND SCREEN    ABO TYPE O      Rh TYPE POS      ANTIBODY SCREEN NEG         All other labs were within normal range or not returned as of this dictation.    EMERGENCY DEPARTMENT COURSE/MDM:   Vitals:    Vitals:    05/30/24 1100 05/30/24 1200 05/30/24 1300 05/30/24 1400   BP: 112/53 106/51 100/58 115/56   Pulse: 55 53 61 62   Resp: 16 18 25 (!) 29   Temp:  36 °C (96.8 °F)     TempSrc:       SpO2: 96% 98% 99% 98%   Weight:       Height:           I reviewed the patient's triage vitals and they are slightly hypertensive recommended follow-up with primary provider for repeat checks.    Due to the above findings the following was ordered tetanus vaccination update medication for pain CT imaging of the head facial structures neck x-ray imaging of the knee.  Limited trauma was not called upon arrival not meeting criteria.    CT imaging revealed subdural hemorrhage patient neurologically intact NIH 0 Van negative.  Neurosurgery was immediately contacted who recommended systolic less than 160 repeat 6-hour CT with every hour neuro's.  Discussed findings with Dr. Chaudhry from surgery who agreed to admit the patient to his service.  He did take over care at time of admission order.  I did speak with the intensivist on-call to be on consult as the patient will need ICU level of care they have  agreed.  Patient did have significant left facial swelling although upon arrival I was able to open the left eye ocular motions intact visual acuity is appropriate no signs of globe rupture or ocular pathology.  Patient's blood pressure was at goal while in the department she is not on anticoagulation.  I did add on lab work after bleed was evident.  Does have mild hypokalemia for which she was recommended oral supplementation.  Her pain is controlled at this time.  I did thoroughly wash out the left medial laceration there is no evidence of joint capsule penetration or tenderness or vascular/neuro damage.  Wound was appropriately close see suture note for further details.  Patient is aware that the sutures need to be removed within 10 to 14 days.  No indication for antibiotic prophylaxis at this time.    Critical Care    Performed by: Winston Schmitz DO  Authorized by: Winston Schmitz DO    Critical care provider statement:     Critical care time (minutes):  32    Critical care time was exclusive of:  Separately billable procedures and treating other patients and teaching time    Critical care was necessary to treat or prevent imminent or life-threatening deterioration of the following conditions:  Trauma    Critical care was time spent personally by me on the following activities:  Discussions with consultants, ordering and performing treatments and interventions, ordering and review of laboratory studies, ordering and review of radiographic studies, pulse oximetry, re-evaluation of patient's condition, review of old charts and examination of patient    Care discussed with: admitting provider          ED Course as of 05/30/24 1658   Wed May 29, 2024   2117 Radiologist did relay critical findings regarding CT imaging of the head to me and neurosurgery call was immediately placed. [MG]   2131 Spoke with neurosurgery recommending systolic less than 160 repeat CT 6 hours ICU level of care every hour neuro's.  [MG]      ED Course User Index  [MG] Winston DO Nadir         Diagnoses as of 05/30/24 1533   SDH (subdural hematoma) (Multi)   Skin tear of left elbow without complication, initial encounter   Laceration of left knee, initial encounter       Patient was counseled regarding labs, imaging, likely diagnosis, and plan. All questions were answered.     ------------------------------------------------------------------  Information provided by the patient  Consults trauma surgery, intensivist  Past medical history complicating workup hypertension  Previous medical records reviewed discharge summary 10/12/2023  Considered additional CT imaging of the chest abdomen pelvis although not indicated at this time  ------------------------------------------------------------------  ED Medications administered this visit:    Medications   levothyroxine (Synthroid, Levoxyl) tablet 112 mcg (112 mcg oral Given 5/30/24 0501)   metoprolol tartrate (Lopressor) tablet 12.5 mg (12.5 mg oral Given 5/30/24 0833)   acetaminophen (Tylenol) tablet 650 mg (650 mg oral Given 5/30/24 1010)     Or   acetaminophen (Tylenol) oral liquid 650 mg ( nasogastric tube See Alternative 5/30/24 1010)     Or   acetaminophen (Tylenol) suppository 650 mg ( rectal See Alternative 5/30/24 1010)   ondansetron ODT (Zofran-ODT) disintegrating tablet 4 mg (has no administration in time range)     Or   ondansetron (Zofran) injection 4 mg (has no administration in time range)   docusate sodium (Colace) capsule 100 mg (100 mg oral Given 5/30/24 0833)   erythromycin (Romycin) 5 mg/gram (0.5 %) ophthalmic ointment 1 cm (has no administration in time range)   diphth,pertus(acell),tetanus (BoostRIX) 2.5-8-5 Lf-mcg-Lf/0.5mL vaccine 0.5 mL (0.5 mL intramuscular Given 5/29/24 2022)   morphine injection 4 mg (4 mg intravenous Given 5/29/24 2022)   ondansetron (Zofran) injection 4 mg (4 mg intravenous Given 5/29/24 2022)   labetaloL (Normodyne,Trandate) injection 10 mg (10  mg intravenous Given 5/29/24 9457)   magnesium sulfate IV 2 g (0 g intravenous Stopped 5/30/24 0914)   potassium chloride (Klor-Con) packet 40 mEq (40 mEq oral Given 5/30/24 0714)           Final Impression:   1. SDH (subdural hematoma) (Multi)    2. Skin tear of left elbow without complication, initial encounter    3. Laceration of left knee, initial encounter          Winston Schmitz DO    (Please note that portions of this note were completed with a voice recognition program.  Efforts were made to edit the dictations but occasionally words are mis-transcribed.)     Winston Schmitz DO  05/30/24 1708       Winston Schmitz DO  05/30/24 1708

## 2024-05-30 NOTE — CONSULTS
"Reason For Consult  SDH    History Of Present Illness  Gabriella Haskins is a 78 y.o. female presenting to [facility] ED on 05/29/2024, after falling forward while walking in her kitchen at home. CT Head imaging x 2 reviewed and by my interpretation, there is right frontal and anterior falx SDH. CT C Spine images reviewed with note of degenerative changes, no acute findings. She currently reports headache, states she usually has morning headaches due to environmental allergies, \"but this one isn't as bad\". No seizure activity, visual changes, weakness / paresthesia.     PMH: HTN, Afib (cardioverted, no AC), hypothyroid, gout, vitamin D / B12 deficiency    Past Medical History  She has a past medical history of Arthritis and History of transfusion.    Surgical History  She has a past surgical history that includes Shoulder surgery.     Social History  She reports that she has never smoked. She has never used smokeless tobacco. She reports that she does not drink alcohol and does not use drugs. She lives at home with her . Has four sons (3 in [place], 1 in [place])    Family History  No family history on file.     Allergies  Azithromycin, Codeine, Conjugated estrogens, Erythromycin, Lisinopril, Cephalexin, Doxycycline, Indomethacin, Bactrim [sulfamethoxazole-trimethoprim], and Amoxicillin    Medications:  Scheduled medications  acetaminophen, 650 mg, oral, q6h   Or  acetaminophen, 650 mg, nasogastric tube, q6h   Or  acetaminophen, 650 mg, rectal, q6h  docusate sodium, 100 mg, oral, BID  levothyroxine, 112 mcg, oral, Daily  metoprolol tartrate, 12.5 mg, oral, BID      Continuous medications  lactated Ringer's, 75 mL/hr, Last Rate: 75 mL/hr (05/29/24 8632)      PRN medications  PRN medications: ondansetron ODT **OR** ondansetron      Review of Systems  ROS x 10 is, otherwise, negative unless documented above in HPI     Physical Exam  Well nourished, well developed female, resting in bed  A&O x 4, speech clear / " "fluent  PERRL, EOMI, No facial droop  No pronator drift, Finger to nose intact  Skin is warm / dry, ecchymoses on left face / periorbital region  Thorax is midline; chest expansion is symmetric  CANO readily  Abdomen is not distended  Urine - no incontinence     Last Recorded Vitals  Blood pressure 103/51, pulse 67, temperature 36.8 °C (98.2 °F), temperature source Skin, resp. rate (!) 37, height 1.702 m (5' 7\"), weight 75.3 kg (166 lb), SpO2 100%.    Relevant Results  Results for orders placed or performed during the hospital encounter of 05/29/24 (from the past 24 hour(s))   CBC and Auto Differential   Result Value Ref Range    WBC 11.7 (H) 4.4 - 11.3 x10*3/uL    nRBC 0.0 0.0 - 0.0 /100 WBCs    RBC 4.41 4.00 - 5.20 x10*6/uL    Hemoglobin 12.9 12.0 - 16.0 g/dL    Hematocrit 39.1 36.0 - 46.0 %    MCV 89 80 - 100 fL    MCH 29.3 26.0 - 34.0 pg    MCHC 33.0 32.0 - 36.0 g/dL    RDW 13.0 11.5 - 14.5 %    Platelets 212 150 - 450 x10*3/uL    Neutrophils % 81.7 40.0 - 80.0 %    Immature Granulocytes %, Automated 0.5 0.0 - 0.9 %    Lymphocytes % 10.7 13.0 - 44.0 %    Monocytes % 5.9 2.0 - 10.0 %    Eosinophils % 0.8 0.0 - 6.0 %    Basophils % 0.4 0.0 - 2.0 %    Neutrophils Absolute 9.58 (H) 1.60 - 5.50 x10*3/uL    Immature Granulocytes Absolute, Automated 0.06 0.00 - 0.50 x10*3/uL    Lymphocytes Absolute 1.25 0.80 - 3.00 x10*3/uL    Monocytes Absolute 0.69 0.05 - 0.80 x10*3/uL    Eosinophils Absolute 0.09 0.00 - 0.40 x10*3/uL    Basophils Absolute 0.05 0.00 - 0.10 x10*3/uL   Comprehensive metabolic panel   Result Value Ref Range    Glucose 115 (H) 74 - 99 mg/dL    Sodium 139 136 - 145 mmol/L    Potassium 3.0 (L) 3.5 - 5.3 mmol/L    Chloride 101 98 - 107 mmol/L    Bicarbonate 29 21 - 32 mmol/L    Anion Gap 12 10 - 20 mmol/L    Urea Nitrogen 17 6 - 23 mg/dL    Creatinine 0.75 0.50 - 1.05 mg/dL    eGFR 82 >60 mL/min/1.73m*2    Calcium 9.6 8.6 - 10.3 mg/dL    Albumin 3.9 3.4 - 5.0 g/dL    Alkaline Phosphatase 44 33 - 136 U/L    " Total Protein 6.7 6.4 - 8.2 g/dL    AST 18 9 - 39 U/L    Bilirubin, Total 0.5 0.0 - 1.2 mg/dL    ALT 8 7 - 45 U/L   Coagulation Screen   Result Value Ref Range    Protime 12.1 9.8 - 12.8 seconds    INR 1.1 0.9 - 1.1    aPTT 26 (L) 27 - 38 seconds   Type and Screen   Result Value Ref Range    ABO TYPE O     Rh TYPE POS     ANTIBODY SCREEN NEG      CT Head x 2 and C Spine images reviewed (see HPI)     Assessment/Plan   77 yo female s/p fall with right frontal and anterior falx SDH: non-surgical  Discussed with Dr. Ramy Odell  - Continue medical management with HOB > 30 degrees, SBP < 160, avoid anticoagulation at this time.  - Okay for PT / OT for discharge planning  - Can follow up with me as outpatient in 2 - 3 weeks with repeat CT Head prior to visit  - Discussed with patient who is agreeable with plan    I spent > 35 minutes in the professional and overall care of this patient.      Jaylin Hathaway, APRN-CNP

## 2024-05-30 NOTE — PROGRESS NOTES
Occupational Therapy    Evaluation    Patient Name: Gabriella Haskins  MRN: [id number]  Today's Date: 5/30/2024  Time Calculation  Start Time: 0932  Stop Time: 1001  Time Calculation (min): 29 min        Assessment:  End of Session Communication: Bedside nurse  End of Session Patient Position: Up in chair, Alarm off, not on at start of session     Plan:  Treatment Interventions: ADL retraining, Functional transfer training, UE strengthening/ROM, Cognitive reorientation, Compensatory technique education  OT Frequency: 3 times per week  OT Discharge Recommendations: Low intensity level of continued care (with initial 24 hr supervision)  OT - OK to Discharge: Yes (to next level of care when cleared by medical team)  Treatment Interventions: ADL retraining, Functional transfer training, UE strengthening/ROM, Cognitive reorientation, Compensatory technique education    Subjective   Current Problem:  1. SDH (subdural hematoma) (Multi)        2. Skin tear of left elbow without complication, initial encounter        3. Laceration of left knee, initial encounter          General:  General  Reason for Referral: impaired adl; pt. admitted s/p fall when standing in kitchen, striking her face and L knee  Referred By: Jyoti Sol  Past Medical History Relevant to Rehab: pmh:  a fib, not on ac, htn, hypothyroidism, gout, s/p cardioversion, thyroidecotmy and hysterectomy  Prior to Session Communication: Bedside nurse  Patient Position Received: Bed, 4 rail up, Alarm off, not on at start of session  General Comment: pt. resting in bed, agreeable to therapy intervention  Precautions:  Precautions Comment: bruising/swelling L side of face, telemetry, hob > 30 degrees, sbp< 160, L knee laceration (sutured)  Vital Signs:  Heart Rate:  (VSS)  Pain:  Pain Assessment  Pain Assessment:  (abdominal pain reported 3-4/10 with transfer from eob)    Objective   Cognition:  Overall Cognitive Status: Within Functional Limits  Orientation  Level: Oriented X4           Home Living:  Home Living Comments: pt. lives with spouse, 1 floor home, 1 +1 step entry with rail, laundry in the basement, 8 steps with rail to basement, tub/shower with bench, has another seat but does not fit in the tub, bsc, st. cane, wh. walker  Prior Function:  Prior Function Comments: pt. independent with adl/iadl tasks, drives, spouse drives also and is primary , no use of device for ambulation  IADL History:     ADL:  ADL Comments: toileting completed with min assist x 1 for transfer on/off lower toilet with cues to utilize grab bar, pt. able to pull underwear down/up over hips with cga in standing, and able to wipe self seated with sba  Activity Tolerance:  Early Mobility/Exercise Safety Screen: Proceed with mobilization - No exclusion criteria met  Bed Mobility/Transfers: Bed Mobility  Bed Mobility:  (supine to sit completed with sba)    Transfers  Transfer:  (sit<> stand from eob to recliner chair, cga with cues for hand placement)      Functional Mobility:  Functional Mobility  Functional Mobility Performed:  (mobility with use of wh. walker completed within room to navigate bed to toilet, toilet to chair, sba/cga overall, no lob, cues for walker safety)     Vision:    and Vision - Complex Assessment  Vision Comments: pt. states having no loss despite swelling/bruising over L eye, some possible deficit during functional mobility/during adl tasks  Sensation:  Sensation Comment: sensation intact  Strength:  Strength Comments: bue strength equal, 4+/5 overall    Outcome Measures:[facility] Daily Activity  Putting on and taking off regular lower body clothing: A little  Bathing (including washing, rinsing, drying): A little  Putting on and taking off regular upper body clothing: A little  Toileting, which includes using toilet, bedpan or urinal: A little  Taking care of personal grooming such as brushing teeth: None  Eating Meals: None  Daily Activity - Total Score: 20          and ICU Mobility Screen  Early Mobility/Exercise Safety Screen: Proceed with mobilization - No exclusion criteria met  ICU Mobility Scale: Walking with assistance of 1 person    Education Documentation  Precautions, taught by Coby Beth OT at 5/30/2024 12:36 PM.  Learner: Patient  Readiness: Acceptance  Method: Explanation  Response: Verbalizes Understanding, Needs Reinforcement    ADL Training, taught by Coby Beth OT at 5/30/2024 12:36 PM.  Learner: Patient  Readiness: Acceptance  Method: Explanation  Response: Verbalizes Understanding, Needs Reinforcement         Goals:  Encounter Problems       Encounter Problems (Active)       OT Goals       Increase functional mobility and  functional transfers to supervision for bed/chair/toilet with dme prn   (Progressing)       Start:  05/30/24    Expected End:  06/06/24            increase bue ther ex/activity x 7-10 minutes and increase standing tolerance x 3-5 minutes with supervision to promote greater activity tolerance for assist with adl.   (Progressing)       Start:  05/30/24    Expected End:  06/06/24            Increase lb dressing/bathing to supervision with dme prn  (Progressing)       Start:  05/30/24    Expected End:  06/06/24            Increase toileting to supervision with dme prn  (Progressing)       Start:  05/30/24    Expected End:  06/06/24            pt. to apply ec/ws techniques with minimal cues to all mobility/transfer/adl to decrease fatigue/promote efficient use of energy toward completion of functional tasks.  (Progressing)       Start:  05/30/24    Expected End:  06/06/24

## 2024-05-31 VITALS
BODY MASS INDEX: 26.06 KG/M2 | TEMPERATURE: 97.9 F | WEIGHT: 166 LBS | SYSTOLIC BLOOD PRESSURE: 129 MMHG | OXYGEN SATURATION: 92 % | HEIGHT: 67 IN | HEART RATE: 71 BPM | RESPIRATION RATE: 18 BRPM | DIASTOLIC BLOOD PRESSURE: 61 MMHG

## 2024-05-31 PROCEDURE — 97535 SELF CARE MNGMENT TRAINING: CPT | Mod: CQ,GP

## 2024-05-31 PROCEDURE — 2500000001 HC RX 250 WO HCPCS SELF ADMINISTERED DRUGS (ALT 637 FOR MEDICARE OP)

## 2024-05-31 PROCEDURE — 99232 SBSQ HOSP IP/OBS MODERATE 35: CPT | Performed by: SURGERY

## 2024-05-31 PROCEDURE — 97116 GAIT TRAINING THERAPY: CPT | Mod: CQ,GP

## 2024-05-31 RX ORDER — ERYTHROMYCIN 5 MG/G
OINTMENT OPHTHALMIC 2 TIMES DAILY
Start: 2024-05-31 | End: 2024-06-06

## 2024-05-31 RX ADMIN — ERYTHROMYCIN 1 CM: 5 OINTMENT OPHTHALMIC at 06:29

## 2024-05-31 RX ADMIN — DOCUSATE SODIUM 100 MG: 100 CAPSULE, LIQUID FILLED ORAL at 09:27

## 2024-05-31 RX ADMIN — METOPROLOL TARTRATE 12.5 MG: 25 TABLET, FILM COATED ORAL at 09:27

## 2024-05-31 RX ADMIN — LEVOTHYROXINE SODIUM 112 MCG: 112 TABLET ORAL at 06:28

## 2024-05-31 RX ADMIN — ERYTHROMYCIN 1 CM: 5 OINTMENT OPHTHALMIC at 00:15

## 2024-05-31 RX ADMIN — ACETAMINOPHEN 650 MG: 325 TABLET ORAL at 06:28

## 2024-05-31 ASSESSMENT — PAIN SCALES - GENERAL: PAINLEVEL_OUTOF10: 1

## 2024-05-31 ASSESSMENT — COGNITIVE AND FUNCTIONAL STATUS - GENERAL
TURNING FROM BACK TO SIDE WHILE IN FLAT BAD: A LITTLE
STANDING UP FROM CHAIR USING ARMS: A LITTLE
CLIMB 3 TO 5 STEPS WITH RAILING: A LITTLE
MOBILITY SCORE: 19
MOVING TO AND FROM BED TO CHAIR: A LITTLE
WALKING IN HOSPITAL ROOM: A LITTLE

## 2024-05-31 NOTE — DISCHARGE SUMMARY
Discharge Diagnosis  SDH (subdural hematoma) (Multi)    Issues Requiring Follow-Up   Patient needs to follow up with her ophthalmologist as outpatient. Also needs to see neurosurgery ILDA in next 2-3 weeks with CT head prior.   Lung nodule clinic referral placed as well    Test Results Pending At Discharge  Pending Labs       No current pending labs.            Hospital Course  Gabriella Haskins is a 78 y.o. female with a PMH significant for gout, HTN, hypothyroid, osteopenia, psoriasis, and Afib s/p cardioversion who presented to [facility] ED on 5/29 as a limited trauma activation following a fall from standing. Patient states she was walking in the kitchen and tripped behind her  as she was passing by him. She fell to the floor with impact to the left knee and face. Denies LOC and thinners. Endorses headache, back pain, facial pain, left knee pain. Denies lightheadedness, dizziness, blurred vision, loss of vision, double vision, palpitations, CP, SOB, abd pain, wrist pain, dysuria, hematuria, or hematochezia.    List of injuries:  - Acute 6mm SDH along falx cerebri  - Large left periorbital and infraorbital soft tissue hemorrhage  - Lt sunconjuctival hemorrhage  - Lt inner canthus laceration  - Left knee skin tear and laceration (primarily repaired in ED)  - Left upper inner lip laceration (to heal by primary intention)    Pt was seen by NSGY, non-operative management. Will need repeat CT and follow-up in clinic in 2-3 weeks. Lt eye/lacrimal apparatus laceration treated with topical erythromycin ointment, will need 3-5 day duration and close follow-up with ophthalmology. Lt knee sutured in ED with dissolvable sutures, not removal needed. Pt complained of upper back pain and had midline tenderness, so CT T-spine was ordered which revealed no acute traumatic injury.   Pt was assessed by PT/OT and deemed appropriate for discharge home with [facility].      Pertinent Physical Exam At Time of Discharge  Physical  Exam  Constitutional:       General: She is not in acute distress.     Appearance: Normal appearance. She is normal weight. She is not ill-appearing.      Comments: Adult female examined after ambulating to recliner chair with walker and minimal assistance. Good balance    HENT:      Head:      Jaw: There is normal jaw occlusion.      Comments: Ecchymosis and edema tracking down Lt cheek, chin, and neck - non-tender - very purple in center with light green ecchymosis spreading around lateral aspects of central ecchymosis      Right Ear: External ear normal.      Left Ear: External ear normal.      Nose: Nose normal. No nasal deformity.      Right Nostril: No epistaxis.   Eyes:      Extraocular Movements: Extraocular movements intact.      Conjunctiva/sclera:      Left eye: Hemorrhage present.      Pupils: Pupils are equal, round, and reactive to light.      Comments: Significant Lt periorbital hematoma and ecchymosis. No hemolacria, but small laceration to inner canthus - hemostatic. No lid lacerations seen, but unable to edgardo eyelids d/t swelling. Significant subconjunctival hemorrhage.    Cardiovascular:      Rate and Rhythm: Normal rate and regular rhythm.      Pulses: Normal pulses.      Heart sounds: Normal heart sounds.   Pulmonary:      Effort: Pulmonary effort is normal. No respiratory distress.      Breath sounds: Normal breath sounds.      Comments: RA, no tenderness to palpate chest wall  Chest:      Chest wall: No tenderness.   Abdominal:      General: Bowel sounds are normal. There is no distension.      Palpations: Abdomen is soft.      Tenderness: There is no abdominal tenderness.   Musculoskeletal:         General: No deformity. Normal range of motion.      Cervical back: Normal, normal range of motion and neck supple. No deformity or tenderness.      Thoracic back: Tenderness (mid-thoracic, mild) present. No deformity.      Lumbar back: Normal. No deformity or tenderness.      Right lower leg: No  edema.      Left lower leg: No edema.   Skin:     General: Skin is warm and dry.      Comments: Superficial L-shaped laceration to Lt knee, sutured in ED, hemostatic   Neurological:      Mental Status: She is alert and oriented to person, place, and time.      GCS: GCS eye subscore is 4. GCS verbal subscore is 5. GCS motor subscore is 6.      Cranial Nerves: No cranial nerve deficit.      Sensory: Sensation is intact.      Motor: Motor function is intact.   Psychiatric:         Mood and Affect: Mood normal.         Behavior: Behavior normal.         Home Medications     Medication List      START taking these medications     erythromycin 5 mg/gram (0.5 %) ophthalmic ointment; Commonly known as:   Romycin; Apply to left eye 2 times a day for 9 doses. Apply Amount per   Dose: 0.25 inch (~0.5 cm) per dose.     CONTINUE taking these medications     colchicine 0.6 mg tablet   ferrous sulfate (325 mg ferrous sulfate) tablet   furosemide 20 mg tablet; Commonly known as: Lasix; TAKE 1/2 OF A TABLETS   (10 MG) BY MOUTH ONCE DAILY.   ketoconazole 2 % cream; Commonly known as: NIZOral; Apply topically 2   times a day.   levothyroxine 112 mcg tablet; Commonly known as: Synthroid, Levoxyl   metoprolol tartrate 25 mg tablet; Commonly known as: Lopressor; Take 0.5   tablets (12.5 mg) by mouth 2 times a day.   sodium chloride 5 % ophthalmic solution; Commonly known as: Adelita 128   ZyrTEC 10 mg capsule; Generic drug: cetirizine       Outpatient Follow-Up  No future appointments.    Fabiola Chase, APRN-CNP

## 2024-05-31 NOTE — PROGRESS NOTES
Physical Therapy    Physical Therapy Treatment    Patient Name: Gabriella Haskins  MRN: [id number]  Today's Date: 5/31/2024  Time Calculation  Start Time: 0831  Stop Time: 0901  Time Calculation (min): 30 min    Assessment/Plan         PT Plan  Treatment/Interventions: Bed mobility, Transfer training, Gait training  PT Plan: Skilled PT  PT Frequency: 4 times per week  PT Discharge Recommendations: Low intensity level of continued care (with 24hr initial SUP)  PT - OK to Discharge: Yes (once cleared by medical team)      General Visit Information:   PT  Visit  PT Received On: 05/31/24       Subjective               Objective                         Treatments:       Bed Mobility  Bed Mobility:  (supine to sit sba)    Ambulation/Gait Training  Ambulation/Gait Training Performed:  (ambulated 100 feet x 2 using fixed wheeled walker cga)  Transfers  Transfer:  (sit to stand sba)    Outcome Measures:  [facility] Basic Mobility  Turning from your back to your side while in a flat bed without using bedrails: None  Moving from lying on your back to sitting on the side of a flat bed without using bedrails: A little  Moving to and from bed to chair (including a wheelchair): A little  Standing up from a chair using your arms (e.g. wheelchair or bedside chair): A little  To walk in hospital room: A little  Climbing 3-5 steps with railing: A little  Basic Mobility - Total Score: 19    Education Documentation  Precautions, taught by Lisa Torres PTA at 5/31/2024  9:54 AM.  Learner: Patient  Readiness: Acceptance  Method: Demonstration  Response: Demonstrated Understanding    ADL Training, taught by Lisa Torres PTA at 5/31/2024  9:54 AM.  Learner: Patient  Readiness: Acceptance  Method: Demonstration  Response: Demonstrated Understanding    Precautions, taught by Lisa Torres PTA at 5/31/2024  9:54 AM.  Learner: Patient  Readiness: Acceptance  Method: Demonstration  Response: Demonstrated Understanding    Mobility Training,  taught by Lisa Torres PTA at 5/31/2024  9:54 AM.  Learner: Patient  Readiness: Acceptance  Method: Demonstration  Response: Demonstrated Understanding    Education Comments  No comments found.             Encounter Problems       Encounter Problems (Active)       PT Problem       STG - Pt will transition supine <> sitting with mod I  (Progressing)       Start:  05/30/24    Expected End:  06/13/24            STG - Pt will transfer STS with SUP  (Progressing)       Start:  05/30/24    Expected End:  06/13/24            STG - Pt will amb 50' using no AD or LRD with SUP  (Progressing)       Start:  05/30/24    Expected End:  06/13/24

## 2024-05-31 NOTE — CARE PLAN
Problem: Pain  Goal: My pain/discomfort is manageable  Outcome: Progressing     Problem: Safety  Goal: Patient will be injury free during hospitalization  Outcome: Progressing  Goal: I will remain free of falls  Outcome: Progressing     Problem: Daily Care  Goal: Daily care needs are met  Outcome: Progressing     Problem: Psychosocial Needs  Goal: Demonstrates ability to cope with hospitalization/illness  Outcome: Progressing  Goal: Collaborate with me, my family, and caregiver to identify my specific goals  Outcome: Progressing     Problem: Discharge Barriers  Goal: My discharge needs are met  Outcome: Progressing     Problem: Pain  Goal: Takes deep breaths with improved pain control throughout the shift  Outcome: Progressing  Goal: Turns in bed with improved pain control throughout the shift  Outcome: Progressing  Goal: Performs ADL's with improved pain control throughout shift  Outcome: Progressing  Goal: Free from opioid side effects throughout the shift  Outcome: Progressing     Problem: Skin  Goal: Decreased wound size/increased tissue granulation at next dressing change  Outcome: Progressing  Goal: Participates in plan/prevention/treatment measures  Outcome: Progressing  Goal: Prevent/manage excess moisture  Outcome: Progressing  Goal: Prevent/minimize sheer/friction injuries  Outcome: Progressing  Goal: Promote/optimize nutrition  Outcome: Progressing  Goal: Promote skin healing  Outcome: Progressing

## 2024-05-31 NOTE — PROGRESS NOTES
"General surgery attending note:  I examined and evaluated the patient.  I discussed the patient with the ILDA and reviewed the ILDA note.    Subjective   No complaints.  No pain.    Objective     Physical Exam afebrile, vital signs stable  Well-developed, well-nourished, no acute distress, alert and oriented  HEENT: Left eye periorbital edema much improved.  Persistent left facial ecchymosis.  Minimal tenderness.  Neck: Ecchymosis  Chest: Ecchymosis  Extremities: Left knee wound shows no signs of infection.    Last Recorded Vitals  Blood pressure 129/61, pulse 71, temperature 36.6 °C (97.9 °F), temperature source Temporal, resp. rate 18, height 1.702 m (5' 7\"), weight 75.3 kg (166 lb), SpO2 92%.  Intake/Output last 3 Shifts:  I/O last 3 completed shifts:  In: 573.8 (7.6 mL/kg) [I.V.:573.8 (7.6 mL/kg)]  Out: - (0 mL/kg)   Weight: 75.3 kg     Relevant Results  I reviewed the CT scan T-spine report and images from yesterday.  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 No  fracture-dislocation.    Hyperinflation with peribronchial thickening and mild bronchiectasis.  Also a 5 mm right upper lobe nodule which may be inflammatory.  Recommend short-term follow-up dedicated CT of the chest.      Assessment/Plan   Principal Problem:    SDH (subdural hematoma) (Multi)  Blunt trauma with the following injuries:  1.  Subdural hematoma.  Hematoma not expanding on repeat CT head.    Neurosurgery following.  They recommend PT/OT for discharge planning.  Outpatient follow-up in 2 to 3 weeks with repeat CT head.  2.  Left knee laceration.  Sutured by ER physician.  Remove sutures in 10 to 14 days.  Patient to follow-up in my office.  3.  Left periorbital ecchymosis and edema.  Follow-up with ophthalmology.     Additional medical problems:  1.  Question of meningioma on initial CT head which was not mentioned on follow-up CT.  Evaluation per neurosurgery.  They have ordered repeat CT head in 2 to 3 " weeks.  2.  Follow-up in lung nodule clinic for right upper lobe nodule.      Sreekanth Chaudhry MD

## 2024-06-04 LAB
ATRIAL RATE: 73 BPM
DIASTOLIC BLOOD PRESSURE: 56 MMHG
P AXIS: 70 DEGREES
P OFFSET: 171 MS
P ONSET: 107 MS
PR INTERVAL: 210 MS
Q ONSET: 212 MS
QRS COUNT: 12 BEATS
QRS DURATION: 86 MS
QT INTERVAL: 416 MS
QTC CALCULATION(BAZETT): 458 MS
QTC FREDERICIA: 444 MS
R AXIS: 2 DEGREES
SYSTOLIC BLOOD PRESSURE: 116 MMHG
T AXIS: 72 DEGREES
T OFFSET: 420 MS
VENTRICULAR RATE: 73 BPM

## 2024-06-06 ENCOUNTER — OFFICE VISIT (OUTPATIENT)
Dept: PRIMARY CARE | Facility: CLINIC | Age: 79
End: 2024-06-06
Payer: MEDICARE

## 2024-06-06 VITALS
OXYGEN SATURATION: 96 % | HEART RATE: 71 BPM | TEMPERATURE: 98.4 F | DIASTOLIC BLOOD PRESSURE: 71 MMHG | WEIGHT: 143.8 LBS | SYSTOLIC BLOOD PRESSURE: 128 MMHG | RESPIRATION RATE: 18 BRPM | HEIGHT: 64 IN | BODY MASS INDEX: 24.55 KG/M2

## 2024-06-06 DIAGNOSIS — R91.1 LUNG NODULE: Primary | ICD-10-CM

## 2024-06-06 PROBLEM — D51.9 ANEMIA DUE TO VITAMIN B12 DEFICIENCY: Status: ACTIVE | Noted: 2018-12-13

## 2024-06-06 PROBLEM — N17.9 AKI (ACUTE KIDNEY INJURY) (CMS-HCC): Status: ACTIVE | Noted: 2023-09-30

## 2024-06-06 PROBLEM — E78.00 HYPERCHOLESTEROLEMIA: Status: ACTIVE | Noted: 2021-04-22

## 2024-06-06 PROBLEM — R79.89 ELEVATED TROPONIN: Status: ACTIVE | Noted: 2023-09-29

## 2024-06-06 PROBLEM — R31.9 HEMATURIA: Status: ACTIVE | Noted: 2023-09-29

## 2024-06-06 PROBLEM — N10 ACUTE PYELONEPHRITIS: Status: ACTIVE | Noted: 2023-09-30

## 2024-06-06 PROBLEM — L40.9 PSORIASIS: Status: ACTIVE | Noted: 2024-06-06

## 2024-06-06 PROBLEM — M17.0 ARTHRITIS OF BOTH KNEES: Status: ACTIVE | Noted: 2023-11-15

## 2024-06-06 PROBLEM — E87.8 ELECTROLYTE ABNORMALITY: Status: ACTIVE | Noted: 2023-09-30

## 2024-06-06 PROBLEM — I87.8 POOR VENOUS ACCESS: Status: ACTIVE | Noted: 2023-10-04

## 2024-06-06 PROBLEM — N30.90 CYSTITIS: Status: ACTIVE | Noted: 2023-10-04

## 2024-06-06 PROBLEM — M16.12 PRIMARY OSTEOARTHRITIS OF LEFT HIP: Status: ACTIVE | Noted: 2023-11-15

## 2024-06-06 PROBLEM — I48.91 A-FIB (MULTI): Status: ACTIVE | Noted: 2023-10-05

## 2024-06-06 PROCEDURE — 99212 OFFICE O/P EST SF 10 MIN: CPT | Performed by: NURSE PRACTITIONER

## 2024-06-06 RX ORDER — VIT C/E/ZN/COPPR/LUTEIN/ZEAXAN 250MG-90MG
1000 CAPSULE ORAL
COMMUNITY

## 2024-06-06 RX ORDER — LEVOCETIRIZINE DIHYDROCHLORIDE 5 MG/1
5 TABLET, FILM COATED ORAL
COMMUNITY

## 2024-06-06 RX ORDER — CLINDAMYCIN HYDROCHLORIDE 150 MG/1
4 CAPSULE ORAL
COMMUNITY

## 2024-06-06 RX ORDER — CYANOCOBALAMIN 1000 UG/ML
INJECTION, SOLUTION INTRAMUSCULAR; SUBCUTANEOUS
COMMUNITY

## 2024-06-06 RX ORDER — HYDROCHLOROTHIAZIDE 25 MG/1
25 TABLET ORAL DAILY
COMMUNITY

## 2024-06-06 RX ORDER — LOSARTAN POTASSIUM 100 MG/1
TABLET ORAL
COMMUNITY
End: 2024-06-06 | Stop reason: ALTCHOICE

## 2024-06-06 RX ORDER — MULTIVIT-MIN/IRON/FOLIC ACID/K 18-600-40
CAPSULE ORAL
COMMUNITY

## 2024-06-06 RX ORDER — VIT C/E/ZN/COPPR/LUTEIN/ZEAXAN 250MG-90MG
1 CAPSULE ORAL
COMMUNITY

## 2024-06-06 RX ORDER — LORATADINE 10 MG/1
1 TABLET ORAL DAILY
COMMUNITY
End: 2024-06-06 | Stop reason: ALTCHOICE

## 2024-06-06 RX ORDER — CARBOXYMETHYLCELLULOSE SODIUM 2.5 MG/ML
1 SOLUTION/ DROPS OPHTHALMIC 4 TIMES DAILY
COMMUNITY

## 2024-06-06 RX ORDER — NIACINAMIDE 500 MG
1 TABLET ORAL DAILY
COMMUNITY
End: 2024-06-06 | Stop reason: ALTCHOICE

## 2024-06-06 RX ORDER — CALCIUM CITRATE/VITAMIN D3 315MG-6.25
TABLET ORAL 2 TIMES DAILY
COMMUNITY
End: 2024-06-06 | Stop reason: ALTCHOICE

## 2024-06-06 RX ORDER — PREDNISOLONE ACETATE 10 MG/ML
SUSPENSION/ DROPS OPHTHALMIC
COMMUNITY
Start: 2024-04-10 | End: 2024-06-06 | Stop reason: ALTCHOICE

## 2024-06-06 RX ORDER — MOXIFLOXACIN 5 MG/ML
SOLUTION/ DROPS OPHTHALMIC
COMMUNITY
Start: 2024-04-10 | End: 2024-06-06 | Stop reason: ALTCHOICE

## 2024-06-06 SDOH — ECONOMIC STABILITY: FOOD INSECURITY: WITHIN THE PAST 12 MONTHS, YOU WORRIED THAT YOUR FOOD WOULD RUN OUT BEFORE YOU GOT MONEY TO BUY MORE.: NEVER TRUE

## 2024-06-06 SDOH — ECONOMIC STABILITY: FOOD INSECURITY: WITHIN THE PAST 12 MONTHS, THE FOOD YOU BOUGHT JUST DIDN'T LAST AND YOU DIDN'T HAVE MONEY TO GET MORE.: NEVER TRUE

## 2024-06-06 ASSESSMENT — ENCOUNTER SYMPTOMS
LOSS OF SENSATION IN FEET: 0
OCCASIONAL FEELINGS OF UNSTEADINESS: 0
DEPRESSION: 0

## 2024-06-06 ASSESSMENT — PAIN SCALES - GENERAL: PAINLEVEL: 0-NO PAIN

## 2024-06-06 NOTE — PATIENT INSTRUCTIONS
5 mm nodule in the right upper lobe.  Recommend CT chest 6-9 months.  She will be notified of results as they become available.      Lung Nodule Clinic    [facility], [address]  [place], [place] [address]  Phone [phone]  Fax [phone]  Nurse Coordinator [phone]                                          Welcome to the [facility]    Today was the initial consult with the lung nodule clinic to determine proper recommendations for follow up. Your care is coordinated to ensure timely management.  As you know, early detection of cancer is very important.  Nodules that are large, look suspicious or have changed over time is why further evaluation such as the additional imaging test that we have ordered is needed. Our clinic will work closely with you in choosing the best next step.       What is my next step?  We will assist with scheduling scans, results reviews, and referrals for priority appointments.      Who do I call?  Your care coordinator for the lung nodule clinic can be contacted at [phone]  All scheduling needs can be assisted within the Cardiac Surgery/Thoracic Surgery/Lung Nodule Clinic offices at [phone].               Table  Manuela H, Philipp DP, Jeffry PONCE, et al. Guidelines for Management of Incidental Pulmonary Nodules Detected on CT Images: From the Fleischner Society 2017. Radiology 2017;284:228-243.

## 2024-06-06 NOTE — PROGRESS NOTES
Subjective   Patient ID: Gabriella Haskins is a 78 y.o. female who presents for New Patient Visit (Gabriella has a new visit regarding a lung nodule.    Never used tobacco products. No personal history of cancer.  Sister had thyroid cancer and endometrial cancer.  Brother had prostate cancer and brain cancer. ).  HPI 78-year-old female presents today for lung nodule clinic.    Never used tobacco products. No personal history of cancer.  Sister had thyroid cancer and endometrial cancer.  Brother had prostate cancer and brain cancer.     5/30/2024 CT Thoracic spine wo IV Contrast   5 mm right upper lobe nodule.     CT abdomen and pelvis 3/13/2020  No acute abnormality of the lung bases.     Review of Systems  Review of systems: Present-feeling well. Not present-chills, fatigue and fever.  Respiratory: Not present-difficulty breathing, cough, bloody sputum.  Cardiovascular: Not present-chest pain, palpitations, dyspnea on exertion.  Objective   Blood pressure 128/71, pulse 71, respirations 18, temperature 36.9, oxygen saturation 96% on room air.  See vital signs per rooming.    General: Mental status-alert. General appearance-cooperative, well groomed and consistent with stated age. Not in acute distress. Orientation-oriented to time, place, purpose and person. Build and nutrition-well-nourished and well-developed. Hydration-well-hydrated.    Integumentary: Status post trauma.  Patient with bruising to left cheek and across to right chest wall.  Various stages of healing.  Injected left eye.      Head and neck: Neck-full range of motion and supple. No lymphadenopathy, no palpable masses, and no nuchal rigidity.    Chest and lung exam: Auscultation: Breath sounds: normal. No adventitious lung sounds.    Cardiovascular: Auscultation-rhythm-regular.  Heart sounds-normal heart sounds S1 and S2. No murmurs or gallops appreciated. No carotid bruits bilaterally.  Assessment/Plan   Diagnoses and all orders for this visit:  Lung  nodule  -     Referral to Lung Nodule Center  -     CT chest wo IV contrast; Future

## 2024-06-12 NOTE — PROGRESS NOTES
History Of Present Illness  HPI    Gabriella Haskins is a 78 y.o. female who fell from a standing position while in her kitchen on May 29, 2024, striking her face and left knee on the floor.  She denied loss of consciousness.  She came to the emergency room and was diagnosed with a subdural hematoma.  She was admitted to a critical care unit.  She was found to have blunt trauma with the following injuries:  1.  Subdural hematoma.  Hematoma not expanding on repeat CT head.    Neurosurgery evaluated her and recommended outpatient follow-up in 2 to 3 weeks with repeat CT head.  2.  Left knee laceration.  Sutured by ER physician.  3.  Left periorbital ecchymosis and edema for which she was told to follow-up with ophthalmology.  Additional findings and medical problems included:  1.  Question of meningioma on initial CT head which was not mentioned on follow-up CT.  Further evaluation per neurosurgery.  2.  Lung nodule on CT chest.  She was told to follow-up in lung nodule clinic for right upper lobe nodule.    She has no complaints.  She has some mild itching of the left knee where her sutures are located.  She saw her ophthalmologist and has no issue with her eyes.  She has a follow-up CT head scheduled for June 18, 2024 and has a subsequent appointment with neurosurgery for evaluation.  She saw Tatianna Jimenez CNP regarding the lung nodule.    Past medical history:  Atrial fibrillation status post cardioversion, not on anticoagulation  Hypertension  Hypothyroid  Gout  Thyroidectomy for benign disease  Hysterectomy for benign disease           Past Medical History  She has a past medical history of Arthritis, History of transfusion, and Lung nodule.    Surgical History  She has a past surgical history that includes Shoulder surgery.     Allergies  Azithromycin, Codeine, Conjugated estrogens, Erythromycin, Lisinopril, Cephalexin, Doxycycline, Indomethacin, Bactrim [sulfamethoxazole-trimethoprim], and Amoxicillin    Social  "History  She reports that she has never smoked. She has never used smokeless tobacco. She reports that she does not drink alcohol and does not use drugs.    Family History  No family history on file.    Last Recorded Vitals  Blood pressure 145/76, pulse 63, temperature 36.2 °C (97.2 °F), temperature source Temporal, resp. rate 14, height 1.626 m (5' 4\"), weight 67.6 kg (149 lb), SpO2 93%.    Physical Exam  Constitutional: Well-developed, well-nourished, alert and oriented, no acute distress  Skin: Warm and dry, no lesions, no rashes, no jaundice  HEENT: Normocephalic, EOMI, no scleral icterus, external inspection of ears and nose is normal, mucous membranes moist.  Ecchymosis of the face has improved.  Periorbital edema is much improved.  Nontender.  Neck: Soft, nontender, no mass or adenopathy  Cardiac: Regular rate and rhythm, no murmur  Chest: Patent airway, clear to auscultation, normal breath sounds with good chest expansion, no wheezes or rales or rhonchi noted, thorax symmetric  Abdomen: Nondistended, positive bowel sounds, soft, nontender, no mass  Rectal: Not performed  Extremities: Left knee laceration shows no signs of infection.  Wound has healed.  Sutures removed.  Lymphatic: No cervical adenopathy  Musculoskeletal: Range of motion intact, no joint swelling, normal strength  Neurological: Alert and oriented x3, intact sensory and motor function, no obvious focal neurologic abnormalities, normal gait  Psychological: Appropriate mood and behavior  Examination chaperoned by Lesley Mercedes    Assessment/Plan   Diagnoses and all orders for this visit:  Blunt trauma  Laceration of left knee, subsequent encounter    1.  Subdural hematoma.  Hematoma not expanding on repeat CT head in hospital.    Neurosurgery evaluated her and recommended outpatient follow-up in 2 to 3 weeks with repeat CT head.  Question of meningioma on initial CT head which was not mentioned on follow-up CT.  She has an appointment with Jaylin" ARLINE Hathaway, from neurosurgery and CT head scheduled.  2.  Left knee laceration.  Sutures removed.  Healing well.  3.  Left periorbital ecchymosis and edema for which she followed up with ophthalmology.  4.  Lung nodule on CT chest.  She saw Tatianna NUNN who ordered a follow-up CT chest.    Follow-up with me as needed.    Sreekanth Chaudhry MD

## 2024-06-14 ENCOUNTER — APPOINTMENT (OUTPATIENT)
Dept: SURGERY | Facility: CLINIC | Age: 79
End: 2024-06-14
Payer: MEDICARE

## 2024-06-14 VITALS
OXYGEN SATURATION: 93 % | SYSTOLIC BLOOD PRESSURE: 145 MMHG | BODY MASS INDEX: 25.44 KG/M2 | HEIGHT: 64 IN | WEIGHT: 149 LBS | HEART RATE: 63 BPM | TEMPERATURE: 97.2 F | RESPIRATION RATE: 14 BRPM | DIASTOLIC BLOOD PRESSURE: 76 MMHG

## 2024-06-14 DIAGNOSIS — S81.012D LACERATION OF LEFT KNEE, SUBSEQUENT ENCOUNTER: ICD-10-CM

## 2024-06-14 DIAGNOSIS — T14.90XA BLUNT TRAUMA: Primary | ICD-10-CM

## 2024-06-14 PROBLEM — S81.012A LACERATION OF LEFT KNEE: Status: ACTIVE | Noted: 2024-06-14

## 2024-06-14 PROCEDURE — 1036F TOBACCO NON-USER: CPT | Performed by: SURGERY

## 2024-06-14 PROCEDURE — 3077F SYST BP >= 140 MM HG: CPT | Performed by: SURGERY

## 2024-06-14 PROCEDURE — 1157F ADVNC CARE PLAN IN RCRD: CPT | Performed by: SURGERY

## 2024-06-14 PROCEDURE — 1159F MED LIST DOCD IN RCRD: CPT | Performed by: SURGERY

## 2024-06-14 PROCEDURE — 3078F DIAST BP <80 MM HG: CPT | Performed by: SURGERY

## 2024-06-14 PROCEDURE — 99214 OFFICE O/P EST MOD 30 MIN: CPT | Performed by: SURGERY

## 2024-06-14 PROCEDURE — 1111F DSCHRG MED/CURRENT MED MERGE: CPT | Performed by: SURGERY

## 2024-06-14 NOTE — LETTER
June 14, 2024     Eric Hodge MD  [address]  [facility] [address]    Patient: Gabriella Haskins   YOB: 1945   Date of Visit: 6/14/2024       Dear Dr. Eric Hodge MD:    Thank you for referring Gabriella Haskins to me for evaluation. Below are my notes for this consultation.  If you have questions, please do not hesitate to call me. I look forward to following your patient along with you.       Sincerely,     Sreekanth Chaudhry MD      CC: No Recipients  ______________________________________________________________________________________    History Of Present Illness  HPI    Gabriella Haskins is a 78 y.o. female who fell from a standing position while in her kitchen on May 29, 2024, striking her face and left knee on the floor.  She denied loss of consciousness.  She came to the emergency room and was diagnosed with a subdural hematoma.  She was admitted to a critical care unit.  She was found to have blunt trauma with the following injuries:  1.  Subdural hematoma.  Hematoma not expanding on repeat CT head.    Neurosurgery evaluated her and recommended outpatient follow-up in 2 to 3 weeks with repeat CT head.  2.  Left knee laceration.  Sutured by ER physician.  3.  Left periorbital ecchymosis and edema for which she was told to follow-up with ophthalmology.  Additional findings and medical problems included:  1.  Question of meningioma on initial CT head which was not mentioned on follow-up CT.  Further evaluation per neurosurgery.  2.  Lung nodule on CT chest.  She was told to follow-up in lung nodule clinic for right upper lobe nodule.    She has no complaints.  She has some mild itching of the left knee where her sutures are located.  She saw her ophthalmologist and has no issue with her eyes.  She has a follow-up CT head scheduled for June 18, 2024 and has a subsequent appointment with neurosurgery for evaluation.  She saw Tatianna Jimenez CNP regarding the lung  "nodule.    Past medical history:  Atrial fibrillation status post cardioversion, not on anticoagulation  Hypertension  Hypothyroid  Gout  Thyroidectomy for benign disease  Hysterectomy for benign disease           Past Medical History  She has a past medical history of Arthritis, History of transfusion, and Lung nodule.    Surgical History  She has a past surgical history that includes Shoulder surgery.     Allergies  Azithromycin, Codeine, Conjugated estrogens, Erythromycin, Lisinopril, Cephalexin, Doxycycline, Indomethacin, Bactrim [sulfamethoxazole-trimethoprim], and Amoxicillin    Social History  She reports that she has never smoked. She has never used smokeless tobacco. She reports that she does not drink alcohol and does not use drugs.    Family History  No family history on file.    Last Recorded Vitals  Blood pressure 145/76, pulse 63, temperature 36.2 °C (97.2 °F), temperature source Temporal, resp. rate 14, height 1.626 m (5' 4\"), weight 67.6 kg (149 lb), SpO2 93%.    Physical Exam  Constitutional: Well-developed, well-nourished, alert and oriented, no acute distress  Skin: Warm and dry, no lesions, no rashes, no jaundice  HEENT: Normocephalic, EOMI, no scleral icterus, external inspection of ears and nose is normal, mucous membranes moist.  Ecchymosis of the face has improved.  Periorbital edema is much improved.  Nontender.  Neck: Soft, nontender, no mass or adenopathy  Cardiac: Regular rate and rhythm, no murmur  Chest: Patent airway, clear to auscultation, normal breath sounds with good chest expansion, no wheezes or rales or rhonchi noted, thorax symmetric  Abdomen: Nondistended, positive bowel sounds, soft, nontender, no mass  Rectal: Not performed  Extremities: Left knee laceration shows no signs of infection.  Wound has healed.  Sutures removed.  Lymphatic: No cervical adenopathy  Musculoskeletal: Range of motion intact, no joint swelling, normal strength  Neurological: Alert and oriented x3, " intact sensory and motor function, no obvious focal neurologic abnormalities, normal gait  Psychological: Appropriate mood and behavior  Examination chaperoned by Lesley Mercedes    Assessment/Plan  Diagnoses and all orders for this visit:  Blunt trauma  Laceration of left knee, subsequent encounter    1.  Subdural hematoma.  Hematoma not expanding on repeat CT head in hospital.    Neurosurgery evaluated her and recommended outpatient follow-up in 2 to 3 weeks with repeat CT head.  Question of meningioma on initial CT head which was not mentioned on follow-up CT.  She has an appointment with ARLINE Acuna, from neurosurgery and CT head scheduled.  2.  Left knee laceration.  Sutures removed.  Healing well.  3.  Left periorbital ecchymosis and edema for which she followed up with ophthalmology.  4.  Lung nodule on CT chest.  She saw Tatianna NUNN who ordered a follow-up CT chest.    Follow-up with me as needed.    Sreekanth Chaudhry MD

## 2024-06-18 ENCOUNTER — HOSPITAL ENCOUNTER (OUTPATIENT)
Dept: RADIOLOGY | Facility: HOSPITAL | Age: 79
Discharge: HOME | End: 2024-06-18
Payer: MEDICARE

## 2024-06-18 DIAGNOSIS — S06.5XAA SDH (SUBDURAL HEMATOMA) (MULTI): ICD-10-CM

## 2024-06-18 PROCEDURE — 70450 CT HEAD/BRAIN W/O DYE: CPT

## 2024-06-18 PROCEDURE — 70450 CT HEAD/BRAIN W/O DYE: CPT | Performed by: RADIOLOGY

## 2024-07-01 ENCOUNTER — APPOINTMENT (OUTPATIENT)
Dept: NEUROSURGERY | Facility: CLINIC | Age: 79
End: 2024-07-01
Payer: MEDICARE

## 2024-07-05 ENCOUNTER — OFFICE VISIT (OUTPATIENT)
Dept: NEUROSURGERY | Facility: CLINIC | Age: 79
End: 2024-07-05
Payer: MEDICARE

## 2024-07-05 VITALS
SYSTOLIC BLOOD PRESSURE: 130 MMHG | DIASTOLIC BLOOD PRESSURE: 72 MMHG | WEIGHT: 144 LBS | BODY MASS INDEX: 24.59 KG/M2 | HEART RATE: 76 BPM | HEIGHT: 64 IN

## 2024-07-05 DIAGNOSIS — S06.5XAA SDH (SUBDURAL HEMATOMA) (MULTI): Primary | ICD-10-CM

## 2024-07-05 PROCEDURE — 3078F DIAST BP <80 MM HG: CPT | Performed by: NURSE PRACTITIONER

## 2024-07-05 PROCEDURE — 1036F TOBACCO NON-USER: CPT | Performed by: NURSE PRACTITIONER

## 2024-07-05 PROCEDURE — 1157F ADVNC CARE PLAN IN RCRD: CPT | Performed by: NURSE PRACTITIONER

## 2024-07-05 PROCEDURE — 99212 OFFICE O/P EST SF 10 MIN: CPT | Performed by: NURSE PRACTITIONER

## 2024-07-05 PROCEDURE — 1159F MED LIST DOCD IN RCRD: CPT | Performed by: NURSE PRACTITIONER

## 2024-07-05 PROCEDURE — 3075F SYST BP GE 130 - 139MM HG: CPT | Performed by: NURSE PRACTITIONER

## 2024-07-05 PROCEDURE — 1160F RVW MEDS BY RX/DR IN RCRD: CPT | Performed by: NURSE PRACTITIONER

## 2024-07-05 PROCEDURE — 1125F AMNT PAIN NOTED PAIN PRSNT: CPT | Performed by: NURSE PRACTITIONER

## 2024-07-05 RX ORDER — BISMUTH SUBSALICYLATE 262 MG
1 TABLET,CHEWABLE ORAL DAILY
COMMUNITY

## 2024-07-05 ASSESSMENT — PATIENT HEALTH QUESTIONNAIRE - PHQ9
1. LITTLE INTEREST OR PLEASURE IN DOING THINGS: NOT AT ALL
SUM OF ALL RESPONSES TO PHQ9 QUESTIONS 1 & 2: 0
2. FEELING DOWN, DEPRESSED OR HOPELESS: NOT AT ALL
SUM OF ALL RESPONSES TO PHQ9 QUESTIONS 1 AND 2: 0
1. LITTLE INTEREST OR PLEASURE IN DOING THINGS: NOT AT ALL
2. FEELING DOWN, DEPRESSED OR HOPELESS: NOT AT ALL

## 2024-07-05 ASSESSMENT — PAIN SCALES - GENERAL: PAINLEVEL: 2

## 2024-07-05 NOTE — PROGRESS NOTES
"Gabriella Haskins is here today, with her , in hospital follow up for SDH and to review images.     To review, she was initially evaluated on 05/30/2024, after falling forward at home while walking in her kitchen. CT Head imaging x 2 demonstrated right frontal and anterior falx SDH. CT C Spine images reviewed with note of degenerative changes, no acute findings. She reported baseline headache. On exam, she had no neuro deficits. Orders were written for CT Head prior to follow up appointment.    Today, she reports feeling well. Both she and her  feel she is back to baseline. She had imaging completed and is here to review findings.    CT HEAD on 06/18/2024:  IMPRESSION:  1. Previously demonstrated subdural hemorrhage is no longer  identified with certainty. No new acute intracranial hemorrhage is  identified.  2. Persistent left premaxillary soft tissue swelling/hematoma with  improvement in left periorbital soft tissue swelling and hematoma.  Signed by: Destiney Montgomery      TREATMENTS:  Observation    SMOKER: No  ANTICOAGULANT USE: No    ROS x 10 is, otherwise, negative unless documented in HPI above    /72 (BP Location: Left arm, Patient Position: Sitting, BP Cuff Size: Large adult long)   Pulse 76   Ht 1.626 m (5' 4\")   Wt 65.3 kg (144 lb)   BMI 24.72 kg/m²     On Exam: Appears comfortable  A&O x 4, speech clear / fluent  PERRL, EOMI, Tongue is midline, palate symmetric  No pronator drift  Respirations even / unlabored  Abdomen without distension  Gait is steady    Gabriella Haskins has resolved SDH. We reviewed recent CT images and compared to hospital CT images. We discussed resolution of SDH. She verbalizes understanding and agreement to follow up PRN.          "

## 2024-08-09 ENCOUNTER — HOSPITAL ENCOUNTER (EMERGENCY)
Facility: HOSPITAL | Age: 79
Discharge: HOME | End: 2024-08-10
Attending: STUDENT IN AN ORGANIZED HEALTH CARE EDUCATION/TRAINING PROGRAM
Payer: MEDICARE

## 2024-08-09 ENCOUNTER — APPOINTMENT (OUTPATIENT)
Dept: RADIOLOGY | Facility: HOSPITAL | Age: 79
End: 2024-08-09
Payer: MEDICARE

## 2024-08-09 VITALS
HEART RATE: 71 BPM | RESPIRATION RATE: 16 BRPM | WEIGHT: 145 LBS | OXYGEN SATURATION: 96 % | DIASTOLIC BLOOD PRESSURE: 86 MMHG | SYSTOLIC BLOOD PRESSURE: 198 MMHG | TEMPERATURE: 97.3 F | HEIGHT: 65 IN | BODY MASS INDEX: 24.16 KG/M2

## 2024-08-09 DIAGNOSIS — M79.675 PAIN OF TOE OF LEFT FOOT: Primary | ICD-10-CM

## 2024-08-09 PROCEDURE — 73630 X-RAY EXAM OF FOOT: CPT | Mod: LEFT SIDE | Performed by: RADIOLOGY

## 2024-08-09 PROCEDURE — 73630 X-RAY EXAM OF FOOT: CPT | Mod: LT

## 2024-08-09 PROCEDURE — 99283 EMERGENCY DEPT VISIT LOW MDM: CPT

## 2024-08-09 ASSESSMENT — COLUMBIA-SUICIDE SEVERITY RATING SCALE - C-SSRS
6. HAVE YOU EVER DONE ANYTHING, STARTED TO DO ANYTHING, OR PREPARED TO DO ANYTHING TO END YOUR LIFE?: NO
1. IN THE PAST MONTH, HAVE YOU WISHED YOU WERE DEAD OR WISHED YOU COULD GO TO SLEEP AND NOT WAKE UP?: NO
2. HAVE YOU ACTUALLY HAD ANY THOUGHTS OF KILLING YOURSELF?: NO

## 2024-08-09 ASSESSMENT — LIFESTYLE VARIABLES
TOTAL SCORE: 0
EVER FELT BAD OR GUILTY ABOUT YOUR DRINKING: NO
EVER HAD A DRINK FIRST THING IN THE MORNING TO STEADY YOUR NERVES TO GET RID OF A HANGOVER: NO
HAVE YOU EVER FELT YOU SHOULD CUT DOWN ON YOUR DRINKING: NO
HAVE PEOPLE ANNOYED YOU BY CRITICIZING YOUR DRINKING: NO

## 2024-08-09 ASSESSMENT — PAIN DESCRIPTION - PAIN TYPE: TYPE: ACUTE PAIN

## 2024-08-09 ASSESSMENT — PAIN SCALES - GENERAL: PAINLEVEL_OUTOF10: 8

## 2024-08-09 ASSESSMENT — PAIN DESCRIPTION - LOCATION: LOCATION: FOOT

## 2024-08-09 ASSESSMENT — PAIN - FUNCTIONAL ASSESSMENT: PAIN_FUNCTIONAL_ASSESSMENT: 0-10

## 2024-08-09 ASSESSMENT — PAIN DESCRIPTION - ORIENTATION: ORIENTATION: LEFT

## 2024-08-10 RX ORDER — ACETAMINOPHEN 325 MG/1
975 TABLET ORAL ONCE
Status: DISCONTINUED | OUTPATIENT
Start: 2024-08-10 | End: 2024-08-10 | Stop reason: HOSPADM

## 2024-08-10 NOTE — ED TRIAGE NOTES
80 Y/O FEMALE COMPLAINS OF NON-TRAUMATIC LEFT GREAT TOE PAIN. PATIENT STATES SHE BELIEVES IT IS BEING CAUSED BY CIPROFLOXACIN SHE HAS BEEN TAKING.

## 2024-08-10 NOTE — DISCHARGE INSTRUCTIONS
Your blood pressure was elevated while you were in the emergency department.  This may be related to your discomfort.  You should follow-up in regards to your elevated blood pressure with your primary care physician.  Hypertension left untreated could result in heart disease.  If you continue to have discomfort in your left foot you should follow-up with the assigned podiatrist for further evaluation

## 2024-08-10 NOTE — ED PROVIDER NOTES
HPI   Chief Complaint   Patient presents with    Toe Pain    Medication Reaction       79-year-old female presents complaining of atraumatic left great toe pain.  The patient states that she suspects it may be from Cipro.  She reports that she is currently on Cipro after having a cystoscopy.  She denies any recent trauma or fall.  She states full range of motion throughout the left lower extremity and denies any weakness or paresthesias.  She reports that the preponderance is in the left MTP.  She does report a past history of gout however she has not had a gout flare in many years.  Denies any overt redness or warmth.  No reports of fevers              Patient History   Past Medical History:   Diagnosis Date    Arthritis     History of transfusion     Lung nodule      Past Surgical History:   Procedure Laterality Date    SHOULDER SURGERY      TITANIUM IMPLANT     No family history on file.  Social History     Tobacco Use    Smoking status: Never    Smokeless tobacco: Never   Substance Use Topics    Alcohol use: Not Currently    Drug use: Never       Physical Exam   ED Triage Vitals [08/09/24 2230]   Temperature Heart Rate Respirations BP   36.3 °C (97.3 °F) 71 16 (!) 198/86      Pulse Ox Temp Source Heart Rate Source Patient Position   96 % Temporal Monitor Sitting      BP Location FiO2 (%)     Right arm --       Physical Exam  Vitals and nursing note reviewed.   Constitutional:       General: She is not in acute distress.     Appearance: Normal appearance. She is normal weight. She is not ill-appearing, toxic-appearing or diaphoretic.   HENT:      Head: Normocephalic.   Cardiovascular:      Rate and Rhythm: Normal rate.      Pulses: Normal pulses.   Pulmonary:      Effort: Pulmonary effort is normal.   Musculoskeletal:         General: Normal range of motion.      Comments: Patient has full range of motion throughout the left lower extremity and is neurovascularly intact throughout.  Patient has a negative left  Johnson test.  She has soft compartments brisk cap refill and easily palpable distal pulses.  There is some tenderness on palpation without obvious deformity to the first MTP of the left foot.   Skin:     General: Skin is warm and dry.      Capillary Refill: Capillary refill takes less than 2 seconds.   Neurological:      General: No focal deficit present.      Mental Status: She is alert and oriented to person, place, and time.   Psychiatric:         Mood and Affect: Mood normal.         Behavior: Behavior normal.         Thought Content: Thought content normal.         Judgment: Judgment normal.           ED Course & MDM   ED Course as of 08/10/24 0120   Sat Aug 10, 2024   0117 IMPRESSION:  1.Degenerative changes first MTP joint.  2.Subtle Increased sclerosis throughout the second metatarsal..   This is nonspecific.  3.Old fracture base of the proximal phalanx fifth toe and  possibly fourth.  Prominent plantar calcaneal spur.   [DS]      ED Course User Index  [DS] Nikita Banda PA-C         Diagnoses as of 08/10/24 0120   Pain of toe of left foot                 No data recorded     [place] Coma Scale Score: 15 (08/09/24 2231 : Stewart Crandall RN)                           Medical Decision Making  Imaging studies were obtained.  Patient was notified of the findings.  Explained the possibility of a tendon injury on Cipro.  I have low suspicion that this is related to the patient's discomfort.  Explained possibility of gout.  Recommended elevation ice therapy and Tylenol as needed for pain.  Patient will be instructed to follow-up with the assigned podiatrist for further evaluation.        Procedure  Procedures     Nikita Banda PA-C  08/10/24 0120

## 2024-12-06 ENCOUNTER — HOSPITAL ENCOUNTER (OUTPATIENT)
Dept: RADIOLOGY | Facility: CLINIC | Age: 79
Discharge: HOME | End: 2024-12-06
Payer: MEDICARE

## 2024-12-06 DIAGNOSIS — R91.1 LUNG NODULE: ICD-10-CM

## 2024-12-06 PROCEDURE — 71250 CT THORAX DX C-: CPT

## 2024-12-10 ENCOUNTER — OFFICE VISIT (OUTPATIENT)
Dept: PRIMARY CARE | Facility: CLINIC | Age: 79
End: 2024-12-10
Payer: MEDICARE

## 2024-12-10 VITALS
OXYGEN SATURATION: 96 % | DIASTOLIC BLOOD PRESSURE: 77 MMHG | SYSTOLIC BLOOD PRESSURE: 120 MMHG | TEMPERATURE: 97.9 F | HEART RATE: 63 BPM

## 2024-12-10 DIAGNOSIS — R91.1 LUNG NODULE: Primary | ICD-10-CM

## 2024-12-10 DIAGNOSIS — R09.89 HYPERINFLATION OF LUNGS: ICD-10-CM

## 2024-12-10 PROCEDURE — 99212 OFFICE O/P EST SF 10 MIN: CPT | Performed by: NURSE PRACTITIONER

## 2024-12-10 PROCEDURE — 1160F RVW MEDS BY RX/DR IN RCRD: CPT | Performed by: NURSE PRACTITIONER

## 2024-12-10 PROCEDURE — 1159F MED LIST DOCD IN RCRD: CPT | Performed by: NURSE PRACTITIONER

## 2024-12-10 PROCEDURE — 3078F DIAST BP <80 MM HG: CPT | Performed by: NURSE PRACTITIONER

## 2024-12-10 PROCEDURE — 1157F ADVNC CARE PLAN IN RCRD: CPT | Performed by: NURSE PRACTITIONER

## 2024-12-10 PROCEDURE — 1036F TOBACCO NON-USER: CPT | Performed by: NURSE PRACTITIONER

## 2024-12-10 PROCEDURE — 1126F AMNT PAIN NOTED NONE PRSNT: CPT | Performed by: NURSE PRACTITIONER

## 2024-12-10 PROCEDURE — 3074F SYST BP LT 130 MM HG: CPT | Performed by: NURSE PRACTITIONER

## 2024-12-10 ASSESSMENT — ENCOUNTER SYMPTOMS
LOSS OF SENSATION IN FEET: 0
DEPRESSION: 0
OCCASIONAL FEELINGS OF UNSTEADINESS: 0

## 2024-12-10 ASSESSMENT — PAIN SCALES - GENERAL: PAINLEVEL_OUTOF10: 0-NO PAIN

## 2024-12-10 NOTE — PROGRESS NOTES
Subjective   Patient ID: Gabirella Haskins is a 79 y.o. female who presents for Follow-up (Gabriella Haskins has a follow up visit with a CT scan prior./).  HPI 79-year-old female presents today for lung nodule clinic.     Never used tobacco products. No personal history of cancer.  Sister had thyroid cancer and endometrial cancer.  Brother had prostate cancer and brain cancer.      CT chest dated 12/6/2024  Lungs are hyperinflated. Biapical scarring. Mild peribronchial  thickening. Area of nodularity in the left upper lobe anteromedially  likely peribronchial thickening and inflammatory in nature. Subtle  reticulonodular infiltrate in the right upper lobe. No pleural  effusions.    5/30/2024 CT Thoracic spine wo IV Contrast   5 mm right upper lobe nodule.      CT abdomen and pelvis 3/13/2020  No acute abnormality of the lung bases.     Review of Systems  Review of systems: Present-feeling well. Not present-chills, fatigue and fever.  Respiratory: Not present-difficulty breathing, cough, bloody sputum.  Cardiovascular: Not present-chest pain, palpitations, dyspnea on exertion.  Objective   /77   Pulse 63   Temp 36.6 °C (97.9 °F)   LMP  (LMP Unknown)   SpO2 96%      Physical Exam  Gen.: Mental status-alert. Gen. appearance-cooperative, well groomed and consistent with stated age. Not in acute distress or sickly. Orientation-oriented to time, place, purpose and person. Build and nutrition-well-nourished and well-developed. Hydration-well-hydrated.    Integumentary:  Color-normal coloration skin. Skin moisture-normal skin moisture.    Chest and lung exam: Auscultation-normal breath sounds, no adventitious lung sounds and normal vocal resonance. Chest wall is normal in shape and non-tender.    Cardiovascular: Auscultation: Regular rate and rhythm. Heart sounds-normal heart sounds, S1-S2. No murmurs appreciated.   Assessment/Plan

## 2024-12-11 ENCOUNTER — TELEPHONE (OUTPATIENT)
Dept: PRIMARY CARE | Facility: CLINIC | Age: 79
End: 2024-12-11
Payer: MEDICARE

## 2024-12-11 NOTE — TELEPHONE ENCOUNTER
Patient LVM stating she has seen provider in Lung Nodule. The provider suggest her to call her PCP Dr Eric Hodge suggesting to get some medication for congestion. Patient stated she did speak with her provider Dr Hodge, he did not think she needs anything, however, he asked the patient to tell the provider to call him with further explanation. Dr Hodge number is [phone]

## 2024-12-13 NOTE — PATIENT INSTRUCTIONS
5 mm right upper lobe nodule noted on CT thoracic spine 5/30/2024.  Bronchial thickening and subtle reticular nodular infiltrate in the right upper lobe.  Patient will follow-up with PCP.    Recommend CT chest in 2-3 months.    She will be notified of results as they become available.   Xerosis Normal Treatment: I recommended application of Cetaphil or CeraVe numerous times a day and before going to bed to all dry areas.

## 2025-01-13 ENCOUNTER — APPOINTMENT (OUTPATIENT)
Dept: CARDIOLOGY | Facility: HOSPITAL | Age: 80
DRG: 522 | End: 2025-01-13
Payer: MEDICARE

## 2025-01-13 ENCOUNTER — APPOINTMENT (OUTPATIENT)
Dept: RADIOLOGY | Facility: HOSPITAL | Age: 80
DRG: 522 | End: 2025-01-13
Payer: MEDICARE

## 2025-01-13 ENCOUNTER — HOSPITAL ENCOUNTER (INPATIENT)
Facility: HOSPITAL | Age: 80
LOS: 4 days | Discharge: ANOTHER HOSPITAL | DRG: 522 | End: 2025-01-17
Attending: STUDENT IN AN ORGANIZED HEALTH CARE EDUCATION/TRAINING PROGRAM | Admitting: INTERNAL MEDICINE
Payer: MEDICARE

## 2025-01-13 DIAGNOSIS — I48.91 ATRIAL FIB/FLUTTER, TRANSIENT (MULTI): ICD-10-CM
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
## 2025-01-13 DIAGNOSIS — I48.92 ATRIAL FIB/FLUTTER, TRANSIENT (MULTI): ICD-10-CM

## 2025-01-13 DIAGNOSIS — S72.002A CLOSED FRACTURE OF NECK OF LEFT FEMUR, INITIAL ENCOUNTER: ICD-10-CM

## 2025-01-13 DIAGNOSIS — E03.9 HYPOTHYROIDISM, UNSPECIFIED TYPE: ICD-10-CM

## 2025-01-13 LAB
ABO GROUP (TYPE) IN BLOOD: NORMAL
ALBUMIN SERPL BCP-MCNC: 3.8 G/DL (ref 3.4–5)
ALP SERPL-CCNC: 50 U/L (ref 33–136)
ALT SERPL W P-5'-P-CCNC: 8 U/L (ref 7–45)
ANION GAP SERPL CALC-SCNC: 11 MMOL/L (ref 10–20)
ANTIBODY SCREEN: NORMAL
APTT PPP: 29 SECONDS (ref 27–38)
AST SERPL W P-5'-P-CCNC: 15 U/L (ref 9–39)
BASOPHILS # BLD AUTO: 0.05 X10*3/UL (ref 0–0.1)
BASOPHILS NFR BLD AUTO: 0.4 %
BILIRUB SERPL-MCNC: 0.8 MG/DL (ref 0–1.2)
BUN SERPL-MCNC: 16 MG/DL (ref 6–23)
CALCIUM SERPL-MCNC: 9.6 MG/DL (ref 8.6–10.3)
CHLORIDE SERPL-SCNC: 101 MMOL/L (ref 98–107)
CO2 SERPL-SCNC: 31 MMOL/L (ref 21–32)
CREAT SERPL-MCNC: 0.72 MG/DL (ref 0.5–1.05)
EGFRCR SERPLBLD CKD-EPI 2021: 86 ML/MIN/1.73M*2
EOSINOPHIL # BLD AUTO: 0.05 X10*3/UL (ref 0–0.4)
EOSINOPHIL NFR BLD AUTO: 0.4 %
ERYTHROCYTE [DISTWIDTH] IN BLOOD BY AUTOMATED COUNT: 13 % (ref 11.5–14.5)
GLUCOSE SERPL-MCNC: 95 MG/DL (ref 74–99)
HCT VFR BLD AUTO: 40.6 % (ref 36–46)
HGB BLD-MCNC: 13.2 G/DL (ref 12–16)
IMM GRANULOCYTES # BLD AUTO: 0.05 X10*3/UL (ref 0–0.5)
IMM GRANULOCYTES NFR BLD AUTO: 0.4 % (ref 0–0.9)
INR PPP: 1.1 (ref 0.9–1.1)
LYMPHOCYTES # BLD AUTO: 1.05 X10*3/UL (ref 0.8–3)
LYMPHOCYTES NFR BLD AUTO: 8.6 %
MCH RBC QN AUTO: 27.7 PG (ref 26–34)
MCHC RBC AUTO-ENTMCNC: 32.5 G/DL (ref 32–36)
MCV RBC AUTO: 85 FL (ref 80–100)
MONOCYTES # BLD AUTO: 0.65 X10*3/UL (ref 0.05–0.8)
MONOCYTES NFR BLD AUTO: 5.3 %
NEUTROPHILS # BLD AUTO: 10.42 X10*3/UL (ref 1.6–5.5)
NEUTROPHILS NFR BLD AUTO: 84.9 %
NRBC BLD-RTO: 0 /100 WBCS (ref 0–0)
PLATELET # BLD AUTO: 207 X10*3/UL (ref 150–450)
POTASSIUM SERPL-SCNC: 3.3 MMOL/L (ref 3.5–5.3)
PROT SERPL-MCNC: 6.7 G/DL (ref 6.4–8.2)
PROTHROMBIN TIME: 12.4 SECONDS (ref 9.8–12.8)
RBC # BLD AUTO: 4.76 X10*6/UL (ref 4–5.2)
RH FACTOR (ANTIGEN D): NORMAL
SODIUM SERPL-SCNC: 140 MMOL/L (ref 136–145)
WBC # BLD AUTO: 12.3 X10*3/UL (ref 4.4–11.3)

## 2025-01-13 PROCEDURE — 2500000004 HC RX 250 GENERAL PHARMACY W/ HCPCS (ALT 636 FOR OP/ED): Performed by: INTERNAL MEDICINE

## 2025-01-13 PROCEDURE — 70450 CT HEAD/BRAIN W/O DYE: CPT | Performed by: STUDENT IN AN ORGANIZED HEALTH CARE EDUCATION/TRAINING PROGRAM

## 2025-01-13 PROCEDURE — 1210000001 HC SEMI-PRIVATE ROOM DAILY

## 2025-01-13 PROCEDURE — 2500000002 HC RX 250 W HCPCS SELF ADMINISTERED DRUGS (ALT 637 FOR MEDICARE OP, ALT 636 FOR OP/ED)

## 2025-01-13 PROCEDURE — 99223 1ST HOSP IP/OBS HIGH 75: CPT | Performed by: INTERNAL MEDICINE

## 2025-01-13 PROCEDURE — 72170 X-RAY EXAM OF PELVIS: CPT | Performed by: RADIOLOGY

## 2025-01-13 PROCEDURE — 72125 CT NECK SPINE W/O DYE: CPT

## 2025-01-13 PROCEDURE — 2500000001 HC RX 250 WO HCPCS SELF ADMINISTERED DRUGS (ALT 637 FOR MEDICARE OP)

## 2025-01-13 PROCEDURE — 36415 COLL VENOUS BLD VENIPUNCTURE: CPT

## 2025-01-13 PROCEDURE — 99285 EMERGENCY DEPT VISIT HI MDM: CPT | Mod: 25 | Performed by: STUDENT IN AN ORGANIZED HEALTH CARE EDUCATION/TRAINING PROGRAM

## 2025-01-13 PROCEDURE — 73560 X-RAY EXAM OF KNEE 1 OR 2: CPT | Mod: LT

## 2025-01-13 PROCEDURE — 2500000004 HC RX 250 GENERAL PHARMACY W/ HCPCS (ALT 636 FOR OP/ED)

## 2025-01-13 PROCEDURE — 73560 X-RAY EXAM OF KNEE 1 OR 2: CPT | Mod: LEFT SIDE | Performed by: RADIOLOGY

## 2025-01-13 PROCEDURE — 93005 ELECTROCARDIOGRAM TRACING: CPT

## 2025-01-13 PROCEDURE — 72170 X-RAY EXAM OF PELVIS: CPT

## 2025-01-13 PROCEDURE — 85730 THROMBOPLASTIN TIME PARTIAL: CPT

## 2025-01-13 PROCEDURE — 73552 X-RAY EXAM OF FEMUR 2/>: CPT | Mod: LEFT SIDE | Performed by: RADIOLOGY

## 2025-01-13 PROCEDURE — 96374 THER/PROPH/DIAG INJ IV PUSH: CPT

## 2025-01-13 PROCEDURE — 80053 COMPREHEN METABOLIC PANEL: CPT

## 2025-01-13 PROCEDURE — 85025 COMPLETE CBC W/AUTO DIFF WBC: CPT

## 2025-01-13 PROCEDURE — 70450 CT HEAD/BRAIN W/O DYE: CPT

## 2025-01-13 PROCEDURE — 86900 BLOOD TYPING SEROLOGIC ABO: CPT

## 2025-01-13 PROCEDURE — 73700 CT LOWER EXTREMITY W/O DYE: CPT | Mod: LT

## 2025-01-13 PROCEDURE — 73700 CT LOWER EXTREMITY W/O DYE: CPT | Mod: LEFT SIDE | Performed by: RADIOLOGY

## 2025-01-13 PROCEDURE — 73552 X-RAY EXAM OF FEMUR 2/>: CPT | Mod: LT

## 2025-01-13 PROCEDURE — 72125 CT NECK SPINE W/O DYE: CPT | Performed by: STUDENT IN AN ORGANIZED HEALTH CARE EDUCATION/TRAINING PROGRAM

## 2025-01-13 RX ORDER — ACETAMINOPHEN 325 MG/1
650 TABLET ORAL EVERY 6 HOURS PRN
Status: DISCONTINUED | OUTPATIENT
Start: 2025-01-13 | End: 2025-01-13

## 2025-01-13 RX ORDER — POTASSIUM CHLORIDE 20 MEQ/1
20 TABLET, EXTENDED RELEASE ORAL ONCE
Status: COMPLETED | OUTPATIENT
Start: 2025-01-13 | End: 2025-01-13

## 2025-01-13 RX ORDER — HEPARIN SODIUM 5000 [USP'U]/ML
5000 INJECTION, SOLUTION INTRAVENOUS; SUBCUTANEOUS EVERY 8 HOURS
Status: DISPENSED | OUTPATIENT
Start: 2025-01-13 | End: 2025-01-14

## 2025-01-13 RX ORDER — ACETAMINOPHEN 325 MG/1
650 TABLET ORAL EVERY 6 HOURS PRN
Status: DISCONTINUED | OUTPATIENT
Start: 2025-01-13 | End: 2025-01-17 | Stop reason: HOSPADM

## 2025-01-13 RX ORDER — HEPARIN SODIUM 5000 [USP'U]/ML
5000 INJECTION, SOLUTION INTRAVENOUS; SUBCUTANEOUS EVERY 8 HOURS
Status: DISCONTINUED | OUTPATIENT
Start: 2025-01-13 | End: 2025-01-13

## 2025-01-13 RX ORDER — METOPROLOL TARTRATE 1 MG/ML
5 INJECTION, SOLUTION INTRAVENOUS EVERY 6 HOURS PRN
Status: DISCONTINUED | OUTPATIENT
Start: 2025-01-13 | End: 2025-01-17 | Stop reason: HOSPADM

## 2025-01-13 RX ORDER — METOPROLOL TARTRATE 25 MG/1
25 TABLET, FILM COATED ORAL 2 TIMES DAILY
Status: DISCONTINUED | OUTPATIENT
Start: 2025-01-13 | End: 2025-01-17 | Stop reason: HOSPADM

## 2025-01-13 RX ORDER — TRIAMCINOLONE ACETONIDE 55 UG/1
2 SPRAY, METERED NASAL DAILY
Status: ON HOLD | COMMUNITY

## 2025-01-13 RX ORDER — HYDROMORPHONE HYDROCHLORIDE 0.2 MG/ML
0.2 INJECTION INTRAMUSCULAR; INTRAVENOUS; SUBCUTANEOUS
Status: DISCONTINUED | OUTPATIENT
Start: 2025-01-13 | End: 2025-01-17 | Stop reason: HOSPADM

## 2025-01-13 RX ORDER — FLUTICASONE PROPIONATE 50 MCG
2 SPRAY, SUSPENSION (ML) NASAL DAILY
Status: DISCONTINUED | OUTPATIENT
Start: 2025-01-14 | End: 2025-01-17 | Stop reason: HOSPADM

## 2025-01-13 RX ORDER — LEVOTHYROXINE SODIUM 112 UG/1
112 TABLET ORAL
Status: DISCONTINUED | OUTPATIENT
Start: 2025-01-14 | End: 2025-01-14

## 2025-01-13 RX ORDER — HYDROMORPHONE HYDROCHLORIDE 0.2 MG/ML
0.1 INJECTION INTRAMUSCULAR; INTRAVENOUS; SUBCUTANEOUS
Status: DISCONTINUED | OUTPATIENT
Start: 2025-01-13 | End: 2025-01-17 | Stop reason: HOSPADM

## 2025-01-13 RX ORDER — HYDROCHLOROTHIAZIDE 25 MG/1
25 TABLET ORAL DAILY
Status: DISCONTINUED | OUTPATIENT
Start: 2025-01-14 | End: 2025-01-17 | Stop reason: HOSPADM

## 2025-01-13 RX ORDER — ACETAMINOPHEN 325 MG/1
975 TABLET ORAL ONCE
Status: COMPLETED | OUTPATIENT
Start: 2025-01-13 | End: 2025-01-13

## 2025-01-13 RX ORDER — ACETAMINOPHEN 325 MG/1
975 TABLET ORAL EVERY 8 HOURS
Status: DISCONTINUED | OUTPATIENT
Start: 2025-01-13 | End: 2025-01-13

## 2025-01-13 RX ADMIN — METOPROLOL TARTRATE 25 MG: 25 TABLET, FILM COATED ORAL at 21:32

## 2025-01-13 RX ADMIN — ACETAMINOPHEN 975 MG: 325 TABLET, FILM COATED ORAL at 11:03

## 2025-01-13 RX ADMIN — ACETAMINOPHEN 650 MG: 325 TABLET, FILM COATED ORAL at 21:54

## 2025-01-13 RX ADMIN — POTASSIUM CHLORIDE 20 MEQ: 1500 TABLET, EXTENDED RELEASE ORAL at 16:52

## 2025-01-13 RX ADMIN — HYDROMORPHONE HYDROCHLORIDE 0.5 MG: 1 INJECTION, SOLUTION INTRAMUSCULAR; INTRAVENOUS; SUBCUTANEOUS at 13:33

## 2025-01-13 RX ADMIN — SODIUM CHLORIDE 500 ML: 9 INJECTION, SOLUTION INTRAVENOUS at 19:32

## 2025-01-13 RX ADMIN — HEPARIN SODIUM 5000 UNITS: 5000 INJECTION INTRAVENOUS; SUBCUTANEOUS at 16:53

## 2025-01-13 SDOH — SOCIAL STABILITY: SOCIAL INSECURITY: DO YOU FEEL ANYONE HAS EXPLOITED OR TAKEN ADVANTAGE OF YOU FINANCIALLY OR OF YOUR PERSONAL PROPERTY?: NO

## 2025-01-13 SDOH — SOCIAL STABILITY: SOCIAL INSECURITY: WITHIN THE LAST YEAR, HAVE YOU BEEN HUMILIATED OR EMOTIONALLY ABUSED IN OTHER WAYS BY YOUR PARTNER OR EX-PARTNER?: NO

## 2025-01-13 SDOH — ECONOMIC STABILITY: FOOD INSECURITY: HOW HARD IS IT FOR YOU TO PAY FOR THE VERY BASICS LIKE FOOD, HOUSING, MEDICAL CARE, AND HEATING?: NOT VERY HARD

## 2025-01-13 SDOH — SOCIAL STABILITY: SOCIAL INSECURITY: HAVE YOU HAD ANY THOUGHTS OF HARMING ANYONE ELSE?: NO

## 2025-01-13 SDOH — SOCIAL STABILITY: SOCIAL INSECURITY
WITHIN THE LAST YEAR, HAVE YOU BEEN KICKED, HIT, SLAPPED, OR OTHERWISE PHYSICALLY HURT BY YOUR PARTNER OR EX-PARTNER?: NO

## 2025-01-13 SDOH — ECONOMIC STABILITY: HOUSING INSECURITY: IN THE PAST 12 MONTHS, HOW MANY TIMES HAVE YOU MOVED WHERE YOU WERE LIVING?: 1

## 2025-01-13 SDOH — SOCIAL STABILITY: SOCIAL INSECURITY: HAS ANYONE EVER THREATENED TO HURT YOUR FAMILY OR YOUR PETS?: NO

## 2025-01-13 SDOH — ECONOMIC STABILITY: FOOD INSECURITY: WITHIN THE PAST 12 MONTHS, YOU WORRIED THAT YOUR FOOD WOULD RUN OUT BEFORE YOU GOT THE MONEY TO BUY MORE.: NEVER TRUE

## 2025-01-13 SDOH — SOCIAL STABILITY: SOCIAL INSECURITY: DO YOU FEEL UNSAFE GOING BACK TO THE PLACE WHERE YOU ARE LIVING?: NO

## 2025-01-13 SDOH — SOCIAL STABILITY: SOCIAL INSECURITY: ARE THERE ANY APPARENT SIGNS OF INJURIES/BEHAVIORS THAT COULD BE RELATED TO ABUSE/NEGLECT?: NO

## 2025-01-13 SDOH — ECONOMIC STABILITY: HOUSING INSECURITY: IN THE LAST 12 MONTHS, WAS THERE A TIME WHEN YOU WERE NOT ABLE TO PAY THE MORTGAGE OR RENT ON TIME?: NO

## 2025-01-13 SDOH — ECONOMIC STABILITY: INCOME INSECURITY: IN THE PAST 12 MONTHS HAS THE ELECTRIC, GAS, OIL, OR WATER COMPANY THREATENED TO SHUT OFF SERVICES IN YOUR HOME?: NO

## 2025-01-13 SDOH — SOCIAL STABILITY: SOCIAL INSECURITY: WITHIN THE LAST YEAR, HAVE YOU BEEN AFRAID OF YOUR PARTNER OR EX-PARTNER?: NO

## 2025-01-13 SDOH — ECONOMIC STABILITY: FOOD INSECURITY: WITHIN THE PAST 12 MONTHS, THE FOOD YOU BOUGHT JUST DIDN'T LAST AND YOU DIDN'T HAVE MONEY TO GET MORE.: NEVER TRUE

## 2025-01-13 SDOH — SOCIAL STABILITY: SOCIAL INSECURITY
WITHIN THE LAST YEAR, HAVE YOU BEEN RAPED OR FORCED TO HAVE ANY KIND OF SEXUAL ACTIVITY BY YOUR PARTNER OR EX-PARTNER?: NO

## 2025-01-13 SDOH — ECONOMIC STABILITY: TRANSPORTATION INSECURITY: IN THE PAST 12 MONTHS, HAS LACK OF TRANSPORTATION KEPT YOU FROM MEDICAL APPOINTMENTS OR FROM GETTING MEDICATIONS?: NO

## 2025-01-13 SDOH — ECONOMIC STABILITY: HOUSING INSECURITY: AT ANY TIME IN THE PAST 12 MONTHS, WERE YOU HOMELESS OR LIVING IN A SHELTER (INCLUDING NOW)?: NO

## 2025-01-13 SDOH — SOCIAL STABILITY: SOCIAL INSECURITY: HAVE YOU HAD THOUGHTS OF HARMING ANYONE ELSE?: NO

## 2025-01-13 SDOH — SOCIAL STABILITY: SOCIAL INSECURITY: ARE YOU OR HAVE YOU BEEN THREATENED OR ABUSED PHYSICALLY, EMOTIONALLY, OR SEXUALLY BY ANYONE?: NO

## 2025-01-13 SDOH — SOCIAL STABILITY: SOCIAL INSECURITY: WERE YOU ABLE TO COMPLETE ALL THE BEHAVIORAL HEALTH SCREENINGS?: YES

## 2025-01-13 SDOH — SOCIAL STABILITY: SOCIAL INSECURITY: ABUSE: ADULT

## 2025-01-13 SDOH — SOCIAL STABILITY: SOCIAL INSECURITY: DOES ANYONE TRY TO KEEP YOU FROM HAVING/CONTACTING OTHER FRIENDS OR DOING THINGS OUTSIDE YOUR HOME?: NO

## 2025-01-13 ASSESSMENT — COGNITIVE AND FUNCTIONAL STATUS - GENERAL
TURNING FROM BACK TO SIDE WHILE IN FLAT BAD: A LOT
HELP NEEDED FOR BATHING: A LITTLE
STANDING UP FROM CHAIR USING ARMS: A LOT
MOVING TO AND FROM BED TO CHAIR: A LOT
MOBILITY SCORE: 13
DRESSING REGULAR LOWER BODY CLOTHING: A LOT
MOVING FROM LYING ON BACK TO SITTING ON SIDE OF FLAT BED WITH BEDRAILS: A LITTLE
CLIMB 3 TO 5 STEPS WITH RAILING: A LOT
TOILETING: A LITTLE
PATIENT BASELINE BEDBOUND: NO
WALKING IN HOSPITAL ROOM: A LOT
DAILY ACTIVITIY SCORE: 20

## 2025-01-13 ASSESSMENT — ACTIVITIES OF DAILY LIVING (ADL)
LACK_OF_TRANSPORTATION: NO
BATHING: NEEDS ASSISTANCE
DRESSING YOURSELF: NEEDS ASSISTANCE
HEARING - RIGHT EAR: FUNCTIONAL
LACK_OF_TRANSPORTATION: NO
GROOMING: INDEPENDENT
WALKS IN HOME: NEEDS ASSISTANCE
TOILETING: NEEDS ASSISTANCE
HEARING - LEFT EAR: FUNCTIONAL
FEEDING YOURSELF: INDEPENDENT
PATIENT'S MEMORY ADEQUATE TO SAFELY COMPLETE DAILY ACTIVITIES?: YES
ADEQUATE_TO_COMPLETE_ADL: YES
JUDGMENT_ADEQUATE_SAFELY_COMPLETE_DAILY_ACTIVITIES: YES

## 2025-01-13 ASSESSMENT — COLUMBIA-SUICIDE SEVERITY RATING SCALE - C-SSRS
2. HAVE YOU ACTUALLY HAD ANY THOUGHTS OF KILLING YOURSELF?: NO
1. IN THE PAST MONTH, HAVE YOU WISHED YOU WERE DEAD OR WISHED YOU COULD GO TO SLEEP AND NOT WAKE UP?: NO
2. HAVE YOU ACTUALLY HAD ANY THOUGHTS OF KILLING YOURSELF?: NO
6. HAVE YOU EVER DONE ANYTHING, STARTED TO DO ANYTHING, OR PREPARED TO DO ANYTHING TO END YOUR LIFE?: NO
1. IN THE PAST MONTH, HAVE YOU WISHED YOU WERE DEAD OR WISHED YOU COULD GO TO SLEEP AND NOT WAKE UP?: NO
6. HAVE YOU EVER DONE ANYTHING, STARTED TO DO ANYTHING, OR PREPARED TO DO ANYTHING TO END YOUR LIFE?: NO

## 2025-01-13 ASSESSMENT — PATIENT HEALTH QUESTIONNAIRE - PHQ9
2. FEELING DOWN, DEPRESSED OR HOPELESS: NOT AT ALL
1. LITTLE INTEREST OR PLEASURE IN DOING THINGS: NOT AT ALL
SUM OF ALL RESPONSES TO PHQ9 QUESTIONS 1 & 2: 0

## 2025-01-13 ASSESSMENT — LIFESTYLE VARIABLES
HOW OFTEN DO YOU HAVE 6 OR MORE DRINKS ON ONE OCCASION: NEVER
SKIP TO QUESTIONS 9-10: 1
HAVE YOU EVER FELT YOU SHOULD CUT DOWN ON YOUR DRINKING: NO
HAVE PEOPLE ANNOYED YOU BY CRITICIZING YOUR DRINKING: NO
HOW OFTEN DO YOU HAVE A DRINK CONTAINING ALCOHOL: NEVER
TOTAL SCORE: 0
SUBSTANCE_ABUSE_PAST_12_MONTHS: NO
AUDIT-C TOTAL SCORE: 0
HOW MANY STANDARD DRINKS CONTAINING ALCOHOL DO YOU HAVE ON A TYPICAL DAY: PATIENT DOES NOT DRINK
EVER FELT BAD OR GUILTY ABOUT YOUR DRINKING: NO
EVER HAD A DRINK FIRST THING IN THE MORNING TO STEADY YOUR NERVES TO GET RID OF A HANGOVER: NO
PRESCIPTION_ABUSE_PAST_12_MONTHS: NO
AUDIT-C TOTAL SCORE: 0

## 2025-01-13 ASSESSMENT — PAIN SCALES - GENERAL
PAINLEVEL_OUTOF10: 2
PAINLEVEL_OUTOF10: 8
PAINLEVEL_OUTOF10: 2
PAINLEVEL_OUTOF10: 8
PAINLEVEL_OUTOF10: 8

## 2025-01-13 ASSESSMENT — PAIN - FUNCTIONAL ASSESSMENT
PAIN_FUNCTIONAL_ASSESSMENT: 0-10
PAIN_FUNCTIONAL_ASSESSMENT: 0-10

## 2025-01-13 ASSESSMENT — PAIN DESCRIPTION - ORIENTATION: ORIENTATION: LEFT

## 2025-01-13 ASSESSMENT — PAIN DESCRIPTION - LOCATION: LOCATION: HIP

## 2025-01-13 NOTE — H&P
Hospital Medicine History & Physical       Patient Name: Gabriella Haskins   YOB: 1945    Subjective:    Gabriella Haskins is a 79 y.o. female with a PMH of hypertension, hyperlipidemia, subdural hematoma, gout, basal cell carcinoma, arthritis of both knees, primary osteoarthritis of left hip, osteoporosis, vitamin D deficiency, and hypothyroidism who presents to the hospital with complaints of left hip pain with inability to ambulate after falling in her home this morning 1/13/2025. Patient states she was walking in her home when she tripped on the leg of her table and fell on her left side. She reports that should could not get up from the floor after her fall. Patient states she did hit her head when she fell. She denies headache, dizziness, syncope, loss of consciousness, nausea, or vomiting. Patient reports pain that is sharp, throbbing, and continuous in her left hip with decreased ROM. She denies fever, chills, dysuria, hematuria, tinging or loss of sensation in her left extremity. Patient has a PMH of atrial fibrillation that was corrected with cardioversion in the ED in 2023. She denies palpitations, chest pain, abdominal pain, or syncope since cardioversion. Denies currently being on anticoagulants.     In the ED, patient was found to be hypertensive, hypokalemia (3.3), WBC 12.3, neutrophils absolute 10.42. XR femur and pelvis  displayed a comminuted fracture through the neck and subcapital region of the left femur.   CT head wo IV contrast was not significant for intracranial hemorrhage, hematoma, or fractures.     A 10 point ROS was completed and is negative expect as stated in HPI.     Past Medical History:  Hypertension   Hyperlipidemia   Osteoporosis   Vitamin D deficiency   Osteoarthritis of left hip   Arthritis of both knees   Hypothyroidism   Atrial fibrillation   History of subdural hematoma   Anemia with B12 deficiency   Gout  History of  recurrent falls     Past Surgical History:  Shoulder Surgery (right) - hemiarthroplasty   Eye Surgery - acute vitreous detachment     Social History: Tobacco - Never, Alcohol - none, Recreational Drugs - none    Family History: family history is not on file.     Objective:    /85   Pulse 85   Temp 37 °C (98.6 °F)   Resp 16   LMP  (LMP Unknown)   SpO2 98%     Physical Exam:    GENERAL: well developed, non-toxic, NAD, alert & cooperative  HEENT: normocephalic, atraumatic, sclera clear, PERRL, EOMI  CARDIAC: regular rate & rhythm, S1/S2  PULMONARY: CTA b/l, no respiratory distress, - wheezes  ABDOMEN: soft, non-tender, non-distended  MSK: no peripheral edema, no obvious deformity. Decreased ROM of left extremity, able to move left foot and phalanges. Patient has tenderness to palpation over the left femur. Femoral and tibial pulses intact bilaterally.   NEURO: A&O X 3, non-focal, CN II-XII grossly intact. Bilateral plantar foot sensation intact.   SKIN: Warm and dry, no lesions, no rashes.  PSYCH: appropriate mood & affect     Assessment/Plan:    Comminuted fracture through the neck and subcapital region of the left femur.   Hypertension   Hyperlipidemia   Osteoporosis   Vitamin D Deficiency   Osteoarthritis of left hip and bilateral knees  Atrial fibrillation       Patient was hypertensive upon arrival in the ED, but during examination her BP normalized to 123/80. Patient was hypokalemic, will replace with potassium chloride tablet PO 20 mEq once and redraw labs. Patient will continue her home medications of metoprolol 25 mg PO BID (hold is SBP < 100 or HR <60), levothyroxine tablet 112 mcg PO daily, and fluticasone 2 sprays each nostril daily. Patient was comfortable upon examination after receiving dilaudid 0.5 mg IV in the ED. Will control pain with PRN acetaminophen  q6h and hydromorphone IV 0.2 mg for severe and 0.1 mg q3h for moderate pain. Patient will become NPO effective midnight starting on  Tuesday 1/14/2025. Patient's [id number] score is greater than 2 warranting anticoagulation. Subcutaneous heparin 5,000 units twice due to patient's scheduled orthopedic surgical procedure on 1/14/2025. Heparin will be held on 1/14/25 AM prior to surgical procedure.       DVT Prophylaxis: Heparin subcutaneous   Code Status: Full Code  Disposition: Med surg      GERARDO BALBUENAS   [facility]

## 2025-01-13 NOTE — ED PROVIDER NOTES
History of Present Illness     History provided by: Patient and EMS  Limitations to History: None  External Records Reviewed with Brief Summary:  see ed course    HPI:  Gabriella Haskins is a 79 y.o. female with a past medical history of hypertension, hyperlipidemia, hypothyroidism who is presenting today for mechanical fall. Patient reports that she was working when she tripped on the underside of the table. She reports that she fell on her left side. Is reporting pain of the left hip. His denying chest, abdominal pain. Reports that she was having difficulty bearing weight afterwards but is able to walk a few steps. Denies blood thinners. Denies LOC. Unknown head strike.    Physical Exam   Triage vitals:  T 37 °C (98.6 °F)  HR 85  /85  RR 16  O2 98 % None (Room air)    General: Awake, alert, in no acute distress  Eyes: Gaze conjugate.  No scleral icterus or injection  HENT: Normo-cephalic, atraumatic. No stridor  CV: Regular rate, regular rhythm. Radial pulses 2+ bilaterally  Resp: Breathing non-labored, speaking in full sentences.  Clear to auscultation bilaterally  GI: Soft, non-distended, non-tender. No rebound or guarding.  MSK/Extremities: No gross bony deformities. Moving all extremities. 2+ TPM PT pulses bilaterally. Patient has pain over the left femur  Skin: Warm. Appropriate color  Neuro: Alert. Oriented. Face symmetric. Speech is fluent.  Gross strength and sensation intact in b/l UE and LEs  Psych: Appropriate mood and affect      Medical Decision Making & ED Course   Medical Decision Makin y.o. female  with a past medical history of hypertension, hyperlipidemia, hypothyroidism who is presenting today for mechanical fall. Vital signs within normal limits. Will obtain a CT head and C-spine to look for acute fracture versus intracranial hemorrhage. Will also obtain x-ray of the left hip and femur. Please see ED course for remainder of workup  ----      Differential diagnoses considered  include but are not limited to: Please see MDM.     Social Determinants of Health which Significantly Impact Care: None identified     EKG Independent Interpretation: EKG interpreted by myself. Please see ED Course and MDM for full interpretation.    Independent Result Review and Interpretation: Results were independently reviewed and interpreted by myself. Please see ED course and MDM for full interpretation.    Chronic conditions affecting the patient's care: As documented in the MDM    The patient was discussed with the following consultants/services: Hospitalist/Admitting Provider who accepted the patient for admission and ortho surgery regarding femur fracture    Care Considerations: As per MDM    ED Course:  ED Course as of 01/13/25 1558   Mon Jan 13, 2025   1058 External chart review:  PCP office visit for dysuria on 12/18/2024      [JUMANA]   1139 CT head and c spine were negative.  [JUMANA]   1228 Xray femur showed a comminuted femur fracture [JUMANA]   1237 EKG was independently interpreted showed sinus rhythm at a rate 85. . First-degree AV block. No ST segment elevations or inversions [JUMANA]   1256 Spoke with Dr Bunch from [facility] who plan for operative management  [JUMANA]   1409 Spoke with Dr Gonzalez who accepted the patient for admission [JUMANA]      ED Course User Index  [JUMANA] Agustina Corbett MD         Diagnoses as of 01/13/25 1558   Fall, initial encounter   Closed fracture of neck of left femur, initial encounter     Disposition   As a result of their workup, the patient will require admission to the hospital.  The patient was informed of her diagnosis.  The patient was given the opportunity to ask questions and I answered them. The patient agreed to be admitted to the hospital.    Procedures   Procedures    Patient seen and discussed with ED attending physician.    Agustina Corbett MD  Emergency Medicine     Agustina Corbett MD  Resident  01/13/25 [id number]

## 2025-01-13 NOTE — PROGRESS NOTES
Pharmacy Medication History Review    Gabriella Haskins is a 79 y.o. female admitted for No Principal Problem: There is no principal problem currently on the Problem List. Please update the Problem List and refresh.. Pharmacy reviewed the patient's cmwom-uk-irxhamukx medications and allergies for accuracy.    The list below reflectives the updated PTA list. Please review each medication in order reconciliation for additional clarification and justification.  Prior to Admission medications    Medication Sig Start Date End Date Taking? Authorizing Provider   ascorbic acid, vitamin C, 500 mg capsule Take 1 tablet by mouth once daily.   Yes Historical Provider, MD   cholecalciferol (Vitamin D-3) 25 MCG (1000 UT) capsule Take 1 capsule (25 mcg) by mouth once daily.   Yes Historical Provider, MD   cyanocobalamin (Vitamin B-12) 1,000 mcg/mL injection Inject 1 mL (1,000 mcg) into the muscle every 14 (fourteen) days.   Yes Historical Provider, MD   hydroCHLOROthiazide (HYDRODiuril) 25 mg tablet Take 1 tablet (25 mg) by mouth once daily.   Yes Historical Provider, MD   levocetirizine (Xyzal) 5 mg tablet Take 1 tablet (5 mg) by mouth once daily in the evening.   Yes Historical Provider, MD   levothyroxine (Synthroid, Levoxyl) 112 mcg tablet Take 1 tablet (112 mcg) by mouth early in the morning.. 5/9/23  Yes Historical Provider, MD   metoprolol tartrate (Lopressor) 25 mg tablet Take 0.5 tablets (12.5 mg) by mouth 2 times a day.  Patient taking differently: Take 1 tablet (25 mg) by mouth 2 times a day. 10/11/23 1/13/25 Yes Kary Gallardo,    omega-3/dha/epa/fish oil (OMEGA-3 FISH OIL ORAL) Take 1 tablet by mouth once daily.  Patient taking differently: Take 2 tablets by mouth once daily.   Yes Historical Provider, MD   triamcinolone (Nasacort) 55 mcg nasal inhaler Administer 2 sprays into each nostril once daily.   Yes Historical Provider, MD   vit C,E-Jw-klybs-lutein-zeaxan (PreserVision AREDS-2) 250-90-40-1 mg capsule Take  1 capsule by mouth.  Patient taking differently: Take 2 capsules by mouth.   Yes Historical Provider, MD   carboxymethylcellulose (TheraTears) 0.25 % ophthalmic solution Administer 1 drop into both eyes 4 times a day as needed for dry eyes.   Yes Historical Provider, MD   clindamycin (Cleocin) 150 mg capsule Take 4 capsules (600 mg) by mouth 1 time if needed. Prior to dental appointments   Yes Historical Provider, MD   ketoconazole (NIZOral) 2 % cream Apply topically 2 times a day.  Patient not taking: Reported on 1/13/2025 10/11/23  Yes Kary Gallardo, DO        The list below reflectives the updated allergy list. Please review each documented allergy for additional clarification and justification.  Allergies  Reviewed by Leah Redding on 1/13/2025        Severity Reactions Comments    Azithromycin High Rash blisters in mouth    Codeine High GI Upset     Conjugated Estrogens High Swelling, Unknown legs swelled    Erythromycin High Diarrhea     Ibuprofen High GI Upset     Lisinopril High Cough     Cephalexin Medium Itching     Doxycycline Medium Itching     Indomethacin Medium Itching     Bactrim [sulfamethoxazole-trimethoprim] Not Specified Hives     Amoxicillin Low Rash             Below are additional concerns with the patient's PTA list.      Leah Redding

## 2025-01-13 NOTE — ED TRIAGE NOTES
Pt from home with a fall from standing when tripped over her end table. Falling onto her left hip and not hitting her head or having LOC. Isolated pain to left hip. No obvious deformities

## 2025-01-13 NOTE — CONSULTS
ORTHOPAEDIC SURGERY CONSULT NOTE      Patient Name: Gabriella Haskins  MRN: [id number]  Admission Date: 1/13/2025  Date of Evaluation:  January 13, 2025  Time of Evaluation: 2:07 PM      Reason for Consult: Hip pain  Requesting Physician: [facility]  Primary Care Physician: Eric Hodge MD    IMPRESSION  1.  Left minimally displaced femoral neck fracture    PLAN:  Admission status: Admitted to primary team.  Test(s)/Imaging: X-rays reviewed, CT left hip ordered.  Intervention: None.  PT/OT: NWB LLE.  Diet: Per primary. NPO.   Pain Control: Per primary  DVT Prophylaxis: Hold for OR.  Disposition  - Plan for OR likely tomorrow for CRPP left hip vs. Left Hip Hemiarthroplasty pending results of CT scan - case request order placed  - NPO at [facility]   - Mccarthy to be placed   - 2g Ancef on hold to OR   - Pre-op labs: UA, CBC, CMP, PT/INR/ Type and Screen   - Full length femur films     We discussed the pathoanatomy of hip fractures at length and treatment options including both conservative and surgical.  We discussed the benefits of early weightbearing following surgical fixation.  I did recommend surgical fixation with CRPP vs. Hemiarthroplasty pending CT scan given the fracture pattern.  The risks and benefits of surgery were reviewed. The risks included but are not limited to the following: infection, blood loss, damage to surrounding structures, loss of function, DVT,  PE, nonunion, malunion, hardware failure, the need for repeat operative interventions and the risks of anesthesia. The patient understands and accepts these risks.    SUBJECTIVE  Ms. Haskins is a 79 y.o. female with a history of left knee OA, lung nodule who presents for left hip pain. Patient reports fall at sisters house earlier today and denies head trauma or LOC. Patient denies low back pain, numbness/tingling of the affected leg, or injury to other extremities. Patient denies use of bisphosphonates, chronic steroids or anticoagulation.       Past  Medical History:   Past Medical History:   Diagnosis Date    Arthritis     History of transfusion     Lung nodule      Past Surgical History:   Past Surgical History:   Procedure Laterality Date    SHOULDER SURGERY      TITANIUM IMPLANT     Family History: No family history on file.  Social History:   Social History     Tobacco Use    Smoking status: Never    Smokeless tobacco: Never   Substance Use Topics    Alcohol use: Not Currently    Drug use: Never     Medications:  Prior to Admission Medications:  (Not in a hospital admission)     Scheduled medications    Continuous medications    PRN medications      Current Allergies:   Allergies as of 01/13/2025 - Reviewed 01/13/2025   Allergen Reaction Noted    Azithromycin Rash 07/11/2008    Codeine GI Upset 07/11/2008    Conjugated estrogens Swelling and Unknown 07/11/2008    Erythromycin Diarrhea 02/02/2011    Ibuprofen GI Upset 12/10/2024    Lisinopril Cough 08/01/2012    Cephalexin Itching 12/07/2009    Doxycycline Itching 07/11/2008    Indomethacin Itching 07/11/2008    Bactrim [sulfamethoxazole-trimethoprim] Hives 10/06/2023    Amoxicillin Rash 10/06/2023       Complete Review of Systems:    As above    OBJECTIVE  Physical Exam:   Patient Vitals for the past 24 hrs:   BP Temp Pulse Resp SpO2   01/13/25 1050 180/85 37 °C (98.6 °F) 85 16 98 %     There is no height or weight on file to calculate BMI.  GENERAL: NAD. A&Ox3. Cooperative. Pleasant.   Left hip MSK:  - no rash, ecchymosis, abrasions or lacerations noted over hip  - +log roll, heel strike  - Wiggles toes; + EHL/TA/TP/GS function intact  - SILT L3-S1; 2+ DP pulse, BCR across all digits   - Compartments soft and compressible   - TTP over groin  Secondary skeletal exam   - No tenderness or obvious deformity to long bones or other major joints    DATA:   Diagnostic tests reviewed for today's visit:      LABORATORY TESTS:  Results for orders placed or performed during the hospital encounter of 01/13/25 (from the  past 24 hours)   CBC and Auto Differential   Result Value Ref Range    WBC 12.3 (H) 4.4 - 11.3 x10*3/uL    nRBC 0.0 0.0 - 0.0 /100 WBCs    RBC 4.76 4.00 - 5.20 x10*6/uL    Hemoglobin 13.2 12.0 - 16.0 g/dL    Hematocrit 40.6 36.0 - 46.0 %    MCV 85 80 - 100 fL    MCH 27.7 26.0 - 34.0 pg    MCHC 32.5 32.0 - 36.0 g/dL    RDW 13.0 11.5 - 14.5 %    Platelets 207 150 - 450 x10*3/uL    Neutrophils % 84.9 40.0 - 80.0 %    Immature Granulocytes %, Automated 0.4 0.0 - 0.9 %    Lymphocytes % 8.6 13.0 - 44.0 %    Monocytes % 5.3 2.0 - 10.0 %    Eosinophils % 0.4 0.0 - 6.0 %    Basophils % 0.4 0.0 - 2.0 %    Neutrophils Absolute 10.42 (H) 1.60 - 5.50 x10*3/uL    Immature Granulocytes Absolute, Automated 0.05 0.00 - 0.50 x10*3/uL    Lymphocytes Absolute 1.05 0.80 - 3.00 x10*3/uL    Monocytes Absolute 0.65 0.05 - 0.80 x10*3/uL    Eosinophils Absolute 0.05 0.00 - 0.40 x10*3/uL    Basophils Absolute 0.05 0.00 - 0.10 x10*3/uL   Comprehensive metabolic panel   Result Value Ref Range    Glucose 95 74 - 99 mg/dL    Sodium 140 136 - 145 mmol/L    Potassium 3.3 (L) 3.5 - 5.3 mmol/L    Chloride 101 98 - 107 mmol/L    Bicarbonate 31 21 - 32 mmol/L    Anion Gap 11 10 - 20 mmol/L    Urea Nitrogen 16 6 - 23 mg/dL    Creatinine 0.72 0.50 - 1.05 mg/dL    eGFR 86 >60 mL/min/1.73m*2    Calcium 9.6 8.6 - 10.3 mg/dL    Albumin 3.8 3.4 - 5.0 g/dL    Alkaline Phosphatase 50 33 - 136 U/L    Total Protein 6.7 6.4 - 8.2 g/dL    AST 15 9 - 39 U/L    Bilirubin, Total 0.8 0.0 - 1.2 mg/dL    ALT 8 7 - 45 U/L   Type And Screen   Result Value Ref Range    ABO TYPE O     Rh TYPE POS    Coagulation Screen   Result Value Ref Range    Protime 12.4 9.8 - 12.8 seconds    INR 1.1 0.9 - 1.1    aPTT 29 27 - 38 seconds         IMAGING TESTS:  XR femur left 2+ views 01/13/2025    Narrative  Interpreted By:  Josue Morris,  STUDY:  XR FEMUR LEFT 2+ VIEWS; ;  1/13/2025 11:59 am    INDICATION:  Signs/Symptoms:fall.      COMPARISON:  None.    ACCESSION  NUMBER(S):  [id number]    ORDERING CLINICIAN:  CHUCK QUINTEROS    FINDINGS:  There is a comminuted fracture through the neck and subcapital region  of the proximal left femur.    No dislocation.    The left acetabulum appears intact. Tricompartmental degenerative  changes of the left knee.    Impression  Comminuted fracture through the neck and subcapital region of the  proximal left femur.    MACRO:  None    Signed by: Josue Morris 1/13/2025 12:21 PM  Dictation workstation:   [id number]    PROCEDURE:  None    Thank you for this consultation.  I appreciate the opportunity to be involved in the patient's care.     Altaf Bullock, DO  Orthopaedic Surgery  Sports Medicine & Shoulder  [facility]   [phone]

## 2025-01-14 ENCOUNTER — ANESTHESIA (OUTPATIENT)
Dept: OPERATING ROOM | Facility: HOSPITAL | Age: 80
End: 2025-01-14
Payer: MEDICARE

## 2025-01-14 ENCOUNTER — DOCUMENTATION (OUTPATIENT)
Dept: CARDIOLOGY | Facility: HOSPITAL | Age: 80
End: 2025-01-14

## 2025-01-14 ENCOUNTER — ANESTHESIA EVENT (OUTPATIENT)
Dept: OPERATING ROOM | Facility: HOSPITAL | Age: 80
End: 2025-01-14
Payer: MEDICARE

## 2025-01-14 PROBLEM — S06.9X9A INTRACRANIAL INJURY WITH LOSS OF CONSCIOUSNESS (MULTI): Status: ACTIVE | Noted: 2024-05-29

## 2025-01-14 PROBLEM — I46.9 CARDIAC ARREST: Status: ACTIVE | Noted: 2023-10-06

## 2025-01-14 PROBLEM — R91.1 LUNG NODULE: Status: ACTIVE | Noted: 2025-01-14

## 2025-01-14 LAB
ANION GAP SERPL CALC-SCNC: 15 MMOL/L (ref 10–20)
ATRIAL RATE: 122 BPM
ATRIAL RATE: 90 BPM
BUN SERPL-MCNC: 15 MG/DL (ref 6–23)
CALCIUM SERPL-MCNC: 8.9 MG/DL (ref 8.6–10.3)
CHLORIDE SERPL-SCNC: 100 MMOL/L (ref 98–107)
CO2 SERPL-SCNC: 29 MMOL/L (ref 21–32)
CREAT SERPL-MCNC: 0.71 MG/DL (ref 0.5–1.05)
EGFRCR SERPLBLD CKD-EPI 2021: 87 ML/MIN/1.73M*2
ERYTHROCYTE [DISTWIDTH] IN BLOOD BY AUTOMATED COUNT: 13.2 % (ref 11.5–14.5)
GLUCOSE SERPL-MCNC: 120 MG/DL (ref 74–99)
HCT VFR BLD AUTO: 37 % (ref 36–46)
HGB BLD-MCNC: 12 G/DL (ref 12–16)
MAGNESIUM SERPL-MCNC: 1.39 MG/DL (ref 1.6–2.4)
MCH RBC QN AUTO: 27.8 PG (ref 26–34)
MCHC RBC AUTO-ENTMCNC: 32.4 G/DL (ref 32–36)
MCV RBC AUTO: 86 FL (ref 80–100)
NRBC BLD-RTO: 0 /100 WBCS (ref 0–0)
P AXIS: 57 DEGREES
PLATELET # BLD AUTO: 185 X10*3/UL (ref 150–450)
POTASSIUM SERPL-SCNC: 3.1 MMOL/L (ref 3.5–5.3)
PR INTERVAL: 114 MS
PR INTERVAL: 220 MS
Q ONSET: 249 MS
Q ONSET: 252 MS
QRS COUNT: 14 BEATS
QRS COUNT: 19 BEATS
QRS DURATION: 78 MS
QRS DURATION: 79 MS
QT INTERVAL: 328 MS
QT INTERVAL: 367 MS
QTC CALCULATION(BAZETT): 449 MS
QTC CALCULATION(BAZETT): 460 MS
QTC FREDERICIA: 411 MS
QTC FREDERICIA: 420 MS
R AXIS: -28 DEGREES
R AXIS: -28 DEGREES
RBC # BLD AUTO: 4.31 X10*6/UL (ref 4–5.2)
SODIUM SERPL-SCNC: 141 MMOL/L (ref 136–145)
T AXIS: 51 DEGREES
T AXIS: 67 DEGREES
T OFFSET: 413 MS
T OFFSET: 436 MS
T4 FREE SERPL-MCNC: 1.34 NG/DL (ref 0.61–1.12)
TSH SERPL-ACNC: 0.16 MIU/L (ref 0.44–3.98)
VENTRICULAR RATE: 118 BPM
VENTRICULAR RATE: 90 BPM
WBC # BLD AUTO: 10.4 X10*3/UL (ref 4.4–11.3)

## 2025-01-14 PROCEDURE — 99100 ANES PT EXTEME AGE<1 YR&>70: CPT | Performed by: ANESTHESIOLOGY

## 2025-01-14 PROCEDURE — 2720000007 HC OR 272 NO HCPCS: Performed by: ORTHOPAEDIC SURGERY

## 2025-01-14 PROCEDURE — 3600000010 HC OR TIME - EACH INCREMENTAL 1 MINUTE - PROCEDURE LEVEL FIVE: Performed by: ORTHOPAEDIC SURGERY

## 2025-01-14 PROCEDURE — 3700000001 HC GENERAL ANESTHESIA TIME - INITIAL BASE CHARGE: Performed by: ORTHOPAEDIC SURGERY

## 2025-01-14 PROCEDURE — 83735 ASSAY OF MAGNESIUM: CPT

## 2025-01-14 PROCEDURE — A20610 PR DRAIN/INJECT LARGE JOINT/BURSA: Performed by: ANESTHESIOLOGY

## 2025-01-14 PROCEDURE — 88305 TISSUE EXAM BY PATHOLOGIST: CPT | Mod: TC,PARLAB,WESLAB | Performed by: STUDENT IN AN ORGANIZED HEALTH CARE EDUCATION/TRAINING PROGRAM

## 2025-01-14 PROCEDURE — 85027 COMPLETE CBC AUTOMATED: CPT

## 2025-01-14 PROCEDURE — 2500000001 HC RX 250 WO HCPCS SELF ADMINISTERED DRUGS (ALT 637 FOR MEDICARE OP): Performed by: INTERNAL MEDICINE

## 2025-01-14 PROCEDURE — 99232 SBSQ HOSP IP/OBS MODERATE 35: CPT | Performed by: INTERNAL MEDICINE

## 2025-01-14 PROCEDURE — 2500000002 HC RX 250 W HCPCS SELF ADMINISTERED DRUGS (ALT 637 FOR MEDICARE OP, ALT 636 FOR OP/ED)

## 2025-01-14 PROCEDURE — 2500000004 HC RX 250 GENERAL PHARMACY W/ HCPCS (ALT 636 FOR OP/ED)

## 2025-01-14 PROCEDURE — 3600000005 HC OR TIME - INITIAL BASE CHARGE - PROCEDURE LEVEL FIVE: Performed by: ORTHOPAEDIC SURGERY

## 2025-01-14 PROCEDURE — 7100000002 HC RECOVERY ROOM TIME - EACH INCREMENTAL 1 MINUTE: Performed by: ORTHOPAEDIC SURGERY

## 2025-01-14 PROCEDURE — 36415 COLL VENOUS BLD VENIPUNCTURE: CPT

## 2025-01-14 PROCEDURE — 2500000004 HC RX 250 GENERAL PHARMACY W/ HCPCS (ALT 636 FOR OP/ED): Performed by: ANESTHESIOLOGY

## 2025-01-14 PROCEDURE — 0SRS0JA REPLACEMENT OF LEFT HIP JOINT, FEMORAL SURFACE WITH SYNTHETIC SUBSTITUTE, UNCEMENTED, OPEN APPROACH: ICD-10-PCS | Performed by: ORTHOPAEDIC SURGERY

## 2025-01-14 PROCEDURE — 84439 ASSAY OF FREE THYROXINE: CPT | Performed by: INTERNAL MEDICINE

## 2025-01-14 PROCEDURE — A20610 PR DRAIN/INJECT LARGE JOINT/BURSA

## 2025-01-14 PROCEDURE — 80048 BASIC METABOLIC PNL TOTAL CA: CPT

## 2025-01-14 PROCEDURE — 2500000004 HC RX 250 GENERAL PHARMACY W/ HCPCS (ALT 636 FOR OP/ED): Performed by: ORTHOPAEDIC SURGERY

## 2025-01-14 PROCEDURE — C1776 JOINT DEVICE (IMPLANTABLE): HCPCS | Performed by: ORTHOPAEDIC SURGERY

## 2025-01-14 PROCEDURE — 84443 ASSAY THYROID STIM HORMONE: CPT | Performed by: INTERNAL MEDICINE

## 2025-01-14 PROCEDURE — 2500000005 HC RX 250 GENERAL PHARMACY W/O HCPCS: Performed by: ORTHOPAEDIC SURGERY

## 2025-01-14 PROCEDURE — 1200000002 HC GENERAL ROOM WITH TELEMETRY DAILY

## 2025-01-14 PROCEDURE — 2780000003 HC OR 278 NO HCPCS: Performed by: ORTHOPAEDIC SURGERY

## 2025-01-14 PROCEDURE — 7100000001 HC RECOVERY ROOM TIME - INITIAL BASE CHARGE: Performed by: ORTHOPAEDIC SURGERY

## 2025-01-14 PROCEDURE — 3700000002 HC GENERAL ANESTHESIA TIME - EACH INCREMENTAL 1 MINUTE: Performed by: ORTHOPAEDIC SURGERY

## 2025-01-14 DEVICE — SELF CENTERING BI-POLAR HEAD 28MM ID 44MM OD
Type: IMPLANTABLE DEVICE | Site: HIP | Status: FUNCTIONAL
Brand: SELF CENTERING

## 2025-01-14 DEVICE — ARTICUL/EZE FEMORAL HEAD DIAMETER 28MM +5 12/14 TAPER
Type: IMPLANTABLE DEVICE | Site: HIP | Status: FUNCTIONAL
Brand: ARTICUL/EZE

## 2025-01-14 DEVICE — CORAIL HIP SYSTEM CEMENTLESS FEMORAL STEM 12/14 AMT 135 DEGREES KA SIZE 13 HA COATED STANDARD COLLAR
Type: IMPLANTABLE DEVICE | Site: HIP | Status: FUNCTIONAL
Brand: CORAIL

## 2025-01-14 RX ORDER — LEVOTHYROXINE SODIUM 50 UG/1
100 TABLET ORAL
Status: DISCONTINUED | OUTPATIENT
Start: 2025-01-15 | End: 2025-01-17 | Stop reason: HOSPADM

## 2025-01-14 RX ORDER — MEPERIDINE HYDROCHLORIDE 50 MG/ML
12.5 INJECTION INTRAMUSCULAR; INTRAVENOUS; SUBCUTANEOUS EVERY 10 MIN PRN
Status: DISCONTINUED | OUTPATIENT
Start: 2025-01-14 | End: 2025-01-14 | Stop reason: HOSPADM

## 2025-01-14 RX ORDER — CEFAZOLIN SODIUM 2 G/100ML
2 INJECTION, SOLUTION INTRAVENOUS EVERY 8 HOURS
Status: CANCELLED | OUTPATIENT
Start: 2025-01-14 | End: 2025-01-15

## 2025-01-14 RX ORDER — TRIAMCINOLONE ACETONIDE 40 MG/ML
10 INJECTION, SUSPENSION INTRA-ARTICULAR; INTRAMUSCULAR ONCE
Status: COMPLETED | OUTPATIENT
Start: 2025-01-14 | End: 2025-01-14

## 2025-01-14 RX ORDER — FENTANYL CITRATE 50 UG/ML
INJECTION, SOLUTION INTRAMUSCULAR; INTRAVENOUS CONTINUOUS PRN
Status: DISCONTINUED | OUTPATIENT
Start: 2025-01-14 | End: 2025-01-14

## 2025-01-14 RX ORDER — ONDANSETRON HYDROCHLORIDE 2 MG/ML
4 INJECTION, SOLUTION INTRAVENOUS EVERY 8 HOURS PRN
Status: CANCELLED | OUTPATIENT
Start: 2025-01-14

## 2025-01-14 RX ORDER — CEFAZOLIN SODIUM 2 G/100ML
2 INJECTION, SOLUTION INTRAVENOUS ONCE
Status: COMPLETED | OUTPATIENT
Start: 2025-01-14 | End: 2025-01-14

## 2025-01-14 RX ORDER — ONDANSETRON HYDROCHLORIDE 2 MG/ML
INJECTION, SOLUTION INTRAVENOUS AS NEEDED
Status: DISCONTINUED | OUTPATIENT
Start: 2025-01-14 | End: 2025-01-14

## 2025-01-14 RX ORDER — CYCLOBENZAPRINE HCL 10 MG
10 TABLET ORAL 3 TIMES DAILY PRN
Status: CANCELLED | OUTPATIENT
Start: 2025-01-14

## 2025-01-14 RX ORDER — ROCURONIUM BROMIDE 10 MG/ML
INJECTION, SOLUTION INTRAVENOUS AS NEEDED
Status: DISCONTINUED | OUTPATIENT
Start: 2025-01-14 | End: 2025-01-14

## 2025-01-14 RX ORDER — POTASSIUM CHLORIDE 14.9 MG/ML
20 INJECTION INTRAVENOUS
Status: COMPLETED | OUTPATIENT
Start: 2025-01-14 | End: 2025-01-14

## 2025-01-14 RX ORDER — PROCHLORPERAZINE EDISYLATE 5 MG/ML
5 INJECTION INTRAMUSCULAR; INTRAVENOUS ONCE AS NEEDED
Status: DISCONTINUED | OUTPATIENT
Start: 2025-01-14 | End: 2025-01-14 | Stop reason: HOSPADM

## 2025-01-14 RX ORDER — PHENYLEPHRINE HCL IN 0.9% NACL 1 MG/10 ML
SYRINGE (ML) INTRAVENOUS AS NEEDED
Status: DISCONTINUED | OUTPATIENT
Start: 2025-01-14 | End: 2025-01-14

## 2025-01-14 RX ORDER — IPRATROPIUM BROMIDE 0.5 MG/2.5ML
500 SOLUTION RESPIRATORY (INHALATION) ONCE
Status: DISCONTINUED | OUTPATIENT
Start: 2025-01-14 | End: 2025-01-14 | Stop reason: HOSPADM

## 2025-01-14 RX ORDER — SODIUM CHLORIDE, SODIUM LACTATE, POTASSIUM CHLORIDE, CALCIUM CHLORIDE 600; 310; 30; 20 MG/100ML; MG/100ML; MG/100ML; MG/100ML
100 INJECTION, SOLUTION INTRAVENOUS CONTINUOUS
Status: CANCELLED | OUTPATIENT
Start: 2025-01-14 | End: 2025-01-15

## 2025-01-14 RX ORDER — TRANEXAMIC ACID 10 MG/ML
1000 INJECTION, SOLUTION INTRAVENOUS ONCE
Status: COMPLETED | OUTPATIENT
Start: 2025-01-14 | End: 2025-01-14

## 2025-01-14 RX ORDER — ONDANSETRON HYDROCHLORIDE 2 MG/ML
4 INJECTION, SOLUTION INTRAVENOUS ONCE AS NEEDED
Status: DISCONTINUED | OUTPATIENT
Start: 2025-01-14 | End: 2025-01-14 | Stop reason: HOSPADM

## 2025-01-14 RX ORDER — LABETALOL HYDROCHLORIDE 5 MG/ML
INJECTION, SOLUTION INTRAVENOUS AS NEEDED
Status: DISCONTINUED | OUTPATIENT
Start: 2025-01-14 | End: 2025-01-14

## 2025-01-14 RX ORDER — ONDANSETRON 4 MG/1
4 TABLET, FILM COATED ORAL EVERY 8 HOURS PRN
Status: CANCELLED | OUTPATIENT
Start: 2025-01-14

## 2025-01-14 RX ORDER — GLYCOPYRROLATE 0.2 MG/ML
INJECTION INTRAMUSCULAR; INTRAVENOUS AS NEEDED
Status: DISCONTINUED | OUTPATIENT
Start: 2025-01-14 | End: 2025-01-14

## 2025-01-14 RX ORDER — SODIUM CHLORIDE, SODIUM LACTATE, POTASSIUM CHLORIDE, CALCIUM CHLORIDE 600; 310; 30; 20 MG/100ML; MG/100ML; MG/100ML; MG/100ML
50 INJECTION, SOLUTION INTRAVENOUS CONTINUOUS
Status: ACTIVE | OUTPATIENT
Start: 2025-01-14 | End: 2025-01-14

## 2025-01-14 RX ORDER — POLYETHYLENE GLYCOL 3350 17 G/17G
17 POWDER, FOR SOLUTION ORAL DAILY
Status: CANCELLED | OUTPATIENT
Start: 2025-01-14

## 2025-01-14 RX ORDER — MAGNESIUM SULFATE HEPTAHYDRATE 40 MG/ML
4 INJECTION, SOLUTION INTRAVENOUS ONCE
Status: COMPLETED | OUTPATIENT
Start: 2025-01-14 | End: 2025-01-14

## 2025-01-14 RX ORDER — LIDOCAINE HYDROCHLORIDE 20 MG/ML
INJECTION, SOLUTION INFILTRATION; PERINEURAL AS NEEDED
Status: DISCONTINUED | OUTPATIENT
Start: 2025-01-14 | End: 2025-01-14

## 2025-01-14 RX ORDER — LIDOCAINE HYDROCHLORIDE 10 MG/ML
0.1 INJECTION, SOLUTION INFILTRATION; PERINEURAL ONCE
Status: DISCONTINUED | OUTPATIENT
Start: 2025-01-14 | End: 2025-01-14 | Stop reason: HOSPADM

## 2025-01-14 RX ORDER — PROPOFOL 10 MG/ML
INJECTION, EMULSION INTRAVENOUS CONTINUOUS PRN
Status: DISCONTINUED | OUTPATIENT
Start: 2025-01-14 | End: 2025-01-14

## 2025-01-14 RX ORDER — BISACODYL 5 MG
10 TABLET, DELAYED RELEASE (ENTERIC COATED) ORAL DAILY PRN
Status: CANCELLED | OUTPATIENT
Start: 2025-01-14

## 2025-01-14 RX ORDER — ACETAMINOPHEN 325 MG/1
650 TABLET ORAL EVERY 4 HOURS PRN
Status: DISCONTINUED | OUTPATIENT
Start: 2025-01-14 | End: 2025-01-14 | Stop reason: HOSPADM

## 2025-01-14 RX ORDER — ALBUTEROL SULFATE 0.83 MG/ML
2.5 SOLUTION RESPIRATORY (INHALATION) ONCE AS NEEDED
Status: DISCONTINUED | OUTPATIENT
Start: 2025-01-14 | End: 2025-01-14 | Stop reason: HOSPADM

## 2025-01-14 RX ORDER — LIDOCAINE HYDROCHLORIDE 20 MG/ML
10 INJECTION, SOLUTION EPIDURAL; INFILTRATION; INTRACAUDAL; PERINEURAL ONCE
Status: DISCONTINUED | OUTPATIENT
Start: 2025-01-14 | End: 2025-01-17 | Stop reason: HOSPADM

## 2025-01-14 RX ORDER — SODIUM CHLORIDE 0.9 G/100ML
INJECTION, SOLUTION IRRIGATION AS NEEDED
Status: DISCONTINUED | OUTPATIENT
Start: 2025-01-14 | End: 2025-01-14 | Stop reason: HOSPADM

## 2025-01-14 RX ADMIN — POTASSIUM CHLORIDE 20 MEQ: 14.9 INJECTION, SOLUTION INTRAVENOUS at 07:54

## 2025-01-14 RX ADMIN — DEXAMETHASONE SODIUM PHOSPHATE 8 MG: 4 INJECTION, SOLUTION INTRAMUSCULAR; INTRAVENOUS at 14:13

## 2025-01-14 RX ADMIN — MAGNESIUM SULFATE HEPTAHYDRATE 4 G: 40 INJECTION, SOLUTION INTRAVENOUS at 07:51

## 2025-01-14 RX ADMIN — FENTANYL CITRATE 25 MCG: 50 INJECTION, SOLUTION INTRAMUSCULAR; INTRAVENOUS at 15:39

## 2025-01-14 RX ADMIN — TRANEXAMIC ACID 1000 MG: 10 INJECTION, SOLUTION INTRAVENOUS at 14:48

## 2025-01-14 RX ADMIN — SODIUM CHLORIDE, POTASSIUM CHLORIDE, SODIUM LACTATE AND CALCIUM CHLORIDE: 600; 310; 30; 20 INJECTION, SOLUTION INTRAVENOUS at 13:48

## 2025-01-14 RX ADMIN — POVIDONE-IODINE 1 APPLICATION: 5 SOLUTION TOPICAL at 12:53

## 2025-01-14 RX ADMIN — FENTANYL CITRATE 50 MCG: 50 INJECTION, SOLUTION INTRAMUSCULAR; INTRAVENOUS at 14:26

## 2025-01-14 RX ADMIN — SUGAMMADEX 200 MG: 100 INJECTION, SOLUTION INTRAVENOUS at 15:30

## 2025-01-14 RX ADMIN — PROPOFOL 100 MG: 10 INJECTION, EMULSION INTRAVENOUS at 13:53

## 2025-01-14 RX ADMIN — METOPROLOL TARTRATE 25 MG: 25 TABLET, FILM COATED ORAL at 08:10

## 2025-01-14 RX ADMIN — LEVOTHYROXINE SODIUM 112 MCG: 112 TABLET ORAL at 06:38

## 2025-01-14 RX ADMIN — POTASSIUM CHLORIDE 20 MEQ: 14.9 INJECTION, SOLUTION INTRAVENOUS at 10:11

## 2025-01-14 RX ADMIN — ROCURONIUM BROMIDE 50 MG: 10 INJECTION, SOLUTION INTRAVENOUS at 13:53

## 2025-01-14 RX ADMIN — SODIUM CHLORIDE, POTASSIUM CHLORIDE, SODIUM LACTATE AND CALCIUM CHLORIDE 50 ML/HR: 600; 310; 30; 20 INJECTION, SOLUTION INTRAVENOUS at 15:43

## 2025-01-14 RX ADMIN — PROPOFOL 50 MG: 10 INJECTION, EMULSION INTRAVENOUS at 14:26

## 2025-01-14 RX ADMIN — FENTANYL CITRATE 50 MCG: 50 INJECTION, SOLUTION INTRAMUSCULAR; INTRAVENOUS at 13:53

## 2025-01-14 RX ADMIN — ONDANSETRON 4 MG: 2 INJECTION INTRAMUSCULAR; INTRAVENOUS at 14:56

## 2025-01-14 RX ADMIN — LABETALOL HYDROCHLORIDE 10 MG: 5 INJECTION INTRAVENOUS at 15:32

## 2025-01-14 RX ADMIN — GLYCOPYRROLATE 0.1 MG: 0.2 INJECTION, SOLUTION INTRAMUSCULAR; INTRAVENOUS at 14:12

## 2025-01-14 RX ADMIN — Medication 200 MCG: at 14:45

## 2025-01-14 RX ADMIN — PROPOFOL 50 MG: 10 INJECTION, EMULSION INTRAVENOUS at 14:13

## 2025-01-14 RX ADMIN — Medication 200 MCG: at 15:22

## 2025-01-14 RX ADMIN — TRANEXAMIC ACID 1000 MG: 10 INJECTION, SOLUTION INTRAVENOUS at 14:07

## 2025-01-14 RX ADMIN — LIDOCAINE HYDROCHLORIDE 60 MG: 20 INJECTION, SOLUTION INFILTRATION; PERINEURAL at 13:53

## 2025-01-14 RX ADMIN — ROCURONIUM BROMIDE 20 MG: 10 INJECTION, SOLUTION INTRAVENOUS at 14:13

## 2025-01-14 RX ADMIN — HYDROMORPHONE HYDROCHLORIDE 0.5 MG: 1 INJECTION, SOLUTION INTRAMUSCULAR; INTRAVENOUS; SUBCUTANEOUS at 15:55

## 2025-01-14 RX ADMIN — CEFAZOLIN SODIUM 2 G: 2 INJECTION, SOLUTION INTRAVENOUS at 14:13

## 2025-01-14 RX ADMIN — FENTANYL CITRATE 25 MCG: 50 INJECTION, SOLUTION INTRAMUSCULAR; INTRAVENOUS at 15:46

## 2025-01-14 RX ADMIN — METOPROLOL TARTRATE 25 MG: 25 TABLET, FILM COATED ORAL at 20:51

## 2025-01-14 SDOH — HEALTH STABILITY: MENTAL HEALTH: CURRENT SMOKER: 0

## 2025-01-14 ASSESSMENT — PAIN SCALES - GENERAL
PAINLEVEL_OUTOF10: 0 - NO PAIN
PAINLEVEL_OUTOF10: 8
PAINLEVEL_OUTOF10: 3
PAINLEVEL_OUTOF10: 0 - NO PAIN
PAINLEVEL_OUTOF10: 3
PAINLEVEL_OUTOF10: 8
PAINLEVEL_OUTOF10: 3
PAINLEVEL_OUTOF10: 4
PAINLEVEL_OUTOF10: 0 - NO PAIN

## 2025-01-14 ASSESSMENT — PAIN DESCRIPTION - LOCATION: LOCATION: HIP

## 2025-01-14 ASSESSMENT — PAIN DESCRIPTION - DESCRIPTORS
DESCRIPTORS: SORE

## 2025-01-14 ASSESSMENT — COGNITIVE AND FUNCTIONAL STATUS - GENERAL
TURNING FROM BACK TO SIDE WHILE IN FLAT BAD: A LOT
EATING MEALS: A LOT
STANDING UP FROM CHAIR USING ARMS: A LOT
WALKING IN HOSPITAL ROOM: A LOT
CLIMB 3 TO 5 STEPS WITH RAILING: A LOT
HELP NEEDED FOR BATHING: A LOT
DRESSING REGULAR UPPER BODY CLOTHING: A LOT
PERSONAL GROOMING: A LOT
MOVING FROM LYING ON BACK TO SITTING ON SIDE OF FLAT BED WITH BEDRAILS: A LOT
DRESSING REGULAR LOWER BODY CLOTHING: A LOT
MOBILITY SCORE: 12
DAILY ACTIVITIY SCORE: 12
MOVING TO AND FROM BED TO CHAIR: A LOT
TOILETING: A LOT

## 2025-01-14 ASSESSMENT — PAIN - FUNCTIONAL ASSESSMENT
PAIN_FUNCTIONAL_ASSESSMENT: 0-10

## 2025-01-14 ASSESSMENT — PAIN DESCRIPTION - ORIENTATION: ORIENTATION: LEFT

## 2025-01-14 NOTE — PROGRESS NOTES
"Gabriella Haskins is a 79 y.o. female on day 1 of admission presenting with Closed fracture of neck of left femur.    Subjective   Left hip pain controlled at rest. Noted to be in a fib with rvr yesterday evening.        Objective     Physical Exam  Left hip MSK:  - no rash, ecchymosis, abrasions or lacerations noted over hip  - +log roll, heel strike  - Wiggles toes; + EHL/TA/TP/GS function intact  - SILT L3-S1; 2+ DP pulse, BCR across all digits   - Compartments soft and compressible   - TTP over groin    Last Recorded Vitals  Blood pressure 126/60, pulse 79, temperature 37 °C (98.6 °F), resp. rate 16, height 1.676 m (5' 6\"), weight 60.3 kg (133 lb), SpO2 99%.  Intake/Output last 3 Shifts:  I/O last 3 completed shifts:  In: 500 (8.3 mL/kg) [IV Piggyback:500]  Out: 0 (0 mL/kg)   Weight: 60.3 kg     Relevant Results  Results for orders placed or performed during the hospital encounter of 01/13/25 (from the past 24 hours)   ECG 12 lead   Result Value Ref Range    Ventricular Rate 90 BPM    Atrial Rate 90 BPM    NM Interval 220 ms    QRS Duration 79 ms    QT Interval 367 ms    QTC Calculation(Bazett) 449 ms    P Axis 57 degrees    R Axis -28 degrees    T Axis 67 degrees    QRS Count 14 beats    Q Onset 252 ms    T Offset 436 ms    QTC Fredericia 420 ms   CBC and Auto Differential   Result Value Ref Range    WBC 12.3 (H) 4.4 - 11.3 x10*3/uL    nRBC 0.0 0.0 - 0.0 /100 WBCs    RBC 4.76 4.00 - 5.20 x10*6/uL    Hemoglobin 13.2 12.0 - 16.0 g/dL    Hematocrit 40.6 36.0 - 46.0 %    MCV 85 80 - 100 fL    MCH 27.7 26.0 - 34.0 pg    MCHC 32.5 32.0 - 36.0 g/dL    RDW 13.0 11.5 - 14.5 %    Platelets 207 150 - 450 x10*3/uL    Neutrophils % 84.9 40.0 - 80.0 %    Immature Granulocytes %, Automated 0.4 0.0 - 0.9 %    Lymphocytes % 8.6 13.0 - 44.0 %    Monocytes % 5.3 2.0 - 10.0 %    Eosinophils % 0.4 0.0 - 6.0 %    Basophils % 0.4 0.0 - 2.0 %    Neutrophils Absolute 10.42 (H) 1.60 - 5.50 x10*3/uL    Immature Granulocytes Absolute, " Automated 0.05 0.00 - 0.50 x10*3/uL    Lymphocytes Absolute 1.05 0.80 - 3.00 x10*3/uL    Monocytes Absolute 0.65 0.05 - 0.80 x10*3/uL    Eosinophils Absolute 0.05 0.00 - 0.40 x10*3/uL    Basophils Absolute 0.05 0.00 - 0.10 x10*3/uL   Comprehensive metabolic panel   Result Value Ref Range    Glucose 95 74 - 99 mg/dL    Sodium 140 136 - 145 mmol/L    Potassium 3.3 (L) 3.5 - 5.3 mmol/L    Chloride 101 98 - 107 mmol/L    Bicarbonate 31 21 - 32 mmol/L    Anion Gap 11 10 - 20 mmol/L    Urea Nitrogen 16 6 - 23 mg/dL    Creatinine 0.72 0.50 - 1.05 mg/dL    eGFR 86 >60 mL/min/1.73m*2    Calcium 9.6 8.6 - 10.3 mg/dL    Albumin 3.8 3.4 - 5.0 g/dL    Alkaline Phosphatase 50 33 - 136 U/L    Total Protein 6.7 6.4 - 8.2 g/dL    AST 15 9 - 39 U/L    Bilirubin, Total 0.8 0.0 - 1.2 mg/dL    ALT 8 7 - 45 U/L   Type And Screen   Result Value Ref Range    ABO TYPE O     Rh TYPE POS     ANTIBODY SCREEN NEG    Coagulation Screen   Result Value Ref Range    Protime 12.4 9.8 - 12.8 seconds    INR 1.1 0.9 - 1.1    aPTT 29 27 - 38 seconds   Electrocardiogram, 12-lead PRN ACS symptoms   Result Value Ref Range    Ventricular Rate 118 BPM    Atrial Rate 122 BPM    KY Interval 114 ms    QRS Duration 78 ms    QT Interval 328 ms    QTC Calculation(Bazett) 460 ms    R Axis -28 degrees    T Axis 51 degrees    QRS Count 19 beats    Q Onset 249 ms    T Offset 413 ms    QTC Fredericia 411 ms   Magnesium   Result Value Ref Range    Magnesium 1.39 (L) 1.60 - 2.40 mg/dL   Basic Metabolic Panel   Result Value Ref Range    Glucose 120 (H) 74 - 99 mg/dL    Sodium 141 136 - 145 mmol/L    Potassium 3.1 (L) 3.5 - 5.3 mmol/L    Chloride 100 98 - 107 mmol/L    Bicarbonate 29 21 - 32 mmol/L    Anion Gap 15 10 - 20 mmol/L    Urea Nitrogen 15 6 - 23 mg/dL    Creatinine 0.71 0.50 - 1.05 mg/dL    eGFR 87 >60 mL/min/1.73m*2    Calcium 8.9 8.6 - 10.3 mg/dL   CBC   Result Value Ref Range    WBC 10.4 4.4 - 11.3 x10*3/uL    nRBC 0.0 0.0 - 0.0 /100 WBCs    RBC 4.31 4.00 -  5.20 x10*6/uL    Hemoglobin 12.0 12.0 - 16.0 g/dL    Hematocrit 37.0 36.0 - 46.0 %    MCV 86 80 - 100 fL    MCH 27.8 26.0 - 34.0 pg    MCHC 32.4 32.0 - 36.0 g/dL    RDW 13.2 11.5 - 14.5 %    Platelets 185 150 - 450 x10*3/uL     CT hip left wo IV contrast 01/13/2025    Narrative  Interpreted By:  Chela Banegas,  STUDY:  CT HIP LEFT WO IV CONTRAST; ;  1/13/2025 5:49 pm    INDICATION:  Signs/Symptoms:fracture.      COMPARISON:  Radiographs of the same day    ACCESSION NUMBER(S):  [id number]    ORDERING CLINICIAN:  THEA BULLOCK    TECHNIQUE:  Noncontrast CT images of the left hip with axial, sagittal and  coronal reconstructed images.    FINDINGS:  There is an acute fracture of the left femoral neck extending from  the subcapital femoral neck laterally to the transcervical femoral  neck medially. The fracture is mildly impacted, but not significantly  displaced.    Moderate left hip osteoarthrosis.    Left gluteal and proximal thigh soft tissues are unremarkable.  Intrapelvic structures are remarkable for colonic diverticulosis.    Impression  Acute, impacted left subcapital-transcervical femoral neck fracture.      MACRO:  None    Signed by: Chela Banegas 1/13/2025 6:57 PM  Dictation workstation:   [id number]                   Assessment/Plan   Assessment & Plan  Closed fracture of neck of left femur    Fall, initial encounter    Left valgus impact femoral neck fracture    - Plan for OR today for left hip CRPP vs. Hemiarthroplasty pending cardiac clearance  - Please keep NPO  - Hold AC  - Maintain NWB LLE on bedrest        I spent 5 minutes in the professional and overall care of this patient.      Thea Bullock, DO

## 2025-01-14 NOTE — CONSULTS
"Consults Cardiology  Impression:  Perioperative risk stratification  Paroxysmal atrial fibrillation  Hypertension  Hypothyroid    Plan:  Moderate risk for perioperative MI.  No further cardiac workup is indicated prior to surgery.  Risk of surgery is outweighed by the benefits.  Post surgery would recommend initiation of systemic anticoagulation for paroxysmal atrial fibrillation once okay from the surgical standpoint.      History Of Present Illness:    Gabriella Haskins is a 79 y.o. female presenting with complaints of pain along the left hip after a mechanical fall after she tripped on a table foot.  She denies lightheaded dizziness chest pain palpitations syncope loss of consciousness lower extreme swelling orthopnea PND.    Found to have left femoral neck fracture     Initial ECG showing atrial fibrillation with a controlled rate subsequent ECG showing sinus rhythm no ischemic changes.    Last Recorded Vitals:  Vitals:    01/14/25 0500 01/14/25 0600 01/14/25 0730 01/14/25 0900   BP: 133/61 126/60  138/66   BP Location:    Right arm   Patient Position:    Lying   Pulse: 76 71 79 72   Resp: (!) 35 17 16 16   Temp:       SpO2: 99% 99% 99% 98%   Weight:       Height:           Last Labs:  CBC - 1/14/2025:  4:55 AM  10.4 12.0 185    37.0      CMP - 1/14/2025:  4:55 AM  8.9 6.7 15 --- 0.8   _ 3.8 8 50      PTT - 1/13/2025:  1:26 PM  1.1   12.4 29     No results found for: \"TROPHS\", \"BNP\", \"HGBA1C\", \"LDLCALC\", \"VLDL\"   Last I/O:  I/O last 3 completed shifts:  In: 500 (8.3 mL/kg) [IV Piggyback:500]  Out: 0 (0 mL/kg)   Weight: 60.3 kg       Stress Test:  NUCLEAR STRESS TEST 10/02/2023: Negative for ischemia          Past Medical History:  She has a past medical history of Arthritis, History of transfusion, and Lung nodule.    Past Surgical History:  She has a past surgical history that includes Shoulder surgery.      Social History:  She reports that she has never smoked. She has never used smokeless tobacco. She reports " that she does not currently use alcohol. She reports that she does not use drugs.    Family History:  No family history on file.     Allergies:  Azithromycin, Codeine, Conjugated estrogens, Erythromycin, Ibuprofen, Lisinopril, Cephalexin, Doxycycline, Indomethacin, Bactrim [sulfamethoxazole-trimethoprim], and Amoxicillin    Inpatient Medications:  Scheduled medications   Medication Dose Route Frequency    fluticasone  2 spray Each Nostril Daily    [Held by provider] hydroCHLOROthiazide  25 mg oral Daily    levothyroxine  112 mcg oral Daily    magnesium sulfate  4 g intravenous Once    metoprolol tartrate  25 mg oral BID    potassium chloride  20 mEq intravenous q2h     PRN medications   Medication    acetaminophen    HYDROmorphone    HYDROmorphone    metoprolol     Continuous Medications   Medication Dose Last Rate     Outpatient Medications:  Current Outpatient Medications   Medication Instructions    ascorbic acid, vitamin C, 500 mg capsule 1 tablet, Daily    carboxymethylcellulose (TheraTears) 0.25 % ophthalmic solution 1 drop, Both Eyes, 4 times daily PRN    cholecalciferol (VITAMIN D-3) 1,000 Units, Daily RT    clindamycin (Cleocin) 150 mg capsule 4 capsules, oral, Once PRN Procedure, Prior to dental appointments    cyanocobalamin (Vitamin B-12) 1,000 mcg/mL injection Inject 1 mL (1,000 mcg) into the muscle every 14 (fourteen) days.    hydroCHLOROthiazide (HYDRODIURIL) 25 mg, Daily    ketoconazole (NIZOral) 2 % cream Topical, 2 times daily    levocetirizine (XYZAL) 5 mg, Every evening    levothyroxine (Synthroid, Levoxyl) 112 mcg tablet Take 1 tablet (112 mcg) by mouth early in the morning..    metoprolol tartrate (LOPRESSOR) 12.5 mg, oral, 2 times daily    omega-3/dha/epa/fish oil (OMEGA-3 FISH OIL ORAL) 1 tablet, Daily    triamcinolone (Nasacort) 55 mcg nasal inhaler 2 sprays, Daily    vit C,A-Gw-etqvw-lutein-zeaxan (PreserVision AREDS-2) 250-90-40-1 mg capsule 1 capsule       Physical Exam:  Alert awake  oriented x 3 no acute distress heart sounds regular lungs are clear the abdomen soft no lower extremity edema           Code Status:  Full Code    I spent 45 minutes in the professional and overall care of this patient.        Denis Dawson MD

## 2025-01-14 NOTE — ANESTHESIA PROCEDURE NOTES
Airway  Date/Time: 1/14/2025 1:59 PM  Urgency: elective    Difficult airway    Staffing  Performed: attending   Authorized by: Yoseph Haque MD    Performed by: Yoseph Haque MD  Patient location during procedure: OR    Indications and Patient Condition  Indications for airway management: anesthesia  Spontaneous ventilation: present  Sedation level: deep  Preoxygenated: yes  Patient position: sniffing  Mask difficulty assessment: 0 - not attempted  Planned trial extubation    Final Airway Details  Final airway type: endotracheal airway      Successful airway: ETT  Cuffed: yes   Successful intubation technique: direct laryngoscopy  Facilitating devices/methods: intubating stylet  Blade size: #3  ETT size (mm): 7.0  Cormack-Lehane Classification: grade IIb - view of arytenoids or posterior of glottis only  Placement verified by: capnometry   Measured from: lips  ETT to lips (cm): 20  Number of attempts at approach: 1  Number of other approaches attempted: 0

## 2025-01-14 NOTE — PROGRESS NOTES
"                                                                 Hospital Medicine Progress Note       Patient Name: Gabriella Haskins   YOB: 1945    Subjective:    Gabriella Haskins is a 79 y.o.yo female who is hospital day 1     Patient was seen and examined at bedside. She reported no new complaints and is comfortable upon examination. She was noted to be in atrial fibrillation with RVR last night 1/13/2025 around 1800.  Her heart rate was controlled and she converted out of atrial-fibrillation after one dose of IV lopressor 5 mg and sodium chloride 0.9% bolus in addition to her nightly PO lopressor 25 mg PO. Patient's heart rate was controlled throughout the night and she was monitored on telemetry. Cardiology was consulted for pre-operative optimization this morning prior to her ORIF with orthopedic surgery. Her morning labs revealed hypokalemia and hypomagnesemia.      Objective:    Recent labs, imaging, vital signs and notes from other providers were reviewed     /60   Pulse 79   Temp 37 °C (98.6 °F)   Resp 16   Ht 1.676 m (5' 6\")   Wt 60.3 kg (133 lb)   LMP  (LMP Unknown)   SpO2 99%   BMI 21.47 kg/m²     Physical Exam:    GENERAL: well developed, non-toxic, NAD, alert & cooperative  HEENT: normocephalic, atraumatic, sclera clear  CARDIAC: regular rate & rhythm, no murmurs, S1/S2  PULMONARY: no respiratory distress, + expiratory wheezes, - rales, - rhonchi  ABDOMEN: soft, non-tender, non-distended  MSK: no peripheral edema, no obvious deformity,  Decreased ROM of left extremity, able to move left foot and phalanges. Patient has tenderness to palpation over the left femur. Femoral and tibial pulses intact bilaterally.   NEURO: A&O X 3, non-focal, CN II-XII grossly intact, Bilateral plantar foot sensation intact.   SKIN: Warm and dry, no lesions, no rashes.  PSYCH: appropriate mood & affect     Assessment/Plan:    Comminuted fracture through the neck and subcapital region of the left " femur.   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 Patient is to remain NPO until post-op.   On her AM labs, she was found to be hypokalemia (3.1), she received potassium chloride 20 mEq IV this morning and will continue as needed q2h. Patient was also found to have hypomagnesemia (1.39) and given magnesium sulfate 4 g IV once. Will recheck labs after surgery this afternoon. Patient's TSH level was 0.16  and free thyroxine is 1.34. Will evaluate the dosage of her levothyroxine and possible adjust post-operatively, it is a possibly that this caused the patient's atrial fibrillation last night. Patient will continue her home medications of metoprolol 25 mg PO BID (hold is SBP < 100 or HR <60), levothyroxine tablet 112 mcg PO daily, and fluticasone 2 sprays each nostril daily. Patient was comfortable upon examination after receiving dilaudid 0.5 mg IV in the ED. Will control pain with PRN acetaminophen  q6h and hydromorphone IV 0.2 mg for severe and 0.1 mg q3h for moderate pain. Lopressor 5 mg IV PRN ordered added for any recurrences of atrial fibrillation with RVR. Patient was noted to be in atrial fibrillation with RVR at 1800 on 1/13/2025, she is awaiting cardiology consult and pre-operative clearance this AM prior to scheduled ORIF. If she receives cardiology clearance, she will proceed with ORIF with orthopedic surgery later this afternoon.     DVT prophylaxis: Subcutaneous heparin on hold   Code Status: Full Code   Disposition: Med/Surg     BARAK BALBUENA  [facility] Medicine

## 2025-01-14 NOTE — ANESTHESIA POSTPROCEDURE EVALUATION
Patient: Gabriella Haskins    Procedure Summary       Date: 01/14/25 Room / Location: [facility] OR 12 / Virtual [facility] OR    Anesthesia Start: 1348 Anesthesia Stop:     Procedure: BIPOLAR LEFT HIP HEMIARTHROPLASTY (Left: Hip) Diagnosis:       Closed fracture of neck of left femur, initial encounter      (Closed fracture of neck of left femur, initial encounter [S72.002A])    Surgeons: Altaf Wells MD Responsible Provider: Yoseph Haque MD    Anesthesia Type: general ASA Status: 3            Anesthesia Type: general    Vitals Value Taken Time   /63 01/14/25 1542   Temp 36.9 °C (98.4 °F) 01/14/25 1541   Pulse 71 01/14/25 1544   Resp 14 01/14/25 1541   SpO2 100 % 01/14/25 1544   Vitals shown include unfiled device data.    Anesthesia Post Evaluation    Patient location during evaluation: PACU  Patient participation: complete - patient participated  Level of consciousness: awake and alert  Pain management: adequate  Airway patency: patent  Cardiovascular status: acceptable  Respiratory status: acceptable  Hydration status: acceptable  Postoperative Nausea and Vomiting: none    There were no known notable events for this encounter.

## 2025-01-14 NOTE — ANESTHESIA PREPROCEDURE EVALUATION
Patient: Gabriella Haskins    Procedure Information       Date/Time: 01/14/25 6715    Procedure: BIPOLAR LEFT HIP HEMIARTHROPLASTY (Left: Hip)    Location: [facility] OR 06 / Virtual [facility] OR    Surgeons: Altaf Wells MD            Relevant Problems   Anesthesia   (+) Poor venous access      Cardiac   (+) A-fib (Multi)   (+) Cardiac arrest   (+) Essential hypertension   (+) Hypercholesterolemia      /Renal   (+) Acute pyelonephritis      Endocrine   (+) Hypothyroidism      Hematology   (+) Anemia due to vitamin B12 deficiency      Musculoskeletal   (+) Generalized OA   (+) Primary osteoarthritis of left hip      ID   (+) Acute pyelonephritis       Clinical information reviewed:   Tobacco  Allergies  Meds  Problems  Med Hx  Surg Hx   Fam Hx  Soc   Hx        NPO Detail:  No data recorded     Physical Exam    Airway  Mallampati: II  TM distance: >3 FB  Neck ROM: full     Cardiovascular - normal exam  Rhythm: regular  Rate: normal     Dental - normal exam     Pulmonary - normal exam     Abdominal      Other findings: Patient on 3L nc          Anesthesia Plan    History of general anesthesia?: yes  History of complications of general anesthesia?: no    ASA 3     general     The patient is not a current smoker.    intravenous induction   Postoperative administration of opioids is intended.  Trial extubation is planned.  Anesthetic plan and risks discussed with patient.  Use of blood products discussed with patient who consented to blood products.    Plan discussed with CAA.

## 2025-01-14 NOTE — CARE PLAN
The patient's goals for the shift include  to remain hemodynamically stable and pain mgt.     The clinical goals for the shift include  to remain hemodynamically stable and pain mgt.     Over the shift, the patient did make progress toward the following goals.

## 2025-01-14 NOTE — OP NOTE
PRE OP DIAGNOSIS - FX LEFT HIP FEM NECK    POST OP DIAGNOSIS- FX LEFT HIP FEM NECK AND OA LEFT KNEE    PROCEDURE - LEFT BIPOLAR POSTERIOR AND STEROID INJECTION LEFT KNEE    SURGEON- THEA GONZALEZ MD    ASSIST-   KIKO    BLOOD LOSS  200  CC    FINDINGS- FX  LEFT HIP    TOURNIQUET- NONE    DRAIN- NONE    IMPLANTS    STEM  STANDARD COLLAR CORAIL SIZE   13  BIPOLAR  CUP SIZE 44  HEAD  28 + 5     OPERATIVE PROCEDURE    OPERATIVE PROCEDURE    UNDER STERILE CONDITIONS 1 ml KENALOG INJECTED LEFT KNEE JOINT    PT WAS TAKEN TO THE OPERATING ROOM AND PLACED SUPINE ON THE OPERATING TABLE. UNDER ADEQUATE GENERAL ANESTHESIA PT WAS PLACED IN THE RIGHT LATERAL DECUBITUS POSITION WITH THE RIGHT SIDE UP AND PREPPED AND DRAPED IN A STERILE MANNER. POSTERIOR APPROACH WAS MADE TO THE RIGHT HIP CENTERED OVER THE GREATER TROCHANTER AND COMING PROXIMALLY AND DISTALLY THROUGH THE SKIN, SUBCUTANEOUS TISSUE AND DEEP FASCIA.  A CHARNLEY RETRACTOR WAS PLACED DEEP TO THE FASCIA.  WITH THE HIP IN INTERNAL ROTATION TO PROTECT THE UNDERLYING SCIATIC NERVE THE EXTERNAL ROTATORS WERE RELEASED FROM THE PROXIMAL FEMUR AND THE PIRIFORMIS TENDON WAS TAGGED WITH A NUMBER 2 ETHIBOND STITCH. THE CAPSULE WAS CUT FROM SUPERIOR TO INFERIOR AND T'D POSTERIORLY EXPOSING THE FX AND JOINT.  THE FEMORAL NECK CUT WAS MADE WITH AN OSCILLATING SAW ABOVE THE LESSER TROCHANTER.  THE ACETABULUM WAS SIZED TO THE ABOVE IMPLANT.    A BOX CUTTING OSTEOTOME WAS USED TO MAKE AN ENTRY INTO THE PROXIMAL FEMUR.  A RASP WAS USED AND THEN I BROACHED THE FEMUR UP TO THE SIZE OF THE ABOVE COMPONENT. A STANDARD NECK AND ABOVE SIZED HEAD WAS USED WITH THE ABOVE SIZE SELF CENTERING CUP. THE HIP WAS REDUCED AND THERE WAS GOOD RANGE OF MOTION, GOOD STABILITY.  THE TRIAL COMPONENTS WERE REMOVED.  I PRESS FIT THE ABOVE SIZE STEM AND PLACED THE ABOVE SIZE SELF CENTERING CUP AND HEAD OVER THE ARROYO TAPER AND TAPPED GENTLY.   THE HIP WAS THEN REDUCED AND AGAIN FOUND TO HAVE GOOD RANGE OF  MOTION AND GOOD STABILITY.  THE WOUND WAS IRRIGATED.  THE CAPSULE WAS REAPPROXIMATED WITH  NUMBER 2 ETHIBOND AND THE PIRIFORMIS TENDON WAS REATTACHED TO THE PROXIMAL FEMUR THROUGH 2 DRILL HOLES USING NUMBER 2 ETHIBOND STITCHES. THE DEEP TISSUE WAS CLOSED WITH AN INTERRUPTED NUMBER 1-VICRYL STITCH.  THE SUBCUTANEOUS TISSUE WAS CLOSED WITH INTERRUPTED 2-0 VICRYL AND THE SKIN WAS CLOSED WITH STAPLES. A DRY STERILE DRESSING WAS PLACED AND THE PATIENT WAS PLACED IN AN ABDUCTION PILLOW AND RETURNED TO THE RECOVERY ROOM IN SATISFACTORY CONDITION.    THE EXCELLENT ASSISTANCE OF CERTIFIED PA WAS NECESSARY FOR SUCCESSFUL OUTCOME OF THIS SURGERY INCLUDING PREP, EXPOSURE, RETRACTING AND CLOSURE    DICTATED BY THEA GONZALEZ MD

## 2025-01-15 LAB
ANION GAP SERPL CALC-SCNC: 12 MMOL/L (ref 10–20)
ATRIAL RATE: 85 BPM
BUN SERPL-MCNC: 22 MG/DL (ref 6–23)
CALCIUM SERPL-MCNC: 8.8 MG/DL (ref 8.6–10.3)
CHLORIDE SERPL-SCNC: 100 MMOL/L (ref 98–107)
CO2 SERPL-SCNC: 28 MMOL/L (ref 21–32)
CREAT SERPL-MCNC: 0.79 MG/DL (ref 0.5–1.05)
EGFRCR SERPLBLD CKD-EPI 2021: 77 ML/MIN/1.73M*2
ERYTHROCYTE [DISTWIDTH] IN BLOOD BY AUTOMATED COUNT: 13.3 % (ref 11.5–14.5)
GLUCOSE SERPL-MCNC: 131 MG/DL (ref 74–99)
HCT VFR BLD AUTO: 33.7 % (ref 36–46)
HGB BLD-MCNC: 10.9 G/DL (ref 12–16)
MAGNESIUM SERPL-MCNC: 2.06 MG/DL (ref 1.6–2.4)
MCH RBC QN AUTO: 28.1 PG (ref 26–34)
MCHC RBC AUTO-ENTMCNC: 32.3 G/DL (ref 32–36)
MCV RBC AUTO: 87 FL (ref 80–100)
NRBC BLD-RTO: 0 /100 WBCS (ref 0–0)
P AXIS: 70 DEGREES
P OFFSET: 192 MS
P ONSET: 122 MS
PLATELET # BLD AUTO: 177 X10*3/UL (ref 150–450)
POTASSIUM SERPL-SCNC: 3.8 MMOL/L (ref 3.5–5.3)
PR INTERVAL: 210 MS
Q ONSET: 227 MS
QRS COUNT: 14 BEATS
QRS DURATION: 78 MS
QT INTERVAL: 362 MS
QTC CALCULATION(BAZETT): 430 MS
QTC FREDERICIA: 406 MS
R AXIS: 25 DEGREES
RBC # BLD AUTO: 3.88 X10*6/UL (ref 4–5.2)
SODIUM SERPL-SCNC: 136 MMOL/L (ref 136–145)
T AXIS: 79 DEGREES
T OFFSET: 408 MS
VENTRICULAR RATE: 85 BPM
WBC # BLD AUTO: 13.6 X10*3/UL (ref 4.4–11.3)

## 2025-01-15 PROCEDURE — 2500000002 HC RX 250 W HCPCS SELF ADMINISTERED DRUGS (ALT 637 FOR MEDICARE OP, ALT 636 FOR OP/ED)

## 2025-01-15 PROCEDURE — 36415 COLL VENOUS BLD VENIPUNCTURE: CPT

## 2025-01-15 PROCEDURE — 83735 ASSAY OF MAGNESIUM: CPT

## 2025-01-15 PROCEDURE — 85027 COMPLETE CBC AUTOMATED: CPT

## 2025-01-15 PROCEDURE — 2500000002 HC RX 250 W HCPCS SELF ADMINISTERED DRUGS (ALT 637 FOR MEDICARE OP, ALT 636 FOR OP/ED): Performed by: INTERNAL MEDICINE

## 2025-01-15 PROCEDURE — 99232 SBSQ HOSP IP/OBS MODERATE 35: CPT | Performed by: INTERNAL MEDICINE

## 2025-01-15 PROCEDURE — 2500000004 HC RX 250 GENERAL PHARMACY W/ HCPCS (ALT 636 FOR OP/ED): Performed by: INTERNAL MEDICINE

## 2025-01-15 PROCEDURE — 1200000002 HC GENERAL ROOM WITH TELEMETRY DAILY

## 2025-01-15 PROCEDURE — 80048 BASIC METABOLIC PNL TOTAL CA: CPT

## 2025-01-15 PROCEDURE — 2500000004 HC RX 250 GENERAL PHARMACY W/ HCPCS (ALT 636 FOR OP/ED)

## 2025-01-15 PROCEDURE — 2500000001 HC RX 250 WO HCPCS SELF ADMINISTERED DRUGS (ALT 637 FOR MEDICARE OP): Performed by: INTERNAL MEDICINE

## 2025-01-15 RX ORDER — ENOXAPARIN SODIUM 100 MG/ML
40 INJECTION SUBCUTANEOUS EVERY 24 HOURS
Status: DISCONTINUED | OUTPATIENT
Start: 2025-01-15 | End: 2025-01-16

## 2025-01-15 RX ADMIN — ACETAMINOPHEN 650 MG: 325 TABLET, FILM COATED ORAL at 02:00

## 2025-01-15 RX ADMIN — LEVOTHYROXINE SODIUM 100 MCG: 0.05 TABLET ORAL at 06:07

## 2025-01-15 RX ADMIN — METOPROLOL TARTRATE 25 MG: 25 TABLET, FILM COATED ORAL at 20:33

## 2025-01-15 RX ADMIN — ACETAMINOPHEN 650 MG: 325 TABLET, FILM COATED ORAL at 08:06

## 2025-01-15 RX ADMIN — FLUTICASONE PROPIONATE 2 SPRAY: 50 SPRAY, METERED NASAL at 15:27

## 2025-01-15 RX ADMIN — HYDROMORPHONE HYDROCHLORIDE 0.2 MG: 0.2 INJECTION, SOLUTION INTRAMUSCULAR; INTRAVENOUS; SUBCUTANEOUS at 21:25

## 2025-01-15 RX ADMIN — ENOXAPARIN SODIUM 40 MG: 40 INJECTION SUBCUTANEOUS at 18:57

## 2025-01-15 RX ADMIN — METOPROLOL TARTRATE 25 MG: 25 TABLET, FILM COATED ORAL at 08:00

## 2025-01-15 RX ADMIN — ACETAMINOPHEN 650 MG: 325 TABLET, FILM COATED ORAL at 20:32

## 2025-01-15 ASSESSMENT — PAIN SCALES - GENERAL
PAINLEVEL_OUTOF10: 3
PAINLEVEL_OUTOF10: 1
PAINLEVEL_OUTOF10: 3
PAINLEVEL_OUTOF10: 3
PAINLEVEL_OUTOF10: 2
PAINLEVEL_OUTOF10: 0 - NO PAIN
PAINLEVEL_OUTOF10: 8

## 2025-01-15 ASSESSMENT — PAIN - FUNCTIONAL ASSESSMENT
PAIN_FUNCTIONAL_ASSESSMENT: 0-10

## 2025-01-15 ASSESSMENT — COGNITIVE AND FUNCTIONAL STATUS - GENERAL
MOBILITY SCORE: 12
STANDING UP FROM CHAIR USING ARMS: A LOT
CLIMB 3 TO 5 STEPS WITH RAILING: A LOT
EATING MEALS: A LOT
TURNING FROM BACK TO SIDE WHILE IN FLAT BAD: A LOT
MOVING FROM LYING ON BACK TO SITTING ON SIDE OF FLAT BED WITH BEDRAILS: A LOT
MOVING TO AND FROM BED TO CHAIR: A LOT
WALKING IN HOSPITAL ROOM: A LOT
PERSONAL GROOMING: A LOT
DAILY ACTIVITIY SCORE: 12
DRESSING REGULAR UPPER BODY CLOTHING: A LOT
TOILETING: A LOT
HELP NEEDED FOR BATHING: A LOT
DRESSING REGULAR LOWER BODY CLOTHING: A LOT

## 2025-01-15 ASSESSMENT — PAIN DESCRIPTION - LOCATION
LOCATION: HIP

## 2025-01-15 ASSESSMENT — PAIN DESCRIPTION - ORIENTATION
ORIENTATION: RIGHT

## 2025-01-15 ASSESSMENT — PAIN DESCRIPTION - DESCRIPTORS: DESCRIPTORS: SORE

## 2025-01-15 NOTE — PROGRESS NOTES
01/15/25 1600   Discharge Planning   Living Arrangements Spouse/significant other   Support Systems Family members   Type of Residence Private residence   Number of Stairs to Enter Residence 4   Number of Stairs Within Residence 8   Expected Discharge Disposition Rehab   Does the patient need discharge transport arranged? No     Patient is POD 1 L hip arthroplasty after sustaining a hip fracture from a fall at home.  I met with patient at her bedside, verified insurance and demographics.  Patient lives with her .  She does not use mobility aids and is independent with ADLs at baseline.  Patient has not worked with PT/OT yet but does realize she will require rehabilitation and verbalized preference for [facility] as she has been there in the past after a fall.  Referral sent; care Coordination team following for assistance with discharge planning.  Dominik SPENCER TCC

## 2025-01-15 NOTE — PROGRESS NOTES
"                                                                 Hospital Medicine Progress Note       Patient Name: Gabriella Haskins   YOB: 1945    Subjective:    Gabriella Haskins is a 79 y.o.yo female who is hospital day 2     Patient seen and examined at bedside. She reports she is comfortable and currently not in pain. Reports she had some pain with muscle spasms during the night and received Tylenol with improvement. Denies chest pain, palpitations, fever, chills, headache, dizziness, or shortness of breath.  Post-operatively on 2 L nasal cannula supplemental oxygen.       Objective:    Recent labs, imaging, vital signs and notes from other providers were reviewed     /63   Pulse 62   Temp 36.2 °C (97.2 °F)   Resp 16   Ht 1.676 m (5' 6\")   Wt 60.3 kg (133 lb)   LMP  (LMP Unknown)   SpO2 100%   BMI 21.47 kg/m²     Physical Exam:    GENERAL: well developed, non-toxic, NAD, alert & cooperative  HEENT: normocephalic, atraumatic, sclera clear  CARDIAC: regular rate & rhythm, S1/S2  PULMONARY: no respiratory distress, + expiratory wheezes, - rales, - rhonchi  ABDOMEN: soft, non-tender, non-distended  MSK: no peripheral edema, no obvious deformity, Surgical dressing over the left lip. NV intact upon exam.   NEURO: A&O X 3, non-focal, CN II-XII grossly intact  SKIN: Warm and dry, no lesions, no rashes.  PSYCH: appropriate mood & affect     Assessment/Plan:    Comminuted fracture through the neck and subcapital region of the left femur.   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 CMP this AM showed potassium and magnesium levels have stabilized to normal limits since yesterday. CBC revealed hemoglobin of 10.9, she is hemodynamically " stable, no indication for transfusion at this time. Will begin anticoagulation with Eliquis when we receive clearance from orthopedic surgery. Will control pain with PRN acetaminophen  q6h and hydromorphone IV 0.2 mg for severe and 0.1 mg q3h for moderate pain. Lopressor 5 mg IV PRN ordered added for any recurrences of atrial fibrillation. Patient's dosing of levothyroxine was adjusted to 100 mcg PO once daily before breakfast, due to TSH 0.16 and T4 of 1.34. When examining at bedside the patient was post-operatively on 2 L nasal cannula oxygen with an spO2 of 100%, therefore we discontinued supplemental oxygen and she will continue on room air. She will continue to be followed by cardiology and orthopedic surgery. Reference orthopedic surgery progress note for all WB status updates. PT/OT consult placed for assessment. Patient stated she thinks it would be best for her to go to a rehab to regain her strength prior to returning home.       DVT Prophylaxis: Awaiting clearance from Dr. Wells to begin anticoagulation today   Code Status: Full Code   Disposition: Med/Surg      BARAK BALBUENA  [facility] Medicine

## 2025-01-16 LAB
ANION GAP SERPL CALC-SCNC: 11 MMOL/L (ref 10–20)
BUN SERPL-MCNC: 18 MG/DL (ref 6–23)
CALCIUM SERPL-MCNC: 8.8 MG/DL (ref 8.6–10.3)
CHLORIDE SERPL-SCNC: 100 MMOL/L (ref 98–107)
CO2 SERPL-SCNC: 30 MMOL/L (ref 21–32)
CREAT SERPL-MCNC: 0.69 MG/DL (ref 0.5–1.05)
EGFRCR SERPLBLD CKD-EPI 2021: 89 ML/MIN/1.73M*2
ERYTHROCYTE [DISTWIDTH] IN BLOOD BY AUTOMATED COUNT: 13.3 % (ref 11.5–14.5)
GLUCOSE SERPL-MCNC: 91 MG/DL (ref 74–99)
HCT VFR BLD AUTO: 33.2 % (ref 36–46)
HGB BLD-MCNC: 10.5 G/DL (ref 12–16)
MAGNESIUM SERPL-MCNC: 1.74 MG/DL (ref 1.6–2.4)
MCH RBC QN AUTO: 27.9 PG (ref 26–34)
MCHC RBC AUTO-ENTMCNC: 31.6 G/DL (ref 32–36)
MCV RBC AUTO: 88 FL (ref 80–100)
NRBC BLD-RTO: 0 /100 WBCS (ref 0–0)
PLATELET # BLD AUTO: 194 X10*3/UL (ref 150–450)
POTASSIUM SERPL-SCNC: 3.6 MMOL/L (ref 3.5–5.3)
RBC # BLD AUTO: 3.77 X10*6/UL (ref 4–5.2)
SODIUM SERPL-SCNC: 137 MMOL/L (ref 136–145)
WBC # BLD AUTO: 11.7 X10*3/UL (ref 4.4–11.3)

## 2025-01-16 PROCEDURE — 99239 HOSP IP/OBS DSCHRG MGMT >30: CPT | Performed by: INTERNAL MEDICINE

## 2025-01-16 PROCEDURE — 1200000002 HC GENERAL ROOM WITH TELEMETRY DAILY

## 2025-01-16 PROCEDURE — 2500000002 HC RX 250 W HCPCS SELF ADMINISTERED DRUGS (ALT 637 FOR MEDICARE OP, ALT 636 FOR OP/ED): Performed by: INTERNAL MEDICINE

## 2025-01-16 PROCEDURE — 2500000001 HC RX 250 WO HCPCS SELF ADMINISTERED DRUGS (ALT 637 FOR MEDICARE OP): Performed by: INTERNAL MEDICINE

## 2025-01-16 PROCEDURE — 97166 OT EVAL MOD COMPLEX 45 MIN: CPT | Mod: GO

## 2025-01-16 PROCEDURE — 80048 BASIC METABOLIC PNL TOTAL CA: CPT

## 2025-01-16 PROCEDURE — 2500000002 HC RX 250 W HCPCS SELF ADMINISTERED DRUGS (ALT 637 FOR MEDICARE OP, ALT 636 FOR OP/ED)

## 2025-01-16 PROCEDURE — 36415 COLL VENOUS BLD VENIPUNCTURE: CPT

## 2025-01-16 PROCEDURE — 97116 GAIT TRAINING THERAPY: CPT | Mod: GP

## 2025-01-16 PROCEDURE — 97162 PT EVAL MOD COMPLEX 30 MIN: CPT | Mod: GP

## 2025-01-16 PROCEDURE — 85027 COMPLETE CBC AUTOMATED: CPT

## 2025-01-16 PROCEDURE — 83735 ASSAY OF MAGNESIUM: CPT

## 2025-01-16 PROCEDURE — 2500000004 HC RX 250 GENERAL PHARMACY W/ HCPCS (ALT 636 FOR OP/ED): Performed by: INTERNAL MEDICINE

## 2025-01-16 RX ADMIN — METOPROLOL TARTRATE 25 MG: 25 TABLET, FILM COATED ORAL at 08:19

## 2025-01-16 RX ADMIN — HYDROMORPHONE HYDROCHLORIDE 0.2 MG: 0.2 INJECTION, SOLUTION INTRAMUSCULAR; INTRAVENOUS; SUBCUTANEOUS at 20:38

## 2025-01-16 RX ADMIN — RIVAROXABAN 20 MG: 20 TABLET, FILM COATED ORAL at 18:19

## 2025-01-16 RX ADMIN — LEVOTHYROXINE SODIUM 100 MCG: 0.05 TABLET ORAL at 06:21

## 2025-01-16 RX ADMIN — FLUTICASONE PROPIONATE 2 SPRAY: 50 SPRAY, METERED NASAL at 08:19

## 2025-01-16 RX ADMIN — METOPROLOL TARTRATE 25 MG: 25 TABLET, FILM COATED ORAL at 20:38

## 2025-01-16 ASSESSMENT — COGNITIVE AND FUNCTIONAL STATUS - GENERAL
TOILETING: TOTAL
STANDING UP FROM CHAIR USING ARMS: A LOT
DRESSING REGULAR UPPER BODY CLOTHING: A LOT
TOILETING: A LOT
MOBILITY SCORE: 12
CLIMB 3 TO 5 STEPS WITH RAILING: TOTAL
WALKING IN HOSPITAL ROOM: A LOT
CLIMB 3 TO 5 STEPS WITH RAILING: A LOT
MOVING FROM LYING ON BACK TO SITTING ON SIDE OF FLAT BED WITH BEDRAILS: A LITTLE
PERSONAL GROOMING: A LITTLE
PERSONAL GROOMING: A LOT
WALKING IN HOSPITAL ROOM: A LOT
MOVING FROM LYING ON BACK TO SITTING ON SIDE OF FLAT BED WITH BEDRAILS: A LOT
DAILY ACTIVITIY SCORE: 13
DRESSING REGULAR UPPER BODY CLOTHING: A LOT
STANDING UP FROM CHAIR USING ARMS: A LOT
DRESSING REGULAR LOWER BODY CLOTHING: A LOT
TURNING FROM BACK TO SIDE WHILE IN FLAT BAD: A LOT
HELP NEEDED FOR BATHING: A LOT
TURNING FROM BACK TO SIDE WHILE IN FLAT BAD: A LOT
DAILY ACTIVITIY SCORE: 14
MOVING TO AND FROM BED TO CHAIR: A LOT
MOVING TO AND FROM BED TO CHAIR: A LOT
HELP NEEDED FOR BATHING: A LOT
MOBILITY SCORE: 12
DRESSING REGULAR LOWER BODY CLOTHING: TOTAL

## 2025-01-16 ASSESSMENT — PAIN SCALES - GENERAL
PAINLEVEL_OUTOF10: 0 - NO PAIN
PAINLEVEL_OUTOF10: 7
PAINLEVEL_OUTOF10: 4
PAINLEVEL_OUTOF10: 4

## 2025-01-16 ASSESSMENT — PAIN - FUNCTIONAL ASSESSMENT
PAIN_FUNCTIONAL_ASSESSMENT: 0-10

## 2025-01-16 NOTE — PROGRESS NOTES
Physical Therapy    Physical Therapy Evaluation    Patient Name: Gabriella Haskins  MRN: [id number]  Today's Date: 1/16/2025   Time Calculation  Start Time: 1104  Stop Time: 1138  Time Calculation (min): 34 min  604/604-A    Assessment/Plan   PT Assessment  PT Assessment Results: Decreased strength, Decreased range of motion, Decreased endurance, Impaired balance, Decreased mobility, Decreased safety awareness, Pain  Rehab Prognosis: Good  Evaluation/Treatment Tolerance: Patient limited by fatigue, Patient limited by pain  End of Session Communication: Bedside nurse  Assessment Comment: Pt admitted 1/13 with a fall and is s/p L bipolar hip hemiarthroplasty. Pt is WBAT with hip precautions. Pt demos decreased strength, endurance and activity tolerance. Pt is below baseline LOF and would benefit from high intensity therapy.  End of Session Patient Position: Up in chair, Alarm on  IP OR SWING BED PT PLAN  Inpatient or Swing Bed: Inpatient  PT Plan  Treatment/Interventions: Bed mobility, Transfer training, Gait training, Balance training, Strengthening, Endurance training, Range of motion, Therapeutic exercise, Therapeutic activity, Home exercise program, Positioning  PT Plan: Ongoing PT  PT Frequency: 4 times per week  PT Discharge Recommendations: High intensity level of continued care  PT - OK to Discharge: Yes (once medically cleared)    Subjective     Current Problem:  1. Fall, initial encounter        2. Closed fracture of neck of left femur, initial encounter  Case Request Operating Room: HEMIARTHROPLASTY, HIP    Case Request Operating Room: HEMIARTHROPLASTY, HIP    Surgical Pathology Exam    Surgical Pathology Exam        Patient Active Problem List   Diagnosis    Debility    Hematuria    Leg pain    Gout    A-fib (Multi)    SDH (subdural hematoma) (Multi)    Acute pyelonephritis    SUZI (acute kidney injury) (CMS-HCC)    Anemia due to vitamin B12 deficiency    Arthritis of both knees    Cystitis    Dislocation,  shoulder    Electrolyte abnormality    Elevated troponin    Essential hypertension    Fracture of proximal humerus    Generalized OA    Hypercholesterolemia    Hypothyroidism    Pes anserinus bursitis    Poor venous access    Primary osteoarthritis of left hip    Psoriasis    Senile osteoporosis    Vitamin D deficiency    Blunt trauma    Laceration of left knee    Closed fracture of neck of left femur    Fall, initial encounter    Cardiac arrest    Intracranial injury with loss of consciousness (Multi)    Lung nodule       General Visit Information:  General  Reason for Referral: PT eval and treat; impaired mobility s/p L hip surgery  Referred By: Morgan Duncan DO  Past Medical History Relevant to Rehab: Pt admitted 1/13 with a fall and found to have closed fracture of neck of left femur. Pt is s/p L hip bipolar hip hemiarthroplasty and is WBAT with posterior hip precautions. (PMH: HTN, HLD, hypothroidism)  Family/Caregiver Present: Yes  Caregiver Feedback:   Co-Treatment: OT  Co-Treatment Reason: to maximize patient safety and participation  Prior to Session Communication: Bedside nurse  Patient Position Received: Bed, 3 rail up, Alarm off, not on at start of session  General Comment: Pt pleasant and agreeable to therapy    Home Living:  Home Living  Home Living Comments: Pt lives with her  in a house with 3 BIGG and lives on one level. Pt has 8 steps to basement with HR and laundry. Pt has tub/shower with elevated toilet and sink for support. R handed. Pt has no device and is ind in ADLs.    Prior Level of Function:  Prior Function Per Pt/Caregiver Report  Level of [place]: Independent with ADLs and functional transfers, Independent with homemaking with ambulation  Prior Function Comments: No other falls. Drives    Precautions:  Precautions  LE Weight Bearing Status: Weight Bearing as Tolerated  Medical Precautions: Fall precautions  Precautions Comment: s/p L hip bipolar hemiarthroplasty      Objective     Pain:  Pain Assessment  Pain Assessment: 0-10  0-10 (Numeric) Pain Score: 4 (increased to 5/10 with mobility)  Pain Type: Surgical pain, Acute pain  Pain Location: Hip  Pain Orientation: Left  Pain Interventions: Repositioned, Ambulation/increased activity  Response to Interventions: Content/relaxed    Cognition:  Cognition  Overall Cognitive Status: Within Functional Limits  Orientation Level: Oriented X4    General Assessments:  General Observation  General Observation: sx dressing clean, dry and intact   Activity Tolerance  Endurance: Decreased tolerance for upright activites  Sensation  Light Touch: No apparent deficits  Strength  Strength Comments: Demos WFL on RLE and 3-/5 on LLE     Functional Assessments:     Bed Mobility  Bed Mobility:  (Pt completed supine to sit with modA x2 and cues to follow hip precautions. Pt denies dizziness with change in position. Pain managed throughout.)  Transfers  Transfer:  (completed from EOB to FWW with modA x1 and requires cues for hand placement. Denies dizziness. Good safety carryover.)  Ambulation/Gait Training  Ambulation/Gait Training Performed:  (completed ~15'x1 with use of FWW and ModA x1. Denies dizziness. No acute buckling. Cues for FWW use and demos step to pattern for pain control.)        Extremity/Trunk Assessments:  RLE   RLE : Within Functional Limits  LLE   LLE : Exceptions to WFL (3-/5)    Outcome Measures:     [facility] Basic Mobility  Turning from your back to your side while in a flat bed without using bedrails: A little  Moving from lying on your back to sitting on the side of a flat bed without using bedrails: A lot  Moving to and from bed to chair (including a wheelchair): A lot  Standing up from a chair using your arms (e.g. wheelchair or bedside chair): A lot  To walk in hospital room: A lot  Climbing 3-5 steps with railing: Total  Basic Mobility - Total Score: 12     Goals:  Encounter Problems       Encounter Problems (Active)       PT  Problem       PT Goal 1 STG - Pt will transition supine <> sitting with Abigail  (Progressing)       Start:  01/16/25    Expected End:  01/30/25            PT Goal 2 STG - Pt will transfer STS with Abigail  (Progressing)       Start:  01/16/25    Expected End:  01/30/25            PT Goal 3 STG - Pt will amb 75' using FWW with CGA  (Progressing)       Start:  01/16/25    Expected End:  01/30/25            PT Goal 4 STG - Pt will perform a B LE ther ex program of 2-3 sets of 10  (Progressing)       Start:  01/16/25    Expected End:  01/30/25               Pain - Adult            Education Documentation  Body Mechanics, taught by Roberta Mata, PT at 1/16/2025  1:30 PM.  Learner: Patient  Readiness: Acceptance  Method: Explanation  Response: Verbalizes Understanding, Needs Reinforcement    Precautions, taught by Roberta Mata PT at 1/16/2025  1:30 PM.  Learner: Patient  Readiness: Acceptance  Method: Explanation  Response: Verbalizes Understanding, Needs Reinforcement    Mobility Training, taught by Roberta Mata PT at 1/16/2025  1:30 PM.  Learner: Patient  Readiness: Acceptance  Method: Explanation  Response: Verbalizes Understanding, Needs Reinforcement    Education Comments  No comments found.

## 2025-01-16 NOTE — PROGRESS NOTES
01/16/25 1129   Discharge Planning   Living Arrangements Spouse/significant other   Support Systems Family members;Spouse/significant other   Expected Discharge Disposition Rehab     Patient is POD 2 hip arthroplasty after fall, femoral fracture.  Patient is currently being evaluated by PT/OT for the first time postoperatively.  Referral to  [facility] pending PT/OT recommendations; patient will need precert.  Dominik SPENCER TCC

## 2025-01-16 NOTE — DISCHARGE SUMMARY
Hospital Medicine Discharge Summary       Patient Name: Gabriella Haskins   YOB: 1945    Discharge Diagnosis: Closed fracture of neck of left femur   Discharge Date: 01/16/25  Discharge Location: Acute Rehab    Hospital Course:  Gabriella Haskins is a 79 y.o. female with a PMH of paroxysmal atrial fibrillation cardioverted in 2023, hypertension, hyperlipidemia, subdural hematoma, gout, basal cell carcinoma, arthritis of both knees, primary osteoarthritis of left hip, osteoporosis, vitamin D deficiency, and hypothyroidism who presented with mechanical fall. She states she tripped on a table at home on 1/13/2025 and fell on her left side. Patient unable to bear weight or ambulate on left side. In the ED, XRAY showed a comminuted fracture through the neck and subcapital region of the left femur. Prior to scheduled surgery, she was noted to be in atrial fibrillation with RVR on 1/13/2025 around 1800. Her heart rate was controlled and she converted out of atrial-fibrillation after one dose of IV lopressor 5 mg and sodium chloride 0.9% bolus in addition to her metoprolol 12.5 mg PO BID being increased to metoprolol 25 mg PO BID. On 1/14/2025, she was cleared by cardiology for hemiarthroplasty which was performed that afternoon without complications. Patients was found to have hypokalemia and hypomagnesemia which were replaced upon admission. Patient was prescribed an incentive spirometer for post-operative decrease in spO2 with improvement to 96%.   The patient was prescribed a 30-day supply of her levothyroxine 100 mcg PO once daily in the AM before breakfast, this is a new dosage for her.  Patient was started on Xarelto after risks and benefits discussion. The patient's Hydrochlorothiazide was discontinued due to the risk of hypotension, she is to follow-up and discuss with PCP if this needs resumed outpatient. She is to follow up with  "orthopedics in 2 weeks, PCP, and cardiology for further management and outpatient evaluation. Patient is doing well and is now stable for DC to [facility].      Time Spent: 38 minutes      Physical Exam:     /65   Pulse 74   Temp 36.4 °C (97.5 °F)   Resp 20   Ht 1.676 m (5' 6\")   Wt 60.3 kg (133 lb)   LMP  (LMP Unknown)   SpO2 91%   BMI 21.47 kg/m²     GENERAL: well developed, non-toxic, NAD, alert & cooperative  HEENT: normocephalic, atraumatic, sclera clear  CARDIAC: regular rate & rhythm, S1/S2  PULMONARY: CTA b/l, no respiratory distress, + expiratory wheezes, - rales, - rhonchi  ABDOMEN: soft, non-tender, non-distended  MSK: no peripheral edema, no obvious deformity, Surgical dressing noted over the lateral hip.   NEURO: A&O X 3, non-focal, CN II-XII grossly intact, NV intact upon exam.   SKIN: Warm and dry, no lesions, no rashes.  PSYCH: appropriate mood & affect      Discharge Medications:     Your medication list        ASK your doctor about these medications        Instructions Last Dose Given Next Dose Due   ascorbic acid (vitamin C) 500 mg capsule           cholecalciferol 25 MCG (1000 UT) capsule  Commonly known as: Vitamin D-3           clindamycin 150 mg capsule  Commonly known as: Cleocin           cyanocobalamin 1,000 mcg/mL injection  Commonly known as: Vitamin B-12           hydroCHLOROthiazide 25 mg tablet  Commonly known as: HYDRODiuril           ketoconazole 2 % cream  Commonly known as: NIZOral      Apply topically 2 times a day.       levocetirizine 5 mg tablet  Commonly known as: Xyzal           levothyroxine 112 mcg tablet  Commonly known as: Synthroid, Levoxyl           metoprolol tartrate 25 mg tablet  Commonly known as: Lopressor      Take 0.5 tablets (12.5 mg) by mouth 2 times a day.       OMEGA-3 FISH OIL ORAL           PreserVision AREDS-2 250-90-40-1 mg capsule  Generic drug: vit C,X-No-acqxs-lutein-zeaxan           TheraTears 0.25 % ophthalmic " solution  Generic drug: carboxymethylcellulose           triamcinolone 55 mcg nasal inhaler  Commonly known as: Nasacort                          TAMMY NAVARRO PA-S  [facility]

## 2025-01-16 NOTE — PROGRESS NOTES
"                                                                 Hospital Medicine Progress Note       Patient Name: Gabriella Haskins   YOB: 1945    Subjective:    Gabriella Haskins is a 79 y.o.yo female who is hospital day 3     Patient seen and examined at bedside. She reports being in more pain today, she received Dilaudid and Tylenol during the night. She denies headache, dizziness, shortness of breath, cough, chest pain, or palpitations. Patient is currently on room air. She reports difficulty sleeping due to the pain.      Objective:    Recent labs, imaging, vital signs and notes from other providers were reviewed     /65   Pulse 74   Temp 36.4 °C (97.5 °F)   Resp 20   Ht 1.676 m (5' 6\")   Wt 60.3 kg (133 lb)   LMP  (LMP Unknown)   SpO2 91%   BMI 21.47 kg/m²     Physical Exam:    GENERAL: well developed, non-toxic, NAD, alert & cooperative  HEENT: normocephalic, atraumatic, sclera clear  CARDIAC: regular rate & rhythm, S1/S2, Peripheral pulses - posterior tibial & dorsalis pedis intact.    PULMONARY: CTA b/l, no respiratory distress, + expiratory wheezes, - rales, - rhonchi  ABDOMEN: soft, non-tender, non-distended  MSK: no peripheral edema, no obvious deformity, Surgical dressing noted over the left lateral hip.   NEURO: A&O X 3, non-focal, CN II-XII grossly intact, NV intact upon exam.   SKIN: Warm and dry, no lesions, no rashes.  PSYCH: appropriate mood & affect     Assessment/Plan:    Comminuted fracture through the neck and subcapital region of the left femur.   Hypertension   Hyperlipidemia   Osteoporosis   Vitamin D Deficiency   Osteoarthritis of left hip and bilateral knees  Atrial fibrillation with RVR       Patient is 2 day post-op for a left hip hemiarthroplasty for fixation of left femoral neck fracture. Patient's VS through the night showed a drop in O2 saturation from her baseline of 100% to 93%. Patient denied shortness of breath and did not appear in respiratory " distress upon examination. Lungs were CTA/bilaterally, she does have a history of a pulmonary nodule. An incentive spirometer was ordered and will continue to monitor spO2. PRN supplemental oxygen, if patient drops below 90%. CBC revealed hemoglobin of 10.5, she is hemodynamically stable, no indication for transfusion at this time. Will begin anticoagulation with Eliquis when we receive clearance from orthopedic surgery.  WBC count is continuing to drop postoperatively.  She is awaiting PT/OT evaluation. Patient spoke with care transition yesterday. Continue to control pain with PRN acetaminophen  q6h and hydromorphone IV 0.2 mg for severe and 0.1 mg q3h for moderate pain. Orthopedics is following. Reference orthopedic surgery progress note for all WB status updates.     DVT Prophylaxis: Lovenox   Code Status: Full Code   Disposition: Med/Surg     TAMMY NAVARRO. [facility]

## 2025-01-16 NOTE — PROGRESS NOTES
Occupational Therapy    Evaluation    Patient Name: Gabriella Haskins  MRN: [id number]  Today's Date: 1/16/2025  Time Calculation  Start Time: 1103  Stop Time: 1138  Time Calculation (min): 35 min  604/604-A    Assessment  IP OT Assessment  OT Assessment: this hospitalization 1/13/2025 due to fall with:  1. Comminuted fracture through the neck and subcapital region of the left femur.   2. Hypertension   3. Hyperlipidemia   4. Osteoporosis   5. Vitamin D Deficiency   6. Osteoarthritis of left hip and bilateral knees  7. Atrial fibrillation.  ** Patient would benefit from further OT to address ADL's and functional transfers/mobility since recovering from LEFT BIPOLAR HIP HEMIARTHROPLASTY on 1/14/2025.  Prognosis: Good  End of Session Communication: Bedside nurse  End of Session Patient Position: Bed, 2 rail up; call-light within reach    Plan:  Treatment Interventions: ADL retraining, Functional transfer training, Patient/family training, Equipment evaluation/education, Endurance training, Compensatory technique education (left hip precautions)  OT Frequency: 5 times per week  OT Discharge Recommendations: High intensity level of continued care  OT - OK to Discharge: Yes to next level of care when medically cleared by physician/medical team    Subjective   Current Problem:  1. Fall, initial encounter        2. Closed fracture of neck of left femur, initial encounter  Case Request Operating Room: HEMIARTHROPLASTY, HIP    Case Request Operating Room: HEMIARTHROPLASTY, HIP    Surgical Pathology Exam    Surgical Pathology Exam        General:  General  Reason for Referral: recent surgery  Referred By: Daniele Snell DO  Past Medical History Relevant to Rehab: hypertension, hyperlipidemia, subdural hematoma, gout, basal cell carcinoma, arthritis of both knees, primary osteoarthritis of left hip, osteoporosis, vitamin D deficiency, hypothyroidism  Family/Caregiver Present: Yes ()  Co-Treatment: PT Co-eval to  maximize safety during mobility tasks  Prior to Session Communication: Bedside nurse who confirmed that patient is medically stable to participate in this OT session  Patient Position Received: Bed, 2 rail up, Alarm off, not on at start of session  General Comment: Patient seen in room; cooperative, motivated, pleasant    Precautions:  LE Weight Bearing Status: Weight Bearing as Tolerated  Medical Precautions: Fall precautions  Post-Surgical Precautions: Left hip precautions    Pain:  Pain Assessment  Pain Assessment: 0-10  0-10 (Numeric) Pain Score: 4  Pain Type: Surgical pain  Pain Location: Hip  Pain Orientation: Left  Pain Interventions: Repositioned  Response to Interventions:  (tolerating well)    Objective   Cognition:  Overall Cognitive Status: Within Functional Limits  Orientation Level: Oriented X4     Home Living:  Type of Home: House  Lives With: Spouse  Home Layout: One level, Laundry in basement  Home Access:  (1 + 2 entry steps with railing)  Bathroom Shower/Tub: Tub/shower unit  Bathroom Toilet:  (higher height; sink for support)  Bathroom Equipment: Grab bars in shower, Tub transfer bench, Hand-held shower hose     Prior Function:  Level of [place]: Independent with ADLs and functional transfers, Independent with homemaking with ambulation  Ambulatory Assistance: Independent (without device)  Hand Dominance: Right    ADL:  LE Dressing Assistance: Total (without adaptive equipment)  ADL Comments: to further address lower body self-care using assisitive techniques/equipment as needed in order to adhere to post-op hip precautions    Activity Tolerance:  Endurance: Decreased tolerance for upright activites    Bed Mobility/Transfers:   Bed Mobility  Bed Mobility: Yes  Bed Mobility 1  Bed Mobility 1: Supine to sitting  Level of Assistance 1: Moderate assistance, +2, Maximum verbal cues  Bed Mobility Comments 1: HOB elevated  Transfers  Transfer: Yes  Transfer 1  Transfer From 1: Sit to, Stand  to  Transfer to 1: Bed, Chair with arms  Transfer Device 1: Walker, Gait belt  Transfer Level of Assistance 1: Moderate assistance, Moderate verbal cues    Ambulation/Gait Training:  Functional Mobility  Functional Mobility Performed: Yes  Functional Mobility 1  Device 1: Rolling walker  Functional Mobility Support Devices: Gait belt  Assistance 1: Moderate assistance, Moderate verbal cues  Comments 1: ambulated to doorway with chair positioned to sit    Sitting Balance:  Static Sitting Balance  Static Sitting-Level of Assistance: Distant supervision    Standing Balance:  Static Standing Balance  Static Standing-Level of Assistance: Moderate assistance  Static Standing-Comment/Number of Minutes: fair (+)    Sensation:  Light Touch:  (chronic numbness/tingling fingertips but rarely occurs per patient)    Extremities: RUE   RUE : Within Functional Limits and LUE   LUE: Within Functional Limits    Outcome Measures: [facility] Daily Activity  Putting on and taking off regular lower body clothing: Total  Bathing (including washing, rinsing, drying): A lot  Putting on and taking off regular upper body clothing: A lot  Toileting, which includes using toilet, bedpan or urinal: Total  Taking care of personal grooming such as brushing teeth: A little  Eating Meals: None  Daily Activity - Total Score: 13     EDUCATION:  Education Documentation  Precautions, taught by Cinthya Segura OT at 1/16/2025  1:36 PM.  Learner: Patient  Readiness: Acceptance  Method: Explanation  Response: Verbalizes Understanding, Demonstrated Understanding, Needs Reinforcement    Mobility Training, taught by Cinthya Segura OT at 1/16/2025  1:36 PM.  Learner: Patient  Readiness: Acceptance  Method: Explanation  Response: Verbalizes Understanding, Demonstrated Understanding, Needs Reinforcement    Goals:   Encounter Problems       Encounter Problems (Active)       OT Goals       Patient will complete upper and lower body bathing/dressing; toileting with  modified independence using assistive techniques/adaptive equipment as needed  (Progressing)       Start:  01/16/25    Expected End:  01/30/25            Patient will perform bed mobility and functional transfers safely and independently: bed, chair, commode using DME as needed  (Progressing)       Start:  01/16/25    Expected End:  01/30/25            Patient will adhere to left hip precautions to ADL's and functional transfers/mobility  (Progressing)       Start:  01/16/25    Expected End:  01/30/25            Patient will tolerate standing for 5 mins. and show overall good (-) standing balance during ADL's and functional transfers/mobility  (Progressing)       Start:  01/16/25    Expected End:  01/30/25

## 2025-01-16 NOTE — PROGRESS NOTES
01/16/25 1129   Discharge Planning   Living Arrangements Spouse/significant other   Support Systems Family members;Spouse/significant other   Expected Discharge Disposition Rehab     Referral sent to  [facility]; still awaiting PT/OT evals and recs as patient is POD 2 hip repair after fall at home.

## 2025-01-16 NOTE — DISCHARGE INSTRUCTIONS
There have been changes to her medications.  We prescribed you a 30-day supply of metoprolol tartrate 25 mg to be taken twice daily.  Please stop taking your previous dose. We have prescribed you 30 day supply of xarelto for stroke prevention for your atrial fibrillation. Please follow-up with your cardiologist within 2-3 weeks for further monitoring.  We have included a referral whose information you can contact to schedule if you do not already follow with one.  Please stop taking your hydrochlorothiazide blood pressure medication to prevent your blood pressures from dropping too low.  Follow-up with your PCP to evaluate if this needs to be resumed outpatient.  We have prescribed you a 30-day supply of levothyroxine 100 mcg.  Please stop taking your previous dose.  Follow-up with your PCP for any dose adjustment/monitoring.  Follow-up with your PCP within 1 week of discharge  Follow-up with orthopedics within 2 weeks of discharge.

## 2025-01-16 NOTE — CARE PLAN
The patient's goals for the shift include      The clinical goals for the shift include To maintain comfort and safety    Over the shift, the patient did make progress toward the following goals.

## 2025-01-17 ENCOUNTER — HOSPITAL ENCOUNTER (INPATIENT)
Facility: HOSPITAL | Age: 80
DRG: 560 | End: 2025-01-17
Attending: PHYSICAL MEDICINE & REHABILITATION | Admitting: PHYSICAL MEDICINE & REHABILITATION
Payer: MEDICARE

## 2025-01-17 VITALS
BODY MASS INDEX: 21.38 KG/M2 | WEIGHT: 133 LBS | TEMPERATURE: 97.7 F | DIASTOLIC BLOOD PRESSURE: 63 MMHG | SYSTOLIC BLOOD PRESSURE: 135 MMHG | OXYGEN SATURATION: 93 % | HEART RATE: 79 BPM | RESPIRATION RATE: 20 BRPM | HEIGHT: 66 IN

## 2025-01-17 DIAGNOSIS — R60.0 EDEMA OF LEFT LOWER LEG: ICD-10-CM

## 2025-01-17 DIAGNOSIS — S72.002D CLOSED FRACTURE OF NECK OF LEFT FEMUR WITH ROUTINE HEALING, SUBSEQUENT ENCOUNTER: ICD-10-CM

## 2025-01-17 DIAGNOSIS — I48.91 ATRIAL FIB/FLUTTER, TRANSIENT (MULTI): ICD-10-CM

## 2025-01-17 DIAGNOSIS — M79.605 PAIN IN LEFT LEG: ICD-10-CM

## 2025-01-17 DIAGNOSIS — I48.92 ATRIAL FIB/FLUTTER, TRANSIENT (MULTI): ICD-10-CM

## 2025-01-17 DIAGNOSIS — S72.002A CLOSED DISPLACED FRACTURE OF LEFT FEMORAL NECK: Primary | ICD-10-CM

## 2025-01-17 PROCEDURE — 1280000001 HC REHAB SEMI-PRIVATE ROOM DAILY

## 2025-01-17 PROCEDURE — 2500000002 HC RX 250 W HCPCS SELF ADMINISTERED DRUGS (ALT 637 FOR MEDICARE OP, ALT 636 FOR OP/ED)

## 2025-01-17 PROCEDURE — 97162 PT EVAL MOD COMPLEX 30 MIN: CPT | Mod: GP

## 2025-01-17 PROCEDURE — 2500000001 HC RX 250 WO HCPCS SELF ADMINISTERED DRUGS (ALT 637 FOR MEDICARE OP)

## 2025-01-17 PROCEDURE — 97166 OT EVAL MOD COMPLEX 45 MIN: CPT | Mod: GO

## 2025-01-17 PROCEDURE — 2500000001 HC RX 250 WO HCPCS SELF ADMINISTERED DRUGS (ALT 637 FOR MEDICARE OP): Performed by: INTERNAL MEDICINE

## 2025-01-17 PROCEDURE — 99222 1ST HOSP IP/OBS MODERATE 55: CPT

## 2025-01-17 PROCEDURE — 2500000004 HC RX 250 GENERAL PHARMACY W/ HCPCS (ALT 636 FOR OP/ED): Performed by: INTERNAL MEDICINE

## 2025-01-17 RX ORDER — CHOLECALCIFEROL (VITAMIN D3) 25 MCG
1000 TABLET ORAL DAILY
Status: DISCONTINUED | OUTPATIENT
Start: 2025-01-17 | End: 2025-01-29 | Stop reason: HOSPADM

## 2025-01-17 RX ORDER — ACETAMINOPHEN 160 MG/5ML
650 SOLUTION ORAL EVERY 4 HOURS PRN
Status: DISCONTINUED | OUTPATIENT
Start: 2025-01-17 | End: 2025-01-29 | Stop reason: HOSPADM

## 2025-01-17 RX ORDER — TALC
3 POWDER (GRAM) TOPICAL NIGHTLY PRN
Status: DISCONTINUED | OUTPATIENT
Start: 2025-01-17 | End: 2025-01-29 | Stop reason: HOSPADM

## 2025-01-17 RX ORDER — METOPROLOL TARTRATE 25 MG/1
25 TABLET, FILM COATED ORAL 2 TIMES DAILY
Qty: 60 TABLET | Refills: 0 | Status: ON HOLD | OUTPATIENT
Start: 2025-01-17 | End: 2025-02-16

## 2025-01-17 RX ORDER — ASCORBIC ACID 250 MG
500 TABLET ORAL DAILY
Status: DISCONTINUED | OUTPATIENT
Start: 2025-01-17 | End: 2025-01-29 | Stop reason: HOSPADM

## 2025-01-17 RX ORDER — CETIRIZINE HYDROCHLORIDE 10 MG/1
10 TABLET ORAL DAILY
Status: DISCONTINUED | OUTPATIENT
Start: 2025-01-17 | End: 2025-01-29 | Stop reason: HOSPADM

## 2025-01-17 RX ORDER — POLYETHYLENE GLYCOL 3350 17 G/17G
17 POWDER, FOR SOLUTION ORAL DAILY
Status: DISCONTINUED | OUTPATIENT
Start: 2025-01-17 | End: 2025-01-29 | Stop reason: HOSPADM

## 2025-01-17 RX ORDER — ACETAMINOPHEN 325 MG/1
650 TABLET ORAL EVERY 4 HOURS PRN
Status: DISCONTINUED | OUTPATIENT
Start: 2025-01-17 | End: 2025-01-29 | Stop reason: HOSPADM

## 2025-01-17 RX ORDER — LEVOTHYROXINE SODIUM 100 UG/1
100 TABLET ORAL
Status: DISCONTINUED | OUTPATIENT
Start: 2025-01-18 | End: 2025-01-29 | Stop reason: HOSPADM

## 2025-01-17 RX ORDER — ONDANSETRON 4 MG/1
4 TABLET, FILM COATED ORAL EVERY 8 HOURS PRN
Status: DISCONTINUED | OUTPATIENT
Start: 2025-01-17 | End: 2025-01-29 | Stop reason: HOSPADM

## 2025-01-17 RX ORDER — LEVOTHYROXINE SODIUM 100 UG/1
100 TABLET ORAL
Status: ON HOLD
Start: 2025-01-17 | End: 2025-02-16

## 2025-01-17 RX ORDER — ONDANSETRON HYDROCHLORIDE 2 MG/ML
4 INJECTION, SOLUTION INTRAVENOUS EVERY 8 HOURS PRN
Status: DISCONTINUED | OUTPATIENT
Start: 2025-01-17 | End: 2025-01-29 | Stop reason: HOSPADM

## 2025-01-17 RX ORDER — OXYCODONE HYDROCHLORIDE 5 MG/1
5 TABLET ORAL EVERY 6 HOURS PRN
Status: DISCONTINUED | OUTPATIENT
Start: 2025-01-17 | End: 2025-01-29 | Stop reason: HOSPADM

## 2025-01-17 RX ORDER — FLUTICASONE PROPIONATE 50 MCG
1 SPRAY, SUSPENSION (ML) NASAL DAILY
Status: DISCONTINUED | OUTPATIENT
Start: 2025-01-18 | End: 2025-01-29 | Stop reason: HOSPADM

## 2025-01-17 RX ORDER — METOPROLOL TARTRATE 25 MG/1
25 TABLET, FILM COATED ORAL 2 TIMES DAILY
Status: DISCONTINUED | OUTPATIENT
Start: 2025-01-17 | End: 2025-01-29 | Stop reason: HOSPADM

## 2025-01-17 RX ORDER — CYANOCOBALAMIN 1000 UG/ML
1000 INJECTION, SOLUTION INTRAMUSCULAR; SUBCUTANEOUS
Status: DISCONTINUED | OUTPATIENT
Start: 2025-01-18 | End: 2025-01-18

## 2025-01-17 RX ORDER — OXYCODONE HYDROCHLORIDE 5 MG/1
2.5 TABLET ORAL EVERY 6 HOURS PRN
Status: DISCONTINUED | OUTPATIENT
Start: 2025-01-17 | End: 2025-01-29 | Stop reason: HOSPADM

## 2025-01-17 RX ORDER — ICOSAPENT ETHYL 1 G/1
1000 CAPSULE ORAL DAILY
Status: DISCONTINUED | OUTPATIENT
Start: 2025-01-17 | End: 2025-01-29 | Stop reason: HOSPADM

## 2025-01-17 RX ORDER — ENOXAPARIN SODIUM 100 MG/ML
40 INJECTION SUBCUTANEOUS EVERY 24 HOURS
Status: DISCONTINUED | OUTPATIENT
Start: 2025-01-17 | End: 2025-01-17

## 2025-01-17 RX ADMIN — LEVOTHYROXINE SODIUM 100 MCG: 0.05 TABLET ORAL at 05:30

## 2025-01-17 RX ADMIN — HYDROMORPHONE HYDROCHLORIDE 0.1 MG: 0.2 INJECTION, SOLUTION INTRAMUSCULAR; INTRAVENOUS; SUBCUTANEOUS at 06:22

## 2025-01-17 RX ADMIN — METOPROLOL TARTRATE 25 MG: 25 TABLET, FILM COATED ORAL at 08:45

## 2025-01-17 RX ADMIN — RIVAROXABAN 20 MG: 20 TABLET, FILM COATED ORAL at 19:13

## 2025-01-17 RX ADMIN — METOPROLOL TARTRATE 25 MG: 25 TABLET, FILM COATED ORAL at 21:38

## 2025-01-17 RX ADMIN — OXYCODONE HYDROCHLORIDE 5 MG: 5 TABLET ORAL at 12:47

## 2025-01-17 RX ADMIN — FLUTICASONE PROPIONATE 2 SPRAY: 50 SPRAY, METERED NASAL at 08:46

## 2025-01-17 RX ADMIN — HYDROMORPHONE HYDROCHLORIDE 0.2 MG: 0.2 INJECTION, SOLUTION INTRAMUSCULAR; INTRAVENOUS; SUBCUTANEOUS at 02:11

## 2025-01-17 RX ADMIN — OXYCODONE HYDROCHLORIDE 2.5 MG: 5 TABLET ORAL at 19:58

## 2025-01-17 RX ADMIN — ACETAMINOPHEN 650 MG: 325 TABLET, FILM COATED ORAL at 05:30

## 2025-01-17 SDOH — SOCIAL STABILITY: SOCIAL INSECURITY: ARE THERE ANY APPARENT SIGNS OF INJURIES/BEHAVIORS THAT COULD BE RELATED TO ABUSE/NEGLECT?: NO

## 2025-01-17 SDOH — ECONOMIC STABILITY: HOUSING INSECURITY: IN THE LAST 12 MONTHS, WAS THERE A TIME WHEN YOU WERE NOT ABLE TO PAY THE MORTGAGE OR RENT ON TIME?: NO

## 2025-01-17 SDOH — ECONOMIC STABILITY: TRANSPORTATION INSECURITY: IN THE PAST 12 MONTHS, HAS LACK OF TRANSPORTATION KEPT YOU FROM MEDICAL APPOINTMENTS OR FROM GETTING MEDICATIONS?: NO

## 2025-01-17 SDOH — SOCIAL STABILITY: SOCIAL INSECURITY: HAVE YOU HAD THOUGHTS OF HARMING ANYONE ELSE?: NO

## 2025-01-17 SDOH — ECONOMIC STABILITY: FOOD INSECURITY: HOW HARD IS IT FOR YOU TO PAY FOR THE VERY BASICS LIKE FOOD, HOUSING, MEDICAL CARE, AND HEATING?: NOT HARD AT ALL

## 2025-01-17 SDOH — SOCIAL STABILITY: SOCIAL INSECURITY: WITHIN THE LAST YEAR, HAVE YOU BEEN AFRAID OF YOUR PARTNER OR EX-PARTNER?: NO

## 2025-01-17 SDOH — ECONOMIC STABILITY: FOOD INSECURITY: WITHIN THE PAST 12 MONTHS, YOU WORRIED THAT YOUR FOOD WOULD RUN OUT BEFORE YOU GOT THE MONEY TO BUY MORE.: NEVER TRUE

## 2025-01-17 SDOH — ECONOMIC STABILITY: HOUSING INSECURITY: IN THE PAST 12 MONTHS, HOW MANY TIMES HAVE YOU MOVED WHERE YOU WERE LIVING?: 0

## 2025-01-17 SDOH — SOCIAL STABILITY: SOCIAL INSECURITY: DO YOU FEEL UNSAFE GOING BACK TO THE PLACE WHERE YOU ARE LIVING?: NO

## 2025-01-17 SDOH — SOCIAL STABILITY: SOCIAL INSECURITY: HAVE YOU HAD ANY THOUGHTS OF HARMING ANYONE ELSE?: NO

## 2025-01-17 SDOH — ECONOMIC STABILITY: HOUSING INSECURITY: AT ANY TIME IN THE PAST 12 MONTHS, WERE YOU HOMELESS OR LIVING IN A SHELTER (INCLUDING NOW)?: NO

## 2025-01-17 SDOH — SOCIAL STABILITY: SOCIAL INSECURITY: HAS ANYONE EVER THREATENED TO HURT YOUR FAMILY OR YOUR PETS?: NO

## 2025-01-17 SDOH — ECONOMIC STABILITY: INCOME INSECURITY: IN THE PAST 12 MONTHS HAS THE ELECTRIC, GAS, OIL, OR WATER COMPANY THREATENED TO SHUT OFF SERVICES IN YOUR HOME?: NO

## 2025-01-17 SDOH — HEALTH STABILITY: PHYSICAL HEALTH
HOW OFTEN DO YOU NEED TO HAVE SOMEONE HELP YOU WHEN YOU READ INSTRUCTIONS, PAMPHLETS, OR OTHER WRITTEN MATERIAL FROM YOUR DOCTOR OR PHARMACY?: RARELY

## 2025-01-17 SDOH — SOCIAL STABILITY: SOCIAL INSECURITY: WITHIN THE LAST YEAR, HAVE YOU BEEN HUMILIATED OR EMOTIONALLY ABUSED IN OTHER WAYS BY YOUR PARTNER OR EX-PARTNER?: NO

## 2025-01-17 SDOH — SOCIAL STABILITY: SOCIAL INSECURITY: DO YOU FEEL ANYONE HAS EXPLOITED OR TAKEN ADVANTAGE OF YOU FINANCIALLY OR OF YOUR PERSONAL PROPERTY?: NO

## 2025-01-17 SDOH — ECONOMIC STABILITY: FOOD INSECURITY: WITHIN THE PAST 12 MONTHS, THE FOOD YOU BOUGHT JUST DIDN'T LAST AND YOU DIDN'T HAVE MONEY TO GET MORE.: NEVER TRUE

## 2025-01-17 SDOH — SOCIAL STABILITY: SOCIAL INSECURITY: ABUSE: ADULT

## 2025-01-17 SDOH — SOCIAL STABILITY: SOCIAL INSECURITY: WERE YOU ABLE TO COMPLETE ALL THE BEHAVIORAL HEALTH SCREENINGS?: YES

## 2025-01-17 SDOH — SOCIAL STABILITY: SOCIAL INSECURITY: ARE YOU OR HAVE YOU BEEN THREATENED OR ABUSED PHYSICALLY, EMOTIONALLY, OR SEXUALLY BY ANYONE?: NO

## 2025-01-17 SDOH — SOCIAL STABILITY: SOCIAL INSECURITY: DOES ANYONE TRY TO KEEP YOU FROM HAVING/CONTACTING OTHER FRIENDS OR DOING THINGS OUTSIDE YOUR HOME?: NO

## 2025-01-17 ASSESSMENT — BRIEF INTERVIEW FOR MENTAL STATUS (BIMS)
WHAT MONTH IS IT: ACCURATE WITHIN 5 DAYS
COGNITIVE PATTERN ASSESSMENT USED: STAFF ASSESSMENT
BIMS SUMMARY SCORE: 14
ASKED TO RECALL BLUE: YES, NO CUE REQUIRED
GENERAL MEMORY AND RECALL ABILITY: CURRENT SEASON;STAFF NAMES AND FACES;RECOGNIZES APPROPRIATE HEALTHCARE SETTING
WHAT YEAR IS IT: CORRECT
WHAT DAY OF THE WEEK IS IT: CORRECT
ASKED TO RECALL SOCK: YES, NO CUE REQUIRED
ASKED TO RECALL BED: YES, AFTER CUEING ("A PIECE OF FURNITURE")
INITIAL REPETITION OF BED BLUE SOCK - FIRST ATTEMPT: 3

## 2025-01-17 ASSESSMENT — PAIN DESCRIPTION - LOCATION
LOCATION: HIP
LOCATION: HIP
LOCATION: LEG
LOCATION: BACK

## 2025-01-17 ASSESSMENT — ENCOUNTER SYMPTOMS
NERVOUS/ANXIOUS: 0
COUGH: 0
WEAKNESS: 1
BRUISES/BLEEDS EASILY: 1
VOMITING: 0
SEIZURES: 0
NECK STIFFNESS: 0
ARTHRALGIAS: 1
CHILLS: 0
WOUND: 1
ABDOMINAL DISTENTION: 0
HALLUCINATIONS: 0
FEVER: 0
ACTIVITY CHANGE: 0
HEADACHES: 0
DIFFICULTY URINATING: 0
FREQUENCY: 0
SHORTNESS OF BREATH: 0
DIZZINESS: 0
ABDOMINAL PAIN: 0
MYALGIAS: 0
FACIAL SWELLING: 0
CHEST TIGHTNESS: 0
WHEEZING: 0
NECK PAIN: 0
NAUSEA: 0
BACK PAIN: 1
EYE PAIN: 0
APPETITE CHANGE: 0
DIARRHEA: 0
JOINT SWELLING: 1

## 2025-01-17 ASSESSMENT — COGNITIVE AND FUNCTIONAL STATUS - GENERAL
MOBILITY SCORE: 12
EATING MEALS: A LOT
MOVING TO AND FROM BED TO CHAIR: A LOT
DRESSING REGULAR LOWER BODY CLOTHING: A LOT
TOILETING: A LOT
TURNING FROM BACK TO SIDE WHILE IN FLAT BAD: A LOT
MOVING FROM LYING ON BACK TO SITTING ON SIDE OF FLAT BED WITH BEDRAILS: A LOT
DAILY ACTIVITIY SCORE: 12
CLIMB 3 TO 5 STEPS WITH RAILING: A LOT
HELP NEEDED FOR BATHING: A LOT
DRESSING REGULAR UPPER BODY CLOTHING: A LOT
STANDING UP FROM CHAIR USING ARMS: A LOT
PERSONAL GROOMING: A LOT
WALKING IN HOSPITAL ROOM: A LOT

## 2025-01-17 ASSESSMENT — PAIN - FUNCTIONAL ASSESSMENT
PAIN_FUNCTIONAL_ASSESSMENT: 0-10

## 2025-01-17 ASSESSMENT — PAIN SCALES - GENERAL
PAINLEVEL_OUTOF10: 9
PAINLEVEL_OUTOF10: 4
PAINLEVEL_OUTOF10: 0 - NO PAIN
PAINLEVEL_OUTOF10: 0 - NO PAIN
PAINLEVEL_OUTOF10: 1
PAINLEVEL_OUTOF10: 7
PAINLEVEL_OUTOF10: 0 - NO PAIN
PAINLEVEL_OUTOF10: 1
PAINLEVEL_OUTOF10: 4
PAINLEVEL_OUTOF10: 0 - NO PAIN
PAINLEVEL_OUTOF10: 2

## 2025-01-17 ASSESSMENT — LIFESTYLE VARIABLES
HOW OFTEN DO YOU HAVE 6 OR MORE DRINKS ON ONE OCCASION: NEVER
AUDIT-C TOTAL SCORE: 0
AUDIT-C TOTAL SCORE: 0
HOW OFTEN DO YOU HAVE A DRINK CONTAINING ALCOHOL: NEVER
SKIP TO QUESTIONS 9-10: 1
HOW MANY STANDARD DRINKS CONTAINING ALCOHOL DO YOU HAVE ON A TYPICAL DAY: PATIENT DOES NOT DRINK

## 2025-01-17 ASSESSMENT — PAIN DESCRIPTION - ORIENTATION
ORIENTATION: LEFT
ORIENTATION: LEFT
ORIENTATION: LOWER
ORIENTATION: LEFT

## 2025-01-17 ASSESSMENT — PATIENT HEALTH QUESTIONNAIRE - PHQ9
1. LITTLE INTEREST OR PLEASURE IN DOING THINGS: NOT AT ALL
SUM OF ALL RESPONSES TO PHQ9 QUESTIONS 1 & 2: 0
2. FEELING DOWN, DEPRESSED OR HOPELESS: NOT AT ALL

## 2025-01-17 ASSESSMENT — ACTIVITIES OF DAILY LIVING (ADL)
LACK_OF_TRANSPORTATION: NO
BATHING_ASSISTANCE: MAXIMAL

## 2025-01-17 NOTE — PROGRESS NOTES
"                                                                 Hospital Medicine Progress Note       Patient Name: Gabriella Haskins   YOB: 1945    Subjective:    Gabriella Haskins is a 79 y.o.yo female who is hospital day 4     Patient was seen and examined at bedside, resting comfortably. She denies fever, chills, abdominal pain, shortness of breath, cheat pain, and palpitations.   Patient's spO2 is looking better today post prescription of incentive spirometer. She was accepted and will be discharged to [facility] today.      Objective:    Recent labs, imaging, vital signs and notes from other providers were reviewed     /63   Pulse 79   Temp 36.5 °C (97.7 °F)   Resp 20   Ht 1.676 m (5' 6\")   Wt 60.3 kg (133 lb)   LMP  (LMP Unknown)   SpO2 93%   BMI 21.47 kg/m²     Physical Exam:    GENERAL: well developed, non-toxic, NAD, alert & cooperative  HEENT: normocephalic, atraumatic, sclera clear  CARDIAC: regular rate & rhythm, S1/S2  PULMONARY: CTA b/l, no respiratory distress, - wheezes  ABDOMEN: soft, non-tender, non-distended  MSK: no peripheral edema, no obvious deformity, Surgical dressing noted over left hip and knee.   NEURO: A&O X 3, non-focal, CN II-XII grossly intact, LLE NV Intact upon exam.   SKIN: Warm and dry, no lesions, no rashes.  PSYCH: appropriate mood & affect     Assessment/Plan:    Comminuted fracture through the neck and subcapital region of the left femur.   Hypertension   Hyperlipidemia   Osteoporosis   Vitamin D Deficiency   Osteoarthritis of left hip and bilateral knees  Atrial fibrillation with RVR       Patient is day 3 post-op for a left hip hemiarthroplasty for fixation of left femoral neck fracture. Vital signs stable throughout the night and upon examination. Patient is to continue using incentive spirometer. Started patient on anticoagulation with Xarelto and she will continue outpatient. PT/OT evaluation completed, score of 12. Continue to " control pain with PRN acetaminophen  q6h and hydromorphone IV 0.2 mg for severe and 0.1 mg q3h for moderate pain. Orthopedics is following. Reference orthopedic surgery progress note for all WB status updates. Continue new doses of metoprolol and levothyroxine. Pt's hemoglobin and hematocrit stable. Patient accepted to [facility] and will discharged today.       DVT Prophylaxis: Xarelto   Code Status: Full code   Disposition: Med/Surg, to be discharged to acute rehab today      BARAK BALBUENA  [facility]

## 2025-01-17 NOTE — PROGRESS NOTES
Received notification this am that [facility] can accept this patient today. Patient with Medicare coverage, will not need precertification.   Notified patient of acceptance to [facility], plan for discharge today. Per Acute Rehab, they can accept anytime after 10:30 am. Notified nursing of time and number for report.   10:10 am Notified patient and family member os time of acceptance to Acute Rehab.

## 2025-01-17 NOTE — CARE PLAN
The patient's goals for the shift include      The clinical goals for the shift include Maintain safety and pain control    Over the shift, the patient did make progress toward the following goals.

## 2025-01-17 NOTE — PROGRESS NOTES
1/17/25:  Spoke with patient bedside, introduced self and explained role.  [place]4 steps to enter.   is retired and available to assist at home, but of note did have shoulder surgery in November 2024.  Prior to admission the patient was independent and driving. She was a patient on rehab in October of 2023.  Discussed ELOS depends on progress and goals, will continue to update regarding DC planning.  -Darek Cope RN

## 2025-01-17 NOTE — PROGRESS NOTES
Physical Therapy    Physical Therapy Evaluation    Patient Name: Gabriella Haskins  MRN: [id number]  Department: [facility] REHAB  Room: [address] [place] [address]  Today's Date: 1/17/2025   Time Calculation  Start Time: 1300  Stop Time: 1345  Time Calculation (min): 45 min    Assessment/Plan   PT Assessment  PT Assessment Results: Decreased strength, Decreased endurance, Impaired balance, Decreased mobility, Decreased safety awareness  Rehab Prognosis:  (Good for stated goals.)  Evaluation/Treatment Tolerance: Patient limited by fatigue  Strengths: Ability to acquire knowledge, Housing layout, Support and attitude of living partners  Barriers to Participation: Coping skills  End of Session Communication: Bedside nurse  End of Session Patient Position: Up in chair, Alarm on  IP OR SWING BED PT PLAN  Inpatient or Swing Bed: Inpatient  PT Plan  Treatment/Interventions: Bed mobility, Transfer training, Gait training, Stair training, Balance training, Strengthening, Therapeutic exercise, Therapeutic activity  PT Plan: Ongoing PT  PT Frequency: 90 min/5 days per week (10 days)  PT Discharge Recommendations:  (Anticipate discharge home with the need for intermittent min assist at the walker level.)  Equipment Recommended upon Discharge: Wheeled walker  PT Recommended Transfer Status: Assist x2    Subjective   General Visit Information:  General  Reason for Referral: PT eval and treat s/p left hip fracture with bipolar hemiarthroplasty on 1/14/25. (80 yo female tripped at home on table leg and fell onto left side.  Found to have a comminuted fracture through the neck and subcapital region of the left femur.)  Referred By: Marii  Past Medical History Relevant to Rehab:  (HTN, subdural hematoma, gout, basal cell carcinoma, knee arthritis, OA, osteoporosis)  Prior to Session Communication: Bedside nurse  Patient Position Received: Bed, 3 rail up, Alarm off, not on at start of session  General Comment:  (Pt somewhat fearful and anxious.)  Home  Living:  Home Living  Type of Home: House (Pt lives in [address] style home.)  Lives With: Spouse (Spouse in good health, no device used.  Spouse does drive and is retired.  Had recent shld surgery.  Pt has 3 adult sons)  Home Adaptive Equipment:  (Wheeled walker)  Home Access:  (1 + 2 stairs to enter.  No rails.)  Prior Level of Function:  Prior Function Per Pt/Caregiver Report  Level of [place]:  (Independent, community ambulator, no device.)  Precautions:  Precautions  LE Weight Bearing Status: Weight Bearing as Tolerated (LLE)  Medical Precautions: Fall precautions  Post-Surgical Precautions: Left hip precautions         Objective   Pain:  Pain Assessment  Pain Assessment: 0-10  0-10 (Numeric) Pain Score: 1  Pain Location: Hip  Pain Orientation: Left  Pain Interventions: Repositioned, Distraction, Ambulation/increased activity  Response to Interventions: No change in pain  Cognition:  Cognition  Overall Cognitive Status: Within Functional Limits  Orientation Level: Oriented X4    General Assessments:  Activity Tolerance  Endurance: Decreased tolerance for upright activites    Sensation  Light Touch: No apparent deficits    Static Sitting Balance  Static Sitting-Level of Assistance: Minimum assistance    Static Standing Balance  Static Standing-Level of Assistance:  (Min/mod assist x2 with walker)  Functional Assessments:  Bed Mobility  Bed Mobility:  (Pt is dependent for sit <>supine.  Pt holding body stiffly in extension.  Rigid.  No initiation of movement despite education and instruction.)    Transfers  Transfer:  (Sit to stand with mod assist x2.)    Ambulation/Gait Training  Ambulation/Gait Training Performed:  (Pt amb 4 steps to bedside chair with wheeled walker and mod assist x2.)  Extremity/Trunk Assessments:  RUE   RUE :  (RUE ROM and strength WFL)  LUE   LUE:  (LUE ROM and strength WFL)  RLE   RLE :  (RLE ROM and strength WFL.)  LLE   LLE :  (LLE ROM WFL within hip precaution guidelines.  Hip ROM  guarded.  Unable to initiate left hip flexion in supine position.  Trace left knee extension.  Left ankle dorsiflexion 3-/5.)      Encounter Problems       Encounter Problems (Active)       PT Problem       STG - Bed mobility with min/mod assist x2. (Progressing)       Start:  01/17/25    Expected End:  01/23/25            STG - Transfers with mod assist x1. (Progressing)       Start:  01/17/25    Expected End:  01/23/25            STG - Pt will amb 30 ft with wheeled walker and mod assist x1.  (Progressing)       Start:  01/17/25    Expected End:  01/23/25            STG - Pt will negotiate one step with rails and min/mod assist x2. (Progressing)       Start:  01/17/25    Expected End:  01/23/25            LTG - Bed mobility with min assist x1. (Progressing)       Start:  01/17/25    Expected End:  01/28/25            LTG - Transfers with min assist x1. (Progressing)       Start:  01/17/25    Expected End:  01/28/25            LTG - Pt will amb 50 ft with wheeled walker and min assist. (Progressing)       Start:  01/17/25    Expected End:  01/28/25            LTG - Pt will negotiate 5 stairs with rails and min/mod assist x1. (Progressing)       Start:  01/17/25    Expected End:  01/28/25               Pain - Adult              Education Documentation  Precautions, taught by Yamilet Contreras, PT at 1/17/2025  2:27 PM.  Learner: Patient  Readiness: Acceptance  Method: Explanation, Demonstration  Response: Verbalizes Understanding, Needs Reinforcement    Mobility Training, taught by Yamilet Contreras, PT at 1/17/2025  2:27 PM.  Learner: Patient  Readiness: Acceptance  Method: Explanation, Demonstration  Response: Verbalizes Understanding, Needs Reinforcement

## 2025-01-17 NOTE — PROGRESS NOTES
Occupational Therapy    Evaluation    Patient Name: Gabriella Haskins  MRN: [id number]  Department: [facility] REHAB  Room: [address] [place] [address]  Today's Date: 1/17/2025  Time Calculation  Start Time: 1300  Stop Time: 1345  Time Calculation (min): 45 min        Assessment:  OT Assessment: Impaired ADLs, transfers, and mobility  Prognosis: Good  End of Session Communication: Bedside nurse  End of Session Patient Position: Alarm on (recliner)  OT Assessment Results: Decreased ADL status, Decreased endurance, Decreased functional mobility, Decreased IADLs  Prognosis: Good  Strengths: Premorbid level of function, Support of Caregivers, Housing layout  Barriers to Participation: Coping skills    Plan:  Treatment Interventions: ADL retraining, UE strengthening/ROM, Functional transfer training, Endurance training, Patient/family training, Equipment evaluation/education  OT Frequency: 90 min/5 days per week (x 10 days)  OT Discharge Recommendations:  (anticipate possible need for up to min assist with ADLs, transfers, and mobility)  Equipment Recommended upon Discharge: Wheeled walker, Bedside commode (tub bench, sock aid, reacher, dressing stick, LHSH, 3:1 commode)  Treatment Interventions: ADL retraining, UE strengthening/ROM, Functional transfer training, Endurance training, Patient/family training, Equipment evaluation/education    Subjective     General:  General  Reason for Referral: Patient presented to [facility] 1/13/25 with inability to ambulate after falling at home. Patient reports tripping on leg of her table and falling on left side. Patient found to have comminuted fracture of neck and subcapital region of left femur     1/14/25: Bipolar left hip hemiarthroplasty    Referred By: Sirisha Colvin    Past Medical History Relevant to Rehab: HTN, HLD, subdural hematoma, gout, basal cell carcinoma, arthritis bilateral knees, OA left hip, osteoporosis, vit D deficiency, hypothyroidism, A-fib, recurrent falls, right shoulder  hemiarthroplasty, eye surgery    Prior to Session Communication: Bedside nurse  Patient Position Received: Alarm on, Bed, 2 rail up  General Comment: patient pleasant and cooperative; appears somewhat anxious    Precautions:  LE Weight Bearing Status:  (WBAT LLE)  Medical Precautions: Fall precautions  Post-Surgical Precautions: Left hip precautions  Precautions Comment: purewick      Pain:  Pain Assessment  Pain Assessment: 0-10  0-10 (Numeric) Pain Score: 2  Pain Location: Hip  Pain Orientation: Left  Pain Interventions: Repositioned, Distraction    Objective   Cognition:  Overall Cognitive Status: Within Functional Limits  Orientation Level: Oriented X4  Insight:  (anxious)  Processing Speed:  (slightly delayed processing)           Home Living:  Type of Home: House (Ranch style house - 3-4 entry steps, back entry; laundry basement)  Lives With: Spouse (spouse Ritchie in good health however recently had shoulder surgery; spouse drives, shares homemaking with spouse)  Bathroom Shower/Tub: Tub/shower unit  Bathroom Toilet: Handicapped height  Bathroom Equipment: Tub transfer bench, Grab bars in shower, Bedside commode, Hand-held shower hose    Prior Function:  Level of [place]: Independent with ADLs and functional transfers, Independent with homemaking with ambulation (normally independent without device, drives, shares homemaking with spouse; owns wheeled walker)  Hand Dominance: Right    ADL:  Eating Assistance: Independent  Grooming Assistance: Stand by  Bathing Assistance: Maximal  UE Dressing Assistance: Minimal  LE Dressing Assistance:  (dependent x2)  Toileting Assistance with Device:  (dependent x2)    Activity Tolerance:  Endurance: Decreased tolerance for upright activites    Bed Mobility/Transfers: Bed Mobility  Bed Mobility:  (dependent x2 supine to sit; patient very rigid and not attempting to initiate bed mobility; appears fearful)    Transfers  Transfer:  (mod assist x2 sit to stand from bed;  max cues for safe technique)      Functional Mobility:  Functional Mobility  Functional Mobility Performed:  mod assist x2 to take 3-4 steps from bed to recliner via wheeled walker; step-by-step cues for safe technique; appears somewhat anxious/fearful    Sitting Balance:  Static Sitting Balance  Static Sitting-Level of Assistance:  (sba)  Dynamic Sitting Balance  Dynamic Sitting-Level of Assistance: Contact guard    Standing Balance:  Static Standing Balance  Static Standing-Level of Assistance:  (min/mod assist x2 at walker)          Vision:Vision - Basic Assessment  Current Vision: No visual deficits       Sensation:  Light Touch: No apparent deficits    Coordination:  Movements are Fluid and Coordinated: Yes     Hand Function:  Gross Grasp: Functional  Coordination: Functional    Extremities: RUE   RUE : Within Functional Limits     LUE   LUE: Within Functional Limits      Education Documentation  Handouts, taught by Jeni Drew OT at 1/17/2025  2:05 PM.  Learner: Patient  Readiness: Acceptance  Method: Explanation  Response: Verbalizes Understanding, Needs Reinforcement    Home Exercise Program, taught by Jeni Drew OT at 1/17/2025  2:05 PM.  Learner: Patient  Readiness: Acceptance  Method: Explanation  Response: Verbalizes Understanding, Needs Reinforcement    Precautions, taught by Jeni Drew OT at 1/17/2025  2:05 PM.  Learner: Patient  Readiness: Acceptance  Method: Explanation  Response: Verbalizes Understanding, Needs Reinforcement    ADL Training, taught by Jeni Drew OT at 1/17/2025  2:05 PM.  Learner: Patient  Readiness: Acceptance  Method: Explanation  Response: Verbalizes Understanding, Needs Reinforcement     EDUCATION:  Education  Individual(s) Educated: Patient  Education Provided: POC discussed and agreed upon, Fall precautons, Risk and benefits of OT discussed with patient or other  Plan of Care Discussed and Agreed Upon: yes  Patient Response to Education:  Patient/Caregiver Verbalized Understanding of Information  Education Comment: Patient declines any information at this time on intimacy/sex in regards to diagnosis.    Goals:  Encounter Problems       Encounter Problems (Active)       OT Problem       LTG - Patient will complete grooming with independence. (Progressing)       Start:  01/17/25    Expected End:  01/27/25            LTG - Patient will complete toileting with min assist. (Progressing)       Start:  01/17/25    Expected End:  01/27/25            LTG - Patient will complete bathing with min assist. (Progressing)       Start:  01/17/25    Expected End:  01/27/25            LTG - Patient will complete UE dressing with supervision. (Progressing)       Start:  01/17/25    Expected End:  01/27/25            LTG - Patient will complete LE dressing using adaptive equip as needed with min assist. (Progressing)       Start:  01/17/25    Expected End:  01/27/25            LTG - Patient will complete commode transfer via device as needed with min assist. (Progressing)       Start:  01/17/25    Expected End:  01/27/25            LTG - Patient will complete tub/shower transfer using DME as needed with min/mod assist. (Progressing)       Start:  01/17/25    Expected End:  01/27/25            LTG - Patient will complete functional mobility via device as needed with min assist. (Progressing)       Start:  01/17/25    Expected End:  01/27/25            STG - Patient will complete toileting with mod assist. (Progressing)       Start:  01/17/25    Expected End:  01/24/25            STG - Patient will complete bathing with mod assist. (Progressing)       Start:  01/17/25    Expected End:  01/24/25            STG - Patient will complete UE dressing with sba. (Progressing)       Start:  01/17/25    Expected End:  01/24/25            STG - Patient will complete LE dressing using adaptive equip as needed with mod assist. (Progressing)       Start:  01/17/25    Expected End:   01/24/25            STG - Patient will complete commode transfer via device as needed with mod assist. (Progressing)       Start:  01/17/25    Expected End:  01/24/25            STG - Patient will complete simple functional mobility via device as needed with mod assist. (Progressing)       Start:  01/17/25    Expected End:  01/24/25

## 2025-01-17 NOTE — PREADMISSION SCREENING NOTE
Physical Medicine and Rehabilitation  Preadmission Screening    Rehab Physician's Review and Admission Determination:  Gabriella Haskins is a 79 y.o. female who needs acute inpatient rehabilitation in order to achieve the functional goals outlined below. She requires close rehabilitation physician monitoring and management due to her complex medical conditions and co-morbidities with the primary indication of:  Rehab Admission Indication: Orthopaedic Disorders: Orthopaedic Disorders: 0008.11 Status Post Unilateral Hip Fracture. Comorbid conditions that will impact course of rehabilitation: hypertension, hyperlipidemia, subdural hematoma, gout, basal cell carcinoma, arthritis of both knees, primary osteoarthritis of left hip, osteoporosis, vitamin D deficiency, and hypothyroidism. She requires 24-hour rehabilitation nursing to manage bowel and bladder function, skin care, surgical incision, nutrition and fluid intake, pulmonary hygiene, pain control, safety, medication management, and patient/family goals. In addition, rehabilitation nursing will reiterate and reinforce therapy skills and equipment use, including ADLs, as well as provide education to the patient and family. Gabriella Haskins is willing to participate in and is able to tolerate the proposed plan of care.    History of Present Illness: Gabriella Haskins is a 79 y.o. female presents [facility] on [facility]/13/[facility] with inability to ambulate after falling in her home this morning.    Patient states she was walking in her home when she tripped on the leg of her table and fell on her left side. She reports that should could not get up from the floor after her fall. Patient states she did hit her head when she fell. She denies headache, dizziness, syncope, loss of consciousness, nausea, or vomiting. Patient reports pain that is sharp, throbbing, and continuous in her left hip with decreased ROM. PMH of hypertension, hyperlipidemia, subdural hematoma, gout, basal cell  "carcinoma, arthritis of both knees, primary osteoarthritis of left hip, osteoporosis, vitamin D deficiency, and hypothyroidism, atrial fibrillation that was corrected with cardioversion in the ED in 2023.  Found to have  Comminuted fracture through the neck and subcapital region of the left femur     1/14/2025  2:21 PM BIPOLAR LEFT HIP HEMIARTHROPLASTY    Altaf Wells MD          /65  Pulse 74  Temp 36.4 °C (97.5 °F)  Resp 20  Ht 1.676 m (5' 6\")  Wt 60.3 kg (133 lb)  LMP (LMP Unknown)  SpO2 91%  BMI 21.47 kg/m²     Full code    Adult regular diet    Past Medical History:  Hypertension   Hyperlipidemia   Osteoporosis   Vitamin D deficiency   Osteoarthritis of left hip   Arthritis of both knees   Hypothyroidism   Atrial fibrillation   History of subdural hematoma   Anemia with B12 deficiency   Gout  History of recurrent falls      Past Surgical History:  Shoulder Surgery (right) - hemiarthroplasty   Eye Surgery - acute vitreous detachment        Social History:  She reports that she has never smoked. She has never used smokeless tobacco. She reports that she does not currently use alcohol. She reports that she does not use drugs.     Family History:  Family History   No family history on file.     Allergies:  Azithromycin, Codeine, Conjugated estrogens, Erythromycin, Ibuprofen, Lisinopril, Cephalexin, Doxycycline, Indomethacin, Bactrim [sulfamethoxazole-trimethoprim], and Amoxicillin     Inpatient Medications:  Scheduled Medications          Scheduled medications   Medication Dose Route Frequency    fluticasone  2 spray Each Nostril Daily    [Held by provider] hydroCHLOROthiazide  25 mg oral Daily    levothyroxine  112 mcg oral Daily    magnesium sulfate  4 g intravenous Once    metoprolol tartrate  25 mg oral BID    potassium chloride  20 mEq intravenous q2h         PRN Medications       PRN medications   Medication    acetaminophen    HYDROmorphone    HYDROmorphone    metoprolol           Prior " Functional Status:  Pt lives with her  in a house with 3 BIGG and lives on one level. Pt has 8 steps to basement with HR and laundry. Pt has tub/shower with elevated toilet and sink for support. R handed. Pt has no device and is ind in ADLs. Level of [place]:   Independent with ADLs and functional transfers, Independent with homemaking with ambulation  Prior Function Comments: No other falls. Drives     Precautions:  Precautions  LE Weight Bearing Status: Weight Bearing as Tolerated Fall precautions  Precautions Comment: s/p L hip bipolar hemiarthroplasty    Self-Care: indep  Ambulation: indep  Stairs: indep  Cognition: alert and oriented x3  Wheelchair: na  Devices: see above    Current Functional Status:  Eating: set up  Oral hygiene: min a  Toileting: mod a  Bathing: mod a  Upper body dressing: min a  Lower body dressing: max a  Bed Mobility: mod a  Transfers: mod a  Bladder and Bowel: continent  Cognition: alert and oriented x3    Patient is alert and oriented x3. Is able to tolerate 3 hours of therapy Monday thru Saturday.   Discharge plan is home in 7 to 10 days with  and outpt therapy at a Mod indep level.     Rehabilitation Goals and Plan:  Expected level of improvement: mod indep  Likelihood of reaching these goals: excellent.  Therapy treatments needed to achieve these goals: physical therapy, occupational therapy, , nursing, and aide.  Barriers to achieving these goals: Pain   Expected length of stay: 10 day(s)    When medically stable, anticipated discharge disposition: home with outpatient therapy  The potential to achieve that is excellent.

## 2025-01-17 NOTE — CARE PLAN
The patient's goals for the shift include      The clinical goals for the shift include Maintain safety and pain control      Problem: Safety - Adult  Goal: Free from fall injury  Outcome: Progressing  Flowsheets (Taken 1/17/2025 0633)  Free from fall injury: Instruct family/caregiver on patient safety     Problem: Pain - Adult  Goal: Verbalizes/displays adequate comfort level or baseline comfort level  Outcome: Progressing  Flowsheets (Taken 1/17/2025 0633)  Verbalizes/displays adequate comfort level or baseline comfort level:   Encourage patient to monitor pain and request assistance   Assess pain using appropriate pain scale   Administer analgesics based on type and severity of pain and evaluate response

## 2025-01-17 NOTE — H&P
Admission diagnosis: Left femoral neck fracture s/p hemiarthroplasty    History Of Present Illness  Gabriella Haskins is a 79 y.o. female with past medical history significant for osteoporosis, OA, HTN, HLD, vitamin D deficiency, hypothyroidism, A-fib (s/p cardioversion 2023), basal cell carcinoma, history of subdural hematoma, gout, B12 deficiency who presented to [facility] on 1/13/2025 following mechanical fall.  On presentation the patient was unable to bear weight.  Stated that she tripped on a table at home, with fall onto left side. X-ray in ED showed comminuted fracture through the neck and subcapital region of the left femur.  Prior to surgery, found to be in A-fib with RVR.  Rate was controlled, and she converted out of A-fib after administering IV Lopressor and fluid bolus, in addition to her regularly scheduled metoprolol.  She went to the OR on 1/14/2025 with Dr. Wells who performed bipolar left hip hemiarthroplasty.  Electrolytes repleted, specifically potassium and magnesium.  Postoperatively she was started on Xarelto for DVT prophylaxis.  Her hydrochlorothiazide was discontinued due to risk for hypotension, and was recommended to follow-up with her PCP for medication adjustment.  Recommended to have follow-up with orthopedics in 2 weeks.  Patient tolerated hemiarthroplasty well, without additional postoperative complications.  PT/OT evaluated patient, recommending acute rehab.  She was admitted to acute rehab on 1/17/2025 in stable condition.       Past Medical History  She has a past medical history of Arthritis, History of transfusion, and Lung nodule.    Surgical History  She has a past surgical history that includes Shoulder surgery.    Prior Functional Status:  Pt lives with her  in a house with 3 BIGG and lives on one level. Pt has 8 steps to basement with HR and laundry. Pt has tub/shower with elevated toilet and sink for support. R handed. Pt has no device and is ind in ADLs. Level of  [place]:   Independent with ADLs and functional transfers, Independent with homemaking with ambulation  Prior Function Comments: No other falls. Drives     Precautions:  Precautions  LE Weight Bearing Status: Weight Bearing as Tolerated Fall precautions  Precautions Comment: s/p L hip bipolar hemiarthroplasty     Self-Care: indep  Ambulation: indep  Stairs: indep  Cognition: alert and oriented x3  Wheelchair: na  Devices: see above     Current Functional Status:  Eating: set up  Oral hygiene: min a  Toileting: mod a  Bathing: mod a  Upper body dressing: min a  Lower body dressing: max a  Bed Mobility: mod a  Transfers: mod a  Bladder and Bowel: continent  Cognition: alert and oriented x3     Patient is alert and oriented x3. Is able to tolerate 3 hours of therapy Monday thru Saturday.   Discharge plan is home in 7 to 10 days with  and outpt therapy at a Mod indep level.      Rehabilitation Goals and Plan:  Expected level of improvement: mod indep  Likelihood of reaching these goals: excellent.  Therapy treatments needed to achieve these goals: physical therapy, occupational therapy, , nursing, and aide.  Barriers to achieving these goals: Pain   Expected length of stay: 10 day(s)      Social History  She reports that she has never smoked. She has never used smokeless tobacco. She reports that she does not currently use alcohol. She reports that she does not use drugs.     Allergies  Azithromycin, Codeine, Conjugated estrogens, Erythromycin, Ibuprofen, Lisinopril, Cephalexin, Doxycycline, Indomethacin, Bactrim [sulfamethoxazole-trimethoprim], and Amoxicillin    Medications  Current Facility-Administered Medications   Medication Dose Route Frequency Provider Last Rate Last Admin    acetaminophen (Tylenol) oral liquid 650 mg  650 mg oral q4h PRN Chintan Piecre DO        Or    acetaminophen (Tylenol) tablet 650 mg  650 mg oral q4h PRN Chintan Pierce DO        ascorbic acid (Vitamin C)  liquid 500 mg  500 mg oral Daily Chintan D Reiman, DO        carboxymethylcellulose (Refresh Celluvisc) 1 % ophthalmic solution 1 drop  1 drop Both Eyes TID PRN Chintan D Reiman, DO        cetirizine (ZyrTEC) tablet 10 mg  10 mg oral Daily Chintan D Reiman, DO        cholecalciferol (Vitamin D-3) tablet 1,000 Units  1,000 Units oral Daily Chintan D Reiman, DO        [START ON 1/18/2025] cyanocobalamin (Vitamin B-12) injection 1,000 mcg  1,000 mcg intramuscular q14 days Chintan D Reiman, DO        [START ON 1/18/2025] fluticasone (Flonase) nasal spray 1 spray  1 spray Each Nostril Daily Chintan D Reiman, DO        [START ON 1/18/2025] levothyroxine (Synthroid, Levoxyl) tablet 100 mcg  100 mcg oral Daily Chintan D Reiman, DO        melatonin tablet 3 mg  3 mg oral Nightly PRN Chintan D Reiman, DO        metoprolol tartrate (Lopressor) tablet 25 mg  25 mg oral BID Chintan D Reiman, DO        omega 3-dha-epa-fish oil 910-1,400 mg capsule 1,400 mg  1,400 mg oral Daily Chintan D Reiman, DO        ondansetron (Zofran) tablet 4 mg  4 mg oral q8h PRN Chintan D Reiman, DO        Or    ondansetron (Zofran) injection 4 mg  4 mg intravenous q8h PRN Chintan D Reiman, DO        oxyCODONE (Roxicodone) immediate release tablet 2.5 mg  2.5 mg oral q6h PRN Chintan D Reiman, DO        oxyCODONE (Roxicodone) immediate release tablet 5 mg  5 mg oral q6h PRN Chintan D Reiman, DO        polyethylene glycol (Glycolax, Miralax) packet 17 g  17 g oral Daily Chintan D Reiman, DO        rivaroxaban (Xarelto) tablet 20 mg  20 mg oral Daily with evening meal Chintan D Reiman, DO            Labs  Admission on 01/13/2025, Discharged on 01/17/2025   Component Date Value Ref Range Status    Ventricular Rate 01/13/2025 90  BPM Preliminary    Atrial Rate 01/13/2025 90  BPM Preliminary    OR Interval 01/13/2025 220  ms Preliminary    QRS Duration 01/13/2025 79  ms Preliminary    QT Interval 01/13/2025 367  ms Preliminary    QTC Calculation(Bazett)  01/13/2025 449  ms Preliminary    P [place] 01/13/2025 57  degrees Preliminary    R Axis 01/13/2025 -28  degrees Preliminary    T Axis 01/13/2025 67  degrees Preliminary    QRS Count 01/13/2025 14  beats Preliminary    Q Onset 01/13/2025 252  ms Preliminary    T Offset 01/13/2025 436  ms Preliminary    QTC Fredericia 01/13/2025 420  ms Preliminary    WBC 01/13/2025 12.3 (H)  4.4 - 11.3 x10*3/uL Final    nRBC 01/13/2025 0.0  0.0 - 0.0 /100 WBCs Final    RBC 01/13/2025 4.76  4.00 - 5.20 x10*6/uL Final    Hemoglobin 01/13/2025 13.2  12.0 - 16.0 g/dL Final    Hematocrit 01/13/2025 40.6  36.0 - 46.0 % Final    MCV 01/13/2025 85  80 - 100 fL Final    MCH 01/13/2025 27.7  26.0 - 34.0 pg Final    MCHC 01/13/2025 32.5  32.0 - 36.0 g/dL Final    RDW 01/13/2025 13.0  11.5 - 14.5 % Final    Platelets 01/13/2025 207  150 - 450 x10*3/uL Final    Neutrophils % 01/13/2025 84.9  40.0 - 80.0 % Final    Immature Granulocytes %, Automated 01/13/2025 0.4  0.0 - 0.9 % Final    Immature Granulocyte Count (IG) includes promyelocytes, myelocytes and metamyelocytes but does not include bands. Percent differential counts (%) should be interpreted in the context of the absolute cell counts (cells/UL).    Lymphocytes % 01/13/2025 8.6  13.0 - 44.0 % Final    Monocytes % 01/13/2025 5.3  2.0 - 10.0 % Final    Eosinophils % 01/13/2025 0.4  0.0 - 6.0 % Final    Basophils % 01/13/2025 0.4  0.0 - 2.0 % Final    Neutrophils Absolute 01/13/2025 10.42 (H)  1.60 - 5.50 x10*3/uL Final    Percent differential counts (%) should be interpreted in the context of the absolute cell counts (cells/uL).    Immature Granulocytes Absolute, Au* 01/13/2025 0.05  0.00 - 0.50 x10*3/uL Final    Lymphocytes Absolute 01/13/2025 1.05  0.80 - 3.00 x10*3/uL Final    Monocytes Absolute 01/13/2025 0.65  0.05 - 0.80 x10*3/uL Final    Eosinophils Absolute 01/13/2025 0.05  0.00 - 0.40 x10*3/uL Final    Basophils Absolute 01/13/2025 0.05  0.00 - 0.10 x10*3/uL Final    Glucose  01/13/2025 95  74 - 99 mg/dL Final    Sodium 01/13/2025 140  136 - 145 mmol/L Final    Potassium 01/13/2025 3.3 (L)  3.5 - 5.3 mmol/L Final    Chloride 01/13/2025 101  98 - 107 mmol/L Final    Bicarbonate 01/13/2025 31  21 - 32 mmol/L Final    Anion Gap 01/13/2025 11  10 - 20 mmol/L Final    Urea Nitrogen 01/13/2025 16  6 - 23 mg/dL Final    Creatinine 01/13/2025 0.72  0.50 - 1.05 mg/dL Final    eGFR 01/13/2025 86  >60 mL/min/1.73m*2 Final    Calculations of estimated GFR are performed using the 2021 CKD-EPI Study Refit equation without the race variable for the IDMS-Traceable creatinine methods.  https://jasn.asnjournals.org/content/early/2021/09/22/ASN.1088597756    Calcium 01/13/2025 9.6  8.6 - 10.3 mg/dL Final    Albumin 01/13/2025 3.8  3.4 - 5.0 g/dL Final    Alkaline Phosphatase 01/13/2025 50  33 - 136 U/L Final    Total Protein 01/13/2025 6.7  6.4 - 8.2 g/dL Final    AST 01/13/2025 15  9 - 39 U/L Final    Bilirubin, Total 01/13/2025 0.8  0.0 - 1.2 mg/dL Final    ALT 01/13/2025 8  7 - 45 U/L Final    Patients treated with Sulfasalazine may generate falsely decreased results for ALT.    ABO TYPE 01/13/2025 O   Final    Rh TYPE 01/13/2025 POS   Final    ANTIBODY SCREEN 01/13/2025 NEG   Final    Protime 01/13/2025 12.4  9.8 - 12.8 seconds Final    INR 01/13/2025 1.1  0.9 - 1.1 Final    aPTT 01/13/2025 29  27 - 38 seconds Final    Magnesium 01/14/2025 1.39 (L)  1.60 - 2.40 mg/dL Final    Glucose 01/14/2025 120 (H)  74 - 99 mg/dL Final    Sodium 01/14/2025 141  136 - 145 mmol/L Final    Potassium 01/14/2025 3.1 (L)  3.5 - 5.3 mmol/L Final    Chloride 01/14/2025 100  98 - 107 mmol/L Final    Bicarbonate 01/14/2025 29  21 - 32 mmol/L Final    Anion Gap 01/14/2025 15  10 - 20 mmol/L Final    Urea Nitrogen 01/14/2025 15  6 - 23 mg/dL Final    Creatinine 01/14/2025 0.71  0.50 - 1.05 mg/dL Final    eGFR 01/14/2025 87  >60 mL/min/1.73m*2 Final    Calculations of estimated GFR are performed using the 2021 CKD-EPI Study  Refit equation without the race variable for the IDMS-Traceable creatinine methods.  https://jasn.asnjournals.org/content/early/2021/09/22/ASN.0282866478    Calcium 01/14/2025 8.9  8.6 - 10.3 mg/dL Final    WBC 01/14/2025 10.4  4.4 - 11.3 x10*3/uL Final    nRBC 01/14/2025 0.0  0.0 - 0.0 /100 WBCs Final    RBC 01/14/2025 4.31  4.00 - 5.20 x10*6/uL Final    Hemoglobin 01/14/2025 12.0  12.0 - 16.0 g/dL Final    Hematocrit 01/14/2025 37.0  36.0 - 46.0 % Final    MCV 01/14/2025 86  80 - 100 fL Final    MCH 01/14/2025 27.8  26.0 - 34.0 pg Final    MCHC 01/14/2025 32.4  32.0 - 36.0 g/dL Final    RDW 01/14/2025 13.2  11.5 - 14.5 % Final    Platelets 01/14/2025 185  150 - 450 x10*3/uL Final    Ventricular Rate 01/13/2025 118  BPM Preliminary    Atrial Rate 01/13/2025 122  BPM Preliminary    MT Interval 01/13/2025 114  ms Preliminary    QRS Duration 01/13/2025 78  ms Preliminary    QT Interval 01/13/2025 328  ms Preliminary    QTC Calculation(Bazett) 01/13/2025 460  ms Preliminary    R Axis 01/13/2025 -28  degrees Preliminary    T [place] 01/13/2025 51  degrees Preliminary    QRS Count 01/13/2025 19  beats Preliminary    Q Onset 01/13/2025 249  ms Preliminary    T Offset 01/13/2025 413  ms Preliminary    QTC Fredericia 01/13/2025 411  ms Preliminary    Thyroid Stimulating Hormone 01/14/2025 0.16 (L)  0.44 - 3.98 mIU/L Final    Thyroxine, Free 01/14/2025 1.34 (H)  0.61 - 1.12 ng/dL Final    Magnesium 01/15/2025 2.06  1.60 - 2.40 mg/dL Final    Glucose 01/15/2025 131 (H)  74 - 99 mg/dL Final    Sodium 01/15/2025 136  136 - 145 mmol/L Final    Potassium 01/15/2025 3.8  3.5 - 5.3 mmol/L Final    Chloride 01/15/2025 100  98 - 107 mmol/L Final    Bicarbonate 01/15/2025 28  21 - 32 mmol/L Final    Anion Gap 01/15/2025 12  10 - 20 mmol/L Final    Urea Nitrogen 01/15/2025 22  6 - 23 mg/dL Final    Creatinine 01/15/2025 0.79  0.50 - 1.05 mg/dL Final    eGFR 01/15/2025 77  >60 mL/min/1.73m*2 Final    Calculations of estimated GFR are  performed using the 2021 CKD-EPI Study Refit equation without the race variable for the IDMS-Traceable creatinine methods.  https://jasn.asnjournals.org/content/early/2021/09/22/ASN.6065913570    Calcium 01/15/2025 8.8  8.6 - 10.3 mg/dL Final    WBC 01/15/2025 13.6 (H)  4.4 - 11.3 x10*3/uL Final    nRBC 01/15/2025 0.0  0.0 - 0.0 /100 WBCs Final    RBC 01/15/2025 3.88 (L)  4.00 - 5.20 x10*6/uL Final    Hemoglobin 01/15/2025 10.9 (L)  12.0 - 16.0 g/dL Final    Hematocrit 01/15/2025 33.7 (L)  36.0 - 46.0 % Final    MCV 01/15/2025 87  80 - 100 fL Final    MCH 01/15/2025 28.1  26.0 - 34.0 pg Final    MCHC 01/15/2025 32.3  32.0 - 36.0 g/dL Final    RDW 01/15/2025 13.3  11.5 - 14.5 % Final    Platelets 01/15/2025 177  150 - 450 x10*3/uL Final    Ventricular Rate 01/13/2025 85  BPM Preliminary    Atrial Rate 01/13/2025 85  BPM Preliminary    [place] Interval 01/13/2025 210  ms Preliminary    QRS Duration 01/13/2025 78  ms Preliminary    QT Interval 01/13/2025 362  ms Preliminary    QTC Calculation(Bazett) 01/13/2025 430  ms Preliminary    P Axis 01/13/2025 70  degrees Preliminary    R Axis 01/13/2025 25  degrees Preliminary    T [place] 01/13/2025 79  degrees Preliminary    QRS Count 01/13/2025 14  beats Preliminary    Q Onset 01/13/2025 227  ms Preliminary    P Onset 01/13/2025 122  ms Preliminary    P Offset 01/13/2025 192  ms Preliminary    T Offset 01/13/2025 408  ms Preliminary    QTC [place]ia 01/13/2025 406  ms Preliminary    Magnesium 01/16/2025 1.74  1.60 - 2.40 mg/dL Final    Glucose 01/16/2025 91  74 - 99 mg/dL Final    Sodium 01/16/2025 137  136 - 145 mmol/L Final    Potassium 01/16/2025 3.6  3.5 - 5.3 mmol/L Final    Chloride 01/16/2025 100  98 - 107 mmol/L Final    Bicarbonate 01/16/2025 30  21 - 32 mmol/L Final    Anion Gap 01/16/2025 11  10 - 20 mmol/L Final    Urea Nitrogen 01/16/2025 18  6 - 23 mg/dL Final    Creatinine 01/16/2025 0.69  0.50 - 1.05 mg/dL Final    eGFR 01/16/2025 89  >60 mL/min/1.73m*2 Final     "Calculations of estimated GFR are performed using the 2021 CKD-EPI Study Refit equation without the race variable for the IDMS-Traceable creatinine methods.  https://jasn.asnjournals.org/content/early/2021/09/22/ASN.0378740443    Calcium 01/16/2025 8.8  8.6 - 10.3 mg/dL Final    WBC 01/16/2025 11.7 (H)  4.4 - 11.3 x10*3/uL Final    nRBC 01/16/2025 0.0  0.0 - 0.0 /100 WBCs Final    RBC 01/16/2025 3.77 (L)  4.00 - 5.20 x10*6/uL Final    Hemoglobin 01/16/2025 10.5 (L)  12.0 - 16.0 g/dL Final    Hematocrit 01/16/2025 33.2 (L)  36.0 - 46.0 % Final    MCV 01/16/2025 88  80 - 100 fL Final    MCH 01/16/2025 27.9  26.0 - 34.0 pg Final    MCHC 01/16/2025 31.6 (L)  32.0 - 36.0 g/dL Final    RDW 01/16/2025 13.3  11.5 - 14.5 % Final    Platelets 01/16/2025 194  150 - 450 x10*3/uL Final        Last Recorded Vitals  Blood pressure 130/60, pulse 69, temperature 37.7 °C (99.9 °F), temperature source Temporal, resp. rate 18, height 1.676 m (5' 6\"), weight 51 kg (112 lb 7 oz), SpO2 96%.    Review of Systems   Constitutional:  Negative for activity change, appetite change, chills and fever.   HENT:  Negative for congestion, ear pain and facial swelling.    Eyes:  Negative for pain.   Respiratory:  Negative for cough, chest tightness, shortness of breath and wheezing.    Cardiovascular:  Negative for chest pain.   Gastrointestinal:  Negative for abdominal distention, abdominal pain, diarrhea, nausea and vomiting.   Genitourinary:  Negative for difficulty urinating and frequency.   Musculoskeletal:  Positive for arthralgias, back pain and joint swelling. Negative for myalgias, neck pain and neck stiffness.   Skin:  Positive for wound.   Neurological:  Positive for weakness. Negative for dizziness, seizures, syncope and headaches.   Hematological:  Bruises/bleeds easily.   Psychiatric/Behavioral:  Negative for hallucinations. The patient is not nervous/anxious.         Physical Exam  Constitutional:       General: She is not in acute " distress.  HENT:      Head: Normocephalic and atraumatic.      Nose: Nose normal.   Eyes:      Extraocular Movements: Extraocular movements intact.      Pupils: Pupils are equal, round, and reactive to light.   Cardiovascular:      Rate and Rhythm: Normal rate and regular rhythm.      Heart sounds: Normal heart sounds.   Pulmonary:      Effort: Pulmonary effort is normal. No respiratory distress.      Breath sounds: Normal breath sounds. No wheezing.   Abdominal:      General: Bowel sounds are normal.      Palpations: Abdomen is soft.      Tenderness: There is no abdominal tenderness. There is no guarding.   Musculoskeletal:         General: Swelling, tenderness and signs of injury present. Normal range of motion.      Comments: Tenderness to palpation in the left proximal femur and lateral thigh.  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   Skin:     General: Skin is warm.      Capillary Refill: Capillary refill takes less than 2 seconds.      Coloration: Skin is pale.      Findings: Bruising present.   Neurological:      General: No focal deficit present.      Mental Status: She is alert and oriented to person, place, and time. Mental status is at baseline.      Motor: Weakness present.      Comments: Weakness and left hip flexion and left knee extension/flexion secondary to pain.  Able to lift leg off of bed 2 to 3 inches while supine.  Neurovascularly intact extremities.   Psychiatric:         Mood and Affect: Mood normal.         Behavior: Behavior normal.         Thought Content: Thought content normal.         Judgment: Judgment normal.         Assessment/Plan   Assessment & Plan  Closed displaced fracture of left femoral neck  -3 hours of PT and OT daily to improve patient functional mobility so she can safely be discharged home modified independent.  -Estimated length of stay 10 days  - Precautions: WBAT to LLE, fall precautions  - Pain management: Tylenol and oxycodone as  needed    Essential hypertension  -Continue metoprolol tartrate 25 mg twice daily  - Was previously taking hydrochlorothiazide 25 mg daily, however this was discontinued due to hypotension  - Recommend follow-up with PCP for medication adjustment  Hypercholesterolemia    Hypothyroidism  -Continue levothyroxine 100 mcg daily    Vitamin D deficiency  -Continue vitamin supplements, including vitamin D, vitamin C, vitamin B12    Fall, initial encounter  -PT/OT as above  - Continue to work on strength, coordination and balance  - Patient with previous history of subdural hematoma following mechanical fall, previously underwent acute rehab stay 1 year ago      1/17  - Vital stable  - Admission labs scheduled for 1/18  - Evaluation date tomorrow  - Pain currently controlled with Tylenol and as needed oxycodone  - Weightbearing as tolerated to left lower extremity  - Expected length of stay: 10 days    Bowel/Bladder  -facilitate daily active BM, daily MiraLAX  -continence of bladder per timed void schedule and rehab nursing  -check PVRs and treat appropriately    DVT prophylaxis  - Xarelto 20 mg daily  -SCDs and ambulation  -Chemoprophylaxis for DVT until ambulating >200 feet    FEN  -follow BMP and treat appropriately  -monitor oral intake daily    Physician Physical Assessment/ Post admission Evaluation (RODDY)     I have reviewed the preadmission rehabilitation screening. There have been no relevant changes since the preadmission screening;    Rehab Plan of Care   The Interdisciplinary Team will work collaboratively to address the patient problems and goals to be managed by the Individualized Interdisciplinary Plan of Care. See the IPOC note for a complete review of the plan of care.    Medical Necessity:  Gabriella Haskins requires, and is capable of participating in, an intensive and coordinated interdisciplinary acute inpatient rehabilitation program. She requires close rehabilitation physician monitoring and management  to monitor her complex medical conditions and coordinate rehabilitation care. The patient's rehabilitation goals and medical complexity cannot adequately be managed in a less intensive setting. Potential risks for clinical complications include: DVT, infection, debility, falls.    Medical Prognosis:  Excellent for continued progress and participation with therapy.    The estimated length of stay:       Total time spent with patient 65 minutes with discussion with patient, rehab team, review of history, physical exam and documentation along with the development of the plan of care.    Plan of care developed today after review of team notes.  The plan of care was reviewed with the patient.      Chintan Pierce, DO  PM&R, PGY-2

## 2025-01-17 NOTE — ASSESSMENT & PLAN NOTE
-3 hours of PT and OT daily to improve patient functional mobility so she can safely be discharged home modified independent.  -Estimated length of stay 10 days  - Precautions: WBAT to LLE, fall precautions  - Pain management: Tylenol and oxycodone as needed

## 2025-01-17 NOTE — ASSESSMENT & PLAN NOTE
-PT/OT as above  - Continue to work on strength, coordination and balance  - Patient with previous history of subdural hematoma following mechanical fall, previously underwent acute rehab stay 1 year ago

## 2025-01-17 NOTE — ASSESSMENT & PLAN NOTE
-Continue metoprolol tartrate 25 mg twice daily  - Was previously taking hydrochlorothiazide 25 mg daily, however this was discontinued due to hypotension  - Recommend follow-up with PCP for medication adjustment

## 2025-01-17 NOTE — CARE PLAN
The patient's goals for the shift include  participate in scheduled evaluation     The clinical goals for the shift include  Pain control     Problem: Pain - Adult  Goal: Verbalizes/displays adequate comfort level or baseline comfort level  Outcome: Progressing     Problem: Safety - Adult  Goal: Free from fall injury  Outcome: Progressing     Problem: Discharge Planning  Goal: Discharge to home or other facility with appropriate resources  Outcome: Progressing     Problem: Chronic Conditions and Co-morbidities  Goal: Patient's chronic conditions and co-morbidity symptoms are monitored and maintained or improved  Outcome: Progressing     Problem: Skin  Goal: Decreased wound size/increased tissue granulation at next dressing change  Outcome: Progressing  Flowsheets (Taken 1/17/2025 1043)  Decreased wound size/increased tissue granulation at next dressing change:   Promote sleep for wound healing   Protective dressings over bony prominences  Goal: Participates in plan/prevention/treatment measures  Outcome: Progressing  Flowsheets (Taken 1/17/2025 1043)  Participates in plan/prevention/treatment measures:   Elevate heels   Increase activity/out of bed for meals  Goal: Prevent/manage excess moisture  Outcome: Progressing  Flowsheets (Taken 1/17/2025 1043)  Prevent/manage excess moisture:   Moisturize dry skin   Cleanse incontinence/protect with barrier cream   Follow provider orders for dressing changes   Monitor for/manage infection if present  Goal: Prevent/minimize sheer/friction injuries  Outcome: Progressing  Flowsheets (Taken 1/17/2025 1043)  Prevent/minimize sheer/friction injuries:   HOB 30 degrees or less   Increase activity/out of bed for meals   Turn/reposition every 2 hours/use positioning/transfer devices   Use pull sheet  Goal: Promote/optimize nutrition  Outcome: Progressing  Flowsheets (Taken 1/17/2025 1043)  Promote/optimize nutrition:   Offer water/supplements/favorite foods   Consume > 50%  meals/supplements   Monitor/record intake including meals  Goal: Promote skin healing  Outcome: Progressing  Flowsheets (Taken 1/17/2025 1043)  Promote skin healing: Turn/reposition every 2 hours/use positioning/transfer devices     Problem: Fall/Injury  Goal: Not fall by end of shift  Outcome: Progressing  Goal: Be free from injury by end of the shift  Outcome: Progressing  Goal: Verbalize understanding of personal risk factors for fall in the hospital  Outcome: Progressing  Goal: Verbalize understanding of risk factor reduction measures to prevent injury from fall in the home  Outcome: Progressing  Goal: Use assistive devices by end of the shift  Outcome: Progressing  Goal: Pace activities to prevent fatigue by end of the shift  Outcome: Progressing     Problem: Pain  Goal: Takes deep breaths with improved pain control throughout the shift  Outcome: Progressing  Goal: Turns in bed with improved pain control throughout the shift  Outcome: Progressing  Goal: Walks with improved pain control throughout the shift  Outcome: Progressing  Goal: Performs ADL's with improved pain control throughout shift  Outcome: Progressing  Goal: Participates in PT with improved pain control throughout the shift  Outcome: Progressing  Goal: Free from opioid side effects throughout the shift  Outcome: Progressing  Goal: Free from acute confusion related to pain meds throughout the shift  Outcome: Progressing

## 2025-01-18 LAB
ANION GAP SERPL CALC-SCNC: 14 MMOL/L (ref 10–20)
BUN SERPL-MCNC: 14 MG/DL (ref 6–23)
CALCIUM SERPL-MCNC: 9 MG/DL (ref 8.6–10.3)
CHLORIDE SERPL-SCNC: 96 MMOL/L (ref 98–107)
CO2 SERPL-SCNC: 27 MMOL/L (ref 21–32)
CREAT SERPL-MCNC: 0.66 MG/DL (ref 0.5–1.05)
EGFRCR SERPLBLD CKD-EPI 2021: 90 ML/MIN/1.73M*2
ERYTHROCYTE [DISTWIDTH] IN BLOOD BY AUTOMATED COUNT: 13.3 % (ref 11.5–14.5)
GLUCOSE SERPL-MCNC: 101 MG/DL (ref 74–99)
HCT VFR BLD AUTO: 34.4 % (ref 36–46)
HGB BLD-MCNC: 11 G/DL (ref 12–16)
MCH RBC QN AUTO: 27.9 PG (ref 26–34)
MCHC RBC AUTO-ENTMCNC: 32 G/DL (ref 32–36)
MCV RBC AUTO: 87 FL (ref 80–100)
NRBC BLD-RTO: 0 /100 WBCS (ref 0–0)
PLATELET # BLD AUTO: 251 X10*3/UL (ref 150–450)
POTASSIUM SERPL-SCNC: 4 MMOL/L (ref 3.5–5.3)
RBC # BLD AUTO: 3.94 X10*6/UL (ref 4–5.2)
SODIUM SERPL-SCNC: 133 MMOL/L (ref 136–145)
WBC # BLD AUTO: 11.8 X10*3/UL (ref 4.4–11.3)

## 2025-01-18 PROCEDURE — 2500000005 HC RX 250 GENERAL PHARMACY W/O HCPCS

## 2025-01-18 PROCEDURE — 97530 THERAPEUTIC ACTIVITIES: CPT | Mod: GP,CQ

## 2025-01-18 PROCEDURE — 1280000001 HC REHAB SEMI-PRIVATE ROOM DAILY

## 2025-01-18 PROCEDURE — 2500000001 HC RX 250 WO HCPCS SELF ADMINISTERED DRUGS (ALT 637 FOR MEDICARE OP)

## 2025-01-18 PROCEDURE — 36415 COLL VENOUS BLD VENIPUNCTURE: CPT

## 2025-01-18 PROCEDURE — 2500000002 HC RX 250 W HCPCS SELF ADMINISTERED DRUGS (ALT 637 FOR MEDICARE OP, ALT 636 FOR OP/ED)

## 2025-01-18 PROCEDURE — 80048 BASIC METABOLIC PNL TOTAL CA: CPT

## 2025-01-18 PROCEDURE — 97116 GAIT TRAINING THERAPY: CPT | Mod: GP,CQ

## 2025-01-18 PROCEDURE — 2500000004 HC RX 250 GENERAL PHARMACY W/ HCPCS (ALT 636 FOR OP/ED)

## 2025-01-18 PROCEDURE — 85027 COMPLETE CBC AUTOMATED: CPT

## 2025-01-18 PROCEDURE — 51701 INSERT BLADDER CATHETER: CPT

## 2025-01-18 PROCEDURE — 97110 THERAPEUTIC EXERCISES: CPT | Mod: GP,CQ

## 2025-01-18 PROCEDURE — 97535 SELF CARE MNGMENT TRAINING: CPT | Mod: CO,GO

## 2025-01-18 RX ORDER — CYANOCOBALAMIN 1000 UG/ML
1000 INJECTION, SOLUTION INTRAMUSCULAR; SUBCUTANEOUS
Status: DISCONTINUED | OUTPATIENT
Start: 2025-01-30 | End: 2025-01-29 | Stop reason: HOSPADM

## 2025-01-18 RX ADMIN — Medication 3 MG: at 21:23

## 2025-01-18 RX ADMIN — ACETAMINOPHEN 650 MG: 325 TABLET, FILM COATED ORAL at 00:13

## 2025-01-18 RX ADMIN — METOPROLOL TARTRATE 25 MG: 25 TABLET, FILM COATED ORAL at 21:23

## 2025-01-18 RX ADMIN — Medication 500 MG: at 08:33

## 2025-01-18 RX ADMIN — ICOSAPENT ETHYL 1000 MG: 1 CAPSULE ORAL at 08:33

## 2025-01-18 RX ADMIN — OXYCODONE HYDROCHLORIDE 5 MG: 5 TABLET ORAL at 09:22

## 2025-01-18 RX ADMIN — ACETAMINOPHEN 650 MG: 325 TABLET, FILM COATED ORAL at 23:36

## 2025-01-18 RX ADMIN — CETIRIZINE HYDROCHLORIDE 10 MG: 10 TABLET, FILM COATED ORAL at 08:33

## 2025-01-18 RX ADMIN — OXYCODONE HYDROCHLORIDE 2.5 MG: 5 TABLET ORAL at 02:59

## 2025-01-18 RX ADMIN — METOPROLOL TARTRATE 25 MG: 25 TABLET, FILM COATED ORAL at 08:33

## 2025-01-18 RX ADMIN — LEVOTHYROXINE SODIUM 100 MCG: 0.1 TABLET ORAL at 06:25

## 2025-01-18 RX ADMIN — POLYETHYLENE GLYCOL 3350 17 G: 17 POWDER, FOR SOLUTION ORAL at 08:33

## 2025-01-18 RX ADMIN — RIVAROXABAN 20 MG: 20 TABLET, FILM COATED ORAL at 17:17

## 2025-01-18 RX ADMIN — Medication 1000 UNITS: at 08:33

## 2025-01-18 ASSESSMENT — PAIN DESCRIPTION - LOCATION
LOCATION: LEG
LOCATION: HIP
LOCATION: HIP

## 2025-01-18 ASSESSMENT — PAIN - FUNCTIONAL ASSESSMENT
PAIN_FUNCTIONAL_ASSESSMENT: 0-10

## 2025-01-18 ASSESSMENT — ACTIVITIES OF DAILY LIVING (ADL)
HOME_MANAGEMENT_TIME_ENTRY: 45
BATHING_LEVEL_OF_ASSISTANCE: MODERATE ASSISTANCE

## 2025-01-18 ASSESSMENT — PAIN SCALES - GENERAL
PAINLEVEL_OUTOF10: 4
PAINLEVEL_OUTOF10: 4
PAINLEVEL_OUTOF10: 0 - NO PAIN
PAINLEVEL_OUTOF10: 3
PAINLEVEL_OUTOF10: 3
PAINLEVEL_OUTOF10: 4
PAINLEVEL_OUTOF10: 7
PAINLEVEL_OUTOF10: 3
PAINLEVEL_OUTOF10: 1
PAINLEVEL_OUTOF10: 3
PAINLEVEL_OUTOF10: 3

## 2025-01-18 ASSESSMENT — PAIN DESCRIPTION - ORIENTATION
ORIENTATION: LEFT

## 2025-01-18 ASSESSMENT — PAIN DESCRIPTION - DESCRIPTORS: DESCRIPTORS: SORE

## 2025-01-18 NOTE — PROGRESS NOTES
Occupational Therapy    OT Treatment    Patient Name: Gabriella Haskins  MRN: [id number]  Department: [facility]  Room: [address] [place] [address]  Today's Date: 1/18/2025  Time Calculation  Start Time: 1000  Stop Time: 1045  Time Calculation (min): 45 min        Assessment:  End of Session Patient Position: Up in chair, Alarm on     Plan:  Treatment Interventions: ADL retraining, UE strengthening/ROM, Functional transfer training, Endurance training, Patient/family training, Equipment evaluation/education  OT Frequency: 90 min/5 days per week (x 10 days)  OT Discharge Recommendations:  (anticipate possible need for up to min assist with ADLs, transfers, and mobility)  Equipment Recommended upon Discharge: Wheeled walker, Bedside commode (tub bench, sock aid, reacher, dressing stick, LHSH, 3:1 commode)  Treatment Interventions: ADL retraining, UE strengthening/ROM, Functional transfer training, Endurance training, Patient/family training, Equipment evaluation/education    Subjective   Previous Visit Info:  OT Last Visit  OT Received On: 01/18/25  General:  General  Prior to Session Communication: Bedside nurse  Patient Position Received: Up in chair, Alarm on  General Comment: pleasant and cooperative  Precautions:  LE Weight Bearing Status: Weight Bearing as Tolerated  Medical Precautions: Fall precautions  Post-Surgical Precautions: Left hip precautions      Pain:  Pain Assessment  Pain Assessment:  (2-3/10 in L LE)    Objective         Activities of Daily Living: LE Dressing  LE Dressing: Yes  LE Dressing Adaptive Equipment: Reacher, Sock aide, Dressing stick  Pants Level of Assistance: Moderate assistance  Adult Briefs Level of Assistance: Maximum assistance  LE Dressing Where Assessed: Wheelchair  LE Dressing Comments: pt educated on use of AE to adhere to hip precautions. pt required mod vc to adhere and apply throughout session. following education pt able to doff/don socks at Min A level with increased time to complete d/t  moving at slow pace  Pt educated on THR precautions as applied to self care tasks and transfers throughout tx session. Pt verbalized and demonstrated understanding with mod vc to carryover.       Toileting  Toileting Level of Assistance: Moderate assistance  Where Assessed: Toilet  Functional Standing Tolerance:  Time: 2:30 standing at FWW  Bed Mobility/Transfers: Transfers  Transfer: Yes  Transfer 1  Transfer From 1: Wheelchair to  Transfer to 1: Chair with arms  Technique 1: Stand pivot  Transfer Device 1: Walker  Transfer Level of Assistance 1: Moderate assistance  Trials/Comments 1: at end of session pt requested to sit up in chair with arms    Toilet Transfers  Toilet Transfer From: Rolling walker  Toilet Transfer Type: To and from  Toilet Transfer to: Raised toilet seat with rails  Toilet Transfer Technique: Ambulating  Toilet Transfers: Moderate assistance    Functional Mobility:  Functional Mobility  Functional Mobility Performed: Yes  Functional Mobility 1  Device 1: Rolling walker  Assistance 1: Minimum assistance  Comments 1: pt able to ambulate short distance from room into bathroom      OP EDUCATION:  Education  Individual(s) Educated: Patient  Education Provided: Diagnosis & Precautions, Fall precautons (education on AE and hip precautions as applied to self care tasks)  Patient Response to Education: Patient/Caregiver Verbalized Understanding of Information  Education Comment: pt would benefit from continued education and carryover    Goals:  Encounter Problems       Encounter Problems (Active)       OT Problem       LTG - Patient will complete grooming with independence. (Progressing)       Start:  01/17/25    Expected End:  01/27/25            LTG - Patient will complete toileting with min assist. (Progressing)       Start:  01/17/25    Expected End:  01/27/25            LTG - Patient will complete bathing with min assist. (Progressing)       Start:  01/17/25    Expected End:  01/27/25             LTG - Patient will complete UE dressing with supervision. (Progressing)       Start:  01/17/25    Expected End:  01/27/25            LTG - Patient will complete LE dressing using adaptive equip as needed with min assist. (Progressing)       Start:  01/17/25    Expected End:  01/27/25            LTG - Patient will complete commode transfer via device as needed with min assist. (Progressing)       Start:  01/17/25    Expected End:  01/27/25            LTG - Patient will complete tub/shower transfer using DME as needed with min/mod assist. (Progressing)       Start:  01/17/25    Expected End:  01/27/25            LTG - Patient will complete functional mobility via device as needed with min assist. (Progressing)       Start:  01/17/25    Expected End:  01/27/25            STG - Patient will complete toileting with mod assist. (Progressing)       Start:  01/17/25    Expected End:  01/24/25            STG - Patient will complete bathing with mod assist. (Progressing)       Start:  01/17/25    Expected End:  01/24/25            STG - Patient will complete UE dressing with sba. (Progressing)       Start:  01/17/25    Expected End:  01/24/25            STG - Patient will complete LE dressing using adaptive equip as needed with mod assist. (Progressing)       Start:  01/17/25    Expected End:  01/24/25            STG - Patient will complete commode transfer via device as needed with mod assist. (Progressing)       Start:  01/17/25    Expected End:  01/24/25            STG - Patient will complete simple functional mobility via device as needed with mod assist. (Progressing)       Start:  01/17/25    Expected End:  01/24/25

## 2025-01-18 NOTE — PROGRESS NOTES
Physical Therapy    Physical Therapy Treatment    Patient Name: Gabriella Haskins  MRN: [id number]  Department: [facility]  Room: [address] [place] [address]  Today's Date: 1/18/2025  Time Calculation  Start Time: 1300  Stop Time: 1345  Time Calculation (min): 45 min         Assessment/Plan   PT Assessment  End of Session Communication:  (OT)  End of Session Patient Position: Up in chair, Alarm on (Pt seated in recliner with BLE elevated, call bell in reach, cold pack applied to L hip.)     PT Plan  Treatment/Interventions: Bed mobility, Transfer training, Gait training, Stair training, Balance training, Strengthening, Therapeutic exercise, Therapeutic activity  PT Plan: Ongoing PT  PT Frequency: 90 min/5 days per week (10 days)  PT Discharge Recommendations:  (Anticipate discharge home with the need for intermittent min assist at the walker level.)  Equipment Recommended upon Discharge: Wheeled walker  PT Recommended Transfer Status: Assist x2      General Visit Information:   PT  Visit  PT Received On: 01/18/25  General  Family/Caregiver Present: Yes (Pt's son (Nikita) present in room at end of PT session.)  Prior to Session Communication: PCT/NA/CTA  Patient Position Received: Up in chair, Alarm on  General Comment:  (Pt pleasant and agreeable to participate in PT session.)    Subjective   Precautions:  Precautions  LE Weight Bearing Status: Weight Bearing as Tolerated  Medical Precautions: Fall precautions  Post-Surgical Precautions: Left hip precautions            Objective   Pain:  Pain Assessment  Pain Assessment: 0-10  0-10 (Numeric) Pain Score: 4  Pain Type: Surgical pain  Pain Location: Hip  Pain Orientation: Left  Pain Interventions: Repositioned, Distraction, Cold pack              Treatments:  Therapeutic Exercise  Therapeutic Exercise Performed: Yes (Pt performed supine BLE therex on gym mat consisting of: AP, HS, SAQ, GS, QS, hip ABD/ADD on slide board 2x10 reps each in order to facilitate improvement in functional  mobility.)    Bed Mobility  Bed Mobility: Yes (Pt performed sit<>supine on gym mat with Mod/MaxAx2; v/c for sequencing and manage LLE.)    Ambulation/Gait Training  Ambulation/Gait Training Performed: Yes (Pt able to amb 40'x1 with ModAx1-2 plus w/c follow; pt demos slow pace with step-to gait pattern. Denies dizziness and no LOB noted.)  Transfers  Transfer: Yes (Pt performed stand pivot w/c<>gym mat and w/c>recliner with FWW and ModAx2; v/c for proper hand placement safety.)  Education Documentation  No documentation found.  Education Comments  Patient educated in correct bed mobility with instruction for proper trunk positioning, upper and lower body placement and positioning and use of bed rail if appropriate.  Patient educated in safe techniques for gait with a device in order to maximize safety and functional independence.  Patient educated in safe and proper transfer techniques including proper positioning and hand/foot placement to maximize safety and functional independence.  Pt educated on therex, including optimal techniques to maximize function.          OP EDUCATION:       Encounter Problems       Encounter Problems (Active)       PT Problem       STG - Bed mobility with min/mod assist x2. (Progressing)       Start:  01/17/25    Expected End:  01/23/25            STG - Transfers with mod assist x1. (Progressing)       Start:  01/17/25    Expected End:  01/23/25            STG - Pt will amb 30 ft with wheeled walker and mod assist x1.  (Progressing)       Start:  01/17/25    Expected End:  01/23/25            STG - Pt will negotiate one step with rails and min/mod assist x2. (Progressing)       Start:  01/17/25    Expected End:  01/23/25            LTG - Bed mobility with min assist x1. (Progressing)       Start:  01/17/25    Expected End:  01/28/25            LTG - Transfers with min assist x1. (Progressing)       Start:  01/17/25    Expected End:  01/28/25            LTG - Pt will amb 50 ft with  wheeled walker and min assist. (Progressing)       Start:  01/17/25    Expected End:  01/28/25            LTG - Pt will negotiate 5 stairs with rails and min/mod assist x1. (Progressing)       Start:  01/17/25    Expected End:  01/28/25               Pain - Adult

## 2025-01-18 NOTE — PROGRESS NOTES
Occupational Therapy    OT Treatment    Patient Name: Gabriella Haskins  MRN: [id number]  Department: [facility]  Room: [address] [place] [address]  Today's Date: 1/18/2025  Time Calculation  Start Time: 0830  Stop Time: 0915  Time Calculation (min): 45 min        Assessment:  End of Session Patient Position: Up in chair, Alarm on     Plan:  Treatment Interventions: ADL retraining, UE strengthening/ROM, Functional transfer training, Endurance training, Patient/family training, Equipment evaluation/education  OT Frequency: 90 min/5 days per week (x 10 days)  OT Discharge Recommendations:  (anticipate possible need for up to min assist with ADLs, transfers, and mobility)  Equipment Recommended upon Discharge: Wheeled walker, Bedside commode (tub bench, sock aid, reacher, dressing stick, LHSH, 3:1 commode)  Treatment Interventions: ADL retraining, UE strengthening/ROM, Functional transfer training, Endurance training, Patient/family training, Equipment evaluation/education    Subjective   Previous Visit Info:  OT Last Visit  OT Received On: 01/18/25  General:  General  Prior to Session Communication: Bedside nurse  Patient Position Received: Bed, 2 rail up, Alarm on  General Comment: pleasant and cooperative  Precautions:  LE Weight Bearing Status: Weight Bearing as Tolerated  Medical Precautions: Fall precautions  Post-Surgical Precautions: Left hip precautions        Pain:  Pain Assessment  Pain Assessment:  (5/10 pain in L LE, nursing aware and not time for next pain medication at start of session)    Objective         Activities of Daily Living: Grooming  Grooming Level of Assistance: Close supervision  Grooming Where Assessed: Standing sinkside  Grooming Comments: wash face, brush teeth, and comb hair    UE Bathing  UE Bathing Level of Assistance: Close supervision  UE Bathing Where Assessed: Sitting sinkside    LE Bathing  LE Bathing Level of Assistance: Moderate assistance  LE Bathing Comments: to adhere to hip precautions and  "balance during standing for jeb care    UE Dressing  UE Dressing Level of Assistance: Close supervision  UE Dressing Where Assessed: Wheelchair  UE Dressing Comments: don pullover shirt    LE Dressing  LE Dressing: Yes  Pants Level of Assistance: Maximum assistance  Adult Briefs Level of Assistance: Maximum assistance  LE Dressing Where Assessed: Toilet  LE Dressing Comments: d/t time constraints max A required to complete tasks    Toileting  Toileting Level of Assistance: Maximum assistance  Where Assessed: Toilet  Functional Standing Tolerance:  Time: 2:00 standing at FWW  Bed Mobility/Transfers: Bed Mobility  Bed Mobility: Yes  Bed Mobility 1  Bed Mobility 1: Supine to sitting  Level of Assistance 1: Moderate assistance  Bed Mobility Comments 1: pt required increased time to complete task d/t moving at slow pace and mod vc for sequencing    Transfers  Transfer: Yes  Transfer 1  Technique 1: Sit to stand, Stand to sit  Transfer Device 1: Walker  Transfer Level of Assistance 1: Moderate assistance    Toilet Transfers  Toilet Transfer From: Wheelchair  Toilet Transfer Type: To and from  Toilet Transfer to: Raised toilet seat with rails  Toilet Transfer Technique: Stand pivot  Toilet Transfers: Moderate assistance    Functional Mobility:  Functional Mobility  Functional Mobility Performed: Yes  Functional Mobility 1  Device 1: Rolling walker  Assistance 1: Moderate assistance  Comments 1: pt able to ambulate short distance from EOB over to w/c. pt verbalized \"feeling funny\" denied dizziness but verbalized fear of falling with standing         OP EDUCATION:  Education  Individual(s) Educated: Patient  Education Provided: Fall precautons, Risk and benefits of OT discussed with patient or other (pt educated on hip precautions and application during self care tasks.)  Patient Response to Education: Patient/Caregiver Verbalized Understanding of Information  Education Comment: pt would benefit from continued education to " improve carryover    Goals:  Encounter Problems       Encounter Problems (Active)       OT Problem       LTG - Patient will complete grooming with independence. (Progressing)       Start:  01/17/25    Expected End:  01/27/25            LTG - Patient will complete toileting with min assist. (Progressing)       Start:  01/17/25    Expected End:  01/27/25            LTG - Patient will complete bathing with min assist. (Progressing)       Start:  01/17/25    Expected End:  01/27/25            LTG - Patient will complete UE dressing with supervision. (Progressing)       Start:  01/17/25    Expected End:  01/27/25            LTG - Patient will complete LE dressing using adaptive equip as needed with min assist. (Progressing)       Start:  01/17/25    Expected End:  01/27/25            LTG - Patient will complete commode transfer via device as needed with min assist. (Progressing)       Start:  01/17/25    Expected End:  01/27/25            LTG - Patient will complete tub/shower transfer using DME as needed with min/mod assist. (Progressing)       Start:  01/17/25    Expected End:  01/27/25            LTG - Patient will complete functional mobility via device as needed with min assist. (Progressing)       Start:  01/17/25    Expected End:  01/27/25            STG - Patient will complete toileting with mod assist. (Progressing)       Start:  01/17/25    Expected End:  01/24/25            STG - Patient will complete bathing with mod assist. (Progressing)       Start:  01/17/25    Expected End:  01/24/25            STG - Patient will complete UE dressing with sba. (Progressing)       Start:  01/17/25    Expected End:  01/24/25            STG - Patient will complete LE dressing using adaptive equip as needed with mod assist. (Progressing)       Start:  01/17/25    Expected End:  01/24/25            STG - Patient will complete commode transfer via device as needed with mod assist. (Progressing)       Start:  01/17/25     Expected End:  01/24/25            STG - Patient will complete simple functional mobility via device as needed with mod assist. (Progressing)       Start:  01/17/25    Expected End:  01/24/25

## 2025-01-18 NOTE — CARE PLAN
The patient's goals for the shift include      The clinical goals for the shift include Pain control    Problem: Pain - Adult  Goal: Verbalizes/displays adequate comfort level or baseline comfort level  Outcome: Progressing     Problem: Safety - Adult  Goal: Free from fall injury  Outcome: Progressing     Problem: Chronic Conditions and Co-morbidities  Goal: Patient's chronic conditions and co-morbidity symptoms are monitored and maintained or improved  Outcome: Progressing     Problem: Skin  Goal: Decreased wound size/increased tissue granulation at next dressing change  Outcome: Progressing  Flowsheets (Taken 1/18/2025 0219)  Decreased wound size/increased tissue granulation at next dressing change:   Promote sleep for wound healing   Protective dressings over bony prominences   Utilize specialty bed per algorithm  Goal: Participates in plan/prevention/treatment measures  Outcome: Progressing  Flowsheets (Taken 1/18/2025 0219)  Participates in plan/prevention/treatment measures:   Discuss with provider PT/OT consult   Elevate heels   Increase activity/out of bed for meals  Goal: Prevent/manage excess moisture  Outcome: Progressing  Flowsheets (Taken 1/18/2025 0219)  Prevent/manage excess moisture:   Cleanse incontinence/protect with barrier cream   Monitor for/manage infection if present   Follow provider orders for dressing changes   Use wicking fabric (obtain order)   Moisturize dry skin  Goal: Prevent/minimize sheer/friction injuries  Outcome: Progressing  Flowsheets (Taken 1/18/2025 0219)  Prevent/minimize sheer/friction injuries:   Complete micro-shifts as needed if patient unable. Adjust patient position to relieve pressure points, not a full turn   Increase activity/out of bed for meals   Use pull sheet   HOB 30 degrees or less  Goal: Promote/optimize nutrition  Outcome: Progressing  Flowsheets (Taken 1/18/2025 0219)  Promote/optimize nutrition:   Assist with feeding   Monitor/record intake including  meals   Consume > 50% meals/supplements   Offer water/supplements/favorite foods  Goal: Promote skin healing  Outcome: Progressing  Flowsheets (Taken 1/18/2025 0219)  Promote skin healing:   Assess skin/pad under line(s)/device(s)   Protective dressings over bony prominences   Turn/reposition every 2 hours/use positioning/transfer devices     Problem: Fall/Injury  Goal: Not fall by end of shift  Outcome: Progressing  Goal: Be free from injury by end of the shift  Outcome: Progressing  Goal: Verbalize understanding of personal risk factors for fall in the hospital  Outcome: Progressing  Goal: Verbalize understanding of risk factor reduction measures to prevent injury from fall in the home  Outcome: Progressing  Goal: Use assistive devices by end of the shift  Outcome: Progressing  Goal: Pace activities to prevent fatigue by end of the shift  Outcome: Progressing     Problem: Pain  Goal: Takes deep breaths with improved pain control throughout the shift  Outcome: Progressing  Goal: Turns in bed with improved pain control throughout the shift  Outcome: Progressing  Goal: Walks with improved pain control throughout the shift  Outcome: Progressing  Goal: Performs ADL's with improved pain control throughout shift  Outcome: Progressing  Goal: Participates in PT with improved pain control throughout the shift  Outcome: Progressing  Goal: Free from opioid side effects throughout the shift  Outcome: Progressing  Goal: Free from acute confusion related to pain meds throughout the shift  Outcome: Progressing

## 2025-01-18 NOTE — PROGRESS NOTES
Physical Therapy    Physical Therapy Treatment    Patient Name: Gabriella Haskins  MRN: [id number]  Department: [facility] REHAB  Room: [address] [place] [address]  Today's Date: 1/18/2025  Time Calculation  Start Time: 0915  Stop Time: 1000  Time Calculation (min): 45 min         Assessment/Plan   PT Assessment  End of Session Communication:  (OT)  End of Session Patient Position: Up in chair, Alarm on     PT Plan  Treatment/Interventions: Bed mobility, Transfer training, Gait training, Stair training, Balance training, Strengthening, Therapeutic exercise, Therapeutic activity  PT Plan: Ongoing PT  PT Frequency: 90 min/5 days per week (10 days)  PT Discharge Recommendations:  (Anticipate discharge home with the need for intermittent min assist at the walker level.)  Equipment Recommended upon Discharge: Wheeled walker  PT Recommended Transfer Status: Assist x2      General Visit Information:   PT  Visit  PT Received On: 01/18/25  General  Prior to Session Communication:  (OT)  Patient Position Received: Up in chair, Alarm on  General Comment:  (Pt pleasant and agreeable to PT session.)    Subjective   Precautions:  Precautions  LE Weight Bearing Status: Weight Bearing as Tolerated  Medical Precautions: Fall precautions  Post-Surgical Precautions: Left hip precautions    Vital Signs (Past 2hrs)                 Objective   Pain:  Pain Assessment  Pain Assessment: 0-10  0-10 (Numeric) Pain Score: 3  Pain Type: Surgical pain  Pain Location: Hip  Pain Orientation: Left  Pain Interventions: Repositioned, Distraction              Treatments:  Therapeutic Exercise  Therapeutic Exercise Performed: Yes (Pt performed seated BLE therex consisting of: heel/toe raises, marching (L AAROM), LAQ (L AAROM), HS, GS, hip ADD/ABD 2x10 reps each in order to facilitate improvement in functional mobility.      Ambulation/Gait Training  Ambulation/Gait Training Performed: Yes (Pt able to amb 6'x2 trials with FWW and ModAx2 plus w/c follow; pt reports feeling  "\"weird\" while amb (not dizzy or lightheaded) possibly nausea during both trials requiring seated rest breaks.)  Transfers  Transfer: Yes (Pt performed sit<>stand with ModAx2; v/c for proper hand placement safety.)    Education Documentation  No documentation found.  Education Comments  Patient educated in safe techniques for gait with a device in order to maximize safety and functional independence.  Patient educated in safe and proper transfer techniques including proper positioning and hand/foot placement to maximize safety and functional independence.  Pt educated on therex, including optimal techniques to maximize function.          OP EDUCATION:       Encounter Problems       Encounter Problems (Active)       PT Problem       STG - Bed mobility with min/mod assist x2. (Progressing)       Start:  01/17/25    Expected End:  01/23/25            STG - Transfers with mod assist x1. (Progressing)       Start:  01/17/25    Expected End:  01/23/25            STG - Pt will amb 30 ft with wheeled walker and mod assist x1.  (Progressing)       Start:  01/17/25    Expected End:  01/23/25            STG - Pt will negotiate one step with rails and min/mod assist x2. (Progressing)       Start:  01/17/25    Expected End:  01/23/25            LTG - Bed mobility with min assist x1. (Progressing)       Start:  01/17/25    Expected End:  01/28/25            LTG - Transfers with min assist x1. (Progressing)       Start:  01/17/25    Expected End:  01/28/25            LTG - Pt will amb 50 ft with wheeled walker and min assist. (Progressing)       Start:  01/17/25    Expected End:  01/28/25            LTG - Pt will negotiate 5 stairs with rails and min/mod assist x1. (Progressing)       Start:  01/17/25    Expected End:  01/28/25               Pain - Adult              "

## 2025-01-18 NOTE — CARE PLAN
The patient's goals for the shift include  participate in scheduled therapy     The clinical goals for the shift include pain control    Problem: Pain - Adult  Goal: Verbalizes/displays adequate comfort level or baseline comfort level  Outcome: Progressing     Problem: Safety - Adult  Goal: Free from fall injury  Outcome: Progressing     Problem: Discharge Planning  Goal: Discharge to home or other facility with appropriate resources  Outcome: Progressing     Problem: Chronic Conditions and Co-morbidities  Goal: Patient's chronic conditions and co-morbidity symptoms are monitored and maintained or improved  Outcome: Progressing     Problem: Skin  Goal: Decreased wound size/increased tissue granulation at next dressing change  Outcome: Progressing  Flowsheets (Taken 1/18/2025 1436)  Decreased wound size/increased tissue granulation at next dressing change:   Promote sleep for wound healing   Protective dressings over bony prominences  Goal: Participates in plan/prevention/treatment measures  Outcome: Progressing  Flowsheets (Taken 1/18/2025 1436)  Participates in plan/prevention/treatment measures:   Elevate heels   Increase activity/out of bed for meals  Goal: Prevent/manage excess moisture  Outcome: Progressing  Flowsheets (Taken 1/18/2025 1436)  Prevent/manage excess moisture:   Moisturize dry skin   Follow provider orders for dressing changes   Monitor for/manage infection if present   Cleanse incontinence/protect with barrier cream  Goal: Prevent/minimize sheer/friction injuries  Outcome: Progressing  Flowsheets (Taken 1/18/2025 1436)  Prevent/minimize sheer/friction injuries:   Increase activity/out of bed for meals   Use pull sheet   HOB 30 degrees or less   Turn/reposition every 2 hours/use positioning/transfer devices  Goal: Promote/optimize nutrition  Outcome: Progressing  Flowsheets (Taken 1/18/2025 1436)  Promote/optimize nutrition:   Monitor/record intake including meals   Consume > 50%  meals/supplements   Offer water/supplements/favorite foods  Goal: Promote skin healing  Outcome: Progressing  Flowsheets (Taken 1/18/2025 1436)  Promote skin healing: Turn/reposition every 2 hours/use positioning/transfer devices     Problem: Fall/Injury  Goal: Not fall by end of shift  Outcome: Progressing  Goal: Be free from injury by end of the shift  Outcome: Progressing  Goal: Verbalize understanding of personal risk factors for fall in the hospital  Outcome: Progressing  Goal: Verbalize understanding of risk factor reduction measures to prevent injury from fall in the home  Outcome: Progressing  Goal: Use assistive devices by end of the shift  Outcome: Progressing  Goal: Pace activities to prevent fatigue by end of the shift  Outcome: Progressing     Problem: Pain  Goal: Takes deep breaths with improved pain control throughout the shift  Outcome: Progressing  Goal: Turns in bed with improved pain control throughout the shift  Outcome: Progressing  Goal: Walks with improved pain control throughout the shift  Outcome: Progressing  Goal: Performs ADL's with improved pain control throughout shift  Outcome: Progressing  Goal: Participates in PT with improved pain control throughout the shift  Outcome: Progressing  Goal: Free from opioid side effects throughout the shift  Outcome: Progressing  Goal: Free from acute confusion related to pain meds throughout the shift  Outcome: Progressing

## 2025-01-19 ENCOUNTER — APPOINTMENT (OUTPATIENT)
Dept: RADIOLOGY | Facility: HOSPITAL | Age: 80
DRG: 560 | End: 2025-01-19
Payer: MEDICARE

## 2025-01-19 VITALS
HEART RATE: 82 BPM | SYSTOLIC BLOOD PRESSURE: 137 MMHG | HEIGHT: 66 IN | OXYGEN SATURATION: 98 % | WEIGHT: 112.43 LBS | BODY MASS INDEX: 18.07 KG/M2 | RESPIRATION RATE: 16 BRPM | DIASTOLIC BLOOD PRESSURE: 65 MMHG | TEMPERATURE: 98.4 F

## 2025-01-19 PROCEDURE — 51701 INSERT BLADDER CATHETER: CPT

## 2025-01-19 PROCEDURE — 74018 RADEX ABDOMEN 1 VIEW: CPT

## 2025-01-19 PROCEDURE — 2500000002 HC RX 250 W HCPCS SELF ADMINISTERED DRUGS (ALT 637 FOR MEDICARE OP, ALT 636 FOR OP/ED)

## 2025-01-19 PROCEDURE — 2500000001 HC RX 250 WO HCPCS SELF ADMINISTERED DRUGS (ALT 637 FOR MEDICARE OP): Performed by: STUDENT IN AN ORGANIZED HEALTH CARE EDUCATION/TRAINING PROGRAM

## 2025-01-19 PROCEDURE — 1280000001 HC REHAB SEMI-PRIVATE ROOM DAILY

## 2025-01-19 PROCEDURE — 51702 INSERT TEMP BLADDER CATH: CPT

## 2025-01-19 PROCEDURE — 2500000004 HC RX 250 GENERAL PHARMACY W/ HCPCS (ALT 636 FOR OP/ED)

## 2025-01-19 PROCEDURE — 74018 RADEX ABDOMEN 1 VIEW: CPT | Performed by: RADIOLOGY

## 2025-01-19 PROCEDURE — 2500000001 HC RX 250 WO HCPCS SELF ADMINISTERED DRUGS (ALT 637 FOR MEDICARE OP)

## 2025-01-19 PROCEDURE — 2500000005 HC RX 250 GENERAL PHARMACY W/O HCPCS

## 2025-01-19 RX ORDER — BISACODYL 10 MG/1
10 SUPPOSITORY RECTAL DAILY
Status: DISCONTINUED | OUTPATIENT
Start: 2025-01-19 | End: 2025-01-29 | Stop reason: HOSPADM

## 2025-01-19 RX ADMIN — Medication 3 MG: at 20:13

## 2025-01-19 RX ADMIN — CETIRIZINE HYDROCHLORIDE 10 MG: 10 TABLET, FILM COATED ORAL at 08:18

## 2025-01-19 RX ADMIN — METOPROLOL TARTRATE 25 MG: 25 TABLET, FILM COATED ORAL at 20:13

## 2025-01-19 RX ADMIN — Medication 500 MG: at 08:18

## 2025-01-19 RX ADMIN — RIVAROXABAN 20 MG: 20 TABLET, FILM COATED ORAL at 17:08

## 2025-01-19 RX ADMIN — BISACODYL 10 MG: 10 SUPPOSITORY RECTAL at 19:44

## 2025-01-19 RX ADMIN — FLUTICASONE PROPIONATE 1 SPRAY: 50 SPRAY, METERED NASAL at 08:21

## 2025-01-19 RX ADMIN — OXYCODONE HYDROCHLORIDE 2.5 MG: 5 TABLET ORAL at 14:10

## 2025-01-19 RX ADMIN — LEVOTHYROXINE SODIUM 100 MCG: 0.1 TABLET ORAL at 06:36

## 2025-01-19 RX ADMIN — ICOSAPENT ETHYL 1000 MG: 1 CAPSULE ORAL at 08:18

## 2025-01-19 RX ADMIN — OXYCODONE HYDROCHLORIDE 5 MG: 5 TABLET ORAL at 20:13

## 2025-01-19 RX ADMIN — ACETAMINOPHEN 650 MG: 325 TABLET, FILM COATED ORAL at 15:16

## 2025-01-19 RX ADMIN — POLYETHYLENE GLYCOL 3350 17 G: 17 POWDER, FOR SOLUTION ORAL at 08:16

## 2025-01-19 RX ADMIN — Medication 1000 UNITS: at 08:19

## 2025-01-19 RX ADMIN — OXYCODONE HYDROCHLORIDE 5 MG: 5 TABLET ORAL at 01:31

## 2025-01-19 RX ADMIN — METOPROLOL TARTRATE 25 MG: 25 TABLET, FILM COATED ORAL at 08:19

## 2025-01-19 ASSESSMENT — PAIN - FUNCTIONAL ASSESSMENT
PAIN_FUNCTIONAL_ASSESSMENT: 0-10
PAIN_FUNCTIONAL_ASSESSMENT: UNABLE TO SELF-REPORT
PAIN_FUNCTIONAL_ASSESSMENT: 0-10

## 2025-01-19 ASSESSMENT — PAIN DESCRIPTION - DESCRIPTORS
DESCRIPTORS: SORE
DESCRIPTORS: ACHING

## 2025-01-19 ASSESSMENT — PAIN SCALES - GENERAL
PAINLEVEL_OUTOF10: 4
PAINLEVEL_OUTOF10: 0 - NO PAIN
PAINLEVEL_OUTOF10: 2
PAINLEVEL_OUTOF10: 6
PAINLEVEL_OUTOF10: 3
PAINLEVEL_OUTOF10: 3
PAINLEVEL_OUTOF10: 7
PAINLEVEL_OUTOF10: 7

## 2025-01-19 ASSESSMENT — PAIN SCALES - WONG BAKER: WONGBAKER_NUMERICALRESPONSE: HURTS EVEN MORE

## 2025-01-19 ASSESSMENT — PAIN DESCRIPTION - LOCATION: LOCATION: HIP

## 2025-01-19 ASSESSMENT — PAIN DESCRIPTION - ORIENTATION: ORIENTATION: LEFT

## 2025-01-19 NOTE — CARE PLAN
The patient's goals for the shift include      The clinical goals for the shift include Remain hemodynamically stable.      Problem: Pain - Adult  Goal: Verbalizes/displays adequate comfort level or baseline comfort level  Outcome: Progressing     Problem: Safety - Adult  Goal: Free from fall injury  Outcome: Progressing     Problem: Discharge Planning  Goal: Discharge to home or other facility with appropriate resources  Outcome: Progressing     Problem: Chronic Conditions and Co-morbidities  Goal: Patient's chronic conditions and co-morbidity symptoms are monitored and maintained or improved  Outcome: Progressing     Problem: Skin  Goal: Decreased wound size/increased tissue granulation at next dressing change  Outcome: Progressing  Flowsheets (Taken 1/19/2025 0024)  Decreased wound size/increased tissue granulation at next dressing change: Promote sleep for wound healing  Goal: Participates in plan/prevention/treatment measures  Outcome: Progressing  Flowsheets (Taken 1/19/2025 0024)  Participates in plan/prevention/treatment measures: Elevate heels  Goal: Prevent/manage excess moisture  Outcome: Progressing  Flowsheets (Taken 1/19/2025 0024)  Prevent/manage excess moisture: Moisturize dry skin  Goal: Prevent/minimize sheer/friction injuries  Outcome: Progressing  Flowsheets (Taken 1/19/2025 0024)  Prevent/minimize sheer/friction injuries:   Use pull sheet   HOB 30 degrees or less   Turn/reposition every 2 hours/use positioning/transfer devices  Goal: Promote/optimize nutrition  Outcome: Progressing  Flowsheets (Taken 1/19/2025 0024)  Promote/optimize nutrition: Monitor/record intake including meals  Goal: Promote skin healing  Outcome: Progressing  Flowsheets (Taken 1/19/2025 0024)  Promote skin healing:   Assess skin/pad under line(s)/device(s)   Protective dressings over bony prominences     Problem: Fall/Injury  Goal: Not fall by end of shift  Outcome: Progressing  Goal: Be free from injury by end of the  shift  Outcome: Progressing  Goal: Verbalize understanding of personal risk factors for fall in the hospital  Outcome: Progressing  Goal: Verbalize understanding of risk factor reduction measures to prevent injury from fall in the home  Outcome: Progressing  Goal: Use assistive devices by end of the shift  Outcome: Progressing  Goal: Pace activities to prevent fatigue by end of the shift  Outcome: Progressing     Problem: Pain  Goal: Takes deep breaths with improved pain control throughout the shift  Outcome: Progressing  Goal: Turns in bed with improved pain control throughout the shift  Outcome: Progressing  Goal: Walks with improved pain control throughout the shift  Outcome: Progressing  Goal: Performs ADL's with improved pain control throughout shift  Outcome: Progressing  Goal: Participates in PT with improved pain control throughout the shift  Outcome: Progressing  Goal: Free from opioid side effects throughout the shift  Outcome: Progressing  Goal: Free from acute confusion related to pain meds throughout the shift  Outcome: Progressing

## 2025-01-19 NOTE — CARE PLAN
The patient's goals for the shift include  increase comfort and maintain safety.     The clinical goals for the shift include Patient will remain hemodynamically

## 2025-01-20 PROCEDURE — 97116 GAIT TRAINING THERAPY: CPT | Mod: GP,CQ

## 2025-01-20 PROCEDURE — 97110 THERAPEUTIC EXERCISES: CPT | Mod: GO,CO

## 2025-01-20 PROCEDURE — 97535 SELF CARE MNGMENT TRAINING: CPT | Mod: GO,CO

## 2025-01-20 PROCEDURE — 2500000004 HC RX 250 GENERAL PHARMACY W/ HCPCS (ALT 636 FOR OP/ED)

## 2025-01-20 PROCEDURE — 1280000001 HC REHAB SEMI-PRIVATE ROOM DAILY

## 2025-01-20 PROCEDURE — 2500000001 HC RX 250 WO HCPCS SELF ADMINISTERED DRUGS (ALT 637 FOR MEDICARE OP): Performed by: STUDENT IN AN ORGANIZED HEALTH CARE EDUCATION/TRAINING PROGRAM

## 2025-01-20 PROCEDURE — 99233 SBSQ HOSP IP/OBS HIGH 50: CPT

## 2025-01-20 PROCEDURE — 97530 THERAPEUTIC ACTIVITIES: CPT | Mod: GO,CO

## 2025-01-20 PROCEDURE — 97530 THERAPEUTIC ACTIVITIES: CPT | Mod: GP

## 2025-01-20 PROCEDURE — 2500000002 HC RX 250 W HCPCS SELF ADMINISTERED DRUGS (ALT 637 FOR MEDICARE OP, ALT 636 FOR OP/ED): Performed by: PHYSICAL MEDICINE & REHABILITATION

## 2025-01-20 PROCEDURE — 2500000001 HC RX 250 WO HCPCS SELF ADMINISTERED DRUGS (ALT 637 FOR MEDICARE OP)

## 2025-01-20 PROCEDURE — 97110 THERAPEUTIC EXERCISES: CPT | Mod: GP,CQ

## 2025-01-20 PROCEDURE — 2500000002 HC RX 250 W HCPCS SELF ADMINISTERED DRUGS (ALT 637 FOR MEDICARE OP, ALT 636 FOR OP/ED)

## 2025-01-20 RX ORDER — TAMSULOSIN HYDROCHLORIDE 0.4 MG/1
0.4 CAPSULE ORAL NIGHTLY
Status: DISCONTINUED | OUTPATIENT
Start: 2025-01-20 | End: 2025-01-29

## 2025-01-20 RX ADMIN — BISACODYL 10 MG: 10 SUPPOSITORY RECTAL at 17:53

## 2025-01-20 RX ADMIN — METOPROLOL TARTRATE 25 MG: 25 TABLET, FILM COATED ORAL at 20:03

## 2025-01-20 RX ADMIN — Medication 500 MG: at 08:19

## 2025-01-20 RX ADMIN — POLYETHYLENE GLYCOL 3350 17 G: 17 POWDER, FOR SOLUTION ORAL at 08:20

## 2025-01-20 RX ADMIN — FLUTICASONE PROPIONATE 1 SPRAY: 50 SPRAY, METERED NASAL at 08:25

## 2025-01-20 RX ADMIN — OXYCODONE HYDROCHLORIDE 5 MG: 5 TABLET ORAL at 08:19

## 2025-01-20 RX ADMIN — METOPROLOL TARTRATE 25 MG: 25 TABLET, FILM COATED ORAL at 08:19

## 2025-01-20 RX ADMIN — ACETAMINOPHEN 650 MG: 325 TABLET, FILM COATED ORAL at 06:18

## 2025-01-20 RX ADMIN — OXYCODONE HYDROCHLORIDE 5 MG: 5 TABLET ORAL at 14:48

## 2025-01-20 RX ADMIN — RIVAROXABAN 20 MG: 20 TABLET, FILM COATED ORAL at 17:06

## 2025-01-20 RX ADMIN — LEVOTHYROXINE SODIUM 100 MCG: 0.1 TABLET ORAL at 06:17

## 2025-01-20 RX ADMIN — TAMSULOSIN HYDROCHLORIDE 0.4 MG: 0.4 CAPSULE ORAL at 20:03

## 2025-01-20 RX ADMIN — ICOSAPENT ETHYL 1000 MG: 1 CAPSULE ORAL at 08:19

## 2025-01-20 RX ADMIN — CETIRIZINE HYDROCHLORIDE 10 MG: 10 TABLET, FILM COATED ORAL at 08:19

## 2025-01-20 RX ADMIN — Medication 1000 UNITS: at 08:19

## 2025-01-20 ASSESSMENT — PAIN - FUNCTIONAL ASSESSMENT
PAIN_FUNCTIONAL_ASSESSMENT: 0-10

## 2025-01-20 ASSESSMENT — PAIN SCALES - GENERAL
PAINLEVEL_OUTOF10: 5 - MODERATE PAIN
PAINLEVEL_OUTOF10: 0 - NO PAIN
PAINLEVEL_OUTOF10: 2
PAINLEVEL_OUTOF10: 0 - NO PAIN
PAINLEVEL_OUTOF10: 3
PAINLEVEL_OUTOF10: 8
PAINLEVEL_OUTOF10: 2
PAINLEVEL_OUTOF10: 4
PAINLEVEL_OUTOF10: 2
PAINLEVEL_OUTOF10: 7

## 2025-01-20 ASSESSMENT — PAIN DESCRIPTION - LOCATION
LOCATION: HIP
LOCATION: HIP

## 2025-01-20 ASSESSMENT — PAIN DESCRIPTION - DESCRIPTORS: DESCRIPTORS: SORE

## 2025-01-20 ASSESSMENT — ACTIVITIES OF DAILY LIVING (ADL): HOME_MANAGEMENT_TIME_ENTRY: 35

## 2025-01-20 ASSESSMENT — PAIN DESCRIPTION - ORIENTATION: ORIENTATION: LEFT

## 2025-01-20 NOTE — PROGRESS NOTES
Occupational Therapy    OT Treatment    Patient Name: Gabriella Haskins  MRN: [id number]  Department: [facility] RE[facility]  Room: [address] [place] [address]  Today's Date: 1/20/2025  Time Calculation  Start Time: 1300  Stop Time: 1345  Time Calculation (min): 45 min        Assessment:  End of Session Communication: Bedside nurse  End of Session Patient Position: Up in chair, Alarm on     Plan:  Treatment Interventions: ADL retraining, UE strengthening/ROM, Functional transfer training, Endurance training, Patient/family training, Equipment evaluation/education  OT Frequency: 90 min/5 days per week (x 10 days)  OT Discharge Recommendations:  (anticipate possible need for up to min assist with ADLs, transfers, and mobility)  Equipment Recommended upon Discharge: Wheeled walker, Bedside commode (tub bench, sock aid, reacher, dressing stick, LHSH, 3:1 commode)  Treatment Interventions: ADL retraining, UE strengthening/ROM, Functional transfer training, Endurance training, Patient/family training, Equipment evaluation/education    Subjective   Previous Visit Info:  OT Last Visit  OT Received On: 01/20/25  General:  General  Patient Position Received: Up in chair, Alarm on  General Comment: Patient pleasant and agreeable to therapy.  Precautions:  LE Weight Bearing Status: Weight Bearing as Tolerated  Medical Precautions: Fall precautions  Post-Surgical Precautions: Left hip precautions  Precautions Comment: mejia  Pain:  Pain Assessment  Pain Assessment: 0-10  0-10 (Numeric) Pain Score: 2  Pain Type: Surgical pain  Pain Location: Hip  Pain Orientation: Left  Pain Interventions: Repositioned  Response to Interventions: Decrease in pain    Objective    Therapy/Activity: Therapeutic Exercise  Therapeutic Exercise Performed: Yes  BUE exercises using one pound weights completing 30 repetitions of 6 exercises to promote greater independence with ADL's and transfers.  Educated patient regarding proper technique and need to complete exercises with slow and  steady speed to target intended muscle group and avoid using large back muscles. Patient demonstrated good understanding and carryover with task.      EDUCATION:  Education  Individual(s) Educated: Patient  Education Provided: Fall precautions  Home Program: Strengthening  Patient Response to Education: Patient/Caregiver Verbalized Understanding of Information, Patient/Caregiver Performed Return Demonstration of Exercises/Activities    Goals:  Encounter Problems       Encounter Problems (Active)       OT Problem       LTG - Patient will complete grooming with independence. (Progressing)       Start:  01/17/25    Expected End:  01/27/25            LTG - Patient will complete toileting with min assist. (Progressing)       Start:  01/17/25    Expected End:  01/27/25            LTG - Patient will complete bathing with min assist. (Progressing)       Start:  01/17/25    Expected End:  01/27/25            LTG - Patient will complete UE dressing with supervision. (Progressing)       Start:  01/17/25    Expected End:  01/27/25            LTG - Patient will complete LE dressing using adaptive equip as needed with min assist. (Progressing)       Start:  01/17/25    Expected End:  01/27/25            LTG - Patient will complete commode transfer via device as needed with min assist. (Progressing)       Start:  01/17/25    Expected End:  01/27/25            LTG - Patient will complete tub/shower transfer using DME as needed with min/mod assist. (Progressing)       Start:  01/17/25    Expected End:  01/27/25            LTG - Patient will complete functional mobility via device as needed with min assist. (Progressing)       Start:  01/17/25    Expected End:  01/27/25            STG - Patient will complete toileting with mod assist. (Progressing)       Start:  01/17/25    Expected End:  01/24/25            STG - Patient will complete bathing with mod assist. (Progressing)       Start:  01/17/25    Expected End:  01/24/25             STG - Patient will complete UE dressing with sba. (Progressing)       Start:  01/17/25    Expected End:  01/24/25            STG - Patient will complete LE dressing using adaptive equip as needed with mod assist. (Progressing)       Start:  01/17/25    Expected End:  01/24/25            STG - Patient will complete commode transfer via device as needed with mod assist. (Progressing)       Start:  01/17/25    Expected End:  01/24/25            STG - Patient will complete simple functional mobility via device as needed with mod assist. (Progressing)       Start:  01/17/25    Expected End:  01/24/25

## 2025-01-20 NOTE — CONSULTS
"Nutrition Initial Assessment:   Nutrition Assessment    Reason for Assessment: Admission nursing screening (acute rehab protocol; MST=0)    Patient is a 79 y.o. 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  Per review of EMR, pt is POD #6 bipolar L hip hemiarthroplasty 2/2 L hip fx.  Hx of constipation, however pt had BM today.  Vitamin/Herbal Supplement Use: vitamin C, vitamin D3  Food Allergies/Intolerances:  None  GI Symptoms: Constipation  Oral Problems:  TELLO        Anthropometrics:  Height: 167.6 cm (5' 5.98\")   Weight: 51 kg (112 lb 7 oz)   BMI (Calculated): 18.16  IBW/kg (Dietitian Calculated): 59.1 kg          Weight History:     Weight Change %:  Weight History / % Weight Change: requested reweigh on standing scale due to significant discrepancy in weights   1/14 60.3kg, 8/9 65.8kg, 7/5 65.3kg, 6/14 67.6kg, 5/30 75.3kg    Nutrition Focused Physical Exam Findings:    Subcutaneous Fat Loss:   Orbital Fat Pads: Defer (not available)  Muscle Wasting:     Edema:  Edema Location: non pitting BLE  Physical Findings:  Skin: Positive (stage @ L buttock, R buttock pressure injury, L leg incision)    Nutrition Significant Labs:  CBC Trend:   Results from last 7 days   Lab Units 01/18/25  0801 01/16/25  0634 01/15/25  0634 01/14/25  0455   WBC AUTO x10*3/uL 11.8* 11.7* 13.6* 10.4   RBC AUTO x10*6/uL 3.94* 3.77* 3.88* 4.31   HEMOGLOBIN g/dL 11.0* 10.5* 10.9* 12.0   HEMATOCRIT % 34.4* 33.2* 33.7* 37.0   MCV fL 87 88 87 86   PLATELETS AUTO x10*3/uL 251 194 177 185    , BMP Trend:   Results from last 7 days   Lab Units 01/18/25  0801 01/16/25  0633 01/15/25  0634 01/14/25  0455   GLUCOSE mg/dL 101* 91 131* 120*   CALCIUM mg/dL 9.0 8.8 8.8 " 8.9   SODIUM mmol/L 133* 137 136 141   POTASSIUM mmol/L 4.0 3.6 3.8 3.1*   CO2 mmol/L 27 30 28 29   CHLORIDE mmol/L 96* 100 100 100   BUN mg/dL 14 18 22 15   CREATININE mg/dL 0.66 0.69 0.79 0.71        Nutrition Specific Medications:  Reviewed     I/O:   Last BM Date: 01/20/25; Stool Appearance: Formed, Loose (01/20/25 0135)    Dietary Orders (From admission, onward)       Start     Ordered    01/20/25 1311  Oral nutritional supplements  Until discontinued        Question Answer Comment   Deliver with Breakfast    Deliver with Dinner    Select supplement: Ensure Plus High Protein        01/20/25 1310    01/17/25 1249  May Participate in Room Service  ( ROOM SERVICE MAY PARTICIPATE)  Once        Question:  .  Answer:  Yes    01/17/25 1248    01/17/25 1055  Adult diet Regular  Diet effective now        Question:  Diet type  Answer:  Regular    01/17/25 1100                     Estimated Needs:      Method for Estimating Needs: 1600-1800kcals (27-30kcals/kg IBW)     Method for Estimating Needs: 70-83g (1.2-1.4g/kg IBW)     Method for Estimating Needs: 1 mL/kcal or as per MD        Nutrition Diagnosis   Malnutrition Diagnosis  Patient has Malnutrition Diagnosis:  (unable to determine at this gaudencio e)    Nutrition Diagnosis  Patient has Nutrition Diagnosis: Yes  Diagnosis Status (1): New  Nutrition Diagnosis 1: Increased nutrient needs  Related to (1): physiological causes increasing nutrient needs  As Evidenced by (1): wound healing needs and acute rehab demands  Additional Nutrition Diagnosis: Diagnosis 2  Diagnosis Status (2): New  Nutrition Diagnosis 2: Inadequate oral intake  Related to (2): acute on chronic illness  As Evidenced by (2): avg 54% of meals       Nutrition Interventions/Recommendations         Nutrition Prescription:  Individualized Nutrition Prescription Provided for : Individualized Nutrition Prescription Provided for :     1600-1800kcal,   70-83 g protein, and  1ml/kcal  fluid via     Regular diet.  Will order Ensure plus HP BID to support nutritional needs        Nutrition Interventions:   Interventions: Meals and snacks, Medical food supplement  Meals and Snacks: General healthful diet  Goal: consume >50->75% of meals  Medical Food Supplement: Commercial beverage  Goal: consume >75%of Ensure plus HP BID (for an additional 350 kcals, 20 gm protein each)         Nutrition Education:   N/A     Nutrition Monitoring and Evaluation   Food/Nutrient Related History Monitoring  Monitoring and Evaluation Plan: Energy intake, Fluid intake, Amount of food  Energy Intake: Estimated energy intake  Criteria: Meal/ONS intake to meet >75% of estimated needs  Fluid Intake: Estimated fluid intake  Criteria: fluid intake to meet >75% of estimated need  Amount of Food: Estimated amout of food, Medical food intake  Criteria: Pt to consume >75% of meals/ONS    Body Composition/Growth/Weight History  Monitoring and Evaluation Plan: Weight  Weight: Measured weight  Criteria: reweigh pt on standing scale for more accurate weight    Biochemical Data, Medical Tests and Procedures  Monitoring and Evaluation Plan: Electrolyte/renal panel  Electrolyte and Renal Panel: Sodium  Criteria: labs WNL    Nutrition Focused Physical Findings  Monitoring and Evaluation Plan: Digestive System, Skin  Digestive System: Constipation  Criteria: formed BM at least every 2-3 days  Criteria: promote healing through adequate nutrition         Time Spent (min): 30 minutes

## 2025-01-20 NOTE — PROGRESS NOTES
Physical Therapy    Bed mobility training:  supine to sit with max assist x2 with head of bed elevated and using bedrail.  Pt demonstrates very little initiation of movement despite education and visual demonstration on bed mobility technique.  Pivot transfer training with walker and mod assist x2.  Pt standing with walker x40 seconds for standing tolerance and static balance activities with min assist x2.

## 2025-01-20 NOTE — CARE PLAN
The patient's goals for the shift include  participate in scheduled therapy     The clinical goals for the shift include pain control    Problem: Pain - Adult  Goal: Verbalizes/displays adequate comfort level or baseline comfort level  Outcome: Progressing     Problem: Safety - Adult  Goal: Free from fall injury  Outcome: Progressing     Problem: Discharge Planning  Goal: Discharge to home or other facility with appropriate resources  Outcome: Progressing     Problem: Chronic Conditions and Co-morbidities  Goal: Patient's chronic conditions and co-morbidity symptoms are monitored and maintained or improved  Outcome: Progressing     Problem: Skin  Goal: Decreased wound size/increased tissue granulation at next dressing change  Outcome: Progressing  Flowsheets (Taken 1/20/2025 1008)  Decreased wound size/increased tissue granulation at next dressing change:   Protective dressings over bony prominences   Promote sleep for wound healing  Goal: Participates in plan/prevention/treatment measures  Outcome: Progressing  Flowsheets (Taken 1/20/2025 1008)  Participates in plan/prevention/treatment measures:   Elevate heels   Increase activity/out of bed for meals  Goal: Prevent/manage excess moisture  Outcome: Progressing  Flowsheets (Taken 1/20/2025 1008)  Prevent/manage excess moisture:   Follow provider orders for dressing changes   Moisturize dry skin   Monitor for/manage infection if present   Cleanse incontinence/protect with barrier cream  Goal: Prevent/minimize sheer/friction injuries  Outcome: Progressing  Flowsheets (Taken 1/20/2025 1008)  Prevent/minimize sheer/friction injuries:   Use pull sheet   Increase activity/out of bed for meals   HOB 30 degrees or less   Turn/reposition every 2 hours/use positioning/transfer devices  Goal: Promote/optimize nutrition  Outcome: Progressing  Flowsheets (Taken 1/20/2025 1008)  Promote/optimize nutrition:   Monitor/record intake including meals   Consume > 50%  meals/supplements   Offer water/supplements/favorite foods  Goal: Promote skin healing  Outcome: Progressing  Flowsheets (Taken 1/20/2025 1008)  Promote skin healing: Turn/reposition every 2 hours/use positioning/transfer devices     Problem: Fall/Injury  Goal: Not fall by end of shift  Outcome: Progressing  Goal: Be free from injury by end of the shift  Outcome: Progressing  Goal: Verbalize understanding of personal risk factors for fall in the hospital  Outcome: Progressing  Goal: Verbalize understanding of risk factor reduction measures to prevent injury from fall in the home  Outcome: Progressing  Goal: Use assistive devices by end of the shift  Outcome: Progressing  Goal: Pace activities to prevent fatigue by end of the shift  Outcome: Progressing     Problem: Pain  Goal: Takes deep breaths with improved pain control throughout the shift  Outcome: Progressing  Goal: Turns in bed with improved pain control throughout the shift  Outcome: Progressing  Goal: Walks with improved pain control throughout the shift  Outcome: Progressing  Goal: Performs ADL's with improved pain control throughout shift  Outcome: Progressing  Goal: Participates in PT with improved pain control throughout the shift  Outcome: Progressing  Goal: Free from opioid side effects throughout the shift  Outcome: Progressing  Goal: Free from acute confusion related to pain meds throughout the shift  Outcome: Progressing

## 2025-01-20 NOTE — PROGRESS NOTES
Physical Therapy    Physical Therapy Treatment    Patient Name: Gabriella Haskins  MRN: [id number]  Department: [facility]  Room: [facility]  Today's Date: 1/20/2025  Time Calculation  Start Time: 0830  Stop Time: 0915  Time Calculation (min): 45 min         Assessment/Plan   PT Assessment  End of Session Communication:  (OT handoff)  End of Session Patient Position: Up in chair, Alarm on     PT Plan  Treatment/Interventions: Bed mobility, Transfer training, Gait training, Stair training, Balance training, Strengthening, Therapeutic exercise, Therapeutic activity  PT Plan: Ongoing PT  PT Frequency: 90 min/5 days per week (10 days)  PT Discharge Recommendations:  (Anticipate discharge home with the need for intermittent min assist at the walker level.)  Equipment Recommended upon Discharge: Wheeled walker  PT Recommended Transfer Status: Assist x2      General Visit Information:   PT  Visit  PT Received On: 01/20/25  General  Prior to Session Communication: Bedside nurse  Patient Position Received: Up in chair, Alarm on  General Comment: Patient pleasant and agreeable to therapy.    Subjective   Precautions:  Precautions  LE Weight Bearing Status: Weight Bearing as Tolerated  Medical Precautions: Fall precautions  Post-Surgical Precautions: Left hip precautions  Precautions Comment: purewick    Objective   Pain:  Pain Assessment  Pain Assessment: 0-10  0-10 (Numeric) Pain Score: 5 - Moderate pain  Pain Type: Surgical pain  Pain Location: Hip  Pain Orientation: Left  Pain Interventions: Medication (See MAR), Distraction, Emotional support  Cognition:  Cognition  Overall Cognitive Status: Within Functional Limits  Treatments:  Therapeutic Exercise  Therapeutic Exercise Performed: Yes  Patient performed seated therex, B LE: APs, hip flexion, LAQ, heel slides, and hip abd/add all [id number]. Done to increase B LE strength to improve overall functional mobility and independence.    Therapeutic Activity  Therapeutic Activity  Performed: Yes  Patient performed blocked practice sit<>stand x5reps with ModA x1. Done to increase strength in B LE and improve sequencing and independence with sit<>stand transfer.      Ambulation/Gait Training  Ambulation/Gait Training Performed: Yes  Patient ambulated 32ft and 50ft with ModA x1 and FWW for support + WC follow. Patient demo's slow (not functionally safe) step to antalgic gait with decreased velocity, decreased step length, and flexed posture. Distance limited d/t fatigue. Seated rest between trials to manage fatigue.     Transfers  Transfer: Yes  Patient performed sit<>stand transfer with initial MaxA x1 however, progressed to ModA x1. Cues for positioning and sequencing. Patient with decreased eccentric control during stand>sit, consistent cues required to correct.     Education Comments  Educated patient on transfer training and gait training techniques to maximize safety and independence.         OP EDUCATION:       Encounter Problems       Encounter Problems (Active)       PT Problem       STG - Bed mobility with min/mod assist x2. (Progressing)       Start:  01/17/25    Expected End:  01/23/25            STG - Transfers with mod assist x1. (Progressing)       Start:  01/17/25    Expected End:  01/23/25            STG - Pt will amb 30 ft with wheeled walker and mod assist x1.  (Progressing)       Start:  01/17/25    Expected End:  01/23/25            STG - Pt will negotiate one step with rails and min/mod assist x2. (Progressing)       Start:  01/17/25    Expected End:  01/23/25            LTG - Bed mobility with min assist x1. (Progressing)       Start:  01/17/25    Expected End:  01/28/25            LTG - Transfers with min assist x1. (Progressing)       Start:  01/17/25    Expected End:  01/28/25            LTG - Pt will amb 50 ft with wheeled walker and min assist. (Progressing)       Start:  01/17/25    Expected End:  01/28/25            LTG - Pt will negotiate 5 stairs with rails and  min/mod assist x1. (Progressing)       Start:  01/17/25    Expected End:  01/28/25               Pain - Adult

## 2025-01-20 NOTE — PROGRESS NOTES
Occupational Therapy    OT Treatment    Patient Name: Gabriella Haskins  MRN: [id number]  Department: [facility]  Room: [address] [place] [address]  Today's Date: 1/20/2025  Time Calculation  Start Time: 0915  Stop Time: 1000  Time Calculation (min): 45 min        Assessment:  End of Session Communication:  (PT)  End of Session Patient Position: Up in chair, Alarm on     Plan:  Treatment Interventions: ADL retraining, UE strengthening/ROM, Functional transfer training, Endurance training, Patient/family training, Equipment evaluation/education  OT Frequency: 90 min/5 days per week (x 10 days)  OT Discharge Recommendations:  (anticipate possible need for up to min assist with ADLs, transfers, and mobility)  Equipment Recommended upon Discharge: Wheeled walker, Bedside commode (tub bench, sock aid, reacher, dressing stick, LHSH, 3:1 commode)  Treatment Interventions: ADL retraining, UE strengthening/ROM, Functional transfer training, Endurance training, Patient/family training, Equipment evaluation/education    Subjective   Previous Visit Info:  OT Last Visit  OT Received On: 01/20/25  General:  General  Prior to Session Communication: Bedside nurse  Patient Position Received: Up in chair, Alarm on  General Comment: Patient agreeable to OT session.  Precautions:  LE Weight Bearing Status: Weight Bearing as Tolerated  Medical Precautions: Fall precautions  Post-Surgical Precautions: Left hip precautions  Precautions Comment: mejia  Pain:  Pain Assessment  Pain Assessment: 0-10  0-10 (Numeric) Pain Score: 2  Pain Type: Surgical pain  Pain Location: Hip  Pain Orientation: Left  Pain Interventions: Cold applied, Repositioned, Ambulation/increased activity, Rest, Distraction  Response to Interventions: Content/relaxed    Activities of Daily Living: LE Dressing  LE Dressing: Yes  LE Dressing Adaptive Equipment: Sock aide, Dressing stick  Sock Level of Assistance:  (After verbal and visual instruction, Doffs socks using dressing stick with Min  A, Dons socks using sock aid with Min A. Mod cues throughout task.)  Bed Mobility/Transfers: Transfers  Transfer: Yes  Transfer 1  Trials/Comments 1: Sit to stand transfers with Max assist x1 and Min assist of another for safety.    Standing Balance:  Static Standing Balance  Static Standing-Comment/Number of Minutes: Static standing at table top with bilat UE support tolerating 3 minutes with Mod assist x1 for greater strength and indep with ADLs and transfers.  EDUCATION:  Education  Individual(s) Educated: Patient  Education Provided: Diagnosis & Precautions, Fall precautons  Education Comment: Patient only aware of 1/3 THR precautions. Education provided (verbal and visual) on THR precautions with Min cues during session for adherence. Education provided on hip kit a/e.    Goals:  Encounter Problems       Encounter Problems (Active)       OT Problem       LTG - Patient will complete grooming with independence. (Progressing)       Start:  01/17/25    Expected End:  01/27/25            LTG - Patient will complete toileting with min assist. (Progressing)       Start:  01/17/25    Expected End:  01/27/25            LTG - Patient will complete bathing with min assist. (Progressing)       Start:  01/17/25    Expected End:  01/27/25            LTG - Patient will complete UE dressing with supervision. (Progressing)       Start:  01/17/25    Expected End:  01/27/25            LTG - Patient will complete LE dressing using adaptive equip as needed with min assist. (Progressing)       Start:  01/17/25    Expected End:  01/27/25            LTG - Patient will complete commode transfer via device as needed with min assist. (Progressing)       Start:  01/17/25    Expected End:  01/27/25            LTG - Patient will complete tub/shower transfer using DME as needed with min/mod assist. (Progressing)       Start:  01/17/25    Expected End:  01/27/25            LTG - Patient will complete functional mobility via device as needed  with min assist. (Progressing)       Start:  01/17/25    Expected End:  01/27/25            STG - Patient will complete toileting with mod assist. (Progressing)       Start:  01/17/25    Expected End:  01/24/25            STG - Patient will complete bathing with mod assist. (Progressing)       Start:  01/17/25    Expected End:  01/24/25            STG - Patient will complete UE dressing with sba. (Progressing)       Start:  01/17/25    Expected End:  01/24/25            STG - Patient will complete LE dressing using adaptive equip as needed with mod assist. (Progressing)       Start:  01/17/25    Expected End:  01/24/25            STG - Patient will complete commode transfer via device as needed with mod assist. (Progressing)       Start:  01/17/25    Expected End:  01/24/25            STG - Patient will complete simple functional mobility via device as needed with mod assist. (Progressing)       Start:  01/17/25    Expected End:  01/24/25

## 2025-01-20 NOTE — PROGRESS NOTES
Gabriella Haskins is a 79 y.o. female on day 3 of admission presenting with Closed displaced fracture of left femoral neck.    Subjective   Mccarthy catheter placed 1/19 due to urinary retention.  On bladder scan, patient was retaining 800 cc.  Patient states that she has required Mccarthy in the past during various hospitalizations, but always is able to void on own when at home.  She also endorsed constipation, which is now resolved following Dulcolax suppository.  Patient remains weightbearing as tolerated to left lower extremity and pain is well-controlled with as needed Tylenol and oxycodone.    Objective   Physical Exam  Constitutional:       General: She is not in acute distress.  HENT:      Head: Normocephalic and atraumatic.      Nose: Nose normal.   Eyes:      Extraocular Movements: Extraocular movements intact.      Pupils: Pupils are equal, round, and reactive to light.   Cardiovascular:      Rate and Rhythm: Normal rate and regular rhythm.      Heart sounds: Normal heart sounds.   Pulmonary:      Effort: Pulmonary effort is normal. No respiratory distress.      Breath sounds: Normal breath sounds. No wheezing.   Abdominal:      General: Bowel sounds are normal.      Palpations: Abdomen is soft.      Tenderness: There is no abdominal tenderness. There is no guarding.  : Mccarthy catheter in place, draining appropriately  Musculoskeletal:         General: Swelling, tenderness and signs of injury present. Normal range of motion.      Comments: Tenderness to palpation in the left proximal femur and lateral thigh.  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   Skin:     General: Skin is warm.      Capillary Refill: Capillary refill takes less than 2 seconds.      Coloration: Skin is pale.      Findings: Bruising present.   Neurological:      General: No focal deficit present.      Mental Status: She is alert and oriented to person, place, and time. Mental status is at baseline.      Motor: Weakness  "present.      Comments: Weakness and left hip flexion and left knee extension/flexion secondary to pain.  Able to lift leg off of bed 2 to 3 inches while supine.  Neurovascularly intact extremities.   Psychiatric:         Mood and Affect: Mood normal.         Behavior: Behavior normal.         Thought Content: Thought content normal.         Judgment: Judgment normal.   Function: Mod assist for transfers, max assist x 2 for supine to sit      Last Recorded Vitals  Blood pressure 109/55, pulse 78, temperature 37.3 °C (99.1 °F), temperature source Temporal, resp. rate 16, height 1.676 m (5' 6\"), weight 51 kg (112 lb 7 oz), SpO2 94%.    Therapy notes from last 24 hours reviewed.    Relevant Result    Scheduled medications  ascorbic acid, 500 mg, oral, Daily  bisacodyl, 10 mg, rectal, Daily  cetirizine, 10 mg, oral, Daily  cholecalciferol, 1,000 Units, oral, Daily  [START ON 1/30/2025] cyanocobalamin, 1,000 mcg, intramuscular, q14 days  fluticasone, 1 spray, Each Nostril, Daily  icosapent ethyL, 1,000 mg, oral, Daily  levothyroxine, 100 mcg, oral, Daily  metoprolol tartrate, 25 mg, oral, BID  polyethylene glycol, 17 g, oral, Daily  rivaroxaban, 20 mg, oral, Daily with evening meal      Continuous medications     PRN medications  PRN medications: acetaminophen **OR** acetaminophen, lubricating eye drops, melatonin, ondansetron **OR** ondansetron, oxyCODONE, oxyCODONE     No results found for this or any previous visit (from the past 24 hours).         Assessment/Plan   This is a 79-year-old female who was found to have a left femoral neck fracture following mechanical fall.  She underwent hemiarthroplasty on 1/14.  Postoperative period relatively uncomplicated.  She was admitted to acute rehabilitation on 1/17.  While at rehab she required Mccarthy catheter placement for urinary retention.  Planning for trial of void, and removal of Mccarthy prior to discharge.  Patient remains weightbearing as tolerated to left lower " extremity, pain management with Tylenol and oxycodone as needed.  Assessment & Plan  Closed displaced fracture of left femoral neck  -3 hours of PT and OT daily to improve patient functional mobility so she can safely be discharged home modified independent.  -Estimated length of stay 10 days  - Precautions: WBAT to LLE, fall precautions  - Pain management: Tylenol and oxycodone as needed    Essential hypertension  -Continue metoprolol tartrate 25 mg twice daily  - Was previously taking hydrochlorothiazide 25 mg daily, however this was discontinued due to hypotension  - Recommend follow-up with PCP for medication adjustment  Hypercholesterolemia    Hypothyroidism  -Continue levothyroxine 100 mcg daily    Vitamin D deficiency  -Continue vitamin supplements, including vitamin D, vitamin C, vitamin B12    Fall, initial encounter  -PT/OT as above  - Continue to work on strength, coordination and balance  - Patient with previous history of subdural hematoma following mechanical fall, previously underwent acute rehab stay 1 year ago      #Urinary retention  - Mccarthy placed 1/19  - Patient states she has required Mccarthy in past during other hospitalizations for retention  - Of note, patient is not on Flomax  - Does not require Mccarthy at baseline at home  - Plan for trial of void in next few days, with goal to remove Mccarthy prior to discharge    #Constipation (resolved)  - Patient previously without bowel movement for 4 days  - Abdominal x-ray obtained on 1/19 showing marked constipation and significant stool burden without obstruction or ileus  - Dulcolax suppository given on 1/19 with resolution of constipation and formed bowel movement  - Continue to monitor      1/20  - Vitals and labs stable  - Urinary retention over weekend, Mccarthy placed Sunday night.  Plan for trial of void in the next few days  - Patient states this has happened in previous hospitalizations, but she is always able to void on her own at home  -  Weightbearing as tolerated to left lower extremity, pain well-controlled with Tylenol and as needed oxycodone 2.5/5  - Constipation resolved following Dulcolax suppository, abdominal KUB without signs of obstruction or ileus    Chintan Pierce, DO  PM&R, PGY-2

## 2025-01-20 NOTE — PROGRESS NOTES
Physical Therapy    Physical Therapy Treatment    Patient Name: Gabriella Haskins  MRN: [id number]  Department: [facility]  Room: [address] [place] [address]  Today's Date: 1/20/2025  Time Calculation  Start Time: 1000  Stop Time: 1045  Time Calculation (min): 45 min         Assessment/Plan   PT Assessment  End of Session Communication: Bedside nurse  End of Session Patient Position: Up in chair, Alarm on (call light and needs within reach.)     PT Plan  Treatment/Interventions: Bed mobility, Transfer training, Gait training, Stair training, Balance training, Strengthening, Therapeutic exercise, Therapeutic activity  PT Plan: Ongoing PT  PT Frequency: 90 min/5 days per week (10 days)  PT Discharge Recommendations:  (Anticipate discharge home with the need for intermittent min assist at the walker level.)  Equipment Recommended upon Discharge: Wheeled walker  PT Recommended Transfer Status: Assist x2      General Visit Information:   PT  Visit  PT Received On: 01/20/25  General  Prior to Session Communication: Bedside nurse  Patient Position Received: Up in chair, Alarm on  General Comment: Patient pleasant and agreeable to therapy.    Subjective   Precautions:  Precautions  LE Weight Bearing Status: Weight Bearing as Tolerated  Medical Precautions: Fall precautions  Post-Surgical Precautions: Left hip precautions  Precautions Comment: jackie    Objective   Pain:  Pain Assessment  Pain Assessment: 0-10  0-10 (Numeric) Pain Score: 2  Pain Type: Surgical pain  Pain Location: Hip  Pain Orientation: Left  Pain Interventions: Distraction, Emotional support  Cognition:  Cognition  Overall Cognitive Status: Within Functional Limits  Treatments:  Therapeutic Activity  Therapeutic Activity Performed: Yes  Patient performed blocked practice stand pivot transfer x4reps WC<>EOM with FWW and ModA x1. Done to increase strength in B LE and improve sequencing and independence with stand pivot transfer.      Ambulation/Gait Training  Ambulation/Gait  Training Performed: Yes  Patient ambulated 50ft x2 trials with FWW and ModA x1 + WC follow.     Transfers  Transfer: Yes  Patient performed sit<>stand and stand pivot transfer with ModA x1 and FWW for support. Cues for hand placement, sequencing, and alignment with surface prior to sitting.       Education Comments  Educated patient on transfer traning and gait training techniques to maximize safety and independence.         OP EDUCATION:       Encounter Problems       Encounter Problems (Active)       PT Problem       STG - Bed mobility with min/mod assist x2. (Progressing)       Start:  01/17/25    Expected End:  01/23/25            STG - Transfers with mod assist x1. (Progressing)       Start:  01/17/25    Expected End:  01/23/25            STG - Pt will amb 30 ft with wheeled walker and mod assist x1.  (Progressing)       Start:  01/17/25    Expected End:  01/23/25            STG - Pt will negotiate one step with rails and min/mod assist x2. (Progressing)       Start:  01/17/25    Expected End:  01/23/25            LTG - Bed mobility with min assist x1. (Progressing)       Start:  01/17/25    Expected End:  01/28/25            LTG - Transfers with min assist x1. (Progressing)       Start:  01/17/25    Expected End:  01/28/25            LTG - Pt will amb 50 ft with wheeled walker and min assist. (Progressing)       Start:  01/17/25    Expected End:  01/28/25            LTG - Pt will negotiate 5 stairs with rails and min/mod assist x1. (Progressing)       Start:  01/17/25    Expected End:  01/28/25               Pain - Adult

## 2025-01-20 NOTE — CARE PLAN
The patient's goals for the shift include      The clinical goals for the shift include Patient will remain hemodynamically stable.      Problem: Skin  Goal: Decreased wound size/increased tissue granulation at next dressing change  Flowsheets (Taken 1/20/2025 0100)  Decreased wound size/increased tissue granulation at next dressing change: Promote sleep for wound healing  Goal: Participates in plan/prevention/treatment measures  Flowsheets (Taken 1/20/2025 0100)  Participates in plan/prevention/treatment measures: Elevate heels  Goal: Prevent/manage excess moisture  Flowsheets (Taken 1/20/2025 0100)  Prevent/manage excess moisture: Moisturize dry skin  Goal: Prevent/minimize sheer/friction injuries  Flowsheets (Taken 1/20/2025 0100)  Prevent/minimize sheer/friction injuries: Use pull sheet  Goal: Promote/optimize nutrition  Flowsheets (Taken 1/20/2025 0100)  Promote/optimize nutrition: Monitor/record intake including meals  Goal: Promote skin healing  Flowsheets (Taken 1/20/2025 0100)  Promote skin healing:   Assess skin/pad under line(s)/device(s)   Protective dressings over bony prominences

## 2025-01-21 LAB
ANION GAP SERPL CALC-SCNC: 14 MMOL/L (ref 10–20)
BASOPHILS # BLD AUTO: 0.06 X10*3/UL (ref 0–0.1)
BASOPHILS NFR BLD AUTO: 0.5 %
BUN SERPL-MCNC: 16 MG/DL (ref 6–23)
CALCIUM SERPL-MCNC: 8.6 MG/DL (ref 8.6–10.3)
CHLORIDE SERPL-SCNC: 97 MMOL/L (ref 98–107)
CO2 SERPL-SCNC: 29 MMOL/L (ref 21–32)
CREAT SERPL-MCNC: 0.6 MG/DL (ref 0.5–1.05)
EGFRCR SERPLBLD CKD-EPI 2021: >90 ML/MIN/1.73M*2
EOSINOPHIL # BLD AUTO: 0.19 X10*3/UL (ref 0–0.4)
EOSINOPHIL NFR BLD AUTO: 1.4 %
ERYTHROCYTE [DISTWIDTH] IN BLOOD BY AUTOMATED COUNT: 13.4 % (ref 11.5–14.5)
GLUCOSE SERPL-MCNC: 102 MG/DL (ref 74–99)
HCT VFR BLD AUTO: 31.2 % (ref 36–46)
HGB BLD-MCNC: 9.9 G/DL (ref 12–16)
IMM GRANULOCYTES # BLD AUTO: 0.28 X10*3/UL (ref 0–0.5)
IMM GRANULOCYTES NFR BLD AUTO: 2.1 % (ref 0–0.9)
LYMPHOCYTES # BLD AUTO: 1.49 X10*3/UL (ref 0.8–3)
LYMPHOCYTES NFR BLD AUTO: 11.2 %
MCH RBC QN AUTO: 28 PG (ref 26–34)
MCHC RBC AUTO-ENTMCNC: 31.7 G/DL (ref 32–36)
MCV RBC AUTO: 88 FL (ref 80–100)
MONOCYTES # BLD AUTO: 1.23 X10*3/UL (ref 0.05–0.8)
MONOCYTES NFR BLD AUTO: 9.3 %
NEUTROPHILS # BLD AUTO: 10.02 X10*3/UL (ref 1.6–5.5)
NEUTROPHILS NFR BLD AUTO: 75.5 %
NRBC BLD-RTO: 0 /100 WBCS (ref 0–0)
PLATELET # BLD AUTO: 362 X10*3/UL (ref 150–450)
POTASSIUM SERPL-SCNC: 4.7 MMOL/L (ref 3.5–5.3)
RBC # BLD AUTO: 3.54 X10*6/UL (ref 4–5.2)
SODIUM SERPL-SCNC: 135 MMOL/L (ref 136–145)
WBC # BLD AUTO: 13.3 X10*3/UL (ref 4.4–11.3)

## 2025-01-21 PROCEDURE — 1280000001 HC REHAB SEMI-PRIVATE ROOM DAILY

## 2025-01-21 PROCEDURE — 51701 INSERT BLADDER CATHETER: CPT

## 2025-01-21 PROCEDURE — 99232 SBSQ HOSP IP/OBS MODERATE 35: CPT | Performed by: PHYSICAL MEDICINE & REHABILITATION

## 2025-01-21 PROCEDURE — 2500000004 HC RX 250 GENERAL PHARMACY W/ HCPCS (ALT 636 FOR OP/ED)

## 2025-01-21 PROCEDURE — 2500000001 HC RX 250 WO HCPCS SELF ADMINISTERED DRUGS (ALT 637 FOR MEDICARE OP): Performed by: STUDENT IN AN ORGANIZED HEALTH CARE EDUCATION/TRAINING PROGRAM

## 2025-01-21 PROCEDURE — 97530 THERAPEUTIC ACTIVITIES: CPT | Mod: GP,CQ

## 2025-01-21 PROCEDURE — 2500000001 HC RX 250 WO HCPCS SELF ADMINISTERED DRUGS (ALT 637 FOR MEDICARE OP)

## 2025-01-21 PROCEDURE — 2500000002 HC RX 250 W HCPCS SELF ADMINISTERED DRUGS (ALT 637 FOR MEDICARE OP, ALT 636 FOR OP/ED): Performed by: PHYSICAL MEDICINE & REHABILITATION

## 2025-01-21 PROCEDURE — 97110 THERAPEUTIC EXERCISES: CPT | Mod: GP,CQ

## 2025-01-21 PROCEDURE — 97530 THERAPEUTIC ACTIVITIES: CPT | Mod: GO,CO

## 2025-01-21 PROCEDURE — 97116 GAIT TRAINING THERAPY: CPT | Mod: GP,CQ

## 2025-01-21 PROCEDURE — 85025 COMPLETE CBC W/AUTO DIFF WBC: CPT | Performed by: PHYSICAL MEDICINE & REHABILITATION

## 2025-01-21 PROCEDURE — 2500000002 HC RX 250 W HCPCS SELF ADMINISTERED DRUGS (ALT 637 FOR MEDICARE OP, ALT 636 FOR OP/ED)

## 2025-01-21 PROCEDURE — 36415 COLL VENOUS BLD VENIPUNCTURE: CPT | Performed by: PHYSICAL MEDICINE & REHABILITATION

## 2025-01-21 PROCEDURE — 2500000001 HC RX 250 WO HCPCS SELF ADMINISTERED DRUGS (ALT 637 FOR MEDICARE OP): Performed by: PHYSICAL MEDICINE & REHABILITATION

## 2025-01-21 PROCEDURE — 82374 ASSAY BLOOD CARBON DIOXIDE: CPT | Performed by: PHYSICAL MEDICINE & REHABILITATION

## 2025-01-21 PROCEDURE — 97535 SELF CARE MNGMENT TRAINING: CPT | Mod: GO,CO

## 2025-01-21 RX ORDER — POLYETHYLENE GLYCOL 3350 17 G/17G
17 POWDER, FOR SOLUTION ORAL DAILY PRN
Status: DISCONTINUED | OUTPATIENT
Start: 2025-01-21 | End: 2025-01-29 | Stop reason: HOSPADM

## 2025-01-21 RX ORDER — SENNOSIDES 8.6 MG/1
2 TABLET ORAL NIGHTLY
Status: DISCONTINUED | OUTPATIENT
Start: 2025-01-21 | End: 2025-01-29 | Stop reason: HOSPADM

## 2025-01-21 RX ORDER — BISACODYL 10 MG/1
10 SUPPOSITORY RECTAL DAILY PRN
Status: DISCONTINUED | OUTPATIENT
Start: 2025-01-21 | End: 2025-01-29 | Stop reason: HOSPADM

## 2025-01-21 RX ORDER — METHOCARBAMOL 500 MG/1
500 TABLET, FILM COATED ORAL 3 TIMES DAILY PRN
Status: DISCONTINUED | OUTPATIENT
Start: 2025-01-21 | End: 2025-01-27

## 2025-01-21 RX ORDER — BALSAM PERU/CASTOR OIL
1 OINTMENT (GRAM) TOPICAL 2 TIMES DAILY
Status: DISCONTINUED | OUTPATIENT
Start: 2025-01-21 | End: 2025-01-29 | Stop reason: HOSPADM

## 2025-01-21 RX ADMIN — OXYCODONE HYDROCHLORIDE 2.5 MG: 5 TABLET ORAL at 02:09

## 2025-01-21 RX ADMIN — METOPROLOL TARTRATE 25 MG: 25 TABLET, FILM COATED ORAL at 20:19

## 2025-01-21 RX ADMIN — Medication 1000 UNITS: at 08:24

## 2025-01-21 RX ADMIN — OXYCODONE HYDROCHLORIDE 5 MG: 5 TABLET ORAL at 08:22

## 2025-01-21 RX ADMIN — LEVOTHYROXINE SODIUM 100 MCG: 0.1 TABLET ORAL at 05:48

## 2025-01-21 RX ADMIN — CASTOR OIL AND BALSAM, PERU 1 APPLICATION: 788; 87 OINTMENT TOPICAL at 20:18

## 2025-01-21 RX ADMIN — TAMSULOSIN HYDROCHLORIDE 0.4 MG: 0.4 CAPSULE ORAL at 20:19

## 2025-01-21 RX ADMIN — RIVAROXABAN 20 MG: 20 TABLET, FILM COATED ORAL at 17:05

## 2025-01-21 RX ADMIN — Medication 500 MG: at 08:22

## 2025-01-21 RX ADMIN — ICOSAPENT ETHYL 1000 MG: 1 CAPSULE ORAL at 08:23

## 2025-01-21 RX ADMIN — CETIRIZINE HYDROCHLORIDE 10 MG: 10 TABLET, FILM COATED ORAL at 08:22

## 2025-01-21 RX ADMIN — BISACODYL 10 MG: 10 SUPPOSITORY RECTAL at 15:33

## 2025-01-21 RX ADMIN — CASTOR OIL AND BALSAM, PERU 1 APPLICATION: 788; 87 OINTMENT TOPICAL at 13:54

## 2025-01-21 RX ADMIN — ACETAMINOPHEN 650 MG: 325 TABLET, FILM COATED ORAL at 13:54

## 2025-01-21 RX ADMIN — OXYCODONE HYDROCHLORIDE 5 MG: 5 TABLET ORAL at 15:46

## 2025-01-21 RX ADMIN — METHOCARBAMOL 500 MG: 500 TABLET ORAL at 20:21

## 2025-01-21 RX ADMIN — POLYETHYLENE GLYCOL 3350 17 G: 17 POWDER, FOR SOLUTION ORAL at 08:23

## 2025-01-21 RX ADMIN — SENNOSIDES 17.2 MG: 8.6 TABLET, FILM COATED ORAL at 20:19

## 2025-01-21 ASSESSMENT — PAIN SCALES - WONG BAKER: WONGBAKER_NUMERICALRESPONSE: HURTS LITTLE MORE

## 2025-01-21 ASSESSMENT — PAIN SCALES - GENERAL
PAINLEVEL_OUTOF10: 0 - NO PAIN
PAINLEVEL_OUTOF10: 7
PAINLEVEL_OUTOF10: 4
PAINLEVEL_OUTOF10: 0 - NO PAIN
PAINLEVEL_OUTOF10: 2
PAINLEVEL_OUTOF10: 3
PAINLEVEL_OUTOF10: 4
PAINLEVEL_OUTOF10: 4
PAINLEVEL_OUTOF10: 0 - NO PAIN
PAINLEVEL_OUTOF10: 7
PAINLEVEL_OUTOF10: 3
PAINLEVEL_OUTOF10: 2

## 2025-01-21 ASSESSMENT — PAIN - FUNCTIONAL ASSESSMENT
PAIN_FUNCTIONAL_ASSESSMENT: 0-10

## 2025-01-21 ASSESSMENT — PAIN DESCRIPTION - LOCATION
LOCATION: HIP

## 2025-01-21 ASSESSMENT — PAIN DESCRIPTION - DESCRIPTORS: DESCRIPTORS: DISCOMFORT

## 2025-01-21 ASSESSMENT — PAIN DESCRIPTION - ORIENTATION
ORIENTATION: LEFT

## 2025-01-21 ASSESSMENT — ACTIVITIES OF DAILY LIVING (ADL)
HOME_MANAGEMENT_TIME_ENTRY: 30
HOME_MANAGEMENT_TIME_ENTRY: 45

## 2025-01-21 NOTE — PROGRESS NOTES
Occupational Therapy    OT Treatment    Patient Name: Gabriella Haskins  MRN: [id number]  Department: [facility] REHAB  Room: [address] [place] [address]  Today's Date: 1/21/2025  Time Calculation  Start Time: 1300  Stop Time: 1345  Time Calculation (min): 45 min    Assessment:  End of Session Communication: Bedside nurse  End of Session Patient Position: Up in chair, Alarm on   Plan:  Treatment Interventions: ADL retraining, UE strengthening/ROM, Functional transfer training, Endurance training, Patient/family training, Equipment evaluation/education  OT Frequency: 90 min/5 days per week (x 10 days)  OT Discharge Recommendations:  (anticipate possible need for up to min assist with ADLs, transfers, and mobility)  Equipment Recommended upon Discharge: Wheeled walker, Bedside commode (tub bench, sock aid, reacher, dressing stick, LHSH, 3:1 commode)  Treatment Interventions: ADL retraining, UE strengthening/ROM, Functional transfer training, Endurance training, Patient/family training, Equipment evaluation/education    Subjective   Previous Visit Info:  OT Last Visit  OT Received On: 01/21/25  General:  General  Prior to Session Communication: Bedside nurse  Patient Position Received: Up in chair, Alarm on  General Comment: Patient reports feeling better this afternoon in relation to low BP symptoms. Agreeable to OT. Difficulty recalling hip precautions despite review this morning.  Precautions:  LE Weight Bearing Status: Weight Bearing as Tolerated  Medical Precautions: Fall precautions  Post-Surgical Precautions: Left hip precautions    Pain:  Pain Assessment  Pain Assessment: 0-10  0-10 (Numeric) Pain Score: 4  Pain Location: Hip  Pain Orientation: Left  Pain Interventions: Distraction, Emotional support, Cold pack, Repositioned    Activities of Daily Living: LE Dressing  Pants Level of Assistance:  (Dons pants over feet using reacher with Min A and mod-max cues for technique)  Sock Level of Assistance:  (Doffs socks using dressing stick  with SBA and min cues for technique. Dons socks using sock aid with SBA and min cues for technique.)    Bed Mobility/Transfers: Transfers  Transfer: Yes  Transfer 1  Trials/Comments 1: Sit to stand transfers with Mod assist x1  Transfers 2  Trials/Comments 2: Pivot transfer via ww with Mod assist x1   EDUCATION:  Education  Individual(s) Educated: Patient  Education Provided: Diagnosis & Precautions, Fall precautons, Risk and benefits of OT discussed with patient or other, POC discussed and agreed upon  Plan of Care Discussed and Agreed Upon: yes  Patient Response to Education: Patient/Caregiver Verbalized Understanding of Information  Education Comment: Education provided on lower body dressing a/e hip kit for adherence for hip precautions. Education provided safety during transfers and mobility. Continued education provided on THR precautions as pt unable to recall all precautions despite review this a.m.    Goals:  Encounter Problems       Encounter Problems (Active)       OT Problem       LTG - Patient will complete grooming with independence. (Progressing)       Start:  01/17/25    Expected End:  01/27/25            LTG - Patient will complete toileting with min assist. (Progressing)       Start:  01/17/25    Expected End:  01/27/25            LTG - Patient will complete bathing with min assist. (Progressing)       Start:  01/17/25    Expected End:  01/27/25            LTG - Patient will complete UE dressing with supervision. (Progressing)       Start:  01/17/25    Expected End:  01/27/25            LTG - Patient will complete LE dressing using adaptive equip as needed with min assist. (Progressing)       Start:  01/17/25    Expected End:  01/27/25            LTG - Patient will complete commode transfer via device as needed with min assist. (Progressing)       Start:  01/17/25    Expected End:  01/27/25            LTG - Patient will complete tub/shower transfer using DME as needed with min/mod assist.  (Progressing)       Start:  01/17/25    Expected End:  01/27/25            LTG - Patient will complete functional mobility via device as needed with min assist. (Progressing)       Start:  01/17/25    Expected End:  01/27/25            STG - Patient will complete toileting with mod assist. (Progressing)       Start:  01/17/25    Expected End:  01/24/25            STG - Patient will complete bathing with mod assist. (Progressing)       Start:  01/17/25    Expected End:  01/24/25            STG - Patient will complete UE dressing with sba. (Progressing)       Start:  01/17/25    Expected End:  01/24/25            STG - Patient will complete LE dressing using adaptive equip as needed with mod assist. (Progressing)       Start:  01/17/25    Expected End:  01/24/25            STG - Patient will complete commode transfer via device as needed with mod assist. (Progressing)       Start:  01/17/25    Expected End:  01/24/25            STG - Patient will complete simple functional mobility via device as needed with mod assist. (Progressing)       Start:  01/17/25    Expected End:  01/24/25

## 2025-01-21 NOTE — PROGRESS NOTES
Occupational Therapy    OT Treatment    Patient Name: Gabriella Haskins  MRN: [id number]  Department: [facility] REHAB  Room: [address] [place] [address]  Today's Date: 1/21/2025  Time Calculation  Start Time: 0745  Stop Time: 0830  Time Calculation (min): 45 min        Assessment:  End of Session Communication: Bedside nurse (and PT)  End of Session Patient Position: Up in chair, Alarm on     Plan:  Treatment Interventions: ADL retraining, UE strengthening/ROM, Functional transfer training, Endurance training, Patient/family training, Equipment evaluation/education  OT Frequency: 90 min/5 days per week (x 10 days)  OT Discharge Recommendations:  (anticipate possible need for up to min assist with ADLs, transfers, and mobility)  Equipment Recommended upon Discharge: Wheeled walker, Bedside commode (tub bench, sock aid, reacher, dressing stick, LHSH, 3:1 commode)  Treatment Interventions: ADL retraining, UE strengthening/ROM, Functional transfer training, Endurance training, Patient/family training, Equipment evaluation/education    Subjective   Previous Visit Info:  OT Last Visit  OT Received On: 01/21/25  General:  General  Prior to Session Communication: Bedside nurse  Patient Position Received: Up in chair, Alarm on  General Comment: Patient agreeable to OT session. Reports sleeping well night prior.  Precautions:  LE Weight Bearing Status: Weight Bearing as Tolerated  Medical Precautions: Fall precautions  Post-Surgical Precautions: Left hip precautions    Vital Signs (Past 2hrs)        Date/Time Vitals Session Patient Position Pulse Resp SpO2 BP MAP (mmHg)    01/21/25 1045 Pre PT  --  --  --  --  101/60  --                         Pain:  Pain Assessment  Pain Assessment: 0-10  0-10 (Numeric) Pain Score: 4  Pain Location: Hip  Pain Orientation: Left  Pain Interventions: Medication (See MAR), Relaxation technique, Distraction    Objective    Cognition:     Coordination:     Activities of Daily Living: Grooming  Grooming Where Assessed:  "Sitting sinkside  Grooming Comments: oral care, washes face, and brushes hair with setup    Toileting  Toileting Comments: Mod assist x1-2 for safety d/t patient not feeling well.  Bed Mobility/Transfers: Transfers  Transfer: Yes  Transfer 1  Trials/Comments 1: Multiple Sit to stand transfers with Mod assist x1-2. Patient feels \"weird\" upon standing. Prompted patient to sit back down- vitals taken, RN notified and present.    Toilet Transfers  Toilet Transfers Comments: commode swap for safety completed with Mod assist x2 d/t patient not feeling well.  EDUCATION:   Patient; Diagnosis & Precautions;Fall precautons;Risk and benefits of OT discussed with patient or other;POC discussed and agreed upon;   Education provided on THR precautions as pt unable to recall from education day prior or handout therapist provided. Education provided on safety during transfers and mobility.    Goals:  Encounter Problems       Encounter Problems (Active)       OT Problem       LTG - Patient will complete grooming with independence. (Progressing)       Start:  01/17/25    Expected End:  01/27/25            LTG - Patient will complete toileting with min assist. (Progressing)       Start:  01/17/25    Expected End:  01/27/25            LTG - Patient will complete bathing with min assist. (Progressing)       Start:  01/17/25    Expected End:  01/27/25            LTG - Patient will complete UE dressing with supervision. (Progressing)       Start:  01/17/25    Expected End:  01/27/25            LTG - Patient will complete LE dressing using adaptive equip as needed with min assist. (Progressing)       Start:  01/17/25    Expected End:  01/27/25            LTG - Patient will complete commode transfer via device as needed with min assist. (Progressing)       Start:  01/17/25    Expected End:  01/27/25            LTG - Patient will complete tub/shower transfer using DME as needed with min/mod assist. (Progressing)       Start:  01/17/25    " Expected End:  01/27/25            LTG - Patient will complete functional mobility via device as needed with min assist. (Progressing)       Start:  01/17/25    Expected End:  01/27/25            STG - Patient will complete toileting with mod assist. (Progressing)       Start:  01/17/25    Expected End:  01/24/25            STG - Patient will complete bathing with mod assist. (Progressing)       Start:  01/17/25    Expected End:  01/24/25            STG - Patient will complete UE dressing with sba. (Progressing)       Start:  01/17/25    Expected End:  01/24/25            STG - Patient will complete LE dressing using adaptive equip as needed with mod assist. (Progressing)       Start:  01/17/25    Expected End:  01/24/25            STG - Patient will complete commode transfer via device as needed with mod assist. (Progressing)       Start:  01/17/25    Expected End:  01/24/25            STG - Patient will complete simple functional mobility via device as needed with mod assist. (Progressing)       Start:  01/17/25    Expected End:  01/24/25

## 2025-01-21 NOTE — CARE PLAN
The patient's goals for the shift include  Participate in scheduled therapy     The clinical goals for the shift include be able to urinate PVR <350    Problem: Pain - Adult  Goal: Verbalizes/displays adequate comfort level or baseline comfort level  Outcome: Progressing     Problem: Safety - Adult  Goal: Free from fall injury  Outcome: Progressing     Problem: Discharge Planning  Goal: Discharge to home or other facility with appropriate resources  Outcome: Progressing     Problem: Chronic Conditions and Co-morbidities  Goal: Patient's chronic conditions and co-morbidity symptoms are monitored and maintained or improved  Outcome: Progressing     Problem: Skin  Goal: Decreased wound size/increased tissue granulation at next dressing change  Outcome: Progressing  Flowsheets (Taken 1/21/2025 1007)  Decreased wound size/increased tissue granulation at next dressing change:   Promote sleep for wound healing   Protective dressings over bony prominences  Goal: Participates in plan/prevention/treatment measures  Outcome: Progressing  Flowsheets (Taken 1/21/2025 1007)  Participates in plan/prevention/treatment measures:   Elevate heels   Increase activity/out of bed for meals  Goal: Promote/optimize nutrition  Outcome: Progressing  Flowsheets (Taken 1/21/2025 1007)  Promote/optimize nutrition:   Monitor/record intake including meals   Consume > 50% meals/supplements   Offer water/supplements/favorite foods  Goal: Promote skin healing  Outcome: Progressing  Flowsheets (Taken 1/21/2025 1007)  Promote skin healing: Turn/reposition every 2 hours/use positioning/transfer devices     Problem: Fall/Injury  Goal: Verbalize understanding of risk factor reduction measures to prevent injury from fall in the home  Outcome: Progressing  Goal: Use assistive devices by end of the shift  Outcome: Progressing  Goal: Pace activities to prevent fatigue by end of the shift  Outcome: Progressing     Problem: Pain  Goal: Takes deep breaths with  improved pain control throughout the shift  Outcome: Progressing  Goal: Turns in bed with improved pain control throughout the shift  Outcome: Progressing  Goal: Walks with improved pain control throughout the shift  Outcome: Progressing  Goal: Performs ADL's with improved pain control throughout shift  Outcome: Progressing  Goal: Participates in PT with improved pain control throughout the shift  Outcome: Progressing  Goal: Free from opioid side effects throughout the shift  Outcome: Progressing  Goal: Free from acute confusion related to pain meds throughout the shift  Outcome: Progressing     Problem: Nutrition  Goal: Oral intake greater than 50%  Outcome: Progressing  Goal: Oral intake greater 75%  Outcome: Progressing  Goal: Consume prescribed supplement  Outcome: Progressing  Goal: Adequate PO fluid intake  Outcome: Progressing  Goal: Lab values WNL  Outcome: Progressing  Goal: Promote healing  Outcome: Progressing  Goal: Gradual weight gain  Outcome: Progressing

## 2025-01-21 NOTE — PROGRESS NOTES
"Physical Therapy    Physical Therapy Treatment    Patient Name: Gabriella Haskins  MRN: [id number]  Department: [facility] REHAB  Room: [address] [place] [address]  Today's Date: 1/21/2025  Time Calculation  Start Time: 1045  Stop Time: 1130  Time Calculation (min): 45 min         Assessment/Plan   PT Assessment  End of Session Communication: Bedside nurse  End of Session Patient Position:  (BSC over toilet, call light within reach.)     PT Plan  Treatment/Interventions: Bed mobility, Transfer training, Gait training, Stair training, Balance training, Strengthening, Therapeutic exercise, Therapeutic activity  PT Plan: Ongoing PT  PT Frequency: 90 min/5 days per week (10 days)  PT Discharge Recommendations:  (Anticipate discharge home with the need for intermittent min assist at the walker level.)  Equipment Recommended upon Discharge: Wheeled walker  PT Recommended Transfer Status: Assist x2      General Visit Information:   PT  Visit  PT Received On: 01/21/25  General  Prior to Session Communication: Bedside nurse  Patient Position Received: Up in chair, Alarm on  General Comment: Patient pleasant and agreeable to therapy. Patient reports \"feeling much better\"    Subjective   Precautions:  Precautions  LE Weight Bearing Status: Weight Bearing as Tolerated  Medical Precautions: Fall precautions  Post-Surgical Precautions: Left hip precautions    Vital Signs (Past 2hrs)        Date/Time Vitals Session Patient Position Pulse Resp SpO2 BP MAP (mmHg)    01/21/25 1045 Pre PT  --  --  --  --  101/60  --                         Objective   Pain:  Pain Assessment  Pain Assessment: 0-10  0-10 (Numeric) Pain Score: 2  Pain Location: Hip  Pain Orientation: Left  Pain Interventions: Distraction, Emotional support  Cognition:  Cognition  Overall Cognitive Status: Within Functional Limits  Treatments:  Ambulation/Gait Training  Ambulation/Gait Training Performed: Yes  Patient ambulated ~50ft and ~75ft with FWW and ModA x1 + WC follow. Patient demo's slow " "(not functionally safe) step to antalgic gait with decreased velocity, decreased step length, and flexed posture. Distance limited d/t fatigue. Seated rest between trials to manage fatigue.     Transfers  Transfer: Yes  Patient performed sit<>stand with ModA x1. Cues for hand placement and control with descent.     Stairs  Stairs: Yes  Patient performed 4\" step ups with B HR x3 trials, ModA x2. Cues for sequencing. Done as pre-stair training task.   Patient negotiated 3-4\" steps up/down x1 trial with B HR and ModA x2. Cues for sequencing and pacing.   Patient performed curb step up/down on 3\" scale with FWW and ModA x2. Cues for sequencing and assist with walker management.     Education Comments  Educated patient on transfer training, gait training, and stair training techniques to maximize safety and independence.       OP EDUCATION:       Encounter Problems       Encounter Problems (Active)       PT Problem       STG - Bed mobility with min/mod assist x2. (Progressing)       Start:  01/17/25    Expected End:  01/23/25            STG - Transfers with mod assist x1. (Progressing)       Start:  01/17/25    Expected End:  01/23/25            STG - Pt will amb 30 ft with wheeled walker and mod assist x1.  (Progressing)       Start:  01/17/25    Expected End:  01/23/25            STG - Pt will negotiate one step with rails and min/mod assist x2. (Progressing)       Start:  01/17/25    Expected End:  01/23/25            LTG - Bed mobility with min assist x1. (Progressing)       Start:  01/17/25    Expected End:  01/28/25            LTG - Transfers with min assist x1. (Progressing)       Start:  01/17/25    Expected End:  01/28/25            LTG - Pt will amb 50 ft with wheeled walker and min assist. (Progressing)       Start:  01/17/25    Expected End:  01/28/25            LTG - Pt will negotiate 5 stairs with rails and min/mod assist x1. (Progressing)       Start:  01/17/25    Expected End:  01/28/25               Pain " - Adult

## 2025-01-21 NOTE — PROGRESS NOTES
Physical Therapy    Physical Therapy Treatment    Patient Name: Gabriella Haskins  MRN: [id number]  Department: [facility] REHAB  Room: [address] [place] [address]  Today's Date: 1/21/2025  Time Calculation  Start Time: 0830  Stop Time: 0915  Time Calculation (min): 45 min         Assessment/Plan   PT Assessment  End of Session Communication: Bedside nurse  End of Session Patient Position: Up in chair, Alarm on (call light and needs within reach. Ice pack for comfort.)     PT Plan  Treatment/Interventions: Bed mobility, Transfer training, Gait training, Stair training, Balance training, Strengthening, Therapeutic exercise, Therapeutic activity  PT Plan: Ongoing PT  PT Frequency: 90 min/5 days per week (10 days)  PT Discharge Recommendations:  (Anticipate discharge home with the need for intermittent min assist at the walker level.)  Equipment Recommended upon Discharge: Wheeled walker  PT Recommended Transfer Status: Assist x2      General Visit Information:   PT  Visit  PT Received On: 01/21/25  General  Prior to Session Communication:  (OT)  Patient Position Received: Up in chair, Alarm on  General Comment: Patient pleasant and agreeable to therapy. Per OT patient with low BP, ok to participate per RN.    Subjective   Precautions:  Precautions  LE Weight Bearing Status: Weight Bearing as Tolerated  Medical Precautions: Fall precautions  Post-Surgical Precautions: Left hip precautions    Objective   Pain:  Pain Assessment  Pain Assessment: 0-10  0-10 (Numeric) Pain Score: 3  Pain Location: Hip  Pain Orientation: Left  Pain Interventions: Medication (See MAR), Distraction  Cognition:  Cognition  Overall Cognitive Status: Within Functional Limits  Treatments:  Therapeutic Exercise  Therapeutic Exercise Performed: Yes  Patient performed seated therex, B LE: APs, hip flexion, LAQ, heel slides, GS, and hip add ball squeeze all [id number]. Done to increase B LE strength to improve overall functional mobility and independence     Therapeutic  "Activity  Therapeutic Activity Performed: Yes  Patient tolerated static standing x2 trials with Caro x1 and FWW for support. </=45s, limited d/t fatigue and per patient \"feeling off\"    Ambulation/Gait Training  Ambulation/Gait Training Performed: Yes  Patient ambulated ~20ft and 32ft with FWW and modA x1 + WC follow. Distance limited d/t fatigue and weakness. Seated rest between trials to manage fatigue.      Transfers  Transfer: Yes  Patient performed sit<>stand transfer with ModA x1-2. Cues for hand placement and sequencing.  Patient performed stand pivot WC>recliner chair with ModA x1 and FWW for support. Cues for alignment with surface prior to sitting.     Education Comments  Educated patient on transfer traning and gait training techniques to maximize safety and independence.         OP EDUCATION:       Encounter Problems       Encounter Problems (Active)       PT Problem       STG - Bed mobility with min/mod assist x2. (Progressing)       Start:  01/17/25    Expected End:  01/23/25            STG - Transfers with mod assist x1. (Progressing)       Start:  01/17/25    Expected End:  01/23/25            STG - Pt will amb 30 ft with wheeled walker and mod assist x1.  (Progressing)       Start:  01/17/25    Expected End:  01/23/25            STG - Pt will negotiate one step with rails and min/mod assist x2. (Progressing)       Start:  01/17/25    Expected End:  01/23/25            LTG - Bed mobility with min assist x1. (Progressing)       Start:  01/17/25    Expected End:  01/28/25            LTG - Transfers with min assist x1. (Progressing)       Start:  01/17/25    Expected End:  01/28/25            LTG - Pt will amb 50 ft with wheeled walker and min assist. (Progressing)       Start:  01/17/25    Expected End:  01/28/25            LTG - Pt will negotiate 5 stairs with rails and min/mod assist x1. (Progressing)       Start:  01/17/25    Expected End:  01/28/25               Pain - Adult              "

## 2025-01-21 NOTE — PROGRESS NOTES
"  Gabriella Haskins is a 79 y.o. female on day 4 of admission presenting with Closed displaced fracture of left femoral neck.    Subjective   She is working well in therapy. Mejia removed this am.   Pain is managed.       Objective       Physical Exam  She is alert and oriented x 3  CTAB  S1S2  SNTND+BS  Negative Federico's bilaterally  Left lateral hip incision with dressing intact    Function: ambulating at CGA overall. Stairs with up to min A      Assessment/Plan        Last Recorded Vitals  Blood pressure 121/60, pulse 70, temperature 36.8 °C (98.2 °F), resp. rate 18, height 1.676 m (5' 5.98\"), weight 51 kg (112 lb 7 oz), SpO2 97%.    Therapy notes from last 24 hours reviewed.    Relevant Results                  Scheduled medications  ascorbic acid, 500 mg, oral, Daily  bisacodyl, 10 mg, rectal, Daily  cetirizine, 10 mg, oral, Daily  cholecalciferol, 1,000 Units, oral, Daily  [START ON 1/30/2025] cyanocobalamin, 1,000 mcg, intramuscular, q14 days  fluticasone, 1 spray, Each Nostril, Daily  icosapent ethyL, 1,000 mg, oral, Daily  levothyroxine, 100 mcg, oral, Daily  metoprolol tartrate, 25 mg, oral, BID  polyethylene glycol, 17 g, oral, Daily  rivaroxaban, 20 mg, oral, Daily with evening meal  tamsulosin, 0.4 mg, oral, Nightly      Continuous medications     PRN medications  PRN medications: acetaminophen **OR** acetaminophen, lubricating eye drops, melatonin, ondansetron **OR** ondansetron, oxyCODONE, oxyCODONE     No results found for this or any previous visit (from the past 24 hours).              Assessment/Plan   Assessment & Plan  Closed displaced fracture of left femoral neck    Essential hypertension    Hypercholesterolemia    Hypothyroidism    Vitamin D deficiency    Fall, initial encounter      1/21  -ongoing rehab  -xarelto for DVT prophylaxis  -BP managed lopressor  -urinary retention with mejia removed, pending trial void  -pain control with oxycodone  -facilitate BM         I spent 35 minutes in the " professional and overall care of this patient.      Sirisha Soto MD

## 2025-01-21 NOTE — NURSING NOTE
0630 Mejia dc 'd . I withdrew 8 ml out of balloon, emjia tip intact. Patient tolerated . Will continue to monitor patient.

## 2025-01-22 PROCEDURE — 97110 THERAPEUTIC EXERCISES: CPT | Mod: GP,CQ

## 2025-01-22 PROCEDURE — 99232 SBSQ HOSP IP/OBS MODERATE 35: CPT | Performed by: PHYSICAL MEDICINE & REHABILITATION

## 2025-01-22 PROCEDURE — 97535 SELF CARE MNGMENT TRAINING: CPT | Mod: GO,CO

## 2025-01-22 PROCEDURE — 97110 THERAPEUTIC EXERCISES: CPT | Mod: GO,CO

## 2025-01-22 PROCEDURE — 2500000001 HC RX 250 WO HCPCS SELF ADMINISTERED DRUGS (ALT 637 FOR MEDICARE OP): Performed by: STUDENT IN AN ORGANIZED HEALTH CARE EDUCATION/TRAINING PROGRAM

## 2025-01-22 PROCEDURE — 1280000001 HC REHAB SEMI-PRIVATE ROOM DAILY

## 2025-01-22 PROCEDURE — 97116 GAIT TRAINING THERAPY: CPT | Mod: GP,CQ

## 2025-01-22 PROCEDURE — 97530 THERAPEUTIC ACTIVITIES: CPT | Mod: GO,CO

## 2025-01-22 PROCEDURE — 2500000001 HC RX 250 WO HCPCS SELF ADMINISTERED DRUGS (ALT 637 FOR MEDICARE OP): Performed by: PHYSICAL MEDICINE & REHABILITATION

## 2025-01-22 PROCEDURE — 2500000004 HC RX 250 GENERAL PHARMACY W/ HCPCS (ALT 636 FOR OP/ED)

## 2025-01-22 PROCEDURE — 2500000002 HC RX 250 W HCPCS SELF ADMINISTERED DRUGS (ALT 637 FOR MEDICARE OP, ALT 636 FOR OP/ED): Performed by: PHYSICAL MEDICINE & REHABILITATION

## 2025-01-22 PROCEDURE — 2500000001 HC RX 250 WO HCPCS SELF ADMINISTERED DRUGS (ALT 637 FOR MEDICARE OP)

## 2025-01-22 PROCEDURE — 51701 INSERT BLADDER CATHETER: CPT

## 2025-01-22 PROCEDURE — 97530 THERAPEUTIC ACTIVITIES: CPT | Mod: GP,CQ

## 2025-01-22 PROCEDURE — 2500000002 HC RX 250 W HCPCS SELF ADMINISTERED DRUGS (ALT 637 FOR MEDICARE OP, ALT 636 FOR OP/ED)

## 2025-01-22 RX ADMIN — Medication 1000 UNITS: at 10:03

## 2025-01-22 RX ADMIN — METHOCARBAMOL 500 MG: 500 TABLET ORAL at 02:05

## 2025-01-22 RX ADMIN — METOPROLOL TARTRATE 25 MG: 25 TABLET, FILM COATED ORAL at 20:26

## 2025-01-22 RX ADMIN — ICOSAPENT ETHYL 1000 MG: 1 CAPSULE ORAL at 10:03

## 2025-01-22 RX ADMIN — OXYCODONE HYDROCHLORIDE 2.5 MG: 5 TABLET ORAL at 16:34

## 2025-01-22 RX ADMIN — OXYCODONE HYDROCHLORIDE 5 MG: 5 TABLET ORAL at 10:03

## 2025-01-22 RX ADMIN — RIVAROXABAN 20 MG: 20 TABLET, FILM COATED ORAL at 16:34

## 2025-01-22 RX ADMIN — BISACODYL 10 MG: 10 SUPPOSITORY RECTAL at 19:00

## 2025-01-22 RX ADMIN — Medication 500 MG: at 10:03

## 2025-01-22 RX ADMIN — CASTOR OIL AND BALSAM, PERU 1 APPLICATION: 788; 87 OINTMENT TOPICAL at 20:27

## 2025-01-22 RX ADMIN — ACETAMINOPHEN 650 MG: 325 TABLET, FILM COATED ORAL at 02:05

## 2025-01-22 RX ADMIN — METOPROLOL TARTRATE 25 MG: 25 TABLET, FILM COATED ORAL at 10:04

## 2025-01-22 RX ADMIN — POLYETHYLENE GLYCOL 3350 17 G: 17 POWDER, FOR SOLUTION ORAL at 10:06

## 2025-01-22 RX ADMIN — SENNOSIDES 17.2 MG: 8.6 TABLET, FILM COATED ORAL at 20:26

## 2025-01-22 RX ADMIN — CETIRIZINE HYDROCHLORIDE 10 MG: 10 TABLET, FILM COATED ORAL at 10:03

## 2025-01-22 RX ADMIN — CASTOR OIL AND BALSAM, PERU 1 APPLICATION: 788; 87 OINTMENT TOPICAL at 10:07

## 2025-01-22 RX ADMIN — METHOCARBAMOL 500 MG: 500 TABLET ORAL at 19:50

## 2025-01-22 RX ADMIN — LEVOTHYROXINE SODIUM 100 MCG: 0.1 TABLET ORAL at 06:08

## 2025-01-22 RX ADMIN — OXYCODONE HYDROCHLORIDE 2.5 MG: 5 TABLET ORAL at 00:32

## 2025-01-22 RX ADMIN — TAMSULOSIN HYDROCHLORIDE 0.4 MG: 0.4 CAPSULE ORAL at 20:26

## 2025-01-22 ASSESSMENT — PAIN DESCRIPTION - DESCRIPTORS
DESCRIPTORS: DISCOMFORT
DESCRIPTORS: SPASM

## 2025-01-22 ASSESSMENT — PAIN SCALES - GENERAL
PAINLEVEL_OUTOF10: 4
PAINLEVEL_OUTOF10: 0 - NO PAIN
PAINLEVEL_OUTOF10: 2
PAINLEVEL_OUTOF10: 4
PAINLEVEL_OUTOF10: 0 - NO PAIN
PAINLEVEL_OUTOF10: 4
PAINLEVEL_OUTOF10: 0 - NO PAIN
PAINLEVEL_OUTOF10: 0 - NO PAIN
PAINLEVEL_OUTOF10: 4
PAINLEVEL_OUTOF10: 7
PAINLEVEL_OUTOF10: 0 - NO PAIN

## 2025-01-22 ASSESSMENT — PAIN SCALES - WONG BAKER
WONGBAKER_NUMERICALRESPONSE: HURTS LITTLE MORE
WONGBAKER_NUMERICALRESPONSE: HURTS LITTLE MORE

## 2025-01-22 ASSESSMENT — ACTIVITIES OF DAILY LIVING (ADL): HOME_MANAGEMENT_TIME_ENTRY: 25

## 2025-01-22 ASSESSMENT — PAIN DESCRIPTION - LOCATION
LOCATION: HIP
LOCATION: HIP

## 2025-01-22 ASSESSMENT — PAIN DESCRIPTION - ORIENTATION
ORIENTATION: RIGHT
ORIENTATION: LEFT

## 2025-01-22 NOTE — PROGRESS NOTES
Occupational Therapy    OT Treatment    Patient Name: Gabriella Haskins  MRN: [id number]  Department: [facility] REHAB  Room: [address] [place] [address]  Today's Date: 1/22/2025  Time Calculation  Start Time: 0915  Stop Time: 1000  Time Calculation (min): 45 min        Assessment:  End of Session Communication: Bedside nurse  End of Session Patient Position: Up in chair, Alarm on     Plan:  Treatment Interventions: ADL retraining, UE strengthening/ROM, Functional transfer training, Endurance training, Patient/family training, Equipment evaluation/education  OT Frequency: 90 min/5 days per week (x 10 days)  OT Discharge Recommendations:  (anticipate possible need for up to min assist with ADLs, transfers, and mobility)  Equipment Recommended upon Discharge: Wheeled walker, Bedside commode (tub bench, sock aid, reacher, dressing stick, LHSH, 3:1 commode)  Treatment Interventions: ADL retraining, UE strengthening/ROM, Functional transfer training, Endurance training, Patient/family training, Equipment evaluation/education    Subjective   Previous Visit Info:  OT Last Visit  OT Received On: 01/22/25  General:  General  Prior to Session Communication: Bedside nurse  Patient Position Received: Up in chair, Alarm on  Precautions: posterior hip precautions  Pain:  Pain Assessment  Pain Assessment: 0-10  0-10 (Numeric) Pain Score: 0 - No pain    Objective    Activities of Daily Living: LE Dressing  LE Dressing: Yes  LE Dressing Adaptive Equipment: Sock aide, Dressing stick  Pants Level of Assistance: Moderate assistance  Sock Level of Assistance:  (SBA with min/mod cues for technique)  Bed Mobility/Transfers: Transfers  Transfer:  (sit to stand transfers times four trials initially at moderate assistance then by last trial at minimal assistance)  Standing Balance:  Static Standing Balance  Static Standing-Comment/Number of Minutes: static standing balance to stable surface with bilateral UE support tolerating 2 mins, 4 mins, 3 mins and 6 mins at  SBA  Education: Educated patient regarding HIP precautions and   adaptive technique with poor carryover.  Recommend continued education to ensure safety and understanding. Issued written hip precautions for room.                                         Goals:  Encounter Problems       Encounter Problems (Active)       OT Problem       LTG - Patient will complete grooming with independence. (Progressing)       Start:  01/17/25    Expected End:  01/27/25            LTG - Patient will complete toileting with min assist. (Progressing)       Start:  01/17/25    Expected End:  01/27/25            LTG - Patient will complete bathing with min assist. (Progressing)       Start:  01/17/25    Expected End:  01/27/25            LTG - Patient will complete UE dressing with supervision. (Progressing)       Start:  01/17/25    Expected End:  01/27/25            LTG - Patient will complete LE dressing using adaptive equip as needed with min assist. (Progressing)       Start:  01/17/25    Expected End:  01/27/25            LTG - Patient will complete commode transfer via device as needed with min assist. (Progressing)       Start:  01/17/25    Expected End:  01/27/25            LTG - Patient will complete tub/shower transfer using DME as needed with min/mod assist. (Progressing)       Start:  01/17/25    Expected End:  01/27/25            LTG - Patient will complete functional mobility via device as needed with min assist. (Progressing)       Start:  01/17/25    Expected End:  01/27/25            STG - Patient will complete toileting with mod assist. (Progressing)       Start:  01/17/25    Expected End:  01/24/25            STG - Patient will complete bathing with mod assist. (Progressing)       Start:  01/17/25    Expected End:  01/24/25            STG - Patient will complete UE dressing with sba. (Progressing)       Start:  01/17/25    Expected End:  01/24/25            STG - Patient will complete LE dressing using adaptive equip  as needed with mod assist. (Progressing)       Start:  01/17/25    Expected End:  01/24/25            STG - Patient will complete commode transfer via device as needed with mod assist. (Progressing)       Start:  01/17/25    Expected End:  01/24/25            STG - Patient will complete simple functional mobility via device as needed with mod assist. (Progressing)       Start:  01/17/25    Expected End:  01/24/25

## 2025-01-22 NOTE — PROGRESS NOTES
Occupational Therapy    OT Treatment    Patient Name: Gabriella Haskins  MRN: [id number]  Department: [facility] REHAB  Room: [address] [place] [address]  Today's Date: 1/22/2025  Time Calculation  Start Time: 1300  Stop Time: 1345  Time Calculation (min): 45 min        Assessment:  End of Session Communication:  (hand off to PT)  End of Session Patient Position: Up in chair, Alarm on     Plan:  Treatment Interventions: ADL retraining, UE strengthening/ROM, Functional transfer training, Endurance training, Patient/family training, Equipment evaluation/education  OT Frequency: 90 min/5 days per week (x 10 days)  OT Discharge Recommendations:  (anticipate possible need for up to min assist with ADLs, transfers, and mobility)  Equipment Recommended upon Discharge: Wheeled walker, Bedside commode (tub bench, sock aid, reacher, dressing stick, LHSH, 3:1 commode)  Treatment Interventions: ADL retraining, UE strengthening/ROM, Functional transfer training, Endurance training, Patient/family training, Equipment evaluation/education    Subjective   Previous Visit Info:  OT Last Visit  OT Received On: 01/22/25  General:  General  Prior to Session Communication: Bedside nurse  Patient Position Received: Up in chair, Alarm on  Precautions:  Post-Surgical Precautions: Left hip precautions    Pain:  Pain Assessment  Pain Assessment: 0-10  0-10 (Numeric) Pain Score: 0 - No pain    Objective    Bed Mobility/Transfers: Transfers  Transfer:  (Trials/Comments 2: sit to stand min/mod assistance  Toilet Transfers  Toilet Transfer From: Rolling walker  Toilet Transfer Type: To and from  Toilet Transfer to: Standard bedside commode  Toilet Transfer Technique: Ambulating  Toilet Transfers: Moderate assistance  Therapy/Activity: Therapeutic Exercise  Therapeutic Exercise Activity 1: BUE exercises with one pound weights 10 rep x 3 sets of 5 exercises to promote independence with ADL's and transfers.   EDUCATION:  Education  Individual(s) Educated: Patient  Education  Provided: Diagnosis & Precautions, Fall precautions, Risk and benefits of OT discussed with patient or other, POC discussed and agreed upon  Home Program: Strengthening  Recommend continued education to ensure safety and understanding.      Goals:  Encounter Problems       Encounter Problems (Active)       OT Problem       LTG - Patient will complete grooming with independence. (Progressing)       Start:  01/17/25    Expected End:  01/27/25            LTG - Patient will complete toileting with min assist. (Progressing)       Start:  01/17/25    Expected End:  01/27/25            LTG - Patient will complete bathing with min assist. (Progressing)       Start:  01/17/25    Expected End:  01/27/25            LTG - Patient will complete UE dressing with supervision. (Progressing)       Start:  01/17/25    Expected End:  01/27/25            LTG - Patient will complete LE dressing using adaptive equip as needed with min assist. (Progressing)       Start:  01/17/25    Expected End:  01/27/25            LTG - Patient will complete commode transfer via device as needed with min assist. (Progressing)       Start:  01/17/25    Expected End:  01/27/25            LTG - Patient will complete tub/shower transfer using DME as needed with min/mod assist. (Progressing)       Start:  01/17/25    Expected End:  01/27/25            LTG - Patient will complete functional mobility via device as needed with min assist. (Progressing)       Start:  01/17/25    Expected End:  01/27/25            STG - Patient will complete toileting with mod assist. (Progressing)       Start:  01/17/25    Expected End:  01/24/25            STG - Patient will complete bathing with mod assist. (Progressing)       Start:  01/17/25    Expected End:  01/24/25            STG - Patient will complete UE dressing with sba. (Progressing)       Start:  01/17/25    Expected End:  01/24/25            STG - Patient will complete LE dressing using adaptive equip as needed  with mod assist. (Progressing)       Start:  01/17/25    Expected End:  01/24/25            STG - Patient will complete commode transfer via device as needed with mod assist. (Progressing)       Start:  01/17/25    Expected End:  01/24/25            STG - Patient will complete simple functional mobility via device as needed with mod assist. (Progressing)       Start:  01/17/25    Expected End:  01/24/25

## 2025-01-22 NOTE — CARE PLAN
The patient's goals for the shift include      The clinical goals for the shift include be able to urinate PVR <350

## 2025-01-22 NOTE — PROGRESS NOTES
Physical Therapy    Physical Therapy Treatment    Patient Name: Gabriella Haskins  MRN: [id number]  Department: [facility] REHAB  Room: [address] [place] [address]  Today's Date: 1/22/2025  Time Calculation  Start Time: 1345  Stop Time: 1430  Time Calculation (min): 45 min         Assessment/Plan   PT Assessment  End of Session Communication: Bedside nurse  End of Session Patient Position: Up in chair, Alarm on     PT Plan  Treatment/Interventions: Bed mobility, Transfer training, Gait training, Stair training, Balance training, Strengthening, Therapeutic exercise, Therapeutic activity  PT Plan: Ongoing PT  PT Frequency: 90 min/5 days per week (10 days)  PT Discharge Recommendations:  (Anticipate discharge home with the need for intermittent min assist at the walker level.)  Equipment Recommended upon Discharge: Wheeled walker  PT Recommended Transfer Status: Assist x2      General Visit Information:   PT  Visit  PT Received On: 01/22/25  General  Prior to Session Communication: Bedside nurse  Patient Position Received: Up in chair, Alarm on  General Comment: Patient pleasant and fully participatory in therapy.    Subjective   Precautions:  Precautions  LE Weight Bearing Status: Weight Bearing as Tolerated  Medical Precautions: Fall precautions  Post-Surgical Precautions: Left hip precautions    Objective   Pain:  Pain Assessment  Pain Assessment: 0-10  0-10 (Numeric) Pain Score: 0 - No pain  Cognition:  Cognition  Overall Cognitive Status: Within Functional Limits  Treatments:  Therapeutic Activity  Therapeutic Activity Performed: Yes  Patient performed blocked practice sit<>stand x8reps Caro/ModA x1. Done to increase strength in B LE and improve sequencing and independence with sit<>stand transfer.      Ambulation/Gait Training  Ambulation/Gait Training Performed: Yes  Patient ambulated ~50ft x2 trials and ~100ft with FWW and ModA x1. Patient demo's slow step to gait with decreased velocity, decreased step length, and flexed posture.  "Distance limited d/t fatigue. Seated rest between trials to manage fatigue.     Transfers  Transfer: Yes  Patient performed sit<>stand transfer with ModA/Caro x1 with cues for hand placement and sequencing.     Stairs  Stairs: Yes  Patient negotiated 3-4\" steps up/down and 2-6\" steps up/down with B HR and ModA/Caro x2. Cues for sequencing and pacing. Seated rest between trials to manage fatigue.     Education Comments  Educated patient on transfer training, gait training, and stair training techniques to maximize safety and independence.       OP EDUCATION:       Encounter Problems       Encounter Problems (Active)       PT Problem       STG - Bed mobility with min/mod assist x2. (Progressing)       Start:  01/17/25    Expected End:  01/23/25            STG - Transfers with mod assist x1. (Progressing)       Start:  01/17/25    Expected End:  01/23/25            STG - Pt will amb 30 ft with wheeled walker and mod assist x1.  (Met)       Start:  01/17/25    Expected End:  01/23/25    Resolved:  01/22/25         STG - Pt will negotiate one step with rails and min/mod assist x2. (Progressing)       Start:  01/17/25    Expected End:  01/23/25            LTG - Bed mobility with min assist x1. (Progressing)       Start:  01/17/25    Expected End:  01/28/25            LTG - Transfers with min assist x1. (Progressing)       Start:  01/17/25    Expected End:  01/28/25            LTG - Pt will amb 50 ft with wheeled walker and min assist. (Progressing)       Start:  01/17/25    Expected End:  01/28/25            LTG - Pt will negotiate 5 stairs with rails and min/mod assist x1. (Progressing)       Start:  01/17/25    Expected End:  01/28/25               Pain - Adult              "

## 2025-01-22 NOTE — PROGRESS NOTES
"  Gabriella Haskins is a 79 y.o. female on day 5 of admission presenting with Closed displaced fracture of left femoral neck.    Subjective   Patient is doing well in therapy.  She still had challenges with urination.  She has had a small bowel movement today.    She is taking pain medications as needed which is controlling her pain.  No lightheadedness or dizziness.    She did have minor complaints of a cough a few days ago.  This has gone away.       Objective       Physical Exam  Pleasant female in no apparent distress.  She is alert and oriented x 3  Chest clear to auscultation bilaterally  Cardiovascular S1-S2 without murmurs rubs or gallops.  Abdomen soft nontender nondistended  Bilateral lower extremities with no lower extremity edema and negative Homans bilaterally.  Moving all 4 extremities appropriately.      Function: Contact-guard assist.    Assessment/Plan        Last Recorded Vitals  Blood pressure 101/56, pulse 97, temperature 36.5 °C (97.7 °F), resp. rate 18, height 1.676 m (5' 5.98\"), weight 62.8 kg (138 lb 8 oz), SpO2 99%.    Therapy notes from last 24 hours reviewed.    Relevant Results                  Scheduled medications  ascorbic acid, 500 mg, oral, Daily  balsam peru-castor oiL, 1 Application, Topical, BID  bisacodyl, 10 mg, rectal, Daily  cetirizine, 10 mg, oral, Daily  cholecalciferol, 1,000 Units, oral, Daily  [START ON 1/30/2025] cyanocobalamin, 1,000 mcg, intramuscular, q14 days  fluticasone, 1 spray, Each Nostril, Daily  icosapent ethyL, 1,000 mg, oral, Daily  levothyroxine, 100 mcg, oral, Daily  metoprolol tartrate, 25 mg, oral, BID  polyethylene glycol, 17 g, oral, Daily  rivaroxaban, 20 mg, oral, Daily with evening meal  sennosides, 2 tablet, oral, Nightly  tamsulosin, 0.4 mg, oral, Nightly      Continuous medications     PRN medications  PRN medications: acetaminophen **OR** acetaminophen, bisacodyl, lubricating eye drops, melatonin, methocarbamol, ondansetron **OR** ondansetron, " oxyCODONE, oxyCODONE, polyethylene glycol     No results found for this or any previous visit (from the past 24 hours).              Assessment/Plan   Assessment & Plan  Closed displaced fracture of left femoral neck    Essential hypertension    Hypercholesterolemia    Hypothyroidism    Vitamin D deficiency    Fall, initial encounter      1/22  -Patient progressing in therapy at contact-guard assist level  -Continue family education and training for safe discharge  -White count slightly elevated.  Continue to follow closely.  No signs or symptoms of infectious process  -BMP within normal limits.  -Vital signs stable.           I spent 35 minutes in the professional and overall care of this patient.      Sirisha Soto MD

## 2025-01-22 NOTE — CARE PLAN
The patient's goals for the shift include  Low PVR <350    The clinical goals for the shift include pain control    Problem: Pain - Adult  Goal: Verbalizes/displays adequate comfort level or baseline comfort level  Outcome: Progressing     Problem: Safety - Adult  Goal: Free from fall injury  Outcome: Progressing     Problem: Discharge Planning  Goal: Discharge to home or other facility with appropriate resources  Outcome: Progressing     Problem: Chronic Conditions and Co-morbidities  Goal: Patient's chronic conditions and co-morbidity symptoms are monitored and maintained or improved  Outcome: Progressing     Problem: Skin  Goal: Decreased wound size/increased tissue granulation at next dressing change  Outcome: Progressing  Flowsheets (Taken 1/22/2025 1823)  Decreased wound size/increased tissue granulation at next dressing change:   Promote sleep for wound healing   Protective dressings over bony prominences  Goal: Participates in plan/prevention/treatment measures  Outcome: Progressing  Flowsheets (Taken 1/22/2025 1823)  Participates in plan/prevention/treatment measures:   Elevate heels   Increase activity/out of bed for meals  Goal: Promote/optimize nutrition  Outcome: Progressing  Flowsheets (Taken 1/22/2025 1823)  Promote/optimize nutrition:   Monitor/record intake including meals   Consume > 50% meals/supplements   Offer water/supplements/favorite foods  Goal: Promote skin healing  Outcome: Progressing  Flowsheets (Taken 1/22/2025 1823)  Promote skin healing: Turn/reposition every 2 hours/use positioning/transfer devices     Problem: Fall/Injury  Goal: Verbalize understanding of risk factor reduction measures to prevent injury from fall in the home  Outcome: Progressing  Goal: Use assistive devices by end of the shift  Outcome: Progressing  Goal: Pace activities to prevent fatigue by end of the shift  Outcome: Progressing     Problem: Pain  Goal: Takes deep breaths with improved pain control throughout the  shift  Outcome: Progressing  Goal: Turns in bed with improved pain control throughout the shift  Outcome: Progressing  Goal: Walks with improved pain control throughout the shift  Outcome: Progressing  Goal: Performs ADL's with improved pain control throughout shift  Outcome: Progressing  Goal: Participates in PT with improved pain control throughout the shift  Outcome: Progressing  Goal: Free from opioid side effects throughout the shift  Outcome: Progressing  Goal: Free from acute confusion related to pain meds throughout the shift  Outcome: Progressing     Problem: Nutrition  Goal: Oral intake greater than 50%  Outcome: Progressing  Goal: Oral intake greater 75%  Outcome: Progressing  Goal: Consume prescribed supplement  Outcome: Progressing  Goal: Adequate PO fluid intake  Outcome: Progressing  Goal: Lab values WNL  Outcome: Progressing  Goal: Promote healing  Outcome: Progressing  Goal: Gradual weight gain  Outcome: Progressing

## 2025-01-22 NOTE — PROGRESS NOTES
Physical Therapy    Physical Therapy Treatment    Patient Name: Gabriella Haskins  MRN: [id number]  Department: [facility] REHAB  Room: [facility]  Today's Date: 1/22/2025  Time Calculation  Start Time: 1045  Stop Time: 1130  Time Calculation (min): 45 min         Assessment/Plan   PT Assessment  End of Session Communication: Bedside nurse  End of Session Patient Position: Up in chair, Alarm on (call light and needs within reach.)     PT Plan  Treatment/Interventions: Bed mobility, Transfer training, Gait training, Stair training, Balance training, Strengthening, Therapeutic exercise, Therapeutic activity  PT Plan: Ongoing PT  PT Frequency: 90 min/5 days per week (10 days)  PT Discharge Recommendations:  (Anticipate discharge home with the need for intermittent min assist at the walker level.)  Equipment Recommended upon Discharge: Wheeled walker  PT Recommended Transfer Status: Assist x2      General Visit Information:   PT  Visit  PT Received On: 01/22/25  General  Prior to Session Communication: Bedside nurse  Patient Position Received: Up in chair, Alarm on  General Comment: Patient pleasant and fully participatory in therapy.    Subjective   Precautions:  Precautions  LE Weight Bearing Status: Weight Bearing as Tolerated  Medical Precautions: Fall precautions  Post-Surgical Precautions: Left hip precautions    Objective   Pain:  Pain Assessment  Pain Assessment: 0-10  0-10 (Numeric) Pain Score: 0 - No pain  Cognition:  Cognition  Overall Cognitive Status: Within Functional Limits   Treatments:  Therapeutic Exercise  Therapeutic Exercise Performed: Yes  Patient performed seated therex, B LE: APs, hip flexion, LAQ, heel slides, GS, and hip add ball squeeze all 2 t11syay. Done to increase B LE strength to improve overall functional mobility and independence     Therapeutic Activity  Therapeutic Activity Performed: Yes  Patient performed blocked practice sit<>stand x5reps Caro/ModA x1. Done to increase strength in B LE and  improve sequencing and independence with sit<>stand transfer.      Ambulation/Gait Training  Ambulation/Gait Training Performed: Yes  Patient ambulated ~50ft x2 trials with FWW and ModA x1 + WC follow. Patient demo's slow step to gait with decreased velocity, decreased step length, and flexed posture. Distance limited d/t fatigue. Seated rest between trials to manage fatigue.     Transfers  Transfer: Yes  Patient performed sit<>stand transfer with ModA/Caro x1 with cues for hand placement and sequencing.     Education Comments  Educated patient on transfer traning and gait training techniques to maximize safety and independence.       OP EDUCATION:       Encounter Problems       Encounter Problems (Active)       PT Problem       STG - Bed mobility with min/mod assist x2. (Progressing)       Start:  01/17/25    Expected End:  01/23/25            STG - Transfers with mod assist x1. (Progressing)       Start:  01/17/25    Expected End:  01/23/25            STG - Pt will amb 30 ft with wheeled walker and mod assist x1.  (Met)       Start:  01/17/25    Expected End:  01/23/25    Resolved:  01/22/25         STG - Pt will negotiate one step with rails and min/mod assist x2. (Progressing)       Start:  01/17/25    Expected End:  01/23/25            LTG - Bed mobility with min assist x1. (Progressing)       Start:  01/17/25    Expected End:  01/28/25            LTG - Transfers with min assist x1. (Progressing)       Start:  01/17/25    Expected End:  01/28/25            LTG - Pt will amb 50 ft with wheeled walker and min assist. (Progressing)       Start:  01/17/25    Expected End:  01/28/25            LTG - Pt will negotiate 5 stairs with rails and min/mod assist x1. (Progressing)       Start:  01/17/25    Expected End:  01/28/25               Pain - Adult

## 2025-01-23 LAB
BASOPHILS # BLD AUTO: 0.04 X10*3/UL (ref 0–0.1)
BASOPHILS NFR BLD AUTO: 0.3 %
EOSINOPHIL # BLD AUTO: 0.29 X10*3/UL (ref 0–0.4)
EOSINOPHIL NFR BLD AUTO: 2.2 %
ERYTHROCYTE [DISTWIDTH] IN BLOOD BY AUTOMATED COUNT: 13.5 % (ref 11.5–14.5)
HCT VFR BLD AUTO: 27.9 % (ref 36–46)
HGB BLD-MCNC: 9.3 G/DL (ref 12–16)
IMM GRANULOCYTES # BLD AUTO: 0.21 X10*3/UL (ref 0–0.5)
IMM GRANULOCYTES NFR BLD AUTO: 1.6 % (ref 0–0.9)
LABORATORY COMMENT REPORT: NORMAL
LYMPHOCYTES # BLD AUTO: 1.13 X10*3/UL (ref 0.8–3)
LYMPHOCYTES NFR BLD AUTO: 8.6 %
MCH RBC QN AUTO: 28.7 PG (ref 26–34)
MCHC RBC AUTO-ENTMCNC: 33.3 G/DL (ref 32–36)
MCV RBC AUTO: 86 FL (ref 80–100)
MONOCYTES # BLD AUTO: 0.94 X10*3/UL (ref 0.05–0.8)
MONOCYTES NFR BLD AUTO: 7.1 %
NEUTROPHILS # BLD AUTO: 10.6 X10*3/UL (ref 1.6–5.5)
NEUTROPHILS NFR BLD AUTO: 80.2 %
NRBC BLD-RTO: 0 /100 WBCS (ref 0–0)
PATH REPORT.FINAL DX SPEC: NORMAL
PATH REPORT.GROSS SPEC: NORMAL
PATH REPORT.RELEVANT HX SPEC: NORMAL
PATH REPORT.TOTAL CANCER: NORMAL
PLATELET # BLD AUTO: 378 X10*3/UL (ref 150–450)
RBC # BLD AUTO: 3.24 X10*6/UL (ref 4–5.2)
RBC MORPH BLD: NORMAL
WBC # BLD AUTO: 13.2 X10*3/UL (ref 4.4–11.3)

## 2025-01-23 PROCEDURE — 2500000002 HC RX 250 W HCPCS SELF ADMINISTERED DRUGS (ALT 637 FOR MEDICARE OP, ALT 636 FOR OP/ED)

## 2025-01-23 PROCEDURE — 97110 THERAPEUTIC EXERCISES: CPT | Mod: GP

## 2025-01-23 PROCEDURE — 36415 COLL VENOUS BLD VENIPUNCTURE: CPT | Performed by: PHYSICAL MEDICINE & REHABILITATION

## 2025-01-23 PROCEDURE — 97116 GAIT TRAINING THERAPY: CPT | Mod: GP

## 2025-01-23 PROCEDURE — 85025 COMPLETE CBC W/AUTO DIFF WBC: CPT | Performed by: PHYSICAL MEDICINE & REHABILITATION

## 2025-01-23 PROCEDURE — 1280000001 HC REHAB SEMI-PRIVATE ROOM DAILY

## 2025-01-23 PROCEDURE — 2500000005 HC RX 250 GENERAL PHARMACY W/O HCPCS

## 2025-01-23 PROCEDURE — 2500000004 HC RX 250 GENERAL PHARMACY W/ HCPCS (ALT 636 FOR OP/ED)

## 2025-01-23 PROCEDURE — 2500000001 HC RX 250 WO HCPCS SELF ADMINISTERED DRUGS (ALT 637 FOR MEDICARE OP): Performed by: PHYSICAL MEDICINE & REHABILITATION

## 2025-01-23 PROCEDURE — 97110 THERAPEUTIC EXERCISES: CPT | Mod: GO

## 2025-01-23 PROCEDURE — 97530 THERAPEUTIC ACTIVITIES: CPT | Mod: GO

## 2025-01-23 PROCEDURE — 2500000001 HC RX 250 WO HCPCS SELF ADMINISTERED DRUGS (ALT 637 FOR MEDICARE OP)

## 2025-01-23 PROCEDURE — 99233 SBSQ HOSP IP/OBS HIGH 50: CPT | Performed by: PHYSICAL MEDICINE & REHABILITATION

## 2025-01-23 PROCEDURE — 97535 SELF CARE MNGMENT TRAINING: CPT | Mod: GO

## 2025-01-23 PROCEDURE — 2500000002 HC RX 250 W HCPCS SELF ADMINISTERED DRUGS (ALT 637 FOR MEDICARE OP, ALT 636 FOR OP/ED): Performed by: PHYSICAL MEDICINE & REHABILITATION

## 2025-01-23 PROCEDURE — 51701 INSERT BLADDER CATHETER: CPT

## 2025-01-23 PROCEDURE — 97530 THERAPEUTIC ACTIVITIES: CPT | Mod: GP

## 2025-01-23 RX ADMIN — BISACODYL 10 MG: 10 SUPPOSITORY RECTAL at 12:42

## 2025-01-23 RX ADMIN — POLYETHYLENE GLYCOL 3350 17 G: 17 POWDER, FOR SOLUTION ORAL at 08:27

## 2025-01-23 RX ADMIN — OXYCODONE HYDROCHLORIDE 2.5 MG: 5 TABLET ORAL at 09:38

## 2025-01-23 RX ADMIN — ICOSAPENT ETHYL 1000 MG: 1 CAPSULE ORAL at 08:28

## 2025-01-23 RX ADMIN — LEVOTHYROXINE SODIUM 100 MCG: 0.1 TABLET ORAL at 06:16

## 2025-01-23 RX ADMIN — SENNOSIDES 17.2 MG: 8.6 TABLET, FILM COATED ORAL at 20:07

## 2025-01-23 RX ADMIN — OXYCODONE HYDROCHLORIDE 5 MG: 5 TABLET ORAL at 19:57

## 2025-01-23 RX ADMIN — CASTOR OIL AND BALSAM, PERU 1 APPLICATION: 788; 87 OINTMENT TOPICAL at 20:09

## 2025-01-23 RX ADMIN — Medication 1000 UNITS: at 08:28

## 2025-01-23 RX ADMIN — CASTOR OIL AND BALSAM, PERU 1 APPLICATION: 788; 87 OINTMENT TOPICAL at 08:27

## 2025-01-23 RX ADMIN — TAMSULOSIN HYDROCHLORIDE 0.4 MG: 0.4 CAPSULE ORAL at 20:07

## 2025-01-23 RX ADMIN — Medication 500 MG: at 08:28

## 2025-01-23 RX ADMIN — METOPROLOL TARTRATE 25 MG: 25 TABLET, FILM COATED ORAL at 08:28

## 2025-01-23 RX ADMIN — METOPROLOL TARTRATE 25 MG: 25 TABLET, FILM COATED ORAL at 20:07

## 2025-01-23 RX ADMIN — Medication 3 MG: at 20:09

## 2025-01-23 RX ADMIN — FLUTICASONE PROPIONATE 1 SPRAY: 50 SPRAY, METERED NASAL at 08:28

## 2025-01-23 RX ADMIN — ACETAMINOPHEN 650 MG: 325 TABLET, FILM COATED ORAL at 10:08

## 2025-01-23 RX ADMIN — RIVAROXABAN 20 MG: 20 TABLET, FILM COATED ORAL at 17:55

## 2025-01-23 RX ADMIN — CETIRIZINE HYDROCHLORIDE 10 MG: 10 TABLET, FILM COATED ORAL at 08:28

## 2025-01-23 ASSESSMENT — ACTIVITIES OF DAILY LIVING (ADL)
BATHING_WHERE_ASSESSED: WHEELCHAIR
BATHING_LEVEL_OF_ASSISTANCE: CLOSE SUPERVISION
HOME_MANAGEMENT_TIME_ENTRY: 35

## 2025-01-23 ASSESSMENT — PAIN - FUNCTIONAL ASSESSMENT
PAIN_FUNCTIONAL_ASSESSMENT: 0-10

## 2025-01-23 ASSESSMENT — PAIN SCALES - GENERAL
PAINLEVEL_OUTOF10: 9
PAINLEVEL_OUTOF10: 9
PAINLEVEL_OUTOF10: 5 - MODERATE PAIN
PAINLEVEL_OUTOF10: 5 - MODERATE PAIN
PAINLEVEL_OUTOF10: 2
PAINLEVEL_OUTOF10: 2
PAINLEVEL_OUTOF10: 5 - MODERATE PAIN

## 2025-01-23 ASSESSMENT — PAIN DESCRIPTION - LOCATION
LOCATION: HIP

## 2025-01-23 ASSESSMENT — PAIN DESCRIPTION - DESCRIPTORS
DESCRIPTORS: DISCOMFORT;SORE
DESCRIPTORS: SORE

## 2025-01-23 ASSESSMENT — PAIN DESCRIPTION - ORIENTATION
ORIENTATION: LEFT

## 2025-01-23 ASSESSMENT — PAIN SCALES - WONG BAKER
WONGBAKER_NUMERICALRESPONSE: HURTS LITTLE BIT
WONGBAKER_NUMERICALRESPONSE: HURTS LITTLE MORE

## 2025-01-23 NOTE — PROGRESS NOTES
Physical Therapy    Physical Therapy Treatment    Patient Name: Gabriella Haskins  MRN: [id number]  Department: [facility] REHAB  Room: [address] [place] [address]  Today's Date: 1/23/2025  Time Calculation  Start Time: 1045  Stop Time: 1130  Time Calculation (min): 45 min         Assessment/Plan         PT Plan  Treatment/Interventions: Bed mobility, Transfer training, Gait training, Stair training, Balance training, Strengthening, Therapeutic exercise, Therapeutic activity  PT Plan: Ongoing PT  PT Frequency: 90 min/5 days per week (10 days)  PT Discharge Recommendations:  (Anticipate discharge home with the need for intermittent min assist at the walker level.)  Equipment Recommended upon Discharge: Wheeled walker  PT Recommended Transfer Status: Assist x2      General Visit Information:   PT  Visit  PT Received On: 01/23/25  General  Patient Position Received: Alarm on, Up in chair    Subjective   Precautions:  Precautions  Medical Precautions: Fall precautions  Post-Surgical Precautions: Left hip precautions  Precautions Comment: Pt requires cues for hip precautions.        Objective   Pain:  Pain Assessment  Pain Assessment: 0-10  0-10 (Numeric) Pain Score: 2  Pain Location: Hip  Pain Orientation: Left  Pain Interventions: Repositioned, Ambulation/increased activity, Distraction  Response to Interventions: No change in pain     Treatments:  Therapeutic Exercise  Therapeutic Exercise Performed:  (Pt performed supine BLE ther ex 2 x 10 reps each.  Ex performed to improve strength and mobility.)    Bed Mobility  Bed Mobility:  (Sit <> supine with mod assist.  Mod cues for following hip precautions during bed mobility.)    Ambulation/Gait Training  Ambulation/Gait Training Performed:  (Pt amb 40 ft with wheeled walker and min assist.)    Transfers  Transfer:  (Sit to stand transfer training from various surfaces and heights with min assist.  Pivot transfers with walker and min assist.)    EDUCATION:  Education Comment:  (Pt educated on hip  precautions during mobility tasks.)    Encounter Problems       Encounter Problems (Active)       PT Problem       STG - Bed mobility with min/mod assist x2. (Progressing)       Start:  01/17/25    Expected End:  01/23/25            STG - Transfers with mod assist x1. (Progressing)       Start:  01/17/25    Expected End:  01/23/25            STG - Pt will amb 30 ft with wheeled walker and mod assist x1.  (Met)       Start:  01/17/25    Expected End:  01/23/25    Resolved:  01/22/25         STG - Pt will negotiate one step with rails and min/mod assist x2. (Progressing)       Start:  01/17/25    Expected End:  01/23/25            LTG - Bed mobility with min assist x1. (Progressing)       Start:  01/17/25    Expected End:  01/28/25            LTG - Transfers with min assist x1. (Progressing)       Start:  01/17/25    Expected End:  01/28/25            LTG - Pt will amb 50 ft with wheeled walker and min assist. (Progressing)       Start:  01/17/25    Expected End:  01/28/25            LTG - Pt will negotiate 5 stairs with rails and min/mod assist x1. (Progressing)       Start:  01/17/25    Expected End:  01/28/25               Pain - Adult

## 2025-01-23 NOTE — PROGRESS NOTES
"  Gabriella Haskins is a 79 y.o. female on day 6 of admission presenting with Closed displaced fracture of left femoral neck.    Subjective   Patient was seen in her room.  She just finished therapy and pain was increased.  Her pain medication wore off.  We did discuss utilizing pain medication early in the morning around breakfast so that we did cover her therapies in the morning and then around lunchtime to cover her therapies in the afternoon.    Patient is starting to urinate on her own.  She did have a small bowel movement today.  Continue to facilitate active bowel movements and monitor PVRs.          Objective       Physical Exam   pleasant female no apparent distress sitting in a wheelchair  Alert and oriented x 4  Chest clear to auscultation bilaterally  Cardiovascular S1-S2  Abdomen soft nontender nondistended with active bowel sounds present  Negative Homans bilaterally and minimal lower extremity edema    Function: Patient functioning at a mod assist level      Assessment/Plan        Last Recorded Vitals  Blood pressure 141/66, pulse 88, temperature 37 °C (98.6 °F), resp. rate 18, height 1.676 m (5' 5.98\"), weight 62.8 kg (138 lb 8 oz), SpO2 93%.    Therapy notes from last 24 hours reviewed.    Relevant Results                  Scheduled medications  ascorbic acid, 500 mg, oral, Daily  balsam peru-castor oiL, 1 Application, Topical, BID  bisacodyl, 10 mg, rectal, Daily  cetirizine, 10 mg, oral, Daily  cholecalciferol, 1,000 Units, oral, Daily  [START ON 1/30/2025] cyanocobalamin, 1,000 mcg, intramuscular, q14 days  fluticasone, 1 spray, Each Nostril, Daily  icosapent ethyL, 1,000 mg, oral, Daily  levothyroxine, 100 mcg, oral, Daily  metoprolol tartrate, 25 mg, oral, BID  polyethylene glycol, 17 g, oral, Daily  rivaroxaban, 20 mg, oral, Daily with evening meal  sennosides, 2 tablet, oral, Nightly  tamsulosin, 0.4 mg, oral, Nightly      Continuous medications     PRN medications  PRN medications: " acetaminophen **OR** acetaminophen, bisacodyl, lubricating eye drops, melatonin, methocarbamol, ondansetron **OR** ondansetron, oxyCODONE, oxyCODONE, polyethylene glycol     Results for orders placed or performed during the hospital encounter of 01/17/25 (from the past 24 hours)   CBC and Auto Differential   Result Value Ref Range    WBC 13.2 (H) 4.4 - 11.3 x10*3/uL    nRBC 0.0 0.0 - 0.0 /100 WBCs    RBC 3.24 (L) 4.00 - 5.20 x10*6/uL    Hemoglobin 9.3 (L) 12.0 - 16.0 g/dL    Hematocrit 27.9 (L) 36.0 - 46.0 %    MCV 86 80 - 100 fL    MCH 28.7 26.0 - 34.0 pg    MCHC 33.3 32.0 - 36.0 g/dL    RDW 13.5 11.5 - 14.5 %    Platelets 378 150 - 450 x10*3/uL    Neutrophils % 80.2 40.0 - 80.0 %    Immature Granulocytes %, Automated 1.6 (H) 0.0 - 0.9 %    Lymphocytes % 8.6 13.0 - 44.0 %    Monocytes % 7.1 2.0 - 10.0 %    Eosinophils % 2.2 0.0 - 6.0 %    Basophils % 0.3 0.0 - 2.0 %    Neutrophils Absolute 10.60 (H) 1.60 - 5.50 x10*3/uL    Immature Granulocytes Absolute, Automated 0.21 0.00 - 0.50 x10*3/uL    Lymphocytes Absolute 1.13 0.80 - 3.00 x10*3/uL    Monocytes Absolute 0.94 (H) 0.05 - 0.80 x10*3/uL    Eosinophils Absolute 0.29 0.00 - 0.40 x10*3/uL    Basophils Absolute 0.04 0.00 - 0.10 x10*3/uL   Morphology   Result Value Ref Range    RBC Morphology No significant RBC morphology present                  Assessment/Plan   Assessment & Plan  Closed displaced fracture of left femoral neck    Essential hypertension    Hypercholesterolemia    Hypothyroidism    Vitamin D deficiency    Fall, initial encounter      1/23  -Ongoing therapy to improve functional ability to the point the patient is modified independent for safe discharge home  -Continue blood pressure management with ongoing use of metoprolol.  -Continue Flomax for urinary retention and monitor PVRs  -Facilitate daily active bowel movements  -Pain control with as needed oxycodone.  -Discharge planning.    I was present and led the team conference. The plan of care was  developed and agreed upon as discussed and documented during the team meeting.  See separate Note.    Team conference today with the rehab team - PT/OT/ST, SW, RN, Dietary, Case Management. Additional separate note in the chart for today. Over 35 min spent with paint care, team meetings, and coordination of care.         I spent 35 minutes in the professional and overall care of this patient.      Sirisha Soto MD

## 2025-01-23 NOTE — PROGRESS NOTES
Physical Therapy    Physical Therapy Treatment    Patient Name: Gabriella Haskins  MRN: [id number]  Department: [facility] REHAB  Room: On license of [facility]  Today's Date: 1/23/2025  Time Calculation  Start Time: 0830  Stop Time: 0915  Time Calculation (min): 45 min         Assessment/Plan   PT Assessment  End of Session Communication:  (OT)  End of Session Patient Position: Up in chair, Alarm on     PT Plan  Treatment/Interventions: Bed mobility, Transfer training, Gait training, Stair training, Balance training, Strengthening, Therapeutic exercise, Therapeutic activity  PT Plan: Ongoing PT  PT Frequency: 90 min/5 days per week (10 days)  PT Discharge Recommendations:  (Anticipate discharge home with the need for intermittent min assist at the walker level.)  Equipment Recommended upon Discharge: Wheeled walker  PT Recommended Transfer Status: Assist x2      General Visit Information:   PT  Visit  PT Received On: 01/23/25  General  Prior to Session Communication: Bedside nurse  Patient Position Received: Up in chair, Alarm on  General Comment: Patient pleasant and agreeable to therapy.    Subjective   Precautions:  Precautions  LE Weight Bearing Status: Weight Bearing as Tolerated  Medical Precautions: Fall precautions  Post-Surgical Precautions: Left hip precautions    Objective   Pain:  Pain Assessment  Pain Assessment: 0-10  0-10 (Numeric) Pain Score: 5 - Moderate pain  Pain Location: Hip  Pain Orientation: Left  Pain Interventions: Emotional support, Distraction  Cognition:  Cognition  Overall Cognitive Status: Within Functional Limits  Treatments:  Therapeutic Exercise  Therapeutic Exercise Performed: Yes  Patient performed seated therex, B LE: APs, hip flexion, LAQ, heel slides, GS, and hip add ball squeeze all 2 [id number]. Done to increase B LE strength to improve overall functional mobility and independence.     Therapeutic Activity  Therapeutic Activity Performed: Yes  Patient performed blocked practice sit<>stand x5reps  Caro/ModA x1. Done to increase strength in B LE and improve sequencing and independence with sit<>stand transfer.      Ambulation/Gait Training  Ambulation/Gait Training Performed: Yes  Patient ambulated ~100ft, ~75ft, and ~60ft with FWW and ModA x1. Patient demo's slow step to gait with decreased velocity, decreased step length, and flexed posture. Distance limited d/t fatigue. Seated rest between trials to manage fatigue.     Transfers  Transfer: Yes  Patient performed sit<>stand transfer with Caro/ModA x1 with cues for hand placement and sequencing.     Education Comments  Educated patient on transfer traning and gait training techniques to maximize safety and independence.       OP EDUCATION:       Encounter Problems       Encounter Problems (Active)       PT Problem       STG - Bed mobility with min/mod assist x2. (Progressing)       Start:  01/17/25    Expected End:  01/23/25            STG - Transfers with mod assist x1. (Progressing)       Start:  01/17/25    Expected End:  01/23/25            STG - Pt will amb 30 ft with wheeled walker and mod assist x1.  (Met)       Start:  01/17/25    Expected End:  01/23/25    Resolved:  01/22/25         STG - Pt will negotiate one step with rails and min/mod assist x2. (Progressing)       Start:  01/17/25    Expected End:  01/23/25            LTG - Bed mobility with min assist x1. (Progressing)       Start:  01/17/25    Expected End:  01/28/25            LTG - Transfers with min assist x1. (Progressing)       Start:  01/17/25    Expected End:  01/28/25            LTG - Pt will amb 50 ft with wheeled walker and min assist. (Progressing)       Start:  01/17/25    Expected End:  01/28/25            LTG - Pt will negotiate 5 stairs with rails and min/mod assist x1. (Progressing)       Start:  01/17/25    Expected End:  01/28/25               Pain - Adult

## 2025-01-23 NOTE — PROGRESS NOTES
Occupational Therapy    OT Treatment    Patient Name: Gabriella Haskins  MRN: [id number]  Department: [facility] REHAB  Room: [address] [place] [address]  Today's Date: 1/23/2025  Time Calculation  Start Time: 0915  Stop Time: 1000  Time Calculation (min): 45 min        Assessment:  End of Session Communication: Bedside nurse  End of Session Patient Position: Up in chair, Alarm on     Plan:  Treatment Interventions: ADL retraining, UE strengthening/ROM, Functional transfer training, Endurance training, Patient/family training, Equipment evaluation/education  OT Frequency: 90 min/5 days per week (x 10 days)  OT Discharge Recommendations:  (anticipate possible need for up to min assist with ADLs, transfers, and mobility)  Equipment Recommended upon Discharge: Wheeled walker, Bedside commode (tub bench, sock aid, reacher, dressing stick, LHSH, 3:1 commode)  Treatment Interventions: ADL retraining, UE strengthening/ROM, Functional transfer training, Endurance training, Patient/family training, Equipment evaluation/education    Subjective   Previous Visit Info:  OT Last Visit  OT Received On: 01/23/25    General:  General  Prior to Session Communication:  (handoff to PT)  Patient Position Received: Alarm on, Up in chair  General Comment: Patient pleasant and agreeable to therapy.    Precautions:  LE Weight Bearing Status: Weight Bearing as Tolerated  Medical Precautions: Fall precautions  Post-Surgical Precautions: Left hip precautions  Precautions Comment: patient continues to require cues to adhere to THR precautions; can recall 1/3 THR precautions despite education on THR precautions earlier            Pain:  Pain Assessment  Pain Assessment: 0-10  0-10 (Numeric) Pain Score: 5 - Moderate pain  Pain Location: Hip  Pain Orientation: Left  Pain Interventions: Medication (See MAR), Distraction, Emotional support  Response to Interventions: Decrease in pain    Objective    Cognition:  Cognition  Overall Cognitive Status:  (forgetful at  baseline)    Functional Standing Tolerance:  Functional Standing Tolerance Comments: static standing x 2:20 and 3 mins with cga and ue support    Bed Mobility/Transfers: Transfers  Transfer:  (mod assist sit to stand from wheelchair)      Functional Mobility:  Functional Mobility  Functional Mobility Performed:  (mod assist approach steps to commode via wheeled walker.)       Therapy/Activity: Therapeutic Exercise  Therapeutic Exercise Performed:  (completes bilateral ue ther ex using one pound weights, 10 reps x 2 sets x 4 exercises, with supervision and min cues for technique)     EDUCATION:  Education  Individual(s) Educated: Patient  Education Provided:  (safe transfers, walker safety, UE HEP)  Patient Response to Education:  (patient with fair understanding and follow through; will require further education)    Goals:  Encounter Problems       Encounter Problems (Active)       OT Problem       LTG - Patient will complete grooming with independence. (Progressing)       Start:  01/17/25    Expected End:  01/29/25            LTG - Patient will complete toileting with min assist. (Progressing)       Start:  01/17/25    Expected End:  01/29/25            LTG - Patient will complete bathing with min assist. (Progressing)       Start:  01/17/25    Expected End:  01/29/25            LTG - Patient will complete UE dressing with supervision. (Progressing)       Start:  01/17/25    Expected End:  01/29/25            LTG - Patient will complete LE dressing using adaptive equip as needed with min assist. (Progressing)       Start:  01/17/25    Expected End:  01/29/25            LTG - Patient will complete commode transfer via device as needed with min assist. (Progressing)       Start:  01/17/25    Expected End:  01/29/25            LTG - Patient will complete tub/shower transfer using DME as needed with min/mod assist. (Progressing)       Start:  01/17/25    Expected End:  01/29/25            LTG - Patient will complete  functional mobility via device as needed with min assist. (Progressing)       Start:  01/17/25    Expected End:  01/29/25            STG - Patient will complete toileting with mod assist. (Progressing)       Start:  01/17/25    Expected End:  01/29/25            STG - Patient will complete bathing with mod assist. (Met)       Start:  01/17/25    Expected End:  01/24/25    Resolved:  01/22/25    Updated to: STG - Patient will complete bathing with min assist.         STG - Patient will complete UE dressing with sba. (Met)       Start:  01/17/25    Expected End:  01/24/25    Resolved:  01/22/25    Updated to: STG - Patient will complete UE dressing with set up         STG - Patient will complete LE dressing using adaptive equip as needed with mod assist. (Progressing)       Start:  01/17/25    Expected End:  01/29/25            STG - Patient will complete commode transfer via device as needed with mod assist. (Progressing)       Start:  01/17/25    Expected End:  01/29/25            STG - Patient will complete simple functional mobility via device as needed with mod assist. (Progressing)       Start:  01/17/25    Expected End:  01/29/25            STG - Patient will complete bathing with min assist. (Progressing)       Start:  01/22/25    Expected End:  01/29/25                STG - Patient will complete UE dressing with set up (Progressing)       Start:  01/22/25    Expected End:  01/29/25

## 2025-01-23 NOTE — CARE PLAN
The patient's goals for the shift include  pain control and comfort    The clinical goals for the shift include pain control    Over the shift, the patient is making progress toward the following goals.       Problem: Pain - Adult  Goal: Verbalizes/displays adequate comfort level or baseline comfort level  Outcome: Progressing    Problem: Fall/Injury  Goal: Pace activities to prevent fatigue by end of the shift  Outcome: Progressing

## 2025-01-23 NOTE — PROGRESS NOTES
Occupational Therapy    OT Treatment    Patient Name: Gabriella Haskins  MRN: [id number]  Department: [facility] REHAB  Room: [address] [place] [address]  Today's Date: 1/23/2025  Time Calculation  Start Time: 0745  Stop Time: 0830  Time Calculation (min): 45 min        Assessment:  End of Session Communication:  (handoff to PT)  End of Session Patient Position: Alarm on, Up in chair     Plan:  Treatment Interventions: ADL retraining, UE strengthening/ROM, Functional transfer training, Endurance training, Patient/family training, Equipment evaluation/education  OT Frequency: 90 min/5 days per week (x 10 days)  OT Discharge Recommendations:  (anticipate possible need for up to min assist with ADLs, transfers, and mobility)  Equipment Recommended upon Discharge: Wheeled walker, Bedside commode (tub bench, sock aid, reacher, dressing stick, LHSH, 3:1 commode)  Treatment Interventions: ADL retraining, UE strengthening/ROM, Functional transfer training, Endurance training, Patient/family training, Equipment evaluation/education    Subjective   Previous Visit Info:  OT Last Visit  OT Received On: 01/23/25    General:  General  Prior to Session Communication: Bedside nurse  Patient Position Received: Up in chair, Alarm on  General Comment: patient pleasant and cooperative; recalls 2/3 THR precautions, however patient observed several times attempting to cross ankles. Patient forgetful.    Precautions:  LE Weight Bearing Status: Weight Bearing as Tolerated  Medical Precautions: Fall precautions  Post-Surgical Precautions: Left hip precautions            Pain:  Pain Assessment  Pain Assessment: 0-10  0-10 (Numeric) Pain Score: 5 - Moderate pain  Pain Location: Hip  Pain Orientation: Left  Pain Interventions: Distraction, Emotional support    Objective         Activities of Daily Living: Grooming  Grooming Comments: supervised to brush teeth, use mouthwash, brush hair while seated at sink    UE Bathing  UE Bathing Adaptive Equipment:  (uses  premoistened wipes)  UE Bathing Level of Assistance: Close supervision  UE Bathing Where Assessed: Wheelchair    LE Bathing  LE Bathing Adaptive Equipment:  (thighs only while seated; declined further bathing, reports nursing assisted with jeb area)  LE Bathing Level of Assistance: Close supervision  LE Bathing Where Assessed: Wheelchair    UE Dressing  UE Dressing Level of Assistance: Setup  UE Dressing Where Assessed: Wheelchair  UE Dressing Comments: don/doff long-sleeved shirt    LE Dressing  Pants Level of Assistance:  (dons pants over feet using reacher with min assist and mod cues for technique and to adhere to THR precautions; min assist to pull pants over hips while standing at walker)  Sock Level of Assistance:  (dons/doffs socks using sock aid and dressing stick with sba; no cues for adaptive equip use however requires cues to adhere to THR precautions)       Bed Mobility/Transfers: Transfers  Transfer:  (mod assist sit to stand transfers from wheelchair)         EDUCATION:  Patient educated on safe transfers, adaptive equip and THR precautions with    fair understanding and follow through; forgetful, will require further education     Goals:  Encounter Problems       Encounter Problems (Active)       OT Problem       LTG - Patient will complete grooming with independence. (Progressing)       Start:  01/17/25    Expected End:  01/29/25            LTG - Patient will complete toileting with min assist. (Progressing)       Start:  01/17/25    Expected End:  01/29/25            LTG - Patient will complete bathing with min assist. (Progressing)       Start:  01/17/25    Expected End:  01/29/25            LTG - Patient will complete UE dressing with supervision. (Progressing)       Start:  01/17/25    Expected End:  01/29/25            LTG - Patient will complete LE dressing using adaptive equip as needed with min assist. (Progressing)       Start:  01/17/25    Expected End:  01/29/25            LTG - Patient  will complete commode transfer via device as needed with min assist. (Progressing)       Start:  01/17/25    Expected End:  01/29/25            LTG - Patient will complete tub/shower transfer using DME as needed with min/mod assist. (Progressing)       Start:  01/17/25    Expected End:  01/29/25            LTG - Patient will complete functional mobility via device as needed with min assist. (Progressing)       Start:  01/17/25    Expected End:  01/29/25            STG - Patient will complete toileting with mod assist. (Progressing)       Start:  01/17/25    Expected End:  01/29/25            STG - Patient will complete bathing with mod assist. (Met)       Start:  01/17/25    Expected End:  01/24/25    Resolved:  01/22/25    Updated to: STG - Patient will complete bathing with min assist.         STG - Patient will complete UE dressing with sba. (Met)       Start:  01/17/25    Expected End:  01/24/25    Resolved:  01/22/25    Updated to: STG - Patient will complete UE dressing with set up         STG - Patient will complete LE dressing using adaptive equip as needed with mod assist. (Progressing)       Start:  01/17/25    Expected End:  01/29/25            STG - Patient will complete commode transfer via device as needed with mod assist. (Progressing)       Start:  01/17/25    Expected End:  01/29/25            STG - Patient will complete simple functional mobility via device as needed with mod assist. (Progressing)       Start:  01/17/25    Expected End:  01/29/25            STG - Patient will complete bathing with min assist. (Progressing)       Start:  01/22/25    Expected End:  01/29/25                STG - Patient will complete UE dressing with set up (Progressing)       Start:  01/22/25    Expected End:  01/29/25

## 2025-01-24 LAB
APPEARANCE UR: ABNORMAL
BACTERIA #/AREA URNS AUTO: ABNORMAL /HPF
BILIRUB UR STRIP.AUTO-MCNC: NEGATIVE MG/DL
COLOR UR: ABNORMAL
GLUCOSE UR STRIP.AUTO-MCNC: NORMAL MG/DL
KETONES UR STRIP.AUTO-MCNC: NEGATIVE MG/DL
LEUKOCYTE ESTERASE UR QL STRIP.AUTO: ABNORMAL
NITRITE UR QL STRIP.AUTO: ABNORMAL
PH UR STRIP.AUTO: 6.5 [PH]
PROT UR STRIP.AUTO-MCNC: ABNORMAL MG/DL
RBC # UR STRIP.AUTO: ABNORMAL /UL
RBC #/AREA URNS AUTO: ABNORMAL /HPF
SP GR UR STRIP.AUTO: 1.01
UROBILINOGEN UR STRIP.AUTO-MCNC: NORMAL MG/DL
WBC #/AREA URNS AUTO: >50 /HPF
WBC CLUMPS #/AREA URNS AUTO: ABNORMAL /HPF

## 2025-01-24 PROCEDURE — 81001 URINALYSIS AUTO W/SCOPE: CPT | Performed by: PHYSICAL MEDICINE & REHABILITATION

## 2025-01-24 PROCEDURE — 2500000002 HC RX 250 W HCPCS SELF ADMINISTERED DRUGS (ALT 637 FOR MEDICARE OP, ALT 636 FOR OP/ED)

## 2025-01-24 PROCEDURE — 97116 GAIT TRAINING THERAPY: CPT | Mod: GP,CQ

## 2025-01-24 PROCEDURE — 99232 SBSQ HOSP IP/OBS MODERATE 35: CPT | Performed by: PHYSICAL MEDICINE & REHABILITATION

## 2025-01-24 PROCEDURE — 2500000001 HC RX 250 WO HCPCS SELF ADMINISTERED DRUGS (ALT 637 FOR MEDICARE OP): Performed by: PHYSICAL MEDICINE & REHABILITATION

## 2025-01-24 PROCEDURE — 2500000001 HC RX 250 WO HCPCS SELF ADMINISTERED DRUGS (ALT 637 FOR MEDICARE OP)

## 2025-01-24 PROCEDURE — 2500000004 HC RX 250 GENERAL PHARMACY W/ HCPCS (ALT 636 FOR OP/ED)

## 2025-01-24 PROCEDURE — 87086 URINE CULTURE/COLONY COUNT: CPT | Mod: PARLAB | Performed by: PHYSICAL MEDICINE & REHABILITATION

## 2025-01-24 PROCEDURE — 97530 THERAPEUTIC ACTIVITIES: CPT | Mod: GP,CQ

## 2025-01-24 PROCEDURE — 1280000001 HC REHAB SEMI-PRIVATE ROOM DAILY

## 2025-01-24 PROCEDURE — 2500000002 HC RX 250 W HCPCS SELF ADMINISTERED DRUGS (ALT 637 FOR MEDICARE OP, ALT 636 FOR OP/ED): Performed by: PHYSICAL MEDICINE & REHABILITATION

## 2025-01-24 PROCEDURE — 51701 INSERT BLADDER CATHETER: CPT

## 2025-01-24 PROCEDURE — 97535 SELF CARE MNGMENT TRAINING: CPT | Mod: GO

## 2025-01-24 PROCEDURE — 97110 THERAPEUTIC EXERCISES: CPT | Mod: GP,CQ

## 2025-01-24 PROCEDURE — 97530 THERAPEUTIC ACTIVITIES: CPT | Mod: GO

## 2025-01-24 PROCEDURE — 2500000005 HC RX 250 GENERAL PHARMACY W/O HCPCS

## 2025-01-24 PROCEDURE — 2500000001 HC RX 250 WO HCPCS SELF ADMINISTERED DRUGS (ALT 637 FOR MEDICARE OP): Performed by: STUDENT IN AN ORGANIZED HEALTH CARE EDUCATION/TRAINING PROGRAM

## 2025-01-24 RX ORDER — NITROFURANTOIN 25; 75 MG/1; MG/1
100 CAPSULE ORAL EVERY 12 HOURS SCHEDULED
Status: COMPLETED | OUTPATIENT
Start: 2025-01-24 | End: 2025-01-29

## 2025-01-24 RX ADMIN — CASTOR OIL AND BALSAM, PERU 1 APPLICATION: 788; 87 OINTMENT TOPICAL at 21:00

## 2025-01-24 RX ADMIN — CETIRIZINE HYDROCHLORIDE 10 MG: 10 TABLET, FILM COATED ORAL at 09:19

## 2025-01-24 RX ADMIN — METHOCARBAMOL 500 MG: 500 TABLET ORAL at 19:57

## 2025-01-24 RX ADMIN — SENNOSIDES 17.2 MG: 8.6 TABLET, FILM COATED ORAL at 20:07

## 2025-01-24 RX ADMIN — LEVOTHYROXINE SODIUM 100 MCG: 0.1 TABLET ORAL at 06:20

## 2025-01-24 RX ADMIN — TAMSULOSIN HYDROCHLORIDE 0.4 MG: 0.4 CAPSULE ORAL at 20:03

## 2025-01-24 RX ADMIN — Medication 3 MG: at 19:57

## 2025-01-24 RX ADMIN — OXYCODONE HYDROCHLORIDE 5 MG: 5 TABLET ORAL at 07:50

## 2025-01-24 RX ADMIN — ACETAMINOPHEN 650 MG: 325 TABLET, FILM COATED ORAL at 06:21

## 2025-01-24 RX ADMIN — OXYCODONE HYDROCHLORIDE 2.5 MG: 5 TABLET ORAL at 23:49

## 2025-01-24 RX ADMIN — METOPROLOL TARTRATE 25 MG: 25 TABLET, FILM COATED ORAL at 20:03

## 2025-01-24 RX ADMIN — CASTOR OIL AND BALSAM, PERU 1 APPLICATION: 788; 87 OINTMENT TOPICAL at 09:22

## 2025-01-24 RX ADMIN — RIVAROXABAN 20 MG: 20 TABLET, FILM COATED ORAL at 17:44

## 2025-01-24 RX ADMIN — BISACODYL 10 MG: 10 SUPPOSITORY RECTAL at 15:02

## 2025-01-24 RX ADMIN — ICOSAPENT ETHYL 1000 MG: 1 CAPSULE ORAL at 09:19

## 2025-01-24 RX ADMIN — Medication 500 MG: at 09:19

## 2025-01-24 RX ADMIN — OXYCODONE HYDROCHLORIDE 5 MG: 5 TABLET ORAL at 01:26

## 2025-01-24 RX ADMIN — Medication 1000 UNITS: at 09:19

## 2025-01-24 RX ADMIN — FLUTICASONE PROPIONATE 1 SPRAY: 50 SPRAY, METERED NASAL at 09:20

## 2025-01-24 RX ADMIN — POLYETHYLENE GLYCOL 3350 17 G: 17 POWDER, FOR SOLUTION ORAL at 09:18

## 2025-01-24 RX ADMIN — NITROFURANTOIN MONOHYDRATE/MACROCRYSTALLINE 100 MG: 25; 75 CAPSULE ORAL at 20:03

## 2025-01-24 ASSESSMENT — PAIN DESCRIPTION - LOCATION
LOCATION: HIP
LOCATION: LEG
LOCATION: HIP

## 2025-01-24 ASSESSMENT — PAIN SCALES - GENERAL
PAINLEVEL_OUTOF10: 0 - NO PAIN
PAINLEVEL_OUTOF10: 4
PAINLEVEL_OUTOF10: 5 - MODERATE PAIN
PAINLEVEL_OUTOF10: 2
PAINLEVEL_OUTOF10: 9
PAINLEVEL_OUTOF10: 0 - NO PAIN
PAINLEVEL_OUTOF10: 3

## 2025-01-24 ASSESSMENT — PAIN - FUNCTIONAL ASSESSMENT
PAIN_FUNCTIONAL_ASSESSMENT: 0-10
PAIN_FUNCTIONAL_ASSESSMENT: UNABLE TO SELF-REPORT

## 2025-01-24 ASSESSMENT — ACTIVITIES OF DAILY LIVING (ADL): HOME_MANAGEMENT_TIME_ENTRY: 35

## 2025-01-24 ASSESSMENT — PAIN DESCRIPTION - DESCRIPTORS: DESCRIPTORS: SORE;SPASM

## 2025-01-24 ASSESSMENT — PAIN DESCRIPTION - ORIENTATION
ORIENTATION: LEFT

## 2025-01-24 NOTE — PROGRESS NOTES
Occupational Therapy    OT Treatment    Patient Name: Gabriella Haskins  MRN: [id number]  Department: [facility] REHAB  Room: [address] [place] [address]  Today's Date: 1/24/2025  Time Calculation  Start Time: 1300  Stop Time: 1345  Time Calculation (min): 45 min        Assessment:  End of Session Communication: Bedside nurse  End of Session Patient Position: Bed, 2 rail up, Alarm on     Plan:  Treatment Interventions: ADL retraining, UE strengthening/ROM, Functional transfer training, Endurance training, Patient/family training, Equipment evaluation/education  OT Frequency: 90 min/5 days per week (x 10 days)  OT Discharge Recommendations:  (anticipate possible need for up to min assist with ADLs, transfers, and mobility)  Equipment Recommended upon Discharge: Wheeled walker, Bedside commode (tub bench, sock aid, reacher, dressing stick, LHSH, 3:1 commode)  Treatment Interventions: ADL retraining, UE strengthening/ROM, Functional transfer training, Endurance training, Patient/family training, Equipment evaluation/education    Subjective   Previous Visit Info:  OT Last Visit  OT Received On: 01/24/25    General:  General  Prior to Session Communication: Bedside nurse  Patient Position Received: Up in chair, Alarm on  General Comment: Patient pleasant and agreeable to therapy.    Precautions:  LE Weight Bearing Status: Weight Bearing as Tolerated  Medical Precautions: Fall precautions  Post-Surgical Precautions: Left hip precautions            Pain:  Pain Assessment  Pain Assessment: 0-10  0-10 (Numeric) Pain Score: 3  Pain Location:  (left hip)  Pain Orientation:  (left hip)  Pain Interventions:  (requested pain meds, notified nursing)  Response to Interventions: No change in pain    Objective      Functional Standing Tolerance:  Functional Standing Tolerance Comments: static standing x5.5 mins with sba and ue support    Bed Mobility/Transfers: Transfers  Transfer:  (cga transfers from higher 3:1 commode; min assist transfers from lower  surfaces (wheelchair); cues for safe technique/hand placement)    Tub Transfers  Tub Transfers Comments: patient reports owning tub bench; also recommending tub grab bar and leg  to improve independence and safety with transfer. Patient transfers to/from tub bench with mod assist via wheeled walker; unable to bring LLE in/out of tub without assist, instructed patient on leg  - with leg  brings LLE in/out of tub with min assist   overall requires up to mod cues for technique and to adhere to THR precautions      Functional Mobility:  Functional Mobility  Functional Mobility Performed:  (cga-min assist simple functional mobility via wheeled walker; min cues for walker safety)     EDUCATION:  Education  Individual(s) Educated: Patient  Education Provided:  (recommended bathroom DME; safe bathroom transfers; THR precautions)  Patient Response to Education:  (patient able to recall 1/3 THR precautions; continues to require up to mod cues to adhere to THR precautions during functional tasks)    Goals:  Encounter Problems       Encounter Problems (Active)       OT Problem       LTG - Patient will complete grooming with independence. (Progressing)       Start:  01/17/25    Expected End:  01/29/25            LTG - Patient will complete toileting with min assist. (Progressing)       Start:  01/17/25    Expected End:  01/29/25            LTG - Patient will complete bathing with min assist. (Progressing)       Start:  01/17/25    Expected End:  01/29/25            LTG - Patient will complete UE dressing with supervision. (Progressing)       Start:  01/17/25    Expected End:  01/29/25            LTG - Patient will complete LE dressing using adaptive equip as needed with min assist. (Progressing)       Start:  01/17/25    Expected End:  01/29/25            LTG - Patient will complete commode transfer via device as needed with min assist. (Progressing)       Start:  01/17/25    Expected End:  01/29/25             LTG - Patient will complete tub/shower transfer using DME as needed with min/mod assist. (Progressing)       Start:  01/17/25    Expected End:  01/29/25            LTG - Patient will complete functional mobility via device as needed with min assist. (Progressing)       Start:  01/17/25    Expected End:  01/29/25            STG - Patient will complete toileting with mod assist. (Progressing)       Start:  01/17/25    Expected End:  01/29/25            STG - Patient will complete bathing with mod assist. (Met)       Start:  01/17/25    Expected End:  01/24/25    Resolved:  01/22/25    Updated to: STG - Patient will complete bathing with min assist.         STG - Patient will complete UE dressing with sba. (Met)       Start:  01/17/25    Expected End:  01/24/25    Resolved:  01/22/25    Updated to: STG - Patient will complete UE dressing with set up         STG - Patient will complete LE dressing using adaptive equip as needed with mod assist. (Progressing)       Start:  01/17/25    Expected End:  01/29/25            STG - Patient will complete commode transfer via device as needed with mod assist. (Progressing)       Start:  01/17/25    Expected End:  01/29/25            STG - Patient will complete simple functional mobility via device as needed with mod assist. (Progressing)       Start:  01/17/25    Expected End:  01/29/25            STG - Patient will complete bathing with min assist. (Progressing)       Start:  01/22/25    Expected End:  01/29/25                STG - Patient will complete UE dressing with set up (Progressing)       Start:  01/22/25    Expected End:  01/29/25

## 2025-01-24 NOTE — PROGRESS NOTES
"  Gabriella Haskins is a 79 y.o. female on day 7 of admission presenting with Closed displaced fracture of left femoral neck.    Subjective   Doing better with urination. No complaints of pain or burning. She did have a bM. She is moving better Pain is still limiting her a bit. Discussed with her regarding using pain controls in the am prior to therapy.          Objective   Physical Exam  Pleasant female in NAD.  Alert and oriented x 4  CTAB  S1S2  SNTND+BS  Negative Federico's bilaterally  NVI  Function: min A overall     Assessment/Plan        Last Recorded Vitals  Blood pressure 104/50, pulse 85, temperature 37.4 °C (99.3 °F), temperature source Temporal, resp. rate 16, height 1.676 m (5' 5.98\"), weight 62.8 kg (138 lb 8 oz), SpO2 95%.    Therapy notes from last 24 hours reviewed.    Relevant Results                  Scheduled medications  ascorbic acid, 500 mg, oral, Daily  balsam peru-castor oiL, 1 Application, Topical, BID  bisacodyl, 10 mg, rectal, Daily  cetirizine, 10 mg, oral, Daily  cholecalciferol, 1,000 Units, oral, Daily  [START ON 1/30/2025] cyanocobalamin, 1,000 mcg, intramuscular, q14 days  fluticasone, 1 spray, Each Nostril, Daily  icosapent ethyL, 1,000 mg, oral, Daily  levothyroxine, 100 mcg, oral, Daily  metoprolol tartrate, 25 mg, oral, BID  polyethylene glycol, 17 g, oral, Daily  rivaroxaban, 20 mg, oral, Daily with evening meal  sennosides, 2 tablet, oral, Nightly  tamsulosin, 0.4 mg, oral, Nightly      Continuous medications     PRN medications  PRN medications: acetaminophen **OR** acetaminophen, bisacodyl, lubricating eye drops, melatonin, methocarbamol, ondansetron **OR** ondansetron, oxyCODONE, oxyCODONE, polyethylene glycol     No results found for this or any previous visit (from the past 24 hours).              Assessment/Plan   Assessment & Plan  Closed displaced fracture of left femoral neck    Essential hypertension    Hypercholesterolemia    Hypothyroidism    Vitamin D " deficiency    Fall, initial encounter      1/24  -ongoing mobility  -check UA due to cloudy urine  -Vitals flucuant  -ongoing xarelto for DVT prophylaxis  -encourage active BM daily and facility mobility.       I spent 25 minutes in the professional and overall care of this patient.      Sirisha Soto MD

## 2025-01-24 NOTE — PROGRESS NOTES
Occupational Therapy    OT Treatment    Patient Name: Gabriella Haskins  MRN: [id number]  Department: [facility] REH[facility]  Room: [address] [place] [address]  Today's Date: 1/24/2025  Time Calculation  Start Time: 0745  Stop Time: 0830  Time Calculation (min): 45 min        Assessment:  End of Session Communication: Bedside nurse  End of Session Patient Position: Up in chair, Alarm on     Plan:  Treatment Interventions: ADL retraining, UE strengthening/ROM, Functional transfer training, Endurance training, Patient/family training, Equipment evaluation/education  OT Frequency: 90 min/5 days per week (x 10 days)  OT Discharge Recommendations:  (anticipate possible need for up to min assist with ADLs, transfers, and mobility)  Equipment Recommended upon Discharge: Wheeled walker, Bedside commode (tub bench, sock aid, reacher, dressing stick, LHSH, 3:1 commode)  Treatment Interventions: ADL retraining, UE strengthening/ROM, Functional transfer training, Endurance training, Patient/family training, Equipment evaluation/education    Subjective   Previous Visit Info:  OT Last Visit  OT Received On: 01/24/25    General:  General  Prior to Session Communication: Bedside nurse  Patient Position Received: Up in chair, Alarm on  General Comment: patient pleasant and cooperative; nursing present intermittently throughout session to complete bladder scan and straight cath    Precautions:  LE Weight Bearing Status: Weight Bearing as Tolerated  Medical Precautions: Fall precautions  Post-Surgical Precautions: Left hip precautions      Pain:  Pain Assessment  Pain Assessment: 0-10  0-10 (Numeric) Pain Score: 2  Pain Orientation:  (left hip)  Pain Interventions: Repositioned    Objective         Activities of Daily Living:      LE Dressing  Pants Level of Assistance:  (sba to don pants over feet using reacher, mod cues for technique; min assist to pull pants over hips while standing at walker)  Adult Briefs Level of Assistance: Maximum  assistance    Toileting  Toileting Level of Assistance: Minimum assistance  Toileting Comments: for jeb hygiene and clothing management while standing at walker       Bed Mobility/Transfers: Transfers  Transfer:  (min assist sit to stand from wheelchair; cga for transfer to/from 3:1 commode via wheeled walker)      Functional Mobility:  Functional Mobility  Functional Mobility Performed:  (cga-min assist simple mobility/bathroom access via wheeled walker)         EDUCATION:patient educated on ThR precautions, safe transfers, adaptive equip;   patient continues to present with fair carryover and continues to require cues to recall and adhere to THR precautions; will benefit from further education     Goals:  Encounter Problems       Encounter Problems (Active)       OT Problem       LTG - Patient will complete grooming with independence. (Progressing)       Start:  01/17/25    Expected End:  01/29/25            LTG - Patient will complete toileting with min assist. (Progressing)       Start:  01/17/25    Expected End:  01/29/25            LTG - Patient will complete bathing with min assist. (Progressing)       Start:  01/17/25    Expected End:  01/29/25            LTG - Patient will complete UE dressing with supervision. (Progressing)       Start:  01/17/25    Expected End:  01/29/25            LTG - Patient will complete LE dressing using adaptive equip as needed with min assist. (Progressing)       Start:  01/17/25    Expected End:  01/29/25            LTG - Patient will complete commode transfer via device as needed with min assist. (Progressing)       Start:  01/17/25    Expected End:  01/29/25            LTG - Patient will complete tub/shower transfer using DME as needed with min/mod assist. (Progressing)       Start:  01/17/25    Expected End:  01/29/25            LTG - Patient will complete functional mobility via device as needed with min assist. (Progressing)       Start:  01/17/25    Expected End:   01/29/25            STG - Patient will complete toileting with mod assist. (Progressing)       Start:  01/17/25    Expected End:  01/29/25            STG - Patient will complete bathing with mod assist. (Met)       Start:  01/17/25    Expected End:  01/24/25    Resolved:  01/22/25    Updated to: STG - Patient will complete bathing with min assist.         STG - Patient will complete UE dressing with sba. (Met)       Start:  01/17/25    Expected End:  01/24/25    Resolved:  01/22/25    Updated to: STG - Patient will complete UE dressing with set up         STG - Patient will complete LE dressing using adaptive equip as needed with mod assist. (Progressing)       Start:  01/17/25    Expected End:  01/29/25            STG - Patient will complete commode transfer via device as needed with mod assist. (Progressing)       Start:  01/17/25    Expected End:  01/29/25            STG - Patient will complete simple functional mobility via device as needed with mod assist. (Progressing)       Start:  01/17/25    Expected End:  01/29/25            STG - Patient will complete bathing with min assist. (Progressing)       Start:  01/22/25    Expected End:  01/29/25                STG - Patient will complete UE dressing with set up (Progressing)       Start:  01/22/25    Expected End:  01/29/25

## 2025-01-24 NOTE — CARE PLAN
The patient's goals for the shift include      The clinical goals for the shift include Remain hemodynamically stable      Problem: Pain - Adult  Goal: Verbalizes/displays adequate comfort level or baseline comfort level  Outcome: Progressing     Problem: Discharge Planning  Goal: Discharge to home or other facility with appropriate resources  Outcome: Progressing     Problem: Chronic Conditions and Co-morbidities  Goal: Patient's chronic conditions and co-morbidity symptoms are monitored and maintained or improved  Outcome: Progressing     Problem: Skin  Goal: Decreased wound size/increased tissue granulation at next dressing change  Outcome: Progressing  Flowsheets (Taken 1/24/2025 0236)  Decreased wound size/increased tissue granulation at next dressing change: Promote sleep for wound healing  Goal: Participates in plan/prevention/treatment measures  Outcome: Progressing  Flowsheets (Taken 1/24/2025 0236)  Participates in plan/prevention/treatment measures: Elevate heels  Goal: Promote/optimize nutrition  Outcome: Progressing  Flowsheets (Taken 1/24/2025 0236)  Promote/optimize nutrition: Monitor/record intake including meals  Goal: Promote skin healing  Outcome: Progressing  Flowsheets (Taken 1/24/2025 0236)  Promote skin healing:   Assess skin/pad under line(s)/device(s)   Protective dressings over bony prominences     Problem: Fall/Injury  Goal: Pace activities to prevent fatigue by end of the shift  Outcome: Progressing     Problem: Pain  Goal: Takes deep breaths with improved pain control throughout the shift  Outcome: Progressing  Goal: Turns in bed with improved pain control throughout the shift  Outcome: Progressing  Goal: Walks with improved pain control throughout the shift  Outcome: Progressing  Goal: Performs ADL's with improved pain control throughout shift  Outcome: Progressing  Goal: Participates in PT with improved pain control throughout the shift  Outcome: Progressing  Goal: Free from opioid  side effects throughout the shift  Outcome: Progressing  Goal: Free from acute confusion related to pain meds throughout the shift  Outcome: Progressing     Problem: Nutrition  Goal: Oral intake greater than 50%  Outcome: Progressing  Goal: Oral intake greater 75%  Outcome: Progressing  Goal: Consume prescribed supplement  Outcome: Progressing  Goal: Adequate PO fluid intake  Outcome: Progressing  Goal: Lab values WNL  Outcome: Progressing  Goal: Promote healing  Outcome: Progressing  Goal: Gradual weight gain  Outcome: Progressing

## 2025-01-24 NOTE — PROGRESS NOTES
Physical Therapy    Physical Therapy Treatment    Patient Name: Gabriella Haskins  MRN: [id number]  Department: [facility] REHAB  Room: [facility]  Today's Date: 1/24/2025  Time Calculation  Start Time: 0830  Stop Time: 0915  Time Calculation (min): 45 min         Assessment/Plan   PT Assessment  End of Session Communication: Bedside nurse  End of Session Patient Position: Up in chair, Alarm on (call light and needs within reach.)     PT Plan  Treatment/Interventions: Bed mobility, Transfer training, Gait training, Stair training, Balance training, Strengthening, Therapeutic exercise, Therapeutic activity  PT Plan: Ongoing PT  PT Frequency: 90 min/5 days per week (10 days)  PT Discharge Recommendations:  (Anticipate discharge home with the need for intermittent min assist at the walker level.)  Equipment Recommended upon Discharge: Wheeled walker  PT Recommended Transfer Status: Assist x2      General Visit Information:   PT  Visit  PT Received On: 01/24/25  General  Prior to Session Communication: Bedside nurse  Patient Position Received:  (Seated EOB with RN present.)  General Comment: Patient pleasant and agreeable to therapy.    Subjective   Precautions:  Precautions  LE Weight Bearing Status: Weight Bearing as Tolerated  Medical Precautions: Fall precautions  Post-Surgical Precautions: Left hip precautions    Objective   Pain:  Pain Assessment  Pain Assessment: 0-10  0-10 (Numeric) Pain Score: 0 - No pain  Cognition:  Cognition  Overall Cognitive Status: Within Functional Limits  Treatments:  Therapeutic Exercise  Therapeutic Exercise Performed: Yes  Patient performed seated therex, B LE: APs, hip flexion, LAQ, heel slides, GS, and hip add ball squeeze all 2 [id number]. Done to increase B LE strength to improve overall functional mobility and independence.     Therapeutic Activity  Therapeutic Activity Performed: Yes  Patient performed maneuverability around 4 bolsters over 20ft with FWW and Caro x1. Patient performed  figure of eight pattern x2 trials. Done to improve balance and maneuverability skills with walker to increase overall functional mobility.    Patient performed blocked practice sit<>stand 2 x5reps on WC with SBA/CGA x1. Cues for sequencing and positioning. Done to increase strength in B LE and improve sequencing and independence with sit<>stand transfer.      Ambulation/Gait Training  Ambulation/Gait Training Performed: Yes  Patient ambulated ~100ft x2 trials with Caro x1 and FWW for support.     Transfers  Transfer: Yes  Patient performed sit<>stand with CGA/Caro x1. Cues for hand placement and sequencing.     Education Comments  Educated patient on transfer traning and gait training techniques to maximize safety and independence.       OP EDUCATION:       Encounter Problems       Encounter Problems (Active)       PT Problem       STG - Bed mobility with min/mod assist x2. (Progressing)       Start:  01/17/25    Expected End:  01/23/25            STG - Transfers with mod assist x1. (Progressing)       Start:  01/17/25    Expected End:  01/23/25            STG - Pt will amb 30 ft with wheeled walker and mod assist x1.  (Met)       Start:  01/17/25    Expected End:  01/23/25    Resolved:  01/22/25         STG - Pt will negotiate one step with rails and min/mod assist x2. (Progressing)       Start:  01/17/25    Expected End:  01/23/25            LTG - Bed mobility with min assist x1. (Progressing)       Start:  01/17/25    Expected End:  01/28/25            LTG - Transfers with min assist x1. (Progressing)       Start:  01/17/25    Expected End:  01/28/25            LTG - Pt will amb 50 ft with wheeled walker and min assist. (Progressing)       Start:  01/17/25    Expected End:  01/28/25            LTG - Pt will negotiate 5 stairs with rails and min/mod assist x1. (Progressing)       Start:  01/17/25    Expected End:  01/28/25               Pain - Adult

## 2025-01-25 LAB
ANION GAP SERPL CALC-SCNC: 14 MMOL/L (ref 10–20)
BASOPHILS # BLD AUTO: 0.04 X10*3/UL (ref 0–0.1)
BASOPHILS NFR BLD AUTO: 0.3 %
BUN SERPL-MCNC: 10 MG/DL (ref 6–23)
CALCIUM SERPL-MCNC: 9 MG/DL (ref 8.6–10.3)
CHLORIDE SERPL-SCNC: 98 MMOL/L (ref 98–107)
CO2 SERPL-SCNC: 27 MMOL/L (ref 21–32)
CREAT SERPL-MCNC: 0.65 MG/DL (ref 0.5–1.05)
EGFRCR SERPLBLD CKD-EPI 2021: 90 ML/MIN/1.73M*2
EOSINOPHIL # BLD AUTO: 0.58 X10*3/UL (ref 0–0.4)
EOSINOPHIL NFR BLD AUTO: 3.8 %
ERYTHROCYTE [DISTWIDTH] IN BLOOD BY AUTOMATED COUNT: 13.9 % (ref 11.5–14.5)
GLUCOSE SERPL-MCNC: 113 MG/DL (ref 74–99)
HCT VFR BLD AUTO: 28.7 % (ref 36–46)
HGB BLD-MCNC: 9.1 G/DL (ref 12–16)
IMM GRANULOCYTES # BLD AUTO: 0.16 X10*3/UL (ref 0–0.5)
IMM GRANULOCYTES NFR BLD AUTO: 1 % (ref 0–0.9)
LYMPHOCYTES # BLD AUTO: 0.95 X10*3/UL (ref 0.8–3)
LYMPHOCYTES NFR BLD AUTO: 6.2 %
MCH RBC QN AUTO: 28.1 PG (ref 26–34)
MCHC RBC AUTO-ENTMCNC: 31.7 G/DL (ref 32–36)
MCV RBC AUTO: 89 FL (ref 80–100)
MONOCYTES # BLD AUTO: 0.6 X10*3/UL (ref 0.05–0.8)
MONOCYTES NFR BLD AUTO: 3.9 %
NEUTROPHILS # BLD AUTO: 13.02 X10*3/UL (ref 1.6–5.5)
NEUTROPHILS NFR BLD AUTO: 84.8 %
NRBC BLD-RTO: 0 /100 WBCS (ref 0–0)
OVALOCYTES BLD QL SMEAR: NORMAL
PLATELET # BLD AUTO: 358 X10*3/UL (ref 150–450)
POLYCHROMASIA BLD QL SMEAR: NORMAL
POTASSIUM SERPL-SCNC: 3.6 MMOL/L (ref 3.5–5.3)
RBC # BLD AUTO: 3.24 X10*6/UL (ref 4–5.2)
RBC MORPH BLD: NORMAL
SODIUM SERPL-SCNC: 135 MMOL/L (ref 136–145)
WBC # BLD AUTO: 15.4 X10*3/UL (ref 4.4–11.3)

## 2025-01-25 PROCEDURE — 97110 THERAPEUTIC EXERCISES: CPT | Mod: GP

## 2025-01-25 PROCEDURE — 2500000002 HC RX 250 W HCPCS SELF ADMINISTERED DRUGS (ALT 637 FOR MEDICARE OP, ALT 636 FOR OP/ED)

## 2025-01-25 PROCEDURE — 2500000004 HC RX 250 GENERAL PHARMACY W/ HCPCS (ALT 636 FOR OP/ED)

## 2025-01-25 PROCEDURE — 85025 COMPLETE CBC W/AUTO DIFF WBC: CPT | Performed by: PHYSICAL MEDICINE & REHABILITATION

## 2025-01-25 PROCEDURE — 1280000001 HC REHAB SEMI-PRIVATE ROOM DAILY

## 2025-01-25 PROCEDURE — 2500000001 HC RX 250 WO HCPCS SELF ADMINISTERED DRUGS (ALT 637 FOR MEDICARE OP): Performed by: PHYSICAL MEDICINE & REHABILITATION

## 2025-01-25 PROCEDURE — 97530 THERAPEUTIC ACTIVITIES: CPT | Mod: GP

## 2025-01-25 PROCEDURE — 51701 INSERT BLADDER CATHETER: CPT

## 2025-01-25 PROCEDURE — 2500000002 HC RX 250 W HCPCS SELF ADMINISTERED DRUGS (ALT 637 FOR MEDICARE OP, ALT 636 FOR OP/ED): Performed by: PHYSICAL MEDICINE & REHABILITATION

## 2025-01-25 PROCEDURE — 80048 BASIC METABOLIC PNL TOTAL CA: CPT | Performed by: PHYSICAL MEDICINE & REHABILITATION

## 2025-01-25 PROCEDURE — 36415 COLL VENOUS BLD VENIPUNCTURE: CPT | Performed by: PHYSICAL MEDICINE & REHABILITATION

## 2025-01-25 PROCEDURE — 2500000001 HC RX 250 WO HCPCS SELF ADMINISTERED DRUGS (ALT 637 FOR MEDICARE OP)

## 2025-01-25 PROCEDURE — 97535 SELF CARE MNGMENT TRAINING: CPT | Mod: GO,CO

## 2025-01-25 PROCEDURE — 99232 SBSQ HOSP IP/OBS MODERATE 35: CPT | Performed by: STUDENT IN AN ORGANIZED HEALTH CARE EDUCATION/TRAINING PROGRAM

## 2025-01-25 PROCEDURE — 97116 GAIT TRAINING THERAPY: CPT | Mod: GP

## 2025-01-25 RX ADMIN — METHOCARBAMOL 500 MG: 500 TABLET ORAL at 16:01

## 2025-01-25 RX ADMIN — LEVOTHYROXINE SODIUM 100 MCG: 0.1 TABLET ORAL at 05:41

## 2025-01-25 RX ADMIN — OXYCODONE HYDROCHLORIDE 5 MG: 5 TABLET ORAL at 21:30

## 2025-01-25 RX ADMIN — Medication 1000 UNITS: at 08:33

## 2025-01-25 RX ADMIN — METHOCARBAMOL 500 MG: 500 TABLET ORAL at 20:18

## 2025-01-25 RX ADMIN — METOPROLOL TARTRATE 25 MG: 25 TABLET, FILM COATED ORAL at 08:33

## 2025-01-25 RX ADMIN — METOPROLOL TARTRATE 25 MG: 25 TABLET, FILM COATED ORAL at 20:12

## 2025-01-25 RX ADMIN — ICOSAPENT ETHYL 1000 MG: 1 CAPSULE ORAL at 08:33

## 2025-01-25 RX ADMIN — OXYCODONE HYDROCHLORIDE 5 MG: 5 TABLET ORAL at 08:33

## 2025-01-25 RX ADMIN — POLYETHYLENE GLYCOL 3350 17 G: 17 POWDER, FOR SOLUTION ORAL at 08:32

## 2025-01-25 RX ADMIN — NITROFURANTOIN MONOHYDRATE/MACROCRYSTALLINE 100 MG: 25; 75 CAPSULE ORAL at 20:12

## 2025-01-25 RX ADMIN — TAMSULOSIN HYDROCHLORIDE 0.4 MG: 0.4 CAPSULE ORAL at 20:12

## 2025-01-25 RX ADMIN — CETIRIZINE HYDROCHLORIDE 10 MG: 10 TABLET, FILM COATED ORAL at 08:33

## 2025-01-25 RX ADMIN — SENNOSIDES 17.2 MG: 8.6 TABLET, FILM COATED ORAL at 20:12

## 2025-01-25 RX ADMIN — RIVAROXABAN 20 MG: 20 TABLET, FILM COATED ORAL at 18:33

## 2025-01-25 RX ADMIN — CASTOR OIL AND BALSAM, PERU 1 APPLICATION: 788; 87 OINTMENT TOPICAL at 20:15

## 2025-01-25 RX ADMIN — ACETAMINOPHEN 650 MG: 325 TABLET, FILM COATED ORAL at 22:32

## 2025-01-25 RX ADMIN — NITROFURANTOIN MONOHYDRATE/MACROCRYSTALLINE 100 MG: 25; 75 CAPSULE ORAL at 08:39

## 2025-01-25 RX ADMIN — Medication 500 MG: at 08:33

## 2025-01-25 RX ADMIN — METHOCARBAMOL 500 MG: 500 TABLET ORAL at 05:41

## 2025-01-25 RX ADMIN — CASTOR OIL AND BALSAM, PERU 1 APPLICATION: 788; 87 OINTMENT TOPICAL at 08:38

## 2025-01-25 ASSESSMENT — PAIN SCALES - WONG BAKER: WONGBAKER_NUMERICALRESPONSE: HURTS WHOLE LOT

## 2025-01-25 ASSESSMENT — PAIN - FUNCTIONAL ASSESSMENT
PAIN_FUNCTIONAL_ASSESSMENT: 0-10

## 2025-01-25 ASSESSMENT — PAIN SCALES - GENERAL
PAINLEVEL_OUTOF10: 9
PAINLEVEL_OUTOF10: 10 - WORST POSSIBLE PAIN
PAINLEVEL_OUTOF10: 2
PAINLEVEL_OUTOF10: 0 - NO PAIN
PAINLEVEL_OUTOF10: 9
PAINLEVEL_OUTOF10: 5 - MODERATE PAIN
PAINLEVEL_OUTOF10: 5 - MODERATE PAIN
PAINLEVEL_OUTOF10: 0 - NO PAIN
PAINLEVEL_OUTOF10: 4

## 2025-01-25 ASSESSMENT — ACTIVITIES OF DAILY LIVING (ADL): HOME_MANAGEMENT_TIME_ENTRY: 45

## 2025-01-25 ASSESSMENT — PAIN DESCRIPTION - LOCATION: LOCATION: HIP

## 2025-01-25 ASSESSMENT — PAIN DESCRIPTION - DESCRIPTORS
DESCRIPTORS: SORE
DESCRIPTORS: DISCOMFORT
DESCRIPTORS: DISCOMFORT

## 2025-01-25 ASSESSMENT — PAIN DESCRIPTION - ORIENTATION: ORIENTATION: LEFT

## 2025-01-25 NOTE — PROGRESS NOTES
Physical Therapy    Physical Therapy Treatment    Patient Name: Gabriella Haskins  MRN: [id number]  Department: [facility] REH[facility]  Room: [address] [place] [address]  Today's Date: 1/25/2025  Time Calculation  Start Time: 1300  Stop Time: 1345  Time Calculation (min): 45 min         Assessment/Plan   PT Assessment  End of Session Patient Position: Up in chair, Alarm on     PT Plan  Treatment/Interventions: Bed mobility, Transfer training, Gait training, Stair training, Balance training, Strengthening, Therapeutic exercise, Therapeutic activity  PT Plan: Ongoing PT  PT Frequency: 90 min/5 days per week (10 days)  PT Discharge Recommendations:  (Anticipate discharge home with the need for intermittent min assist at the walker level.)  Equipment Recommended upon Discharge: Wheeled walker  PT Recommended Transfer Status: Assist x2      General Visit Information:      General  Prior to Session Communication: Bedside nurse  General Comment:  (Pt is pleasant and cooperative with therapy despite feeling fatigued today. Pt reports having difficulty remembering posterior hip precautions.)    Subjective   Precautions:  Precautions  LE Weight Bearing Status: Weight Bearing as Tolerated  Medical Precautions: Fall precautions  Post-Surgical Precautions: Left hip precautions            Objective   Pain:  Pain Assessment  Pain Assessment: 0-10  0-10 (Numeric) Pain Score: 4 (L hip)  Pain Interventions: Repositioned       Treatments:  Therapeutic Exercise  Therapeutic Exercise Performed:  (Seated BLE: AP, LAQ, hip adduction ball squeeze, GS x 10 reps each.)  Therapeutic Exercise Activity 1: Standing tx with BUE support at hemibar with CGA: heel raises, toe raises, marches x 10 reps each R/L LE.    Therapeutic Activity  Therapeutic Activity Performed:  (With BUE support on hemibar and CGA, pt ambulated sideways in R and L directions the length of hemibar for 8 ft x 4 trials with cues for increased step height.)    Ambulation/Gait Training  Ambulation/Gait  Training Performed:  (Pt ambulated 100 ft x 2 with FWW and Min A, demonstrating step through gait with decreased step height.)  Transfers  Transfer:  (CGA/Min A for sit<>stand transfers at W/C to FWW, hemibar and B stair HRs, with cues for proper hand and leg placement to maintain posterior hip precautions)    Stairs  Stairs:  (Up/down 5 steps x 2 with B HRs and Min A, demo non recip stair pattern. Reviewed proper sequencing technique with fair recall and follow through. Pt fatigued afterwards.)    EDUCATION: Reviewed safe transfer techniques to maintain posterior hip pxs and proper stair sequencing       Encounter Problems       Encounter Problems (Active)       PT Problem       STG - Bed mobility with min/mod assist x2. (Progressing)       Start:  01/17/25    Expected End:  01/23/25            STG - Transfers with mod assist x1. (Progressing)       Start:  01/17/25    Expected End:  01/23/25            STG - Pt will amb 30 ft with wheeled walker and mod assist x1.  (Met)       Start:  01/17/25    Expected End:  01/23/25    Resolved:  01/22/25         STG - Pt will negotiate one step with rails and min/mod assist x2. (Progressing)       Start:  01/17/25    Expected End:  01/23/25            LTG - Bed mobility with min assist x1. (Progressing)       Start:  01/17/25    Expected End:  01/28/25            LTG - Transfers with min assist x1. (Progressing)       Start:  01/17/25    Expected End:  01/28/25            LTG - Pt will amb 50 ft with wheeled walker and min assist. (Progressing)       Start:  01/17/25    Expected End:  01/28/25            LTG - Pt will negotiate 5 stairs with rails and min/mod assist x1. (Progressing)       Start:  01/17/25    Expected End:  01/28/25               Pain - Adult

## 2025-01-25 NOTE — PROGRESS NOTES
" Gabriella Haskins is a 79 y.o. female on day 8 of admission presenting with Closed displaced fracture of left femoral neck.    Subjective   Patient denies any urinary pain or burning.  Patient mentioned that she does have some spasms that happen in her leg which are alleviated by medication-Robaxin.  Patient feels therapy is going well.       Objective       Physical Exam  Vitals reviewed.   Constitutional:       Appearance: Normal appearance.   HENT:      Head: Atraumatic.   Eyes:      Pupils: Pupils are equal, round, and reactive to light.   Pulmonary:      Effort: Pulmonary effort is normal.      Breath sounds: Normal breath sounds.   Abdominal:      General: Bowel sounds are normal.      Palpations: Abdomen is soft.   Musculoskeletal:      Comments: Flexors and dorsiflexors 5/5 able to do knee extension 4 -/5 in LLE  RLE 5/5 throughout  Sensation intact to light touch in bilateral lower extremities   Skin:     General: Skin is warm and dry.   Neurological:      Mental Status: She is alert and oriented to person, place, and time. Mental status is at baseline.   Psychiatric:         Mood and Affect: Mood normal.         Behavior: Behavior is cooperative.       Function: min assist with ADLs, transfers, and mobility   Assessment/Plan        Last Recorded Vitals  Blood pressure 129/56, pulse 77, temperature 37 °C (98.6 °F), temperature source Temporal, resp. rate 17, height 1.676 m (5' 5.98\"), weight 62.8 kg (138 lb 8 oz), SpO2 96%.    Therapy notes from last 24 hours reviewed.    Relevant Results         Scheduled medications  ascorbic acid, 500 mg, oral, Daily  balsam peru-castor oiL, 1 Application, Topical, BID  bisacodyl, 10 mg, rectal, Daily  cetirizine, 10 mg, oral, Daily  cholecalciferol, 1,000 Units, oral, Daily  [START ON 1/30/2025] cyanocobalamin, 1,000 mcg, intramuscular, q14 days  fluticasone, 1 spray, Each Nostril, Daily  icosapent ethyL, 1,000 mg, oral, Daily  levothyroxine, 100 mcg, oral, " Daily  metoprolol tartrate, 25 mg, oral, BID  nitrofurantoin (macrocrystal-monohydrate), 100 mg, oral, q12h ANDREI  polyethylene glycol, 17 g, oral, Daily  rivaroxaban, 20 mg, oral, Daily with evening meal  sennosides, 2 tablet, oral, Nightly  tamsulosin, 0.4 mg, oral, Nightly      Continuous medications     PRN medications  PRN medications: acetaminophen **OR** acetaminophen, bisacodyl, lubricating eye drops, melatonin, methocarbamol, ondansetron **OR** ondansetron, oxyCODONE, oxyCODONE, polyethylene glycol     Results for orders placed or performed during the hospital encounter of 01/17/25 (from the past 24 hours)   Basic Metabolic Panel   Result Value Ref Range    Glucose 113 (H) 74 - 99 mg/dL    Sodium 135 (L) 136 - 145 mmol/L    Potassium 3.6 3.5 - 5.3 mmol/L    Chloride 98 98 - 107 mmol/L    Bicarbonate 27 21 - 32 mmol/L    Anion Gap 14 10 - 20 mmol/L    Urea Nitrogen 10 6 - 23 mg/dL    Creatinine 0.65 0.50 - 1.05 mg/dL    eGFR 90 >60 mL/min/1.73m*2    Calcium 9.0 8.6 - 10.3 mg/dL   CBC and Auto Differential   Result Value Ref Range    WBC 15.4 (H) 4.4 - 11.3 x10*3/uL    nRBC 0.0 0.0 - 0.0 /100 WBCs    RBC 3.24 (L) 4.00 - 5.20 x10*6/uL    Hemoglobin 9.1 (L) 12.0 - 16.0 g/dL    Hematocrit 28.7 (L) 36.0 - 46.0 %    MCV 89 80 - 100 fL    MCH 28.1 26.0 - 34.0 pg    MCHC 31.7 (L) 32.0 - 36.0 g/dL    RDW 13.9 11.5 - 14.5 %    Platelets 358 150 - 450 x10*3/uL    Neutrophils % 84.8 40.0 - 80.0 %    Immature Granulocytes %, Automated 1.0 (H) 0.0 - 0.9 %    Lymphocytes % 6.2 13.0 - 44.0 %    Monocytes % 3.9 2.0 - 10.0 %    Eosinophils % 3.8 0.0 - 6.0 %    Basophils % 0.3 0.0 - 2.0 %    Neutrophils Absolute 13.02 (H) 1.60 - 5.50 x10*3/uL    Immature Granulocytes Absolute, Automated 0.16 0.00 - 0.50 x10*3/uL    Lymphocytes Absolute 0.95 0.80 - 3.00 x10*3/uL    Monocytes Absolute 0.60 0.05 - 0.80 x10*3/uL    Eosinophils Absolute 0.58 (H) 0.00 - 0.40 x10*3/uL    Basophils Absolute 0.04 0.00 - 0.10 x10*3/uL   Morphology    Result Value Ref Range    RBC Morphology See Below     Polychromasia Mild     Ovalocytes Few                  Assessment/Plan   Assessment & Plan  Closed displaced fracture of left femoral neck  -3 hours of PT and OT daily to improve patient functional mobility so she can safely be discharged home modified independent.  -Estimated length of stay 10 days  - Precautions: WBAT to LLE, fall precautions  - Pain management: Tylenol and oxycodone as needed      Essential hypertension  -Continue metoprolol tartrate 25 mg twice daily  - Was previously taking hydrochlorothiazide 25 mg daily, however this was discontinued due to hypotension  - Recommend follow-up with PCP for medication adjustment  Hypercholesterolemia    Hypothyroidism  -Continue levothyroxine 100 mcg daily    Vitamin D deficiency  -Continue vitamin supplements, including vitamin D, vitamin C, vitamin B12    Fall, initial encounter  -PT/OT as above  - Continue to work on strength, coordination and balance  - Patient with previous history of subdural hematoma following mechanical fall, previously underwent acute rehab stay 1 year ago    1/25  - As per Ortho: Plan for staple removal on 1/28 and left hip x-rays.  Plan to follow-up with Dr. Wells in 2 weeks.   -ongoing xarelto for DVT prophylaxis  -encourage active BM daily and facility mobility.    I spent 25 minutes in the professional and overall care of this patient.      Ruma Oseguera MD

## 2025-01-25 NOTE — CARE PLAN
The patient's goals for the shift include      The clinical goals for the shift include Remain hemodynamically stable.      Problem: Pain - Adult  Goal: Verbalizes/displays adequate comfort level or baseline comfort level  Outcome: Progressing     Problem: Discharge Planning  Goal: Discharge to home or other facility with appropriate resources  Outcome: Progressing     Problem: Chronic Conditions and Co-morbidities  Goal: Patient's chronic conditions and co-morbidity symptoms are monitored and maintained or improved  Outcome: Progressing     Problem: Skin  Goal: Decreased wound size/increased tissue granulation at next dressing change  Outcome: Progressing  Flowsheets (Taken 1/25/2025 0025)  Decreased wound size/increased tissue granulation at next dressing change: Promote sleep for wound healing  Goal: Participates in plan/prevention/treatment measures  Outcome: Progressing  Flowsheets (Taken 1/25/2025 0025)  Participates in plan/prevention/treatment measures: Elevate heels  Goal: Promote/optimize nutrition  Outcome: Progressing  Flowsheets (Taken 1/25/2025 0025)  Promote/optimize nutrition: Monitor/record intake including meals  Goal: Promote skin healing  Outcome: Progressing  Flowsheets (Taken 1/25/2025 0025)  Promote skin healing:   Assess skin/pad under line(s)/device(s)   Protective dressings over bony prominences   Turn/reposition every 2 hours/use positioning/transfer devices     Problem: Fall/Injury  Goal: Pace activities to prevent fatigue by end of the shift  Outcome: Progressing     Problem: Pain  Goal: Takes deep breaths with improved pain control throughout the shift  Outcome: Progressing  Goal: Turns in bed with improved pain control throughout the shift  Outcome: Progressing  Goal: Walks with improved pain control throughout the shift  Outcome: Progressing  Goal: Performs ADL's with improved pain control throughout shift  Outcome: Progressing  Goal: Participates in PT with improved pain control  throughout the shift  Outcome: Progressing  Goal: Free from opioid side effects throughout the shift  Outcome: Progressing  Goal: Free from acute confusion related to pain meds throughout the shift  Outcome: Progressing     Problem: Nutrition  Goal: Oral intake greater than 50%  Outcome: Progressing  Goal: Oral intake greater 75%  Outcome: Progressing  Goal: Consume prescribed supplement  Outcome: Progressing  Goal: Adequate PO fluid intake  Outcome: Progressing  Goal: Lab values WNL  Outcome: Progressing  Goal: Promote healing  Outcome: Progressing  Goal: Gradual weight gain  Outcome: Progressing

## 2025-01-25 NOTE — PROGRESS NOTES
Occupational Therapy    OT Treatment    Patient Name: Gabriella Haskins  MRN: [id number]  Department: [facility]  Room: [address] [place] [address]  Today's Date: 1/25/2025  Time Calculation  Start Time: 1000  Stop Time: 1045  Time Calculation (min): 45 min        Assessment:        Plan:  Treatment Interventions: ADL retraining, UE strengthening/ROM, Functional transfer training, Endurance training, Patient/family training, Equipment evaluation/education  OT Frequency: 90 min/5 days per week (x 10 days)  OT Discharge Recommendations:  (anticipate possible need for up to min assist with ADLs, transfers, and mobility)  Equipment Recommended upon Discharge: Wheeled walker, Bedside commode (tub bench, sock aid, reacher, dressing stick, LHSH, 3:1 commode)  Treatment Interventions: ADL retraining, UE strengthening/ROM, Functional transfer training, Endurance training, Patient/family training, Equipment evaluation/education    Subjective   Previous Visit Info:  OT Last Visit  OT Received On: 01/25/25  General:  General  Prior to Session Communication: Bedside nurse  General Comment: Patient agreeable to OT  Precautions:  LE Weight Bearing Status: Weight Bearing as Tolerated  Medical Precautions: Fall precautions  Post-Surgical Precautions: Left hip precautions            Pain:  Pain Assessment  Pain Assessment: 0-10  0-10 (Numeric) Pain Score: 5 - Moderate pain  Pain Type:  (C/o of spasms)  Pain Location: Hip  Pain Orientation: Left  Pain Interventions: Repositioned, Cold pack, Ambulation/increased activity, Distraction, Relaxation technique  Response to Interventions: Decrease in pain    Objective    Activities of Daily Living:      UE Bathing  UE Bathing Where Assessed: Wheelchair  UE Bathing Comments: Sponge bathes upper body with setup    UE Dressing  UE Dressing Where Assessed: Wheelchair  UE Dressing Comments: doffs and dons t-shirt with setup, Min A to don jacket    LE Dressing  LE Dressing: Yes  Pants Level of Assistance:  (Doffs  pants sitting on toilet with dressing stick and SBA however cues for technique. Dons pants with reacher with SBA, however cues for technique, pulls up over hips with Min A at ww)  Sock Level of Assistance:  (Doffs socks with dressing stick with supervision and cues for technique.)    Toileting  Toileting Level of Assistance: Contact guard  Where Assessed:  (Standing at toilet with cues for adherence to hip precautions.)    Bed Mobility/Transfers: Transfers  Transfer: Yes  Transfer 1  Trials/Comments 1: Sit to stand transfers with Min A    Toilet Transfers  Toilet Transfers Comments: on and off commode with Min A    Functional Mobility:  Functional Mobility  Functional Mobility Performed: Yes  Functional Mobility 1  Comments 1: Simple functional mobility via ww from recliner<>bathroom with Min A  EDUCATION:   Patient; Diagnosis & Precautions;Fall precautons;Risk and benefits of OT discussed with patient or other;POC discussed and agreed upon; Continued education provided on THR precautions despite prior review, hand out on patient's bedside table, pt unable to recall all of precautions. Continued education provided on lower body dressing a/e for adherence to precautions.     Goals:  Encounter Problems       Encounter Problems (Active)       OT Problem       LTG - Patient will complete grooming with independence. (Progressing)       Start:  01/17/25    Expected End:  01/29/25            LTG - Patient will complete toileting with min assist. (Progressing)       Start:  01/17/25    Expected End:  01/29/25            LTG - Patient will complete bathing with min assist. (Progressing)       Start:  01/17/25    Expected End:  01/29/25            LTG - Patient will complete UE dressing with supervision. (Progressing)       Start:  01/17/25    Expected End:  01/29/25            LTG - Patient will complete LE dressing using adaptive equip as needed with min assist. (Progressing)       Start:  01/17/25    Expected End:   01/29/25            LTG - Patient will complete commode transfer via device as needed with min assist. (Progressing)       Start:  01/17/25    Expected End:  01/29/25            LTG - Patient will complete tub/shower transfer using DME as needed with min/mod assist. (Progressing)       Start:  01/17/25    Expected End:  01/29/25            LTG - Patient will complete functional mobility via device as needed with min assist. (Progressing)       Start:  01/17/25    Expected End:  01/29/25            STG - Patient will complete toileting with mod assist. (Progressing)       Start:  01/17/25    Expected End:  01/29/25            STG - Patient will complete bathing with mod assist. (Met)       Start:  01/17/25    Expected End:  01/24/25    Resolved:  01/22/25    Updated to: STG - Patient will complete bathing with min assist.         STG - Patient will complete UE dressing with sba. (Met)       Start:  01/17/25    Expected End:  01/24/25    Resolved:  01/22/25    Updated to: STG - Patient will complete UE dressing with set up         STG - Patient will complete LE dressing using adaptive equip as needed with mod assist. (Progressing)       Start:  01/17/25    Expected End:  01/29/25            STG - Patient will complete commode transfer via device as needed with mod assist. (Progressing)       Start:  01/17/25    Expected End:  01/29/25            STG - Patient will complete simple functional mobility via device as needed with mod assist. (Progressing)       Start:  01/17/25    Expected End:  01/29/25            STG - Patient will complete bathing with min assist. (Progressing)       Start:  01/22/25    Expected End:  01/29/25                STG - Patient will complete UE dressing with set up (Progressing)       Start:  01/22/25    Expected End:  01/29/25

## 2025-01-25 NOTE — CARE PLAN
The patient's goals for the shift include  participate in scheduled therapy     The clinical goals for the shift include pain control    Problem: Pain - Adult  Goal: Verbalizes/displays adequate comfort level or baseline comfort level  Outcome: Progressing     Problem: Discharge Planning  Goal: Discharge to home or other facility with appropriate resources  Outcome: Progressing     Problem: Chronic Conditions and Co-morbidities  Goal: Patient's chronic conditions and co-morbidity symptoms are monitored and maintained or improved  Outcome: Progressing     Problem: Skin  Goal: Decreased wound size/increased tissue granulation at next dressing change  Outcome: Progressing  Flowsheets (Taken 1/25/2025 1121)  Decreased wound size/increased tissue granulation at next dressing change:   Promote sleep for wound healing   Protective dressings over bony prominences  Goal: Participates in plan/prevention/treatment measures  Outcome: Progressing  Flowsheets (Taken 1/25/2025 1121)  Participates in plan/prevention/treatment measures:   Elevate heels   Increase activity/out of bed for meals  Goal: Promote/optimize nutrition  Outcome: Progressing  Flowsheets (Taken 1/25/2025 1121)  Promote/optimize nutrition:   Monitor/record intake including meals   Consume > 50% meals/supplements   Offer water/supplements/favorite foods  Goal: Promote skin healing  Outcome: Progressing  Flowsheets (Taken 1/25/2025 1121)  Promote skin healing: Turn/reposition every 2 hours/use positioning/transfer devices     Problem: Fall/Injury  Goal: Pace activities to prevent fatigue by end of the shift  Outcome: Progressing     Problem: Pain  Goal: Takes deep breaths with improved pain control throughout the shift  Outcome: Progressing  Goal: Turns in bed with improved pain control throughout the shift  Outcome: Progressing  Goal: Walks with improved pain control throughout the shift  Outcome: Progressing  Goal: Performs ADL's with improved pain control  throughout shift  Outcome: Progressing  Goal: Participates in PT with improved pain control throughout the shift  Outcome: Progressing  Goal: Free from opioid side effects throughout the shift  Outcome: Progressing  Goal: Free from acute confusion related to pain meds throughout the shift  Outcome: Progressing     Problem: Nutrition  Goal: Oral intake greater than 50%  Outcome: Progressing  Goal: Oral intake greater 75%  Outcome: Progressing  Goal: Consume prescribed supplement  Outcome: Progressing  Goal: Adequate PO fluid intake  Outcome: Progressing  Goal: Lab values WNL  Outcome: Progressing  Goal: Promote healing  Outcome: Progressing  Goal: Gradual weight gain  Outcome: Progressing

## 2025-01-26 ENCOUNTER — APPOINTMENT (OUTPATIENT)
Dept: RADIOLOGY | Facility: HOSPITAL | Age: 80
DRG: 560 | End: 2025-01-26
Payer: MEDICARE

## 2025-01-26 VITALS
HEIGHT: 66 IN | RESPIRATION RATE: 17 BRPM | DIASTOLIC BLOOD PRESSURE: 63 MMHG | TEMPERATURE: 99 F | BODY MASS INDEX: 22.26 KG/M2 | HEART RATE: 79 BPM | WEIGHT: 138.5 LBS | SYSTOLIC BLOOD PRESSURE: 132 MMHG | OXYGEN SATURATION: 97 %

## 2025-01-26 LAB — BACTERIA UR CULT: ABNORMAL

## 2025-01-26 PROCEDURE — 2500000002 HC RX 250 W HCPCS SELF ADMINISTERED DRUGS (ALT 637 FOR MEDICARE OP, ALT 636 FOR OP/ED)

## 2025-01-26 PROCEDURE — 2500000002 HC RX 250 W HCPCS SELF ADMINISTERED DRUGS (ALT 637 FOR MEDICARE OP, ALT 636 FOR OP/ED): Performed by: PHYSICAL MEDICINE & REHABILITATION

## 2025-01-26 PROCEDURE — 1280000001 HC REHAB SEMI-PRIVATE ROOM DAILY

## 2025-01-26 PROCEDURE — 2500000004 HC RX 250 GENERAL PHARMACY W/ HCPCS (ALT 636 FOR OP/ED)

## 2025-01-26 PROCEDURE — 2500000001 HC RX 250 WO HCPCS SELF ADMINISTERED DRUGS (ALT 637 FOR MEDICARE OP)

## 2025-01-26 PROCEDURE — 73502 X-RAY EXAM HIP UNI 2-3 VIEWS: CPT | Mod: LT

## 2025-01-26 PROCEDURE — 51701 INSERT BLADDER CATHETER: CPT

## 2025-01-26 PROCEDURE — 2500000001 HC RX 250 WO HCPCS SELF ADMINISTERED DRUGS (ALT 637 FOR MEDICARE OP): Performed by: PHYSICAL MEDICINE & REHABILITATION

## 2025-01-26 PROCEDURE — 73502 X-RAY EXAM HIP UNI 2-3 VIEWS: CPT | Mod: LEFT SIDE | Performed by: RADIOLOGY

## 2025-01-26 RX ADMIN — CETIRIZINE HYDROCHLORIDE 10 MG: 10 TABLET, FILM COATED ORAL at 08:50

## 2025-01-26 RX ADMIN — CASTOR OIL AND BALSAM, PERU 1 APPLICATION: 788; 87 OINTMENT TOPICAL at 08:50

## 2025-01-26 RX ADMIN — METOPROLOL TARTRATE 25 MG: 25 TABLET, FILM COATED ORAL at 20:06

## 2025-01-26 RX ADMIN — POLYETHYLENE GLYCOL 3350 17 G: 17 POWDER, FOR SOLUTION ORAL at 08:50

## 2025-01-26 RX ADMIN — METHOCARBAMOL 500 MG: 500 TABLET ORAL at 06:00

## 2025-01-26 RX ADMIN — LEVOTHYROXINE SODIUM 100 MCG: 0.1 TABLET ORAL at 06:00

## 2025-01-26 RX ADMIN — METOPROLOL TARTRATE 25 MG: 25 TABLET, FILM COATED ORAL at 08:49

## 2025-01-26 RX ADMIN — ACETAMINOPHEN 650 MG: 325 TABLET, FILM COATED ORAL at 17:38

## 2025-01-26 RX ADMIN — NITROFURANTOIN MONOHYDRATE/MACROCRYSTALLINE 100 MG: 25; 75 CAPSULE ORAL at 20:06

## 2025-01-26 RX ADMIN — Medication 500 MG: at 08:49

## 2025-01-26 RX ADMIN — OXYCODONE HYDROCHLORIDE 5 MG: 5 TABLET ORAL at 20:10

## 2025-01-26 RX ADMIN — METHOCARBAMOL 500 MG: 500 TABLET ORAL at 20:09

## 2025-01-26 RX ADMIN — TAMSULOSIN HYDROCHLORIDE 0.4 MG: 0.4 CAPSULE ORAL at 20:06

## 2025-01-26 RX ADMIN — NITROFURANTOIN MONOHYDRATE/MACROCRYSTALLINE 100 MG: 25; 75 CAPSULE ORAL at 08:50

## 2025-01-26 RX ADMIN — RIVAROXABAN 20 MG: 20 TABLET, FILM COATED ORAL at 17:38

## 2025-01-26 RX ADMIN — SENNOSIDES 17.2 MG: 8.6 TABLET, FILM COATED ORAL at 20:06

## 2025-01-26 RX ADMIN — Medication 1000 UNITS: at 08:50

## 2025-01-26 RX ADMIN — ICOSAPENT ETHYL 1000 MG: 1 CAPSULE ORAL at 08:50

## 2025-01-26 RX ADMIN — OXYCODONE HYDROCHLORIDE 5 MG: 5 TABLET ORAL at 05:59

## 2025-01-26 RX ADMIN — CASTOR OIL AND BALSAM, PERU 1 APPLICATION: 788; 87 OINTMENT TOPICAL at 20:06

## 2025-01-26 ASSESSMENT — PAIN - FUNCTIONAL ASSESSMENT
PAIN_FUNCTIONAL_ASSESSMENT: 0-10

## 2025-01-26 ASSESSMENT — PAIN SCALES - GENERAL
PAINLEVEL_OUTOF10: 1
PAINLEVEL_OUTOF10: 0 - NO PAIN
PAINLEVEL_OUTOF10: 2
PAINLEVEL_OUTOF10: 0 - NO PAIN
PAINLEVEL_OUTOF10: 5 - MODERATE PAIN
PAINLEVEL_OUTOF10: 0 - NO PAIN
PAINLEVEL_OUTOF10: 7

## 2025-01-26 ASSESSMENT — PAIN DESCRIPTION - LOCATION: LOCATION: HIP

## 2025-01-26 ASSESSMENT — PAIN DESCRIPTION - DESCRIPTORS
DESCRIPTORS: DISCOMFORT
DESCRIPTORS: DISCOMFORT

## 2025-01-26 ASSESSMENT — PAIN SCALES - WONG BAKER
WONGBAKER_NUMERICALRESPONSE: HURTS EVEN MORE
WONGBAKER_NUMERICALRESPONSE: HURTS EVEN MORE

## 2025-01-26 ASSESSMENT — PAIN DESCRIPTION - ORIENTATION: ORIENTATION: LEFT

## 2025-01-26 NOTE — CARE PLAN
The patient's goals for the shift include  participate in weekend walking program     The clinical goals for the shift include pain control      Problem: Pain - Adult  Goal: Verbalizes/displays adequate comfort level or baseline comfort level  Outcome: Progressing     Problem: Discharge Planning  Goal: Discharge to home or other facility with appropriate resources  Outcome: Progressing     Problem: Chronic Conditions and Co-morbidities  Goal: Patient's chronic conditions and co-morbidity symptoms are monitored and maintained or improved  Outcome: Progressing     Problem: Skin  Goal: Decreased wound size/increased tissue granulation at next dressing change  Outcome: Progressing  Flowsheets (Taken 1/26/2025 1035)  Decreased wound size/increased tissue granulation at next dressing change: Protective dressings over bony prominences  Goal: Participates in plan/prevention/treatment measures  Outcome: Progressing  Flowsheets (Taken 1/26/2025 1035)  Participates in plan/prevention/treatment measures:   Elevate heels   Increase activity/out of bed for meals  Goal: Promote/optimize nutrition  Outcome: Progressing  Flowsheets (Taken 1/26/2025 1035)  Promote/optimize nutrition:   Monitor/record intake including meals   Consume > 50% meals/supplements   Offer water/supplements/favorite foods  Goal: Promote skin healing  Outcome: Progressing  Flowsheets (Taken 1/26/2025 1035)  Promote skin healing: Turn/reposition every 2 hours/use positioning/transfer devices     Problem: Pain  Goal: Takes deep breaths with improved pain control throughout the shift  Outcome: Progressing  Goal: Turns in bed with improved pain control throughout the shift  Outcome: Progressing  Goal: Walks with improved pain control throughout the shift  Outcome: Progressing  Goal: Performs ADL's with improved pain control throughout shift  Outcome: Progressing  Goal: Participates in PT with improved pain control throughout the shift  Outcome: Progressing  Goal:  Free from opioid side effects throughout the shift  Outcome: Progressing  Goal: Free from acute confusion related to pain meds throughout the shift  Outcome: Progressing     Problem: Nutrition  Goal: Oral intake greater than 50%  Outcome: Progressing  Goal: Oral intake greater 75%  Outcome: Progressing  Goal: Consume prescribed supplement  Outcome: Progressing  Goal: Adequate PO fluid intake  Outcome: Progressing  Goal: Lab values WNL  Outcome: Progressing  Goal: Promote healing  Outcome: Progressing  Goal: Gradual weight gain  Outcome: Progressing

## 2025-01-27 ENCOUNTER — APPOINTMENT (OUTPATIENT)
Dept: VASCULAR MEDICINE | Facility: HOSPITAL | Age: 80
DRG: 560 | End: 2025-01-27
Payer: MEDICARE

## 2025-01-27 LAB
ACANTHOCYTES BLD QL SMEAR: ABNORMAL
ALBUMIN SERPL BCP-MCNC: 3.4 G/DL (ref 3.4–5)
ALP SERPL-CCNC: 52 U/L (ref 33–136)
ALT SERPL W P-5'-P-CCNC: 7 U/L (ref 7–45)
ANION GAP SERPL CALC-SCNC: 11 MMOL/L (ref 10–20)
AST SERPL W P-5'-P-CCNC: 11 U/L (ref 9–39)
BACTERIA UR CULT: ABNORMAL
BASOPHILS # BLD MANUAL: 0.25 X10*3/UL (ref 0–0.1)
BASOPHILS NFR BLD MANUAL: 2 %
BILIRUB SERPL-MCNC: 0.6 MG/DL (ref 0–1.2)
BUN SERPL-MCNC: 13 MG/DL (ref 6–23)
CALCIUM SERPL-MCNC: 9.3 MG/DL (ref 8.6–10.3)
CHLORIDE SERPL-SCNC: 99 MMOL/L (ref 98–107)
CO2 SERPL-SCNC: 29 MMOL/L (ref 21–32)
CREAT SERPL-MCNC: 0.74 MG/DL (ref 0.5–1.05)
EGFRCR SERPLBLD CKD-EPI 2021: 83 ML/MIN/1.73M*2
EOSINOPHIL # BLD MANUAL: 0.12 X10*3/UL (ref 0–0.4)
EOSINOPHIL NFR BLD MANUAL: 1 %
ERYTHROCYTE [DISTWIDTH] IN BLOOD BY AUTOMATED COUNT: 14.2 % (ref 11.5–14.5)
GLUCOSE SERPL-MCNC: 80 MG/DL (ref 74–99)
HCT VFR BLD AUTO: 28.9 % (ref 36–46)
HGB BLD-MCNC: 9.1 G/DL (ref 12–16)
IMM GRANULOCYTES # BLD AUTO: 0.1 X10*3/UL (ref 0–0.5)
IMM GRANULOCYTES NFR BLD AUTO: 0.8 % (ref 0–0.9)
LYMPHOCYTES # BLD MANUAL: 1.72 X10*3/UL (ref 0.8–3)
LYMPHOCYTES NFR BLD MANUAL: 14 %
MCH RBC QN AUTO: 28.1 PG (ref 26–34)
MCHC RBC AUTO-ENTMCNC: 31.5 G/DL (ref 32–36)
MCV RBC AUTO: 89 FL (ref 80–100)
METAMYELOCYTES # BLD MANUAL: 0 X10*3/UL
METAMYELOCYTES NFR BLD MANUAL: 0 %
MONOCYTES # BLD MANUAL: 0.62 X10*3/UL (ref 0.05–0.8)
MONOCYTES NFR BLD MANUAL: 5 %
NEUTROPHILS # BLD MANUAL: 9.6 X10*3/UL (ref 1.6–5.5)
NEUTS BAND # BLD MANUAL: 0.25 X10*3/UL (ref 0–0.5)
NEUTS BAND NFR BLD MANUAL: 2 %
NEUTS SEG # BLD MANUAL: 9.35 X10*3/UL (ref 1.6–5)
NEUTS SEG NFR BLD MANUAL: 76 %
NRBC BLD-RTO: 0 /100 WBCS (ref 0–0)
OVALOCYTES BLD QL SMEAR: ABNORMAL
PLATELET # BLD AUTO: 450 X10*3/UL (ref 150–450)
POLYCHROMASIA BLD QL SMEAR: ABNORMAL
POTASSIUM SERPL-SCNC: 3.2 MMOL/L (ref 3.5–5.3)
PROT SERPL-MCNC: 6.5 G/DL (ref 6.4–8.2)
RBC # BLD AUTO: 3.24 X10*6/UL (ref 4–5.2)
RBC MORPH BLD: ABNORMAL
SODIUM SERPL-SCNC: 136 MMOL/L (ref 136–145)
TOTAL CELLS COUNTED BLD: 100
WBC # BLD AUTO: 12.3 X10*3/UL (ref 4.4–11.3)

## 2025-01-27 PROCEDURE — 2500000002 HC RX 250 W HCPCS SELF ADMINISTERED DRUGS (ALT 637 FOR MEDICARE OP, ALT 636 FOR OP/ED)

## 2025-01-27 PROCEDURE — 93971 EXTREMITY STUDY: CPT

## 2025-01-27 PROCEDURE — 97110 THERAPEUTIC EXERCISES: CPT | Mod: GP,CQ

## 2025-01-27 PROCEDURE — 2500000002 HC RX 250 W HCPCS SELF ADMINISTERED DRUGS (ALT 637 FOR MEDICARE OP, ALT 636 FOR OP/ED): Performed by: PHYSICAL MEDICINE & REHABILITATION

## 2025-01-27 PROCEDURE — 97530 THERAPEUTIC ACTIVITIES: CPT | Mod: GP,CQ

## 2025-01-27 PROCEDURE — 93971 EXTREMITY STUDY: CPT | Performed by: SURGERY

## 2025-01-27 PROCEDURE — 1280000001 HC REHAB SEMI-PRIVATE ROOM DAILY

## 2025-01-27 PROCEDURE — 2500000001 HC RX 250 WO HCPCS SELF ADMINISTERED DRUGS (ALT 637 FOR MEDICARE OP)

## 2025-01-27 PROCEDURE — 80053 COMPREHEN METABOLIC PANEL: CPT

## 2025-01-27 PROCEDURE — 97116 GAIT TRAINING THERAPY: CPT | Mod: GP,CQ

## 2025-01-27 PROCEDURE — 99233 SBSQ HOSP IP/OBS HIGH 50: CPT

## 2025-01-27 PROCEDURE — 2500000001 HC RX 250 WO HCPCS SELF ADMINISTERED DRUGS (ALT 637 FOR MEDICARE OP): Performed by: PHYSICAL MEDICINE & REHABILITATION

## 2025-01-27 PROCEDURE — 97535 SELF CARE MNGMENT TRAINING: CPT | Mod: GO,CO

## 2025-01-27 PROCEDURE — 2500000005 HC RX 250 GENERAL PHARMACY W/O HCPCS

## 2025-01-27 PROCEDURE — 85007 BL SMEAR W/DIFF WBC COUNT: CPT

## 2025-01-27 PROCEDURE — 51701 INSERT BLADDER CATHETER: CPT

## 2025-01-27 PROCEDURE — 85027 COMPLETE CBC AUTOMATED: CPT

## 2025-01-27 PROCEDURE — 2500000004 HC RX 250 GENERAL PHARMACY W/ HCPCS (ALT 636 FOR OP/ED)

## 2025-01-27 PROCEDURE — 36415 COLL VENOUS BLD VENIPUNCTURE: CPT

## 2025-01-27 RX ORDER — NYSTATIN 100000 [USP'U]/G
1 POWDER TOPICAL 2 TIMES DAILY
Status: DISCONTINUED | OUTPATIENT
Start: 2025-01-27 | End: 2025-01-27

## 2025-01-27 RX ORDER — CYCLOBENZAPRINE HCL 10 MG
5 TABLET ORAL 3 TIMES DAILY PRN
Status: DISCONTINUED | OUTPATIENT
Start: 2025-01-27 | End: 2025-01-29 | Stop reason: HOSPADM

## 2025-01-27 RX ORDER — POTASSIUM CHLORIDE 20 MEQ/1
40 TABLET, EXTENDED RELEASE ORAL EVERY 4 HOURS
Status: COMPLETED | OUTPATIENT
Start: 2025-01-27 | End: 2025-01-27

## 2025-01-27 RX ADMIN — OXYCODONE HYDROCHLORIDE 5 MG: 5 TABLET ORAL at 06:19

## 2025-01-27 RX ADMIN — CASTOR OIL AND BALSAM, PERU 1 APPLICATION: 788; 87 OINTMENT TOPICAL at 08:35

## 2025-01-27 RX ADMIN — Medication 3 MG: at 21:17

## 2025-01-27 RX ADMIN — METOPROLOL TARTRATE 25 MG: 25 TABLET, FILM COATED ORAL at 08:31

## 2025-01-27 RX ADMIN — TAMSULOSIN HYDROCHLORIDE 0.4 MG: 0.4 CAPSULE ORAL at 21:17

## 2025-01-27 RX ADMIN — Medication 500 MG: at 08:31

## 2025-01-27 RX ADMIN — SENNOSIDES 17.2 MG: 8.6 TABLET, FILM COATED ORAL at 21:17

## 2025-01-27 RX ADMIN — ACETAMINOPHEN 650 MG: 325 TABLET, FILM COATED ORAL at 08:31

## 2025-01-27 RX ADMIN — LEVOTHYROXINE SODIUM 100 MCG: 0.1 TABLET ORAL at 06:15

## 2025-01-27 RX ADMIN — RIVAROXABAN 20 MG: 20 TABLET, FILM COATED ORAL at 18:10

## 2025-01-27 RX ADMIN — NITROFURANTOIN MONOHYDRATE/MACROCRYSTALLINE 100 MG: 25; 75 CAPSULE ORAL at 08:31

## 2025-01-27 RX ADMIN — POLYETHYLENE GLYCOL 3350 17 G: 17 POWDER, FOR SOLUTION ORAL at 08:31

## 2025-01-27 RX ADMIN — Medication 1000 UNITS: at 08:31

## 2025-01-27 RX ADMIN — METHOCARBAMOL 500 MG: 500 TABLET ORAL at 04:44

## 2025-01-27 RX ADMIN — ACETAMINOPHEN 650 MG: 325 TABLET, FILM COATED ORAL at 18:07

## 2025-01-27 RX ADMIN — CASTOR OIL AND BALSAM, PERU 1 APPLICATION: 788; 87 OINTMENT TOPICAL at 21:16

## 2025-01-27 RX ADMIN — CYCLOBENZAPRINE 5 MG: 10 TABLET, FILM COATED ORAL at 15:31

## 2025-01-27 RX ADMIN — CETIRIZINE HYDROCHLORIDE 10 MG: 10 TABLET, FILM COATED ORAL at 08:31

## 2025-01-27 RX ADMIN — METOPROLOL TARTRATE 25 MG: 25 TABLET, FILM COATED ORAL at 21:17

## 2025-01-27 RX ADMIN — POTASSIUM CHLORIDE 40 MEQ: 1500 TABLET, EXTENDED RELEASE ORAL at 21:17

## 2025-01-27 RX ADMIN — NITROFURANTOIN MONOHYDRATE/MACROCRYSTALLINE 100 MG: 25; 75 CAPSULE ORAL at 21:17

## 2025-01-27 RX ADMIN — POTASSIUM CHLORIDE 40 MEQ: 1500 TABLET, EXTENDED RELEASE ORAL at 18:07

## 2025-01-27 RX ADMIN — ICOSAPENT ETHYL 1000 MG: 1 CAPSULE ORAL at 08:31

## 2025-01-27 ASSESSMENT — PAIN SCALES - WONG BAKER: WONGBAKER_NUMERICALRESPONSE: HURTS WHOLE LOT

## 2025-01-27 ASSESSMENT — PAIN DESCRIPTION - DESCRIPTORS
DESCRIPTORS: SPASM
DESCRIPTORS: CRAMPING;DISCOMFORT
DESCRIPTORS: CRAMPING

## 2025-01-27 ASSESSMENT — PAIN DESCRIPTION - ORIENTATION: ORIENTATION: LEFT

## 2025-01-27 ASSESSMENT — PAIN SCALES - GENERAL
PAINLEVEL_OUTOF10: 3
PAINLEVEL_OUTOF10: 4
PAINLEVEL_OUTOF10: 9
PAINLEVEL_OUTOF10: 5 - MODERATE PAIN
PAINLEVEL_OUTOF10: 2
PAINLEVEL_OUTOF10: 3
PAINLEVEL_OUTOF10: 3

## 2025-01-27 ASSESSMENT — PAIN - FUNCTIONAL ASSESSMENT
PAIN_FUNCTIONAL_ASSESSMENT: 0-10

## 2025-01-27 ASSESSMENT — ACTIVITIES OF DAILY LIVING (ADL)
BATHING_WHERE_ASSESSED: SHOWER
HOME_MANAGEMENT_TIME_ENTRY: 55
BATHING_EQUIPMENT_NEEDED: LONG-HANDLED SPONGE;REMOVEABLE SHOWER HEAD
BATHING_LEVEL_OF_ASSISTANCE: MINIMUM ASSISTANCE
HOME_MANAGEMENT_TIME_ENTRY: 40

## 2025-01-27 ASSESSMENT — BRIEF INTERVIEW FOR MENTAL STATUS (BIMS)
GENERAL MEMORY AND RECALL ABILITY: CURRENT SEASON;LOCATION OF OWN ROOM;STAFF NAMES AND FACES;RECOGNIZES APPROPRIATE HEALTHCARE SETTING

## 2025-01-27 ASSESSMENT — PAIN DESCRIPTION - LOCATION: LOCATION: HIP

## 2025-01-27 NOTE — PROGRESS NOTES
" Gabriella Haskins is a 79 y.o. female on day 10 of admission presenting with Closed displaced fracture of left femoral neck.    Subjective   Patient continues to report nightly spasms in left lower extremity near surgical site.  Pain is well-controlled with current regimen.  Endorses pain and swelling in left lower extremity.  Continues to require straight catheterization for retention, not feeling sensation to void on own.  Patient reports prior to this hospitalization, she suffered from urinary leakage and underwent urologic procedure.  Now with consistent urinary retention since admission.  With UTI, treating with Macrobid.      Objective   Physical Exam  Vitals reviewed.   Constitutional:       Appearance: Normal appearance.   HENT:      Head: Atraumatic.   Eyes:      Pupils: Pupils are equal, round, and reactive to light.   Pulmonary:      Effort: Pulmonary effort is normal.      Breath sounds: Normal breath sounds.   Abdominal:      General: Bowel sounds are normal.      Palpations: Abdomen is soft.   Musculoskeletal:      Comments: Flexors and dorsiflexors 5/5 able to do knee extension 4 -/5 in LLE  RLE 5/5 throughout  Sensation intact to light touch in bilateral lower extremities   Skin:     General: Skin is warm and dry.   Neurological:      Mental Status: She is alert and oriented to person, place, and time. Mental status is at baseline.   Psychiatric:         Mood and Affect: Mood normal.         Behavior: Behavior is cooperative.   Function: min assist with ADLs, transfers, and mobility       Last Recorded Vitals  Blood pressure 106/55, pulse 86, temperature 36.6 °C (97.9 °F), resp. rate 17, height 1.676 m (5' 5.98\"), weight 62.8 kg (138 lb 8 oz), SpO2 94%.    Therapy notes from last 24 hours reviewed.    Relevant Results  Scheduled medications  ascorbic acid, 500 mg, oral, Daily  balsam peru-castor oiL, 1 Application, Topical, BID  bisacodyl, 10 mg, rectal, Daily  cetirizine, 10 mg, oral, " Daily  cholecalciferol, 1,000 Units, oral, Daily  [START ON 1/30/2025] cyanocobalamin, 1,000 mcg, intramuscular, q14 days  fluticasone, 1 spray, Each Nostril, Daily  icosapent ethyL, 1,000 mg, oral, Daily  levothyroxine, 100 mcg, oral, Daily  metoprolol tartrate, 25 mg, oral, BID  nitrofurantoin (macrocrystal-monohydrate), 100 mg, oral, q12h ANDREI  polyethylene glycol, 17 g, oral, Daily  rivaroxaban, 20 mg, oral, Daily with evening meal  sennosides, 2 tablet, oral, Nightly  tamsulosin, 0.4 mg, oral, Nightly      Continuous medications     PRN medications  PRN medications: acetaminophen **OR** acetaminophen, bisacodyl, lubricating eye drops, melatonin, methocarbamol, ondansetron **OR** ondansetron, oxyCODONE, oxyCODONE, polyethylene glycol     No results found for this or any previous visit (from the past 24 hours).         Assessment/Plan   This is a 79-year-old female who sustained a left femoral neck fracture, and is now status post hemiarthroplasty on 1/14.  Hospital course complicated by UTI, currently on Macrobid.  Additional complications from urinary retention.  Appreciate urology recommendations for urinary retention and potential home-going [facility].  Assessment & Plan  Closed displaced fracture of left femoral neck  -3 hours of PT and OT daily to improve patient functional mobility so she can safely be discharged home modified independent.  -Estimated length of stay 10 days  - Precautions: WBAT to LLE, fall precautions  - Pain management: Tylenol and oxycodone as needed      Essential hypertension  -Continue metoprolol tartrate 25 mg twice daily  - Was previously taking hydrochlorothiazide 25 mg daily, however this was discontinued due to hypotension  - Recommend follow-up with PCP for medication adjustment  Hypercholesterolemia    Hypothyroidism  -Continue levothyroxine 100 mcg daily    Vitamin D deficiency  -Continue vitamin supplements, including vitamin D, vitamin C, vitamin B12    Fall, initial  encounter  -PT/OT as above  - Continue to work on strength, coordination and balance  - Patient with previous history of subdural hematoma following mechanical fall, previously underwent acute rehab stay 1 year ago    #Left lower extremity edema  - Lower extremity Doppler, rule out DVT    #Spasms  - Discontinue Robaxin, will start Flexeril    1/27  - Vitals stable, labs significant for leukocytosis  - New labs for today, continue to follow follow-up  - Urinalysis consistent with UTI, currently treating with Macrobid  - Continuing to require straight catheterization for urinary retention, will consult urology and appreciate recommendations  - Ortho to follow up tomorrow 1/28 for staple removal and left hip x-ray  - Will discontinue Robaxin and switch to Flexeril for spasm management  - Will obtain Doppler for left lower extremity, to rule out DVT    Chintan Pierce, DO   PM&R, PGY-2

## 2025-01-27 NOTE — PROGRESS NOTES
Occupational Therapy    OT Treatment    Patient Name: Gabriella Haskins  MRN: [id number]  Department: [facility] REHAB  Room: [address] [place] [address]  Today's Date: 1/27/2025  Time Calculation  Start Time: 1300  Stop Time: 1355  Time Calculation (min): 55 min    Assessment:  End of Session Communication: Bedside nurse  End of Session Patient Position: Up in chair, Alarm on     Plan:  Treatment Interventions: ADL retraining, UE strengthening/ROM, Functional transfer training, Endurance training, Patient/family training, Equipment evaluation/education  OT Frequency: 90 min/5 days per week (x 10 days)  OT Discharge Recommendations:  (anticipate possible need for up to min assist with ADLs, transfers, and mobility)  Equipment Recommended upon Discharge: Wheeled walker, Bedside commode (tub bench, sock aid, reacher, dressing stick, LHSH, 3:1 commode)  Treatment Interventions: ADL retraining, UE strengthening/ROM, Functional transfer training, Endurance training, Patient/family training, Equipment evaluation/education    Subjective   Previous Visit Info:  OT Last Visit  OT Received On: 01/27/25  General:  General  Prior to Session Communication: Bedside nurse  Patient Position Received: Up in chair, Alarm on  General Comment: Patient agreeable to OT. RN reports patient will be taken for test to detect DVT, however resident and RN cleared patient for this therapy session.  Precautions:  LE Weight Bearing Status: Weight Bearing as Tolerated  Medical Precautions: Fall precautions  Post-Surgical Precautions: Left hip precautions    Pain:  Pain Assessment  Pain Assessment: 0-10  0-10 (Numeric) Pain Score: 3  Pain Type: Surgical pain  Pain Location: Hip  Pain Orientation: Left  Pain Interventions: Ambulation/increased activity, Repositioned, Distraction, Shower    Activities of Daily Living: UE Bathing  UE Bathing Adaptive Equipment: Long-handled sponge, Removeable shower head  UE Bathing Level of Assistance: Setup  UE Bathing Where Assessed: Shower  (Seated on shower bench)    LE Bathing  LE Bathing Adaptive Equipment: Long-handled sponge, Removeable shower head  LE Bathing Level of Assistance: Minimum assistance  LE Bathing Where Assessed: Shower (Seated on shower bench)  LE Bathing Comments: Requires assitance drying bilat feet. Moderate cues for adherence to hip precautions during bathing task/compensatory technique    UE Dressing  UE Dressing Level of Assistance: Setup  UE Dressing Where Assessed: Wheelchair    LE Dressing  LE Dressing: Yes  Pants Level of Assistance:  (Dons underwear/pants over feet using reacher with supervision however mod cues during task,pulls up over hips with SBA.)   Sock Level of Assistance:  (Doffs socks using dressing stick with supervision.)    Bed Mobility/Transfers: Transfers  Transfer: Yes  Transfer 1  Trials/Comments 1: Sit to stand transfers with SBA  Transfers 2  Trials/Comments 2: pivot transfer via ww from wheelchair <> toilet with SBA    Toilet Transfers  Toilet Transfers Comments: on and off toilet with SBA  Shower Transfers  Shower Transfers Comments: stand pivot via ww from wheelchair to shower bench with CGA  EDUCATION:  Education  Individual(s) Educated: Patient  Education Provided: Diagnosis & Precautions, Fall precautons, Risk and benefits of OT discussed with patient or other, POC discussed and agreed upon  Education Comment: Education provided on bathing a/e for adherence to hip precautions. Continued review on lower body dressing a/e.    Goals:  Encounter Problems       Encounter Problems (Active)       OT Problem       LTG - Patient will complete grooming with independence. (Progressing)       Start:  01/17/25    Expected End:  01/29/25            LTG - Patient will complete toileting with min assist. (Progressing)       Start:  01/17/25    Expected End:  01/29/25            LTG - Patient will complete bathing with min assist. (Progressing)       Start:  01/17/25    Expected End:  01/29/25            LTG -  Patient will complete UE dressing with supervision. (Progressing)       Start:  01/17/25    Expected End:  01/29/25            LTG - Patient will complete LE dressing using adaptive equip as needed with min assist. (Progressing)       Start:  01/17/25    Expected End:  01/29/25            LTG - Patient will complete commode transfer via device as needed with min assist. (Progressing)       Start:  01/17/25    Expected End:  01/29/25            LTG - Patient will complete tub/shower transfer using DME as needed with min/mod assist. (Progressing)       Start:  01/17/25    Expected End:  01/29/25            LTG - Patient will complete functional mobility via device as needed with min assist. (Progressing)       Start:  01/17/25    Expected End:  01/29/25            STG - Patient will complete toileting with mod assist. (Progressing)       Start:  01/17/25    Expected End:  01/29/25            STG - Patient will complete bathing with mod assist. (Met)       Start:  01/17/25    Expected End:  01/24/25    Resolved:  01/22/25    Updated to: STG - Patient will complete bathing with min assist.         STG - Patient will complete UE dressing with sba. (Met)       Start:  01/17/25    Expected End:  01/24/25    Resolved:  01/22/25    Updated to: STG - Patient will complete UE dressing with set up         STG - Patient will complete LE dressing using adaptive equip as needed with mod assist. (Progressing)       Start:  01/17/25    Expected End:  01/29/25            STG - Patient will complete commode transfer via device as needed with mod assist. (Progressing)       Start:  01/17/25    Expected End:  01/29/25            STG - Patient will complete simple functional mobility via device as needed with mod assist. (Progressing)       Start:  01/17/25    Expected End:  01/29/25            STG - Patient will complete bathing with min assist. (Progressing)       Start:  01/22/25    Expected End:  01/29/25                STG - Patient  will complete UE dressing with set up (Progressing)       Start:  01/22/25    Expected End:  01/29/25

## 2025-01-27 NOTE — PROGRESS NOTES
Physical Therapy    Physical Therapy Treatment    Patient Name: Gabriella Haskins  MRN: [id number]  Department: [facility] REH[facility]  Room: [facility]  Today's Date: 1/27/2025  Time Calculation  Start Time: 1045  Stop Time: 1130  Time Calculation (min): 45 min         Assessment/Plan   PT Assessment  End of Session Communication: Bedside nurse  End of Session Patient Position: Up in chair, Alarm on (call light and needs within reach.)     PT Plan  Treatment/Interventions: Bed mobility, Transfer training, Gait training, Stair training, Balance training, Strengthening, Therapeutic exercise, Therapeutic activity  PT Plan: Ongoing PT  PT Frequency: 90 min/5 days per week (10 days)  PT Discharge Recommendations:  (Anticipate discharge home with the need for intermittent min assist at the walker level.)  Equipment Recommended upon Discharge: Wheeled walker  PT Recommended Transfer Status: Assist x2      General Visit Information:   PT  Visit  PT Received On: 01/27/25  General  Prior to Session Communication: Bedside nurse  Patient Position Received: Up in chair, Alarm on  General Comment: Patient pleasant and agreeable to therapy.    Subjective   Precautions:  Precautions  LE Weight Bearing Status: Weight Bearing as Tolerated  Medical Precautions: Fall precautions  Post-Surgical Precautions: Left hip precautions    Objective   Pain:  Pain Assessment  Pain Assessment: 0-10  0-10 (Numeric) Pain Score: 3  Pain Location: Hip  Pain Orientation: Left  Pain Interventions: Distraction  Cognition:  Cognition  Overall Cognitive Status: Within Functional Limits  Treatments:  Therapeutic Exercise  Therapeutic Exercise Performed: Yes  Patient performed seated therex, B LE: APs, hip flexion, LAQ, heel slides, hip add ball squeeze, and GS all 2 [id number]. Done to increase B LE strength to improve overall functional mobility and independence     Ambulation/Gait Training  Ambulation/Gait Training Performed: Yes  Patient ambulated ~100ft and ~150ft with  CGA/Caro x1 and FWW for support.     Transfers  Transfer: Yes  Patient performed sit<>stand with CGA x1. Cues for hand placement and sequencing.     Stairs  Stairs: Yes  Patient performed 5 steps up/down x2 trial with B HR and Caro x1. Cues for sequencing.      Object From Floor  Devices: Reacher, Rolling walker  Assist Level: Minimum assist  Comments: utilizing reacher and FWW for support patient able to  2 rings from ground with Caro x1. Cues for body mechanics and alignment with object.      Education Comments  Educated patient on transfer training, gait training, and stair training techniques to maximize safety and independence.         OP EDUCATION:       Encounter Problems       Encounter Problems (Active)       PT Problem       STG - Bed mobility with min/mod assist x2. (Progressing)       Start:  01/17/25    Expected End:  01/23/25            STG - Transfers with mod assist x1. (Progressing)       Start:  01/17/25    Expected End:  01/23/25            STG - Pt will amb 30 ft with wheeled walker and mod assist x1.  (Met)       Start:  01/17/25    Expected End:  01/23/25    Resolved:  01/22/25         STG - Pt will negotiate one step with rails and min/mod assist x2. (Progressing)       Start:  01/17/25    Expected End:  01/23/25            LTG - Bed mobility with min assist x1. (Progressing)       Start:  01/17/25    Expected End:  01/28/25            LTG - Transfers with min assist x1. (Progressing)       Start:  01/17/25    Expected End:  01/28/25            LTG - Pt will amb 50 ft with wheeled walker and min assist. (Progressing)       Start:  01/17/25    Expected End:  01/28/25            LTG - Pt will negotiate 5 stairs with rails and min/mod assist x1. (Progressing)       Start:  01/17/25    Expected End:  01/28/25               Pain - Adult               decreased strength/decr endurance/impaired postural control

## 2025-01-27 NOTE — ASSESSMENT & PLAN NOTE
-PT/OT as above  - Continue to work on strength, coordination and balance  - Patient with previous history of subdural hematoma following mechanical fall, previously underwent acute rehab stay 1 year ago    #Left lower extremity edema  - Lower extremity Doppler, rule out DVT    #Spasms  - Discontinue Robaxin, will start Flexeril

## 2025-01-27 NOTE — PROGRESS NOTES
"Nutrition Follow Up Assessment:     Nutrition History:  Energy Intake: Fair 50-75 %, Good > 75 %  Pain affecting nutrition status: N/A  Food and Nutrient History: Pt sitting in chair in room at visit.  Pt notes \"I think I am eating good\".  Per review of EMR, pt eating mostly 75% of meals.  Pt reports she is not drinking the Ensure and feels as though she is eat enough at meals.  Pt is POD#13 L hip hemiarthroplasty.  Last BM 1/26  Vitamin/Herbal Supplement Use: vitamin C, vitamin D3       Anthropometrics:  Height: 167.6 cm (5' 5.98\")   Weight: 62.8 kg (138 lb 8 oz)   BMI (Calculated): 22.37  IBW/kg (Dietitian Calculated): 59.1 kg          Weight History:     Weight Change %:  Weight History / % Weight Change: pt reweighed 1/21 at 62.8kg which is more in line with weight hx.  Significant Weight Loss: No    Nutrition Focused Physical Exam Findings:    Subcutaneous Fat Loss:   Orbital Fat Pads: Well nourished (slightly bulging fat pads)  Buccal Fat Pads: Well nourished (full, rounded cheeks)  Triceps: Defer  Ribs: Defer  Muscle Wasting:  Temporalis: Well nourished (well-defined muscle)  Pectoralis (Clavicular Region): Mild-Moderate (some protrusion of clavicle) (mild)  Deltoid/Trapezius: Mild-Moderate (slight protrusion of acromion process) (mild)  Interosseous: Defer  Trapezius/Infraspinatus/Supraspinatus (Scapular Region): Defer  Quadriceps: Defer  Gastrocnemius: Defer  Edema:  Edema: +1 trace  Edema Location: 1+ LLE; non pitting RLE  Physical Findings:  Skin: Positive  Positive Skin Findings: Pressure injury stage 2 (stage 2 L buttock; Red R buttock; incision L leg)  Respiratory : Negative    Nutrition Significant Labs:  BMP Trend:   Results from last 7 days   Lab Units 01/25/25  0748 01/21/25  0654   GLUCOSE mg/dL 113* 102*   CALCIUM mg/dL 9.0 8.6   SODIUM mmol/L 135* 135*   POTASSIUM mmol/L 3.6 4.7   CO2 mmol/L 27 29   CHLORIDE mmol/L 98 97*   BUN mg/dL 10 16   CREATININE mg/dL 0.65 0.60        Nutrition Specific " Medications:  Reviewed     I/O:   Last BM Date: 01/26/25; Stool Appearance: Formed, Soft (01/24/25 1800)    Dietary Orders (From admission, onward)       Start     Ordered    01/17/25 1249  May Participate in Room Service  ( ROOM SERVICE MAY PARTICIPATE)  Once        Question:  .  Answer:  Yes    01/17/25 1248    01/17/25 1055  Adult diet Regular  Diet effective now        Question:  Diet type  Answer:  Regular    01/17/25 1100                     Estimated Needs:      Method for Estimating Needs: 1600-1800kcals (27-30kcals/kg IBW)     Method for Estimating 24 Hour Protein Needs: 70-83g (1.2-1.4g/kg IBW)     Method for Estimating 24 Hour Fluid Needs: 1 mL/kcal or as per MD        Nutrition Diagnosis   Malnutrition Diagnosis  Patient has Malnutrition Diagnosis: No    Nutrition Diagnosis  Patient has Nutrition Diagnosis: Yes  Diagnosis Status (1): Active  Nutrition Diagnosis 1: Increased nutrient needs  Related to (1): physiological causes increasing nutrient needs  As Evidenced by (1): wound healing needs and acute rehab demands  Additional Nutrition Diagnosis: Diagnosis 2  Diagnosis Status (2): Resolved  Nutrition Diagnosis 2: Inadequate oral intake  Related to (2): acute on chronic illness  As Evidenced by (2): avg 54% of meals  Additional Assessment Information (2): pt eating % of meals       Nutrition Interventions/Recommendations   Nutrition prescription for oral nutrition    Nutrition Recommendations:  Individualized Nutrition Prescription Provided for : Regular diet as ordered.  ONS discontinued as pt not drinking    Nutrition Interventions/Goals:   Interventions: Meals and snacks, Vitamin and mineral supplement therapy  Meals and Snacks: General healthful diet  Goal: consume >50->75% of meals--met/ongoing  Vitamin and Mineral Supplement Therapy: Vitamin C supplement therapy, Vitamin D supplement therapy, Vitamin B12 supplement therapy  Goal: take vitamins as ordered daily      Education  Documentation  No documentation found.        Nutrition Monitoring and Evaluation   Food/Nutrient Related History Monitoring  Monitoring and Evaluation Plan: Estimated Energy Intake, Fluid intake, Intake / amount of food  Estimated Energy Intake: Energy intake 50 -75% of estimated energy needs, Energy intake greater or equal to 75% of estimated energy needs  Fluid Intake: Estimated fluid intake    Anthropometric Measurements  Monitoring and Evaluation Plan: Body weight  Body Weight: Body weight - Maintain stable weight    Biochemical Data, Medical Tests and Procedures  Monitoring and Evaluation Plan: Electrolyte/renal panel  Electrolyte and Renal Panel: Electrolytes within normal limits, Sodium    Physical Exam Findings  Monitoring and Evaluation Plan: Skin  Criteria: promote healing through adequate nutrition    Goal Status: Some progress toward goal(s)    Time Spent (min): 30 minutes

## 2025-01-27 NOTE — PROGRESS NOTES
Physical Therapy    Physical Therapy Treatment    Patient Name: Gabriella Haskins  MRN: [id number]  Department: [facility] REHAB  Room: [address] [place] [address]  Today's Date: 1/27/2025  Time Calculation  Start Time: 0830  Stop Time: 0915  Time Calculation (min): 45 min         Assessment/Plan   PT Assessment  End of Session Communication:  (Resident doctor.)  End of Session Patient Position: Up in chair, Alarm on (Seated in w/c, call bell in reach.)     PT Plan  Treatment/Interventions: Bed mobility, Transfer training, Gait training, Stair training, Balance training, Strengthening, Therapeutic exercise, Therapeutic activity  PT Plan: Ongoing PT  PT Frequency: 90 min/5 days per week (10 days)  PT Discharge Recommendations:  (Anticipate discharge home with the need for intermittent min assist at the walker level.)  Equipment Recommended upon Discharge: Wheeled walker  PT Recommended Transfer Status: Assist x2      General Visit Information:   PT  Visit  PT Received On: 01/27/25  General  Prior to Session Communication:  (OT)  Patient Position Received: Up in chair, Alarm on  General Comment:  (Pt pleasant and agreeable to PT session.)    Subjective   Precautions:  Precautions  LE Weight Bearing Status: Weight Bearing as Tolerated  Medical Precautions: Fall precautions  Post-Surgical Precautions: Left hip precautions  Precautions Comment: Pt requires Min v/c for hip precautions.     Date/Time Vitals Session Patient Position Pulse Resp SpO2 BP MAP (mmHg)    01/27/25 0836 --  --  86  --  --  106/55  --                 Objective   Pain:  Pain Assessment  Pain Assessment: 0-10  0-10 (Numeric) Pain Score: 2  Pain Type: Surgical pain  Pain Location: Hip  Pain Orientation: Left  Pain Descriptors: Cramping  Pain Interventions: Repositioned, Distraction (Nursing admin pt pain meds at beginning of PT session.)              Treatments:  Bed Mobility  Bed Mobility: Yes (Pt performed sit<>supine from R side of bed x2 trials on ADL bed, with  bridging/scooting L/R with CGA. Pt demos BUE self assist with LLE in and RLE hook LLE assist out in order to simulate technique in home environment.)  Bed Mobility 1  Bed Mobility 1: Scooting (R/L along ADL EOB x2 trials with CGA/SBA)    Ambulation/Gait Training  Ambulation/Gait Training Performed: Yes (Pt able to amb 75'x2 with FWW and Caro/CGAx1 over tile/threshold/carpet plus w/c follow; demos slow, steady pace with good foot clearance, step-to/partial step-through gait pattern.  Pt performed side-stepping along EOB R/L ~3 ft x 2 trials each with FWW and CGA/MinAx1 Denies dizziness and no LOB noted.)  Transfers  Transfer: Yes (Sit<>stand from w/c and from EOB ADL bed with FWW and CGA/MinAx1; Min v/c for proper hand placement safety.)    Education Documentation  No documentation found.  Education Comments  Patient educated in correct bed mobility with instruction for proper trunk positioning, upper and lower body placement and positioning and use of bed rail if appropriate.  Patient educated in safe and proper transfer techniques including proper positioning and hand/foot placement to maximize safety and functional independence.  Patient educated in safe techniques for gait with a device in order to maximize safety and functional independence.          OP EDUCATION:       Encounter Problems       Encounter Problems (Active)       PT Problem       STG - Bed mobility with min/mod assist x2. (Progressing)       Start:  01/17/25    Expected End:  01/23/25            STG - Transfers with mod assist x1. (Progressing)       Start:  01/17/25    Expected End:  01/23/25            STG - Pt will amb 30 ft with wheeled walker and mod assist x1.  (Met)       Start:  01/17/25    Expected End:  01/23/25    Resolved:  01/22/25         STG - Pt will negotiate one step with rails and min/mod assist x2. (Progressing)       Start:  01/17/25    Expected End:  01/23/25            LTG - Bed mobility with min assist x1. (Progressing)        Start:  01/17/25    Expected End:  01/28/25            LTG - Transfers with min assist x1. (Progressing)       Start:  01/17/25    Expected End:  01/28/25            LTG - Pt will amb 50 ft with wheeled walker and min assist. (Progressing)       Start:  01/17/25    Expected End:  01/28/25            LTG - Pt will negotiate 5 stairs with rails and min/mod assist x1. (Progressing)       Start:  01/17/25    Expected End:  01/28/25               Pain - Adult

## 2025-01-27 NOTE — PROGRESS NOTES
Occupational Therapy    OT Treatment    Patient Name: Gabriella Haskins  MRN: [id number]  Department: [facility] REHAB  Room: [address] [place] [address]  Today's Date: 1/27/2025  Time Calculation  Start Time: 0750  Stop Time: 0830  Time Calculation (min): 40 min        Assessment:  End of Session Communication: Bedside nurse  End of Session Patient Position: Up in chair, Alarm on     Plan:  Treatment Interventions: ADL retraining, UE strengthening/ROM, Functional transfer training, Endurance training, Patient/family training, Equipment evaluation/education  OT Frequency: 90 min/5 days per week (x 10 days)  OT Discharge Recommendations:  (anticipate possible need for up to min assist with ADLs, transfers, and mobility)  Equipment Recommended upon Discharge: Wheeled walker, Bedside commode (tub bench, sock aid, reacher, dressing stick, LHSH, 3:1 commode)  Treatment Interventions: ADL retraining, UE strengthening/ROM, Functional transfer training, Endurance training, Patient/family training, Equipment evaluation/education    Subjective   Previous Visit Info:  OT Last Visit  OT Received On: 01/27/25  General:  General  Prior to Session Communication: Bedside nurse  Patient Position Received: Up in chair, Alarm on  General Comment: Patient agreeable to OT session. Reports feeling better today, and had spasms during the night however they have stopped.  Precautions:  LE Weight Bearing Status: Weight Bearing as Tolerated  Medical Precautions: Fall precautions  Post-Surgical Precautions: Left hip precautions    Pain:  Pain Assessment  Pain Assessment: 0-10  0-10 (Numeric) Pain Score: 4  Pain Type: Surgical pain  Pain Location: Hip  Pain Orientation: Left  Pain Interventions: Cold applied, Medication (See MAR), Ambulation/increased activity, Distraction    Activities of Daily Living: Grooming  Grooming Comments: Standing at ww at sink performs oral care with SBA tolerating ~4 minutes. Cues for adherence to THR precautions.    Toileting  Where  Assessed: Toilet  Toileting Comments: Standing at ww performs hygiene/clothing mgmt with CGA/SBA after BM standing at ww however requires cues for adherence to THR precautions.    Bed Mobility/Transfers: Transfers  Transfer: Yes  Transfer 1  Trials/Comments 1: Sit to stand transfers with CGA    Toilet Transfers  Toilet Transfers Comments: on and off commode with CGA    Functional Mobility:  Functional Mobility  Functional Mobility Performed: Yes  Functional Mobility 1  Comments 1: Simple functional mobility via ww from wheelchair>toilet>sink>wheelchair with CGA/SBA  EDUCATION:   Patient; Diagnosis & Precautions;Fall precautons;Risk and benefits of OT discussed with patient or other;POC discussed and agreed upon; Continued education provided on THR precautions despite prior review, hand out on patient's bedside table, pt recalls 2/3 precautions and struggles to apply in real time. Continued education provided on lower body dressing a/e for adherence to precautions. Education provided on safety at home going as pt notes she may be able to furniture walk in home in tight areas where walker does not fit.     Goals:  Encounter Problems       Encounter Problems (Active)       OT Problem       LTG - Patient will complete grooming with independence. (Progressing)       Start:  01/17/25    Expected End:  01/29/25            LTG - Patient will complete toileting with min assist. (Progressing)       Start:  01/17/25    Expected End:  01/29/25            LTG - Patient will complete bathing with min assist. (Progressing)       Start:  01/17/25    Expected End:  01/29/25            LTG - Patient will complete UE dressing with supervision. (Progressing)       Start:  01/17/25    Expected End:  01/29/25            LTG - Patient will complete LE dressing using adaptive equip as needed with min assist. (Progressing)       Start:  01/17/25    Expected End:  01/29/25            LTG - Patient will complete commode transfer via device as  needed with min assist. (Progressing)       Start:  01/17/25    Expected End:  01/29/25            LTG - Patient will complete tub/shower transfer using DME as needed with min/mod assist. (Progressing)       Start:  01/17/25    Expected End:  01/29/25            LTG - Patient will complete functional mobility via device as needed with min assist. (Progressing)       Start:  01/17/25    Expected End:  01/29/25            STG - Patient will complete toileting with mod assist. (Progressing)       Start:  01/17/25    Expected End:  01/29/25            STG - Patient will complete bathing with mod assist. (Met)       Start:  01/17/25    Expected End:  01/24/25    Resolved:  01/22/25    Updated to: STG - Patient will complete bathing with min assist.         STG - Patient will complete UE dressing with sba. (Met)       Start:  01/17/25    Expected End:  01/24/25    Resolved:  01/22/25    Updated to: STG - Patient will complete UE dressing with set up         STG - Patient will complete LE dressing using adaptive equip as needed with mod assist. (Progressing)       Start:  01/17/25    Expected End:  01/29/25            STG - Patient will complete commode transfer via device as needed with mod assist. (Progressing)       Start:  01/17/25    Expected End:  01/29/25            STG - Patient will complete simple functional mobility via device as needed with mod assist. (Progressing)       Start:  01/17/25    Expected End:  01/29/25            STG - Patient will complete bathing with min assist. (Progressing)       Start:  01/22/25    Expected End:  01/29/25                STG - Patient will complete UE dressing with set up (Progressing)       Start:  01/22/25    Expected End:  01/29/25

## 2025-01-27 NOTE — CARE PLAN
The patient's goals for the shift include participate in scheduled therapy      The clinical goals for the shift include pain control        Problem: Pain - Adult  Goal: Verbalizes/displays adequate comfort level or baseline comfort level  Outcome: Progressing     Problem: Discharge Planning  Goal: Discharge to home or other facility with appropriate resources  Outcome: Progressing     Problem: Chronic Conditions and Co-morbidities  Goal: Patient's chronic conditions and co-morbidity symptoms are monitored and maintained or improved  Outcome: Progressing     Problem: Skin  Goal: Decreased wound size/increased tissue granulation at next dressing change  Outcome: Progressing  Flowsheets (Taken 1/27/2025 0932)  Decreased wound size/increased tissue granulation at next dressing change:   Promote sleep for wound healing   Protective dressings over bony prominences  Goal: Participates in plan/prevention/treatment measures  Outcome: Progressing  Flowsheets (Taken 1/27/2025 0932)  Participates in plan/prevention/treatment measures:   Elevate heels   Increase activity/out of bed for meals  Goal: Promote/optimize nutrition  Outcome: Progressing  Flowsheets (Taken 1/27/2025 0932)  Promote/optimize nutrition:   Monitor/record intake including meals   Consume > 50% meals/supplements   Offer water/supplements/favorite foods  Goal: Promote skin healing  Outcome: Progressing  Flowsheets (Taken 1/27/2025 0932)  Promote skin healing: Turn/reposition every 2 hours/use positioning/transfer devices     Problem: Pain  Goal: Takes deep breaths with improved pain control throughout the shift  Outcome: Progressing  Goal: Turns in bed with improved pain control throughout the shift  Outcome: Progressing  Goal: Walks with improved pain control throughout the shift  Outcome: Progressing  Goal: Performs ADL's with improved pain control throughout shift  Outcome: Progressing  Goal: Participates in PT with improved pain control throughout the  shift  Outcome: Progressing  Goal: Free from opioid side effects throughout the shift  Outcome: Progressing  Goal: Free from acute confusion related to pain meds throughout the shift  Outcome: Progressing     Problem: Nutrition  Goal: Oral intake greater than 50%  Outcome: Progressing  Goal: Oral intake greater 75%  Outcome: Progressing  Goal: Consume prescribed supplement  Outcome: Progressing  Goal: Adequate PO fluid intake  Outcome: Progressing  Goal: Lab values WNL  Outcome: Progressing  Goal: Promote healing  Outcome: Progressing  Goal: Gradual weight gain  Outcome: Progressing

## 2025-01-28 LAB
ANION GAP SERPL CALC-SCNC: 14 MMOL/L (ref 10–20)
BUN SERPL-MCNC: 10 MG/DL (ref 6–23)
CALCIUM SERPL-MCNC: 8.8 MG/DL (ref 8.6–10.3)
CHLORIDE SERPL-SCNC: 103 MMOL/L (ref 98–107)
CO2 SERPL-SCNC: 26 MMOL/L (ref 21–32)
CREAT SERPL-MCNC: 0.67 MG/DL (ref 0.5–1.05)
EGFRCR SERPLBLD CKD-EPI 2021: 90 ML/MIN/1.73M*2
GLUCOSE SERPL-MCNC: 93 MG/DL (ref 74–99)
HOLD SPECIMEN: NORMAL
POTASSIUM SERPL-SCNC: 4.5 MMOL/L (ref 3.5–5.3)
SODIUM SERPL-SCNC: 138 MMOL/L (ref 136–145)

## 2025-01-28 PROCEDURE — 2500000001 HC RX 250 WO HCPCS SELF ADMINISTERED DRUGS (ALT 637 FOR MEDICARE OP)

## 2025-01-28 PROCEDURE — 97535 SELF CARE MNGMENT TRAINING: CPT | Mod: GO,CO

## 2025-01-28 PROCEDURE — 99232 SBSQ HOSP IP/OBS MODERATE 35: CPT

## 2025-01-28 PROCEDURE — 80048 BASIC METABOLIC PNL TOTAL CA: CPT | Performed by: PHYSICAL MEDICINE & REHABILITATION

## 2025-01-28 PROCEDURE — 1280000001 HC REHAB SEMI-PRIVATE ROOM DAILY

## 2025-01-28 PROCEDURE — 2500000002 HC RX 250 W HCPCS SELF ADMINISTERED DRUGS (ALT 637 FOR MEDICARE OP, ALT 636 FOR OP/ED): Performed by: PHYSICAL MEDICINE & REHABILITATION

## 2025-01-28 PROCEDURE — 97530 THERAPEUTIC ACTIVITIES: CPT | Mod: GP,CQ

## 2025-01-28 PROCEDURE — 36415 COLL VENOUS BLD VENIPUNCTURE: CPT | Performed by: PHYSICAL MEDICINE & REHABILITATION

## 2025-01-28 PROCEDURE — 99222 1ST HOSP IP/OBS MODERATE 55: CPT | Performed by: NURSE PRACTITIONER

## 2025-01-28 PROCEDURE — 2500000004 HC RX 250 GENERAL PHARMACY W/ HCPCS (ALT 636 FOR OP/ED)

## 2025-01-28 PROCEDURE — 51701 INSERT BLADDER CATHETER: CPT

## 2025-01-28 PROCEDURE — 97116 GAIT TRAINING THERAPY: CPT | Mod: GP

## 2025-01-28 PROCEDURE — 97530 THERAPEUTIC ACTIVITIES: CPT | Mod: GO,CO

## 2025-01-28 PROCEDURE — 2500000002 HC RX 250 W HCPCS SELF ADMINISTERED DRUGS (ALT 637 FOR MEDICARE OP, ALT 636 FOR OP/ED)

## 2025-01-28 RX ADMIN — CYCLOBENZAPRINE 5 MG: 10 TABLET, FILM COATED ORAL at 19:54

## 2025-01-28 RX ADMIN — POLYETHYLENE GLYCOL 3350 17 G: 17 POWDER, FOR SOLUTION ORAL at 08:46

## 2025-01-28 RX ADMIN — RIVAROXABAN 20 MG: 20 TABLET, FILM COATED ORAL at 17:31

## 2025-01-28 RX ADMIN — TAMSULOSIN HYDROCHLORIDE 0.4 MG: 0.4 CAPSULE ORAL at 21:10

## 2025-01-28 RX ADMIN — METOPROLOL TARTRATE 25 MG: 25 TABLET, FILM COATED ORAL at 13:20

## 2025-01-28 RX ADMIN — NITROFURANTOIN MONOHYDRATE/MACROCRYSTALLINE 100 MG: 25; 75 CAPSULE ORAL at 08:47

## 2025-01-28 RX ADMIN — ICOSAPENT ETHYL 1000 MG: 1 CAPSULE ORAL at 08:46

## 2025-01-28 RX ADMIN — FLUTICASONE PROPIONATE 1 SPRAY: 50 SPRAY, METERED NASAL at 13:20

## 2025-01-28 RX ADMIN — LEVOTHYROXINE SODIUM 100 MCG: 0.1 TABLET ORAL at 06:14

## 2025-01-28 RX ADMIN — CASTOR OIL AND BALSAM, PERU 1 APPLICATION: 788; 87 OINTMENT TOPICAL at 21:14

## 2025-01-28 RX ADMIN — ACETAMINOPHEN 650 MG: 325 TABLET, FILM COATED ORAL at 06:14

## 2025-01-28 RX ADMIN — CASTOR OIL AND BALSAM, PERU 1 APPLICATION: 788; 87 OINTMENT TOPICAL at 08:57

## 2025-01-28 RX ADMIN — CYCLOBENZAPRINE 5 MG: 10 TABLET, FILM COATED ORAL at 03:17

## 2025-01-28 RX ADMIN — Medication 1000 UNITS: at 08:47

## 2025-01-28 RX ADMIN — NITROFURANTOIN MONOHYDRATE/MACROCRYSTALLINE 100 MG: 25; 75 CAPSULE ORAL at 21:10

## 2025-01-28 RX ADMIN — Medication 500 MG: at 08:47

## 2025-01-28 RX ADMIN — OXYCODONE HYDROCHLORIDE 5 MG: 5 TABLET ORAL at 04:23

## 2025-01-28 RX ADMIN — ACETAMINOPHEN 650 MG: 325 TABLET, FILM COATED ORAL at 19:54

## 2025-01-28 RX ADMIN — CETIRIZINE HYDROCHLORIDE 10 MG: 10 TABLET, FILM COATED ORAL at 08:47

## 2025-01-28 RX ADMIN — METOPROLOL TARTRATE 25 MG: 25 TABLET, FILM COATED ORAL at 21:10

## 2025-01-28 ASSESSMENT — PAIN - FUNCTIONAL ASSESSMENT
PAIN_FUNCTIONAL_ASSESSMENT: 0-10

## 2025-01-28 ASSESSMENT — ACTIVITIES OF DAILY LIVING (ADL)
HOME_MANAGEMENT_TIME_ENTRY: 28
HOME_MANAGEMENT_TIME_ENTRY: 30

## 2025-01-28 ASSESSMENT — PAIN SCALES - GENERAL
PAINLEVEL_OUTOF10: 8
PAINLEVEL_OUTOF10: 3
PAINLEVEL_OUTOF10: 7
PAINLEVEL_OUTOF10: 4
PAINLEVEL_OUTOF10: 4
PAINLEVEL_OUTOF10: 2
PAINLEVEL_OUTOF10: 5 - MODERATE PAIN
PAINLEVEL_OUTOF10: 3
PAINLEVEL_OUTOF10: 4
PAINLEVEL_OUTOF10: 4

## 2025-01-28 ASSESSMENT — PAIN DESCRIPTION - LOCATION: LOCATION: LEG

## 2025-01-28 ASSESSMENT — PAIN DESCRIPTION - ORIENTATION: ORIENTATION: RIGHT;LEFT

## 2025-01-28 ASSESSMENT — ENCOUNTER SYMPTOMS
HEMATURIA: 0
DYSURIA: 0
DIFFICULTY URINATING: 1
FLANK PAIN: 0
FREQUENCY: 0

## 2025-01-28 NOTE — CONSULTS
Inpatient consult to Urology  Consult performed by: Aliza Gould, ERMA-CNP  Consult ordered by: Sirisha Soto MD  Reason for consult: Acute urinary retention          Reason For Consult  Acute urinary retention     History Of Present Illness  Gabriella Haskins is a 79 y.o. female with PMH HTN, HLD, hypothyroidism, A. Fib, Vit D deficiency. She was admitted for left femoral neck fracture due to fall and is s/p hemiarthroplasty on 1/14. She is in acute rehab for PT/OT.    She had a mejia catheter placed on 1/19 due to urinary retention and this was removed 1/21. She has since required straight catheterizations due to inability to empty bladder completely or not able to void. She is being treated for Culture + E. Coli and susceptible to Macrobid. She was also started on Flomax on 1/20. She has required a mejia catheter in the past for urinary retention when she was hospitalized.     She has followed with [facility] in the past but wishes to transfer care to  Urology. She was evaluated by [facility] in 2023 for a bladder infection. She then completed Urodynamics but is unsure of the results and then had a bladder suspension. The bladder suspension helped with nocturia. Her last UTI was in 2023. She denies any current or previous dysuria, frequency, urgency, hematuria or incontinence.      Past Medical History  She has a past medical history of Arthritis, History of transfusion, and Lung nodule.    Surgical History  She has a past surgical history that includes Shoulder surgery.     Social History  She reports that she has never smoked. She has never used smokeless tobacco. She reports that she does not currently use alcohol. She reports that she does not use drugs.    Family History  No family history on file.     Allergies  Azithromycin, Codeine, Conjugated estrogens, Erythromycin, Ibuprofen, Lisinopril, Cephalexin, Doxycycline, Indomethacin, Bactrim  "[sulfamethoxazole-trimethoprim], and Amoxicillin    Review of Systems   Genitourinary:  Positive for difficulty urinating. Negative for dysuria, flank pain, frequency, hematuria and urgency.        Physical Exam  Pulmonary:      Effort: Pulmonary effort is normal.   Neurological:      General: No focal deficit present.      Mental Status: She is alert and oriented to person, place, and time.          Last Recorded Vitals  Blood pressure 140/65, pulse 74, temperature 36.8 °C (98.2 °F), temperature source Temporal, resp. rate 14, height 1.676 m (5' 5.98\"), weight 62.8 kg (138 lb 8 oz), SpO2 95%.    Relevant Results  Scheduled medications  ascorbic acid, 500 mg, oral, Daily  balsam peru-castor oiL, 1 Application, Topical, BID  bisacodyl, 10 mg, rectal, Daily  cetirizine, 10 mg, oral, Daily  cholecalciferol, 1,000 Units, oral, Daily  [START ON 1/30/2025] cyanocobalamin, 1,000 mcg, intramuscular, q14 days  fluticasone, 1 spray, Each Nostril, Daily  icosapent ethyL, 1,000 mg, oral, Daily  levothyroxine, 100 mcg, oral, Daily  metoprolol tartrate, 25 mg, oral, BID  nitrofurantoin (macrocrystal-monohydrate), 100 mg, oral, q12h ANDREI  polyethylene glycol, 17 g, oral, Daily  rivaroxaban, 20 mg, oral, Daily with evening meal  sennosides, 2 tablet, oral, Nightly  tamsulosin, 0.4 mg, oral, Nightly      Continuous medications     PRN medications  PRN medications: acetaminophen **OR** acetaminophen, bisacodyl, cyclobenzaprine, lubricating eye drops, melatonin, ondansetron **OR** ondansetron, oxyCODONE, oxyCODONE, polyethylene glycol   Results for orders placed or performed during the hospital encounter of 01/17/25 (from the past 24 hours)   CBC and Auto Differential   Result Value Ref Range    WBC 12.3 (H) 4.4 - 11.3 x10*3/uL    nRBC 0.0 0.0 - 0.0 /100 WBCs    RBC 3.24 (L) 4.00 - 5.20 x10*6/uL    Hemoglobin 9.1 (L) 12.0 - 16.0 g/dL    Hematocrit 28.9 (L) 36.0 - 46.0 %    MCV 89 80 - 100 fL    MCH 28.1 26.0 - 34.0 pg    MCHC 31.5 (L) " 32.0 - 36.0 g/dL    RDW 14.2 11.5 - 14.5 %    Platelets 450 150 - 450 x10*3/uL    Immature Granulocytes %, Automated 0.8 0.0 - 0.9 %    Immature Granulocytes Absolute, Automated 0.10 0.00 - 0.50 x10*3/uL   Comprehensive Metabolic Panel   Result Value Ref Range    Glucose 80 74 - 99 mg/dL    Sodium 136 136 - 145 mmol/L    Potassium 3.2 (L) 3.5 - 5.3 mmol/L    Chloride 99 98 - 107 mmol/L    Bicarbonate 29 21 - 32 mmol/L    Anion Gap 11 10 - 20 mmol/L    Urea Nitrogen 13 6 - 23 mg/dL    Creatinine 0.74 0.50 - 1.05 mg/dL    eGFR 83 >60 mL/min/1.73m*2    Calcium 9.3 8.6 - 10.3 mg/dL    Albumin 3.4 3.4 - 5.0 g/dL    Alkaline Phosphatase 52 33 - 136 U/L    Total Protein 6.5 6.4 - 8.2 g/dL    AST 11 9 - 39 U/L    Bilirubin, Total 0.6 0.0 - 1.2 mg/dL    ALT 7 7 - 45 U/L   Manual Differential   Result Value Ref Range    Neutrophils %, Manual 76.0 40.0 - 80.0 %    Bands %, Manual 2.0 0.0 - 5.0 %    Lymphocytes %, Manual 14.0 13.0 - 44.0 %    Monocytes %, Manual 5.0 2.0 - 10.0 %    Eosinophils %, Manual 1.0 0.0 - 6.0 %    Basophils %, Manual 2.0 0.0 - 2.0 %    Metamyelocytes %, Manual 0.0 0.0 - 0.0 %    Seg Neutrophils Absolute, Manual 9.35 (H) 1.60 - 5.00 x10*3/uL    Bands Absolute, Manual 0.25 0.00 - 0.50 x10*3/uL    Lymphocytes Absolute, Manual 1.72 0.80 - 3.00 x10*3/uL    Monocytes Absolute, Manual 0.62 0.05 - 0.80 x10*3/uL    Eosinophils Absolute, Manual 0.12 0.00 - 0.40 x10*3/uL    Basophils Absolute, Manual 0.25 (H) 0.00 - 0.10 x10*3/uL    Metamyelocytes Absolute, Manual 0.00 0.00 - 0.00 x10*3/uL    Total Cells Counted 100     Neutrophils Absolute, Manual 9.60 (H) 1.60 - 5.50 x10*3/uL    RBC Morphology See Below     Polychromasia Mild     Ovalocytes Few     Acanthocytes None    Lower extremity venous duplex left   Result Value Ref Range    BSA 1.71 m2   Basic Metabolic Panel   Result Value Ref Range    Glucose 93 74 - 99 mg/dL    Sodium 138 136 - 145 mmol/L    Potassium 4.5 3.5 - 5.3 mmol/L    Chloride 103 98 - 107  mmol/L    Bicarbonate 26 21 - 32 mmol/L    Anion Gap 14 10 - 20 mmol/L    Urea Nitrogen 10 6 - 23 mg/dL    Creatinine 0.67 0.50 - 1.05 mg/dL    eGFR 90 >60 mL/min/1.73m*2    Calcium 8.8 8.6 - 10.3 mg/dL    Lower extremity venous duplex left    Result Date: 1/27/2025  Preliminary Cardiology Report          [facility] [address] [place] [address] Tel [phone] and Fax [phone]       Preliminary Vascular Lab Report  VASC US LOWER EXTREMITY VENOUS DUPLEX LEFT  Patient Name:      LAUREN ALVARENGA Reading Physician:  [id number] Travis Goel MD Study Date:        1/27/2025        Ordering Physician: [id number] GEOFFREY VASQUEZ MRN/PID:           [id number]         Technologist:       Jeni Aguilar RVT Accession#:        [id number]     Technologist 2: Date of Birth/Age: 1945        Encounter#:         [id number] Gender:            F Admission Status:  Inpatient        Location Performed: [facility]  Diagnosis/ICD: Pain in left leg-M79.605 Procedure/CPT: 64614 Peripheral venous duplex scan for DVT Limited  PRELIMINARY CONCLUSIONS: Right Lower Venous: The right common femoral vein demonstrates normal spontaneous and respirophasic flow. Left Lower Venous: No evidence of acute deep vein thrombus visualized in the left lower extremity. Cannot rule out thrombus in non-visualized peroneal vein due to edema.  Imaging & Doppler Findings:  Right        Flow CFV   Spontaneous/Phasic  Left                  Compress Thrombus        Flow Distal External Iliac   Yes      None   Spontaneous/Phasic CFV                     Yes      None   Spontaneous/Phasic PFV                     Yes      None FV Proximal             Yes      None   Spontaneous/Phasic FV Mid                  Yes      None FV Distal               Yes      None Popliteal               Yes      None   Spontaneous/Phasic  PTV                     Yes      None VASCULAR PRELIMINARY REPORT completed by Jeni Aguilar RVT on 1/27/2025 at 3:34:28 PM  ** Final **        Assessment/Plan     78 yo female with acute urinary retention, multifactorial from surgery and mobility. Likely not obstructive.     - Recommend replacing mejia catheter  > Nursing to provide leg bag and mejia catheter education  - Stop Flomax  > More concern for falls risk of patient due to orthostatic hypotensive side effects  > Retention likely not obstructive   - Continue bowel regimen to prevent constipation  -  Continue with Macrobid for UTI  - Will make follow-up appt with urology for TOV     Pt discussed with attending physician, Dr. Darek Edmondson    I spent 20 minutes in the professional and overall care of this patient.    01/28/25 at 1:09 PM - ERMA Nichole-CNP

## 2025-01-28 NOTE — ASSESSMENT & PLAN NOTE
-PT/OT as above  - Continue to work on strength, coordination and balance  - Patient with previous history of subdural hematoma following mechanical fall, previously underwent acute rehab stay 1 year ago    #Left lower extremity edema  - 1/27 left lower extremity Doppler negative for DVT  - Continue to elevate leg, apply compression hose as needed    #Spasms  - Robaxin ineffective, continue Flexeril

## 2025-01-28 NOTE — PROGRESS NOTES
Occupational Therapy    OT Treatment    Patient Name: Gabriella Haskins  MRN: [id number]  Department: [facility] REHAB  Room: [address] [place] [address]  Today's Date: 1/28/2025  Time Calculation  Start Time: 0745  Stop Time: 0830  Time Calculation (min): 45 min        Assessment:  End of Session Communication: Bedside nurse  End of Session Patient Position: Up in chair, Alarm on     Plan:  Treatment Interventions: ADL retraining, UE strengthening/ROM, Functional transfer training, Endurance training, Patient/family training, Equipment evaluation/education  OT Frequency: 90 min/5 days per week (x 10 days)  OT Discharge Recommendations:  (anticipate possible need for up to min assist with ADLs, transfers, and mobility)  Equipment Recommended upon Discharge: Wheeled walker, Bedside commode (tub bench, sock aid, reacher, dressing stick, LHSH, 3:1 commode)  Treatment Interventions: ADL retraining, UE strengthening/ROM, Functional transfer training, Endurance training, Patient/family training, Equipment evaluation/education    Subjective   Previous Visit Info:  OT Last Visit  OT Received On: 01/28/25  General:  General  Prior to Session Communication: Bedside nurse  Patient Position Received: Up in chair, Alarm on  General Comment: Patient agreeable to OT. Patient reports feeling sleepy.  Precautions:  LE Weight Bearing Status: Weight Bearing as Tolerated  Medical Precautions: Fall precautions  Post-Surgical Precautions: Left hip precautions Cues for adherence to hip precautions.  Pain:  Pain Assessment  Pain Assessment: 0-10  0-10 (Numeric) Pain Score: 4  Pain Type: Surgical pain  Pain Location: Leg  Pain Orientation: Left  Pain Interventions: Distraction, Emotional support, Rest    Activities of Daily Living: LE Dressing  LE Dressing: Yes  Sock Level of Assistance:  (Doffs socks with dressing stick and supervision.)  Shoe Level of Assistance:  (Doffs shoes with dressing stick with supervision, dons shoes with elastic shoe laces with Mod A LLE  and RLE with SBA)  Therapy/Activity: Therapeutic Exercise  Therapeutic Exercise Activity 1: bue ther ex using 2# weights completing 3 exercises x 3 sets x 10 reps to promote greater indep with ADLs and transfers.  EDUCATION: Patient; Diagnosis & Precautions;Fall precautons;Risk and benefits of OT discussed with patient or other;POC discussed and agreed upon;    Education provided on adaptions for best adherence to hip precautions.    Goals:  Encounter Problems       Encounter Problems (Active)       OT Problem       LTG - Patient will complete grooming with independence. (Progressing)       Start:  01/17/25    Expected End:  01/29/25            LTG - Patient will complete toileting with min assist. (Progressing)       Start:  01/17/25    Expected End:  01/29/25            LTG - Patient will complete bathing with min assist. (Progressing)       Start:  01/17/25    Expected End:  01/29/25            LTG - Patient will complete UE dressing with supervision. (Progressing)       Start:  01/17/25    Expected End:  01/29/25            LTG - Patient will complete LE dressing using adaptive equip as needed with min assist. (Progressing)       Start:  01/17/25    Expected End:  01/29/25            LTG - Patient will complete commode transfer via device as needed with min assist. (Progressing)       Start:  01/17/25    Expected End:  01/29/25            LTG - Patient will complete tub/shower transfer using DME as needed with min/mod assist. (Progressing)       Start:  01/17/25    Expected End:  01/29/25            LTG - Patient will complete functional mobility via device as needed with min assist. (Progressing)       Start:  01/17/25    Expected End:  01/29/25            STG - Patient will complete toileting with mod assist. (Progressing)       Start:  01/17/25    Expected End:  01/29/25            STG - Patient will complete bathing with mod assist. (Met)       Start:  01/17/25    Expected End:  01/24/25    Resolved:   01/22/25    Updated to: STG - Patient will complete bathing with min assist.         STG - Patient will complete UE dressing with sba. (Met)       Start:  01/17/25    Expected End:  01/24/25    Resolved:  01/22/25    Updated to: STG - Patient will complete UE dressing with set up         STG - Patient will complete LE dressing using adaptive equip as needed with mod assist. (Progressing)       Start:  01/17/25    Expected End:  01/29/25            STG - Patient will complete commode transfer via device as needed with mod assist. (Progressing)       Start:  01/17/25    Expected End:  01/29/25            STG - Patient will complete simple functional mobility via device as needed with mod assist. (Progressing)       Start:  01/17/25    Expected End:  01/29/25            STG - Patient will complete bathing with min assist. (Progressing)       Start:  01/22/25    Expected End:  01/29/25                STG - Patient will complete UE dressing with set up (Progressing)       Start:  01/22/25    Expected End:  01/29/25

## 2025-01-28 NOTE — PROGRESS NOTES
POD 14    COMFORTABLE  WOUND DRY  [place] INTACT  STABLE  PT AND HOME WITH HHC CONTINUE HIP  PRECAUTIONS 4 MORE WEEKS

## 2025-01-28 NOTE — PROGRESS NOTES
Physical Therapy    Physical Therapy Treatment    Patient Name: Gabriella Haskins  MRN: [id number]  Department: [facility] REHAB  Room: [facility]  Today's Date: 1/28/2025  Time Calculation  Start Time: 1045  Stop Time: 1112  Time Calculation (min): 27 min         Assessment/Plan   PT Assessment  End of Session Communication: Bedside nurse  End of Session Patient Position: Up in chair, Alarm on (spouse present. Needs within reach.)     PT Plan  Treatment/Interventions: Bed mobility, Transfer training, Gait training, Stair training, Balance training, Strengthening, Therapeutic exercise, Therapeutic activity  PT Plan: Ongoing PT  PT Frequency: 90 min/5 days per week (10 days)  PT Discharge Recommendations:  (Anticipate discharge home with the need for intermittent min assist at the walker level.)  Equipment Recommended upon Discharge: Wheeled walker  PT Recommended Transfer Status: Assist x2      General Visit Information:   PT  Visit  PT Received On: 01/28/25  General  Prior to Session Communication: Bedside nurse  Patient Position Received: Up in chair, Alarm on  General Comment: Family training this date with patient, spouse, and OT.    Subjective   Precautions:  Precautions  LE Weight Bearing Status: Weight Bearing as Tolerated  Medical Precautions: Fall precautions  Post-Surgical Precautions: Left hip precautions    Objective   Treatments:  Therapeutic Activity  Therapeutic Activity Performed: Yes  Family teaching completed with spouse. Spouse educated on safety recommendations as well as safe mobility techniques related to bed mobility, chair transfers, car transfers, gait, and stairs, as well as follow up therapy. Spouse appears to have good understanding of pt's needs at discharge.     Bed Mobility  Bed Mobility: Yes  Patient performed bed mobility on hospitals bed sup<>sit transfer with CGA/SBA x1. Patient able to self assist L LE into and out of bed. Cues for maintaining hip precautions.     Transfers  Transfer:  Yes  Patient performed sit<>stand transfer with SBA x1.     Stairs  Stairs: Yes  Patient negotiated 5 steps up/down over 2 trials. First trial with this PTA simulate home set-up with only 1 rail using B UE on single HR to ascend and Single HR and HHA to descend; overall Caro x1 with cues for sequencing. Spouse assisted with second trial demo'd good body mechanics and safety.     Education Comments  Patient and spouse educated on safety with transfers, bed mobility, gait, and stairs. Both spouse and patient receptive and verbalized understanding.         OP EDUCATION:       Encounter Problems       Encounter Problems (Active)       PT Problem       STG - Bed mobility with min/mod assist x2. (Progressing)       Start:  01/17/25    Expected End:  01/23/25            STG - Transfers with mod assist x1. (Progressing)       Start:  01/17/25    Expected End:  01/23/25            STG - Pt will amb 30 ft with wheeled walker and mod assist x1.  (Met)       Start:  01/17/25    Expected End:  01/23/25    Resolved:  01/22/25         STG - Pt will negotiate one step with rails and min/mod assist x2. (Progressing)       Start:  01/17/25    Expected End:  01/23/25            LTG - Bed mobility with min assist x1. (Progressing)       Start:  01/17/25    Expected End:  01/28/25            LTG - Transfers with min assist x1. (Progressing)       Start:  01/17/25    Expected End:  01/28/25            LTG - Pt will amb 50 ft with wheeled walker and min assist. (Progressing)       Start:  01/17/25    Expected End:  01/28/25            LTG - Pt will negotiate 5 stairs with rails and min/mod assist x1. (Progressing)       Start:  01/17/25    Expected End:  01/28/25               Pain - Adult

## 2025-01-28 NOTE — PROGRESS NOTES
Occupational Therapy    OT Treatment    Patient Name: Gabriella Haskins  MRN: [id number]  Department: [facility] REHAB  Room: [address] [place] [address]  Today's Date: 1/28/2025  Time Calculation  Start Time: 1112  Stop Time: 1140  Time Calculation (min): 28 min        Assessment:  End of Session Communication: Bedside nurse  End of Session Patient Position: Up in chair, Alarm on     Plan:  Treatment Interventions: ADL retraining, UE strengthening/ROM, Functional transfer training, Endurance training, Patient/family training, Equipment evaluation/education  OT Frequency: 90 min/5 days per week (x 10 days)  OT Discharge Recommendations:  (anticipate possible need for up to min assist with ADLs, transfers, and mobility)  Equipment Recommended upon Discharge: Wheeled walker, Bedside commode (tub bench, sock aid, reacher, dressing stick, LHSH, 3:1 commode)  Treatment Interventions: ADL retraining, UE strengthening/ROM, Functional transfer training, Endurance training, Patient/family training, Equipment evaluation/education    Subjective   Previous Visit Info:  OT Last Visit  OT Received On: 01/28/25  General:  General  Family/Caregiver Present: Yes  Caregiver Feedback: Spouse present for family training in prep for home going.  Prior to Session Communication: Bedside nurse  Patient Position Received: Up in chair, Alarm on  General Comment: Patient agreeable to OT. Patient reports feeling sleepy.  Precautions:  LE Weight Bearing Status: Weight Bearing as Tolerated  Medical Precautions: Fall precautions  Post-Surgical Precautions: Left hip precautions            Pain:  Pain Assessment  Pain Assessment: 0-10  0-10 (Numeric) Pain Score: 4  Pain Type: Surgical pain  Pain Location: Hip  Pain Orientation: Left  Pain Interventions: Cold applied, Ambulation/increased activity, Distraction, Rest    Objective      Activities of Daily Living: LE Dressing  LE Dressing: Yes  Pants Level of Assistance:  (Dons pants over feet using reacher with supervision  with cues for best technique (donning surgical leg first))  Sock Level of Assistance:  (Doffs socks using dressing stick with setup Dons socks using sock aid with setup)  Shoe Level of Assistance:  (Doffs shoes with dressing stick with supervision, dons shoes with elastic shoe laces with Mod A LLE and RLE with SBA)    Bed Mobility/Transfers: Transfers  Transfer: Yes  Transfer 1  Trials/Comments 1: Sit to stand transfers with SBA/Supervision    Tub Transfers  Tub Transfers Comments: tub transfer via tub bench using sit and turn method with Min A and use of leg  and mod cues for technique for adherence to hip precautions. Patient and  report good understanding.   EDUCATION:  Education  Individual(s) Educated: Patient  Education Provided: Diagnosis & Precautions, Fall precautons, Risk and benefits of OT discussed with patient or other, POC discussed and agreed upon  Equipment:  (tub bench, commode, leg .)  Risk and Benefits Discussed with Patient/Caregiver/Other: yes  Patient/Caregiver Demonstrated Understanding: yes  Plan of Care Discussed and Agreed Upon: yes  Patient Response to Education: Patient/Caregiver Verbalized Understanding of Information  Education Comment: Education provided to patient and spouse regarding patient's current level of assistance including transfers , ADLs, and mobility. Patient requires reminders for hip precautions as pt forgetful of hip precautions despite education and handouts. Education provided on lower body dressing a/e, dme needs including tub bench and leg  for adherence to hip precautions. Education provided on wedge use from hospital during night time for adherence to hip precautions. Patient and spouse report good understanding of education.    Goals:  Encounter Problems       Encounter Problems (Active)       OT Problem       LTG - Patient will complete grooming with independence. (Progressing)       Start:  01/17/25    Expected End:  01/29/25             LTG - Patient will complete toileting with min assist. (Progressing)       Start:  01/17/25    Expected End:  01/29/25            LTG - Patient will complete bathing with min assist. (Progressing)       Start:  01/17/25    Expected End:  01/29/25            LTG - Patient will complete UE dressing with supervision. (Progressing)       Start:  01/17/25    Expected End:  01/29/25            LTG - Patient will complete LE dressing using adaptive equip as needed with min assist. (Progressing)       Start:  01/17/25    Expected End:  01/29/25            LTG - Patient will complete commode transfer via device as needed with min assist. (Progressing)       Start:  01/17/25    Expected End:  01/29/25            LTG - Patient will complete tub/shower transfer using DME as needed with min/mod assist. (Progressing)       Start:  01/17/25    Expected End:  01/29/25            LTG - Patient will complete functional mobility via device as needed with min assist. (Progressing)       Start:  01/17/25    Expected End:  01/29/25            STG - Patient will complete toileting with mod assist. (Progressing)       Start:  01/17/25    Expected End:  01/29/25            STG - Patient will complete bathing with mod assist. (Met)       Start:  01/17/25    Expected End:  01/24/25    Resolved:  01/22/25    Updated to: STG - Patient will complete bathing with min assist.         STG - Patient will complete UE dressing with sba. (Met)       Start:  01/17/25    Expected End:  01/24/25    Resolved:  01/22/25    Updated to: STG - Patient will complete UE dressing with set up         STG - Patient will complete LE dressing using adaptive equip as needed with mod assist. (Progressing)       Start:  01/17/25    Expected End:  01/29/25            STG - Patient will complete commode transfer via device as needed with mod assist. (Progressing)       Start:  01/17/25    Expected End:  01/29/25            STG - Patient will complete simple functional  mobility via device as needed with mod assist. (Progressing)       Start:  01/17/25    Expected End:  01/29/25            STG - Patient will complete bathing with min assist. (Progressing)       Start:  01/22/25    Expected End:  01/29/25                STG - Patient will complete UE dressing with set up (Progressing)       Start:  01/22/25    Expected End:  01/29/25

## 2025-01-28 NOTE — ASSESSMENT & PLAN NOTE
-3 hours of PT and OT daily to improve patient functional mobility so she can safely be discharged home modified independent.  -Estimated length of stay 10 days  - Precautions: WBAT to LLE, fall precautions  - Pain management: Tylenol and oxycodone as needed  - Per Ortho, plan for staple removal and follow-up x-rays on 1/28

## 2025-01-28 NOTE — PROGRESS NOTES
Occupational Therapy    OT Treatment    Patient Name: Gabriella Haskins  MRN: [id number]  Department: [facility] REHAB  Room: [address] [place] [address]  Today's Date: 1/28/2025  Time Calculation  Start Time: 0745  Stop Time: 0830  Time Calculation (min): 45 min        Assessment:  End of Session Communication: Bedside nurse  End of Session Patient Position: Up in chair, Alarm on     Plan:  Treatment Interventions: ADL retraining, UE strengthening/ROM, Functional transfer training, Endurance training, Patient/family training, Equipment evaluation/education  OT Frequency: 90 min/5 days per week (x 10 days)  OT Discharge Recommendations:  (anticipate possible need for up to min assist with ADLs, transfers, and mobility)  Equipment Recommended upon Discharge: Wheeled walker, Bedside commode (tub bench, sock aid, reacher, dressing stick, LHSH, 3:1 commode)  Treatment Interventions: ADL retraining, UE strengthening/ROM, Functional transfer training, Endurance training, Patient/family training, Equipment evaluation/education    Subjective   Previous Visit Info:  OT Last Visit  OT Received On: 01/28/25  General:  General  Prior to Session Communication: Bedside nurse  Patient Position Received: Up in chair, Alarm on  General Comment: Patient agreeable to OT. Patient reports not sleeping well last night.  Precautions:  LE Weight Bearing Status: Weight Bearing as Tolerated  Medical Precautions: Fall precautions  Post-Surgical Precautions: Left hip precautions  Pain:  Pain Assessment  Pain Assessment: 0-10  0-10 (Numeric) Pain Score: 3  Pain Type: Surgical pain  Pain Location: Leg  Pain Orientation: Left  Pain Interventions: Repositioned, Cold applied    Activities of Daily Living: Grooming  Grooming Comments: Standing at sink at ww,  performs grooming tasks with SBA/supervision tolerating 7 minutes to perform oral care and washes face, chest and upper body.    UE Dressing  UE Dressing Level of Assistance: Setup  UE Dressing Where Assessed:  Wheelchair  UE Dressing Comments: doffs/dons shirt with setup    LE Dressing  LE Dressing: Yes  Pants Level of Assistance:  (Dons underwear/pants over feet using reacher with supervision however mod cues during task,pulls up over hips with SBA.)  Sock Level of Assistance:  (Doffs socks with dressing stick and supervision.)  Shoe Level of Assistance:  (Dons shoes with long handled shoe horn with SBA for RLE and Mod A for LLE. Requires assistance with placement of LE into shoe, placement of shoe horn and some assistance with pushing LE into shoe.)    Bed Mobility/Transfers: Transfers  Transfer: Yes  Transfer 1  Trials/Comments 1: Sit to stand transfers with SBA/supervision however requires 2 attempts to stand  Transfers 2  Trials/Comments 2: pivot transfer via ww from wheelchair <> toilet with SBA      Functional Mobility:  Functional Mobility  Functional Mobility Performed: Yes  Functional Mobility 1  Comments 1: Simple functional mobility via ww from wheelchair>sink>wheelchair with SBA/Supervision.  EDUCATION:   Patient; Diagnosis & Precautions;Fall precautons;Risk and benefits of OT discussed with patient or other;POC discussed and agreed upon; Education provided on a/e for donning/doffing shoes. Education provided on recommended dme needs for home going- will further review with patient's spouse during family training.     Goals:  Encounter Problems       Encounter Problems (Active)       OT Problem       LTG - Patient will complete grooming with independence. (Progressing)       Start:  01/17/25    Expected End:  01/29/25            LTG - Patient will complete toileting with min assist. (Progressing)       Start:  01/17/25    Expected End:  01/29/25            LTG - Patient will complete bathing with min assist. (Progressing)       Start:  01/17/25    Expected End:  01/29/25            LTG - Patient will complete UE dressing with supervision. (Progressing)       Start:  01/17/25    Expected End:  01/29/25             LTG - Patient will complete LE dressing using adaptive equip as needed with min assist. (Progressing)       Start:  01/17/25    Expected End:  01/29/25            LTG - Patient will complete commode transfer via device as needed with min assist. (Progressing)       Start:  01/17/25    Expected End:  01/29/25            LTG - Patient will complete tub/shower transfer using DME as needed with min/mod assist. (Progressing)       Start:  01/17/25    Expected End:  01/29/25            LTG - Patient will complete functional mobility via device as needed with min assist. (Progressing)       Start:  01/17/25    Expected End:  01/29/25            STG - Patient will complete toileting with mod assist. (Progressing)       Start:  01/17/25    Expected End:  01/29/25            STG - Patient will complete bathing with mod assist. (Met)       Start:  01/17/25    Expected End:  01/24/25    Resolved:  01/22/25    Updated to: STG - Patient will complete bathing with min assist.         STG - Patient will complete UE dressing with sba. (Met)       Start:  01/17/25    Expected End:  01/24/25    Resolved:  01/22/25    Updated to: STG - Patient will complete UE dressing with set up         STG - Patient will complete LE dressing using adaptive equip as needed with mod assist. (Progressing)       Start:  01/17/25    Expected End:  01/29/25            STG - Patient will complete commode transfer via device as needed with mod assist. (Progressing)       Start:  01/17/25    Expected End:  01/29/25            STG - Patient will complete simple functional mobility via device as needed with mod assist. (Progressing)       Start:  01/17/25    Expected End:  01/29/25            STG - Patient will complete bathing with min assist. (Progressing)       Start:  01/22/25    Expected End:  01/29/25                STG - Patient will complete UE dressing with set up (Progressing)       Start:  01/22/25    Expected End:  01/29/25

## 2025-01-28 NOTE — PROGRESS NOTES
"Gabriella Haskins is a 79 y.o. female on day 11 of admission presenting with Closed displaced fracture of left femoral neck.    Subjective   Patient seen and examined ambulating with wheeled walker, standby.  Reports improvement in spasms and overall pain.  Continues to require straight catheterization for urinary retention, and not feeling the sensation to void.  Otherwise progressing well with therapy.  Eating 3 times per day, having regular bowel movements and obtaining good sleep.  Anticipate Ortho removal of staples today and potential follow-up x-rays    Objective   Physical Exam  Vitals reviewed.   Constitutional:       Appearance: Normal appearance.   HENT:      Head: Atraumatic.   Eyes:      Pupils: Pupils are equal, round, and reactive to light.   Pulmonary:      Effort: Pulmonary effort is normal.      Breath sounds: Normal breath sounds.   Abdominal:      General: Bowel sounds are normal.      Palpations: Abdomen is soft.   Musculoskeletal:      Comments: Flexors and dorsiflexors 5/5 able to do knee extension 4/5 in LLE  RLE 5/5 throughout  Sensation intact to light touch in bilateral lower extremities   Skin:     General: Skin is warm and dry.   Neurological:      Mental Status: She is alert and oriented to person, place, and time. Mental status is at baseline.   Psychiatric:         Mood and Affect: Mood normal.         Behavior: Behavior is cooperative.   Function: Min assist to standby assist with ADLs, transfers, and mobility       Last Recorded Vitals  Blood pressure 140/65, pulse 74, temperature 36.8 °C (98.2 °F), temperature source Temporal, resp. rate 14, height 1.676 m (5' 5.98\"), weight 62.8 kg (138 lb 8 oz), SpO2 95%.    Therapy notes from last 24 hours reviewed.    Relevant Results  Scheduled medications  ascorbic acid, 500 mg, oral, Daily  balsam peru-castor oiL, 1 Application, Topical, BID  bisacodyl, 10 mg, rectal, Daily  cetirizine, 10 mg, oral, Daily  cholecalciferol, 1,000 Units, oral, " Daily  [START ON 1/30/2025] cyanocobalamin, 1,000 mcg, intramuscular, q14 days  fluticasone, 1 spray, Each Nostril, Daily  icosapent ethyL, 1,000 mg, oral, Daily  levothyroxine, 100 mcg, oral, Daily  metoprolol tartrate, 25 mg, oral, BID  nitrofurantoin (macrocrystal-monohydrate), 100 mg, oral, q12h ANDREI  polyethylene glycol, 17 g, oral, Daily  rivaroxaban, 20 mg, oral, Daily with evening meal  sennosides, 2 tablet, oral, Nightly  tamsulosin, 0.4 mg, oral, Nightly      Continuous medications     PRN medications  PRN medications: acetaminophen **OR** acetaminophen, bisacodyl, cyclobenzaprine, lubricating eye drops, melatonin, ondansetron **OR** ondansetron, oxyCODONE, oxyCODONE, polyethylene glycol     Results for orders placed or performed during the hospital encounter of 01/17/25 (from the past 24 hours)   CBC and Auto Differential   Result Value Ref Range    WBC 12.3 (H) 4.4 - 11.3 x10*3/uL    nRBC 0.0 0.0 - 0.0 /100 WBCs    RBC 3.24 (L) 4.00 - 5.20 x10*6/uL    Hemoglobin 9.1 (L) 12.0 - 16.0 g/dL    Hematocrit 28.9 (L) 36.0 - 46.0 %    MCV 89 80 - 100 fL    MCH 28.1 26.0 - 34.0 pg    MCHC 31.5 (L) 32.0 - 36.0 g/dL    RDW 14.2 11.5 - 14.5 %    Platelets 450 150 - 450 x10*3/uL    Immature Granulocytes %, Automated 0.8 0.0 - 0.9 %    Immature Granulocytes Absolute, Automated 0.10 0.00 - 0.50 x10*3/uL   Comprehensive Metabolic Panel   Result Value Ref Range    Glucose 80 74 - 99 mg/dL    Sodium 136 136 - 145 mmol/L    Potassium 3.2 (L) 3.5 - 5.3 mmol/L    Chloride 99 98 - 107 mmol/L    Bicarbonate 29 21 - 32 mmol/L    Anion Gap 11 10 - 20 mmol/L    Urea Nitrogen 13 6 - 23 mg/dL    Creatinine 0.74 0.50 - 1.05 mg/dL    eGFR 83 >60 mL/min/1.73m*2    Calcium 9.3 8.6 - 10.3 mg/dL    Albumin 3.4 3.4 - 5.0 g/dL    Alkaline Phosphatase 52 33 - 136 U/L    Total Protein 6.5 6.4 - 8.2 g/dL    AST 11 9 - 39 U/L    Bilirubin, Total 0.6 0.0 - 1.2 mg/dL    ALT 7 7 - 45 U/L   Manual Differential   Result Value Ref Range    Neutrophils  %, Manual 76.0 40.0 - 80.0 %    Bands %, Manual 2.0 0.0 - 5.0 %    Lymphocytes %, Manual 14.0 13.0 - 44.0 %    Monocytes %, Manual 5.0 2.0 - 10.0 %    Eosinophils %, Manual 1.0 0.0 - 6.0 %    Basophils %, Manual 2.0 0.0 - 2.0 %    Metamyelocytes %, Manual 0.0 0.0 - 0.0 %    Seg Neutrophils Absolute, Manual 9.35 (H) 1.60 - 5.00 x10*3/uL    Bands Absolute, Manual 0.25 0.00 - 0.50 x10*3/uL    Lymphocytes Absolute, Manual 1.72 0.80 - 3.00 x10*3/uL    Monocytes Absolute, Manual 0.62 0.05 - 0.80 x10*3/uL    Eosinophils Absolute, Manual 0.12 0.00 - 0.40 x10*3/uL    Basophils Absolute, Manual 0.25 (H) 0.00 - 0.10 x10*3/uL    Metamyelocytes Absolute, Manual 0.00 0.00 - 0.00 x10*3/uL    Total Cells Counted 100     Neutrophils Absolute, Manual 9.60 (H) 1.60 - 5.50 x10*3/uL    RBC Morphology See Below     Polychromasia Mild     Ovalocytes Few     Acanthocytes None    Lower extremity venous duplex left   Result Value Ref Range    BSA 1.71 m2   Lavender Top   Result Value Ref Range    Extra Tube Hold for add-ons.    Basic Metabolic Panel   Result Value Ref Range    Glucose 93 74 - 99 mg/dL    Sodium 138 136 - 145 mmol/L    Potassium 4.5 3.5 - 5.3 mmol/L    Chloride 103 98 - 107 mmol/L    Bicarbonate 26 21 - 32 mmol/L    Anion Gap 14 10 - 20 mmol/L    Urea Nitrogen 10 6 - 23 mg/dL    Creatinine 0.67 0.50 - 1.05 mg/dL    eGFR 90 >60 mL/min/1.73m*2    Calcium 8.8 8.6 - 10.3 mg/dL            Assessment/Plan   This is a 79-year-old female who sustained a left femoral neck fracture, and is now status post hemiarthroplasty on 1/14.  Hospital course complicated by UTI, currently on Macrobid.  Additional complications from urinary retention with history of prolapse, pyelonephritis, cystitis.  Appreciate urology recommendations for urinary retention and potential home-going Mccarthy.  Assessment & Plan  Closed displaced fracture of left femoral neck  -3 hours of PT and OT daily to improve patient functional mobility so she can safely be  discharged home modified independent.  -Estimated length of stay 10 days  - Precautions: WBAT to LLE, fall precautions  - Pain management: Tylenol and oxycodone as needed  - Per Ortho, plan for staple removal and follow-up x-rays on 1/28    Essential hypertension  -Continue metoprolol tartrate 25 mg twice daily  - Was previously taking hydrochlorothiazide 25 mg daily, however this was discontinued due to hypotension  - Recommend follow-up with PCP for medication adjustment  Hypercholesterolemia    Hypothyroidism  -Continue levothyroxine 100 mcg daily    Vitamin D deficiency  -Continue vitamin supplements, including vitamin D, vitamin C, vitamin B12    Fall, initial encounter  -PT/OT as above  - Continue to work on strength, coordination and balance  - Patient with previous history of subdural hematoma following mechanical fall, previously underwent acute rehab stay 1 year ago    #Left lower extremity edema  - 1/27 left lower extremity Doppler negative for DVT  - Continue to elevate leg, apply compression hose as needed    #Spasms  - Robaxin ineffective, continue Flexeril    #UTI  - UA indicative for UTI, culture growing E. coli  - Susceptibility: Pansensitive  - Continue Macrobid for 5-day course    #Urinary retention  - Previous history of prolapse, pyelonephritis, cystitis  - History of cystoscopy  - Now with acute urinary retention, initial Mccarthy management now using straight cath  - Urology consulted, appreciate recommendations      Bowel/Bladder  -facilitate daily active BM   -Straight cath per protocol, potential need for home-going Mccarthy  -check PVRs and treat appropriately     DVT prophylaxis  - Continue Xarelto 20 mg daily  -SCDs and ambulation  -Chemoprophylaxis for DVT until ambulating >200 feet     FEN  -follow BMP and treat appropriately  -monitor oral intake daily      1/28  - Vitals stable, leukocytosis improving  - UA indicating UTI, culture growing E. coli with pan sensitivity  - Continue Macrobid  for 5 days  - With recent urinary retention in the setting of previous prolapse, cystitis, pyelonephritis, appreciate urology recommendations for potential home-going Mccarthy  - Follow-up with orthopedic surgery for staple removal and potential follow-up x-rays of left hip  - Continue Flexeril for spasms  - Left lower extremity Doppler negative for DVT  - Min assist to standby assist with ADLs, transfers and mobility      Chintan Pierce, DO   PM&R, PGY-2

## 2025-01-28 NOTE — CARE PLAN
Patient achieved 4/4 LTGs and 4/4 STGs established at initial evaluation.  Patient discharged home at SBA walker level with min A for stair negotiation.  Family teaching done with spouse on 1/28/25.  Recommend continued [facility] PT at this time.     Problem: PT Problem  Goal: STG - Bed mobility with min/mod assist x2.  Outcome: Met  Goal: STG - Transfers with mod assist x1.  Outcome: Met  Goal: STG - Pt will negotiate one step with rails and min/mod assist x2.  Outcome: Met  Goal: LTG - Bed mobility with min assist x1.  Outcome: Met  Goal: LTG - Transfers with min assist x1.  Outcome: Met  Goal: LTG - Pt will amb 50 ft with wheeled walker and min assist.  Outcome: Met  Goal: LTG - Pt will negotiate 5 stairs with rails and min/mod assist x1.  Outcome: Met

## 2025-01-28 NOTE — CARE PLAN
The patient's goals for the shift include      The clinical goals for the shift include Remain hemodynamically stable      Problem: Pain - Adult  Goal: Verbalizes/displays adequate comfort level or baseline comfort level  Outcome: Progressing     Problem: Discharge Planning  Goal: Discharge to home or other facility with appropriate resources  Outcome: Progressing     Problem: Chronic Conditions and Co-morbidities  Goal: Patient's chronic conditions and co-morbidity symptoms are monitored and maintained or improved  Outcome: Progressing     Problem: Skin  Goal: Decreased wound size/increased tissue granulation at next dressing change  Outcome: Progressing  Flowsheets (Taken 1/28/2025 0203)  Decreased wound size/increased tissue granulation at next dressing change: Promote sleep for wound healing  Goal: Participates in plan/prevention/treatment measures  Outcome: Progressing  Flowsheets (Taken 1/28/2025 0203)  Participates in plan/prevention/treatment measures: Elevate heels  Goal: Promote/optimize nutrition  Outcome: Progressing  Flowsheets (Taken 1/28/2025 0203)  Promote/optimize nutrition: Monitor/record intake including meals  Goal: Promote skin healing  Outcome: Progressing  Flowsheets (Taken 1/28/2025 0203)  Promote skin healing:   Assess skin/pad under line(s)/device(s)   Protective dressings over bony prominences     Problem: Pain  Goal: Takes deep breaths with improved pain control throughout the shift  Outcome: Progressing  Goal: Turns in bed with improved pain control throughout the shift  Outcome: Progressing  Goal: Walks with improved pain control throughout the shift  Outcome: Progressing  Goal: Performs ADL's with improved pain control throughout shift  Outcome: Progressing  Goal: Participates in PT with improved pain control throughout the shift  Outcome: Progressing  Goal: Free from opioid side effects throughout the shift  Outcome: Progressing  Goal: Free from acute confusion related to pain meds  throughout the shift  Outcome: Progressing     Problem: Nutrition  Goal: Oral intake greater than 50%  Outcome: Progressing  Goal: Oral intake greater 75%  Outcome: Progressing  Goal: Adequate PO fluid intake  Outcome: Progressing  Goal: Lab values WNL  Outcome: Progressing  Goal: Promote healing  Outcome: Progressing

## 2025-01-28 NOTE — PROGRESS NOTES
Physical Therapy    Physical Therapy Treatment    Patient Name: Gabriella Haskins  MRN: [id number]  Department: [facility]  Room: [address] [place] [address]  Today's Date: 1/28/2025  Time Calculation  Start Time: 0830  Stop Time: 0915  Time Calculation (min): 45 min         Assessment/Plan   PT Assessment  End of Session Communication: Bedside nurse  End of Session Patient Position: Up in chair, Alarm on     PT Plan  Treatment/Interventions: Bed mobility, Transfer training, Gait training, Stair training, Balance training, Strengthening, Therapeutic exercise, Therapeutic activity  PT Plan: Ongoing PT  PT Frequency: 90 min/5 days per week (10 days)  PT Discharge Recommendations:  (Anticipate discharge home with the need for intermittent min assist at the walker level.)  Equipment Recommended upon Discharge: Wheeled walker  PT Recommended Transfer Status: Assist x2      General Visit Information:   PT  Visit  PT Received On: 01/28/25  General  Prior to Session Communication:  (OT)  Patient Position Received: Up in chair, Alarm on    Subjective   Precautions:  Precautions  LE Weight Bearing Status: Weight Bearing as Tolerated  Medical Precautions: Fall precautions  Post-Surgical Precautions: Left hip precautions            Objective   Pain:  Pain Assessment  Pain Assessment: 0-10  0-10 (Numeric) Pain Score: 4  Pain Location:  (L hip)  Pain Interventions: Repositioned, Distraction, Emotional support    Treatments:  Bed Mobility  Bed Mobility:  (Pt performs supine/sit with min A x 1. Pt able to lift LLE up onto mat table with BUE but requires min A to prevent breaking L hip precautions during adjustment. Pt performs supine/sit with SBA.)    Ambulation/Gait Training  Ambulation/Gait Training Performed:  (Pt ambulates 200 ft, 150 ft x 2 over tile, carpet and thresholds with FWW and SBA.)  Transfers  Transfer:  (Pt performs sit/stand transfers with SBA.)     Object From Floor  Comments:  (Pt ambulates 15 ft with FWW and picks up 2 rings  from floor with reacher with SBA)      Education Documentation  Pt educated on techniques for transfers and gait training with device in order to maximize safety and independence.        Encounter Problems       Encounter Problems (Active)       PT Problem       STG - Bed mobility with min/mod assist x2. (Progressing)       Start:  01/17/25    Expected End:  01/23/25            STG - Transfers with mod assist x1. (Progressing)       Start:  01/17/25    Expected End:  01/23/25            STG - Pt will amb 30 ft with wheeled walker and mod assist x1.  (Met)       Start:  01/17/25    Expected End:  01/23/25    Resolved:  01/22/25         STG - Pt will negotiate one step with rails and min/mod assist x2. (Progressing)       Start:  01/17/25    Expected End:  01/23/25            LTG - Bed mobility with min assist x1. (Progressing)       Start:  01/17/25    Expected End:  01/28/25            LTG - Transfers with min assist x1. (Progressing)       Start:  01/17/25    Expected End:  01/28/25            LTG - Pt will amb 50 ft with wheeled walker and min assist. (Progressing)       Start:  01/17/25    Expected End:  01/28/25            LTG - Pt will negotiate 5 stairs with rails and min/mod assist x1. (Progressing)       Start:  01/17/25    Expected End:  01/28/25

## 2025-01-29 ENCOUNTER — HOME HEALTH ADMISSION (OUTPATIENT)
Dept: HOME HEALTH SERVICES | Facility: HOME HEALTH | Age: 80
End: 2025-01-29
Payer: MEDICARE

## 2025-01-29 ENCOUNTER — PHARMACY VISIT (OUTPATIENT)
Dept: PHARMACY | Facility: CLINIC | Age: 80
End: 2025-01-29
Payer: COMMERCIAL

## 2025-01-29 VITALS
RESPIRATION RATE: 16 BRPM | DIASTOLIC BLOOD PRESSURE: 70 MMHG | HEART RATE: 63 BPM | TEMPERATURE: 96.6 F | SYSTOLIC BLOOD PRESSURE: 148 MMHG | BODY MASS INDEX: 22.26 KG/M2 | WEIGHT: 138.5 LBS | HEIGHT: 66 IN | OXYGEN SATURATION: 94 %
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
## 2025-01-29 LAB
BASOPHILS # BLD AUTO: 0.07 X10*3/UL (ref 0–0.1)
BASOPHILS NFR BLD AUTO: 0.9 %
EOSINOPHIL # BLD AUTO: 0.28 X10*3/UL (ref 0–0.4)
EOSINOPHIL NFR BLD AUTO: 3.7 %
ERYTHROCYTE [DISTWIDTH] IN BLOOD BY AUTOMATED COUNT: 14.4 % (ref 11.5–14.5)
HCT VFR BLD AUTO: 34.4 % (ref 36–46)
HGB BLD-MCNC: 9.7 G/DL (ref 12–16)
IMM GRANULOCYTES # BLD AUTO: 0.09 X10*3/UL (ref 0–0.5)
IMM GRANULOCYTES NFR BLD AUTO: 1.2 % (ref 0–0.9)
LYMPHOCYTES # BLD AUTO: 1.28 X10*3/UL (ref 0.8–3)
LYMPHOCYTES NFR BLD AUTO: 16.7 %
MCH RBC QN AUTO: 27.6 PG (ref 26–34)
MCHC RBC AUTO-ENTMCNC: 28.2 G/DL (ref 32–36)
MCV RBC AUTO: 98 FL (ref 80–100)
MONOCYTES # BLD AUTO: 0.55 X10*3/UL (ref 0.05–0.8)
MONOCYTES NFR BLD AUTO: 7.2 %
NEUTROPHILS # BLD AUTO: 5.4 X10*3/UL (ref 1.6–5.5)
NEUTROPHILS NFR BLD AUTO: 70.3 %
NRBC BLD-RTO: 0 /100 WBCS (ref 0–0)
PLATELET # BLD AUTO: 331 X10*3/UL (ref 150–450)
RBC # BLD AUTO: 3.51 X10*6/UL (ref 4–5.2)
WBC # BLD AUTO: 7.7 X10*3/UL (ref 4.4–11.3)

## 2025-01-29 PROCEDURE — 97530 THERAPEUTIC ACTIVITIES: CPT | Mod: GP

## 2025-01-29 PROCEDURE — 2500000004 HC RX 250 GENERAL PHARMACY W/ HCPCS (ALT 636 FOR OP/ED)

## 2025-01-29 PROCEDURE — 2500000001 HC RX 250 WO HCPCS SELF ADMINISTERED DRUGS (ALT 637 FOR MEDICARE OP)

## 2025-01-29 PROCEDURE — RXMED WILLOW AMBULATORY MEDICATION CHARGE

## 2025-01-29 PROCEDURE — 36415 COLL VENOUS BLD VENIPUNCTURE: CPT | Performed by: PHYSICAL MEDICINE & REHABILITATION

## 2025-01-29 PROCEDURE — 2500000002 HC RX 250 W HCPCS SELF ADMINISTERED DRUGS (ALT 637 FOR MEDICARE OP, ALT 636 FOR OP/ED)

## 2025-01-29 PROCEDURE — 2500000002 HC RX 250 W HCPCS SELF ADMINISTERED DRUGS (ALT 637 FOR MEDICARE OP, ALT 636 FOR OP/ED): Performed by: PHYSICAL MEDICINE & REHABILITATION

## 2025-01-29 PROCEDURE — 85025 COMPLETE CBC W/AUTO DIFF WBC: CPT | Performed by: PHYSICAL MEDICINE & REHABILITATION

## 2025-01-29 PROCEDURE — 99239 HOSP IP/OBS DSCHRG MGMT >30: CPT

## 2025-01-29 RX ORDER — OXYCODONE HYDROCHLORIDE 5 MG/1
5 TABLET ORAL EVERY 6 HOURS PRN
Qty: 15 TABLET | Refills: 0 | Status: SHIPPED | OUTPATIENT
Start: 2025-01-29

## 2025-01-29 RX ORDER — SENNOSIDES 8.6 MG/1
2 TABLET ORAL NIGHTLY
Qty: 60 TABLET | Refills: 0 | Status: SHIPPED | OUTPATIENT
Start: 2025-01-29 | End: 2025-02-28

## 2025-01-29 RX ORDER — CYCLOBENZAPRINE HCL 5 MG
5 TABLET ORAL 3 TIMES DAILY PRN
Qty: 20 TABLET | Refills: 0 | Status: SHIPPED | OUTPATIENT
Start: 2025-01-29 | End: 2025-02-28

## 2025-01-29 RX ORDER — POLYETHYLENE GLYCOL 3350 17 G/17G
17 POWDER, FOR SOLUTION ORAL DAILY
Qty: 510 G | Refills: 0 | Status: SHIPPED | OUTPATIENT
Start: 2025-01-30

## 2025-01-29 RX ORDER — ACETAMINOPHEN 325 MG/1
650 TABLET ORAL EVERY 4 HOURS PRN
Qty: 30 TABLET | Refills: 0 | Status: SHIPPED | OUTPATIENT
Start: 2025-01-29

## 2025-01-29 RX ADMIN — LEVOTHYROXINE SODIUM 100 MCG: 0.1 TABLET ORAL at 05:46

## 2025-01-29 RX ADMIN — OXYCODONE HYDROCHLORIDE 2.5 MG: 5 TABLET ORAL at 00:30

## 2025-01-29 RX ADMIN — NITROFURANTOIN MONOHYDRATE/MACROCRYSTALLINE 100 MG: 25; 75 CAPSULE ORAL at 08:20

## 2025-01-29 RX ADMIN — CETIRIZINE HYDROCHLORIDE 10 MG: 10 TABLET, FILM COATED ORAL at 08:14

## 2025-01-29 RX ADMIN — POLYETHYLENE GLYCOL 3350 17 G: 17 POWDER, FOR SOLUTION ORAL at 08:13

## 2025-01-29 RX ADMIN — METOPROLOL TARTRATE 25 MG: 25 TABLET, FILM COATED ORAL at 08:20

## 2025-01-29 RX ADMIN — ACETAMINOPHEN 650 MG: 325 TABLET, FILM COATED ORAL at 03:20

## 2025-01-29 RX ADMIN — FLUTICASONE PROPIONATE 1 SPRAY: 50 SPRAY, METERED NASAL at 08:14

## 2025-01-29 RX ADMIN — ICOSAPENT ETHYL 1000 MG: 1 CAPSULE ORAL at 09:19

## 2025-01-29 RX ADMIN — Medication 500 MG: at 08:13

## 2025-01-29 RX ADMIN — Medication 1000 UNITS: at 08:13

## 2025-01-29 RX ADMIN — CASTOR OIL AND BALSAM, PERU 1 APPLICATION: 788; 87 OINTMENT TOPICAL at 08:20

## 2025-01-29 ASSESSMENT — BRIEF INTERVIEW FOR MENTAL STATUS (BIMS)
INITIAL REPETITION OF BED BLUE SOCK - FIRST ATTEMPT: 3
ASKED TO RECALL BED: NO, COULD NOT RECALL
GENERAL MEMORY AND RECALL ABILITY: CURRENT SEASON;LOCATION OF OWN ROOM;STAFF NAMES AND FACES;RECOGNIZES APPROPRIATE HEALTHCARE SETTING
WHAT YEAR IS IT: CORRECT
ASKED TO RECALL BLUE: YES, NO CUE REQUIRED
COGNITIVE PATTERN ASSESSMENT USED: STAFF ASSESSMENT
WHAT MONTH IS IT: ACCURATE WITHIN 5 DAYS
WHAT DAY OF THE WEEK IS IT: CORRECT
GENERAL FUNCTIONAL COGNITION RATING: INDEPENDENT
ASKED TO RECALL SOCK: YES, NO CUE REQUIRED
BIMS SUMMARY SCORE: 13

## 2025-01-29 ASSESSMENT — PAIN DESCRIPTION - DESCRIPTORS: DESCRIPTORS: SPASM

## 2025-01-29 ASSESSMENT — PAIN - FUNCTIONAL ASSESSMENT
PAIN_FUNCTIONAL_ASSESSMENT: 0-10

## 2025-01-29 ASSESSMENT — PAIN SCALES - GENERAL
PAINLEVEL_OUTOF10: 5 - MODERATE PAIN
PAINLEVEL_OUTOF10: 0 - NO PAIN
PAINLEVEL_OUTOF10: 0 - NO PAIN

## 2025-01-29 NOTE — PROGRESS NOTES
Physical Therapy    Car transfer training with pt and spouse.  After training, pt performed car transfer with min assist to lift LLE safely into car while following hip precautions.

## 2025-01-29 NOTE — DISCHARGE SUMMARY
Discharge Diagnosis  Closed displaced fracture of left femoral neck    Issues Requiring Follow-Up  PCP: Please follow-up with your primary care provider regarding recent hospital stay, for review and potential medication adjustments.    Orthopedic surgery: Please follow-up with Ortho as scheduled on 2/26 for updates on healing and potential x-rays    Urology: Please follow-up with urology as scheduled on 2/3 regarding your recent urinary retention and Mccarthy catheter.  At this time I will be essential to determine long-term plan    Discharge Meds     Medication List      START taking these medications     acetaminophen 325 mg tablet; Commonly known as: Tylenol; Take 2 tablets   (650 mg) by mouth every 4 hours if needed for moderate pain (4 - 6),   headaches or fever (temp greater than 38.0 C).   cyclobenzaprine 5 mg tablet; Commonly known as: Flexeril; Take 1 tablet   (5 mg) by mouth 3 times a day as needed for muscle spasms.   oxyCODONE 5 mg immediate release tablet; Commonly known as: Roxicodone;   Take 1 tablet (5 mg) by mouth every 6 hours if needed for severe pain (7 -   10).   polyethylene glycol 17 gram/dose powder; Commonly known as: Glycolax,   Miralax; Mix 1 capful (17 g) of powder with 4 to 8 ounces of water or   juice and drink once daily.; Start taking on: January 30, 2025   sennosides 8.6 mg tablet; Commonly known as: Senokot; Take 2 tablets   (17.2 mg) by mouth once daily at bedtime.     CHANGE how you take these medications     OMEGA-3 FISH OIL ORAL; What changed: how much to take   PreserVision AREDS-2 250-90-40-1 mg capsule; Generic drug: vit   C,O-Ir-buafw-lutein-zeaxan     CONTINUE taking these medications     ascorbic acid (vitamin C) 500 mg capsule   cholecalciferol 25 MCG (1000 UT) capsule; Commonly known as: Vitamin D-3   cyanocobalamin 1,000 mcg/mL injection; Commonly known as: Vitamin B-12   levocetirizine 5 mg tablet; Commonly known as: Xyzal   levothyroxine 100 mcg tablet; Commonly known  as: Synthroid, Levoxyl;   Take 1 tablet (100 mcg) by mouth early in the morning.. Take on an empty   stomach at the same time each day, either 30 to 60 minutes prior to   breakfast   metoprolol tartrate 25 mg tablet; Commonly known as: Lopressor; Take 1   tablet (25 mg) by mouth 2 times a day.   rivaroxaban 20 mg tablet; Commonly known as: Xarelto; Take 1 tablet (20   mg) by mouth once daily in the evening. Take with meals. Take with food.   TheraTears 0.25 % ophthalmic solution; Generic drug:   carboxymethylcellulose   triamcinolone 55 mcg nasal inhaler; Commonly known as: Nasacort     STOP taking these medications     clindamycin 150 mg capsule; Commonly known as: Cleocin       Test Results Pending At Discharge  Pending Labs       Order Current Status    Extra Urine Gray Tube Collected (01/24/25 8179)    Urinalysis with Reflex Culture and Microscopic In process            Hospital Course  Gabriella Haskins is a 79 y.o. female with past medical history significant for osteoporosis, OA, HTN, HLD, vitamin D deficiency, hypothyroidism, A-fib (s/p cardioversion 2023), basal cell carcinoma, history of subdural hematoma, gout, B12 deficiency who presented to [facility] on 1/13/2025 following mechanical fall.  On presentation the patient was unable to bear weight.  Stated that she tripped on a table at home, with fall onto left side. X-ray in ED showed comminuted fracture through the neck and subcapital region of the left femur.  Prior to surgery, found to be in A-fib with RVR.  Rate was controlled, and she converted out of A-fib after administering IV Lopressor and fluid bolus, in addition to her regularly scheduled metoprolol.  She went to the OR on 1/14/2025 with Dr. Wells who performed bipolar left hip hemiarthroplasty.  Electrolytes repleted, specifically potassium and magnesium.  Postoperatively she was started on Xarelto for DVT prophylaxis.  Her hydrochlorothiazide was discontinued due to risk for hypotension, and was  recommended to follow-up with her PCP for medication adjustment.  Recommended to have follow-up with orthopedics in 2 weeks.  Patient tolerated hemiarthroplasty well, without additional postoperative complications.  PT/OT evaluated patient, recommending acute rehab.  She was admitted to acute rehab on 1/17/2025 in stable condition.    Patient successfully completed 3 hours of therapy daily, and progressed to contact-guard for transfers, ambulation and most ADLs.   has been in for training.  Medically, hospital course complicated by urinary retention and UTI.  She completed course of Macrobid prior to discharge, and will not require any antibiotics for home-going.  Patient has extensive  history including prolapse, pyelonephritis, cystitis and UTIs.  Urology was consulted and a Mccarthy catheter was placed prior to discharge.  She will be discharged with a Mccarthy catheter for her urinary retention, and recommended to follow-up with urology.  She is currently scheduled for outpatient urology follow-up on 2/3.  She has orthopedic surgery follow-ups planned.  Staples were removed prior to discharge.  She will be discharged home with her  with home health care PT and OT.  She will have scheduled follow-ups with IM, urology, Ortho.  Discharged in stable condition on 1/29 with her .      Pertinent Physical Exam At Time of Discharge  Constitutional:       Appearance: Normal appearance.  No acute distress, mild cognitive/memory difficulties  HENT:      Head: Atraumatic.   Eyes:      Pupils: Pupils are equal, round, and reactive to light.   Pulmonary:      Effort: Pulmonary effort is normal.      Breath sounds: Normal breath sounds.   Abdominal:      General: Bowel sounds are normal.      Palpations: Abdomen is soft.   Musculoskeletal:      Comments: 5/5 strength in bilateral upper extremities, without sensory deficits.  Neurovascularly intact upper extremities with no focal neurological deficits  LLE: HF  4/5, KE/KF 4/5, DF 5/5, PF 5/5, EHL 5/5.  Very mild deficits secondary to pain, without true focal weakness  RLE: HF 5/5, KE/KF 5/5, DF 5/5, PF 5/5, EHL 5/5  Sensation intact to light touch in bilateral lower extremities   Skin:     General: Skin is warm and dry.   Neurological:      Mental Status: She is alert and oriented to person, place, and time. Mental status is at baseline.   Psychiatric:         Mood and Affect: Mood normal.         Behavior: Behavior is cooperative.   Function: CGA with ADLs, transfers, and mobility     Outpatient Follow-Up  Future Appointments   Date Time Provider Department Center   2/3/2025  1:30 PM ERMA Espinoza-CNP [id number]   3/11/2025 10:30 AM PAR OPCTR PET CT PAROPCCT PAR RAD   3/13/2025  2:00 PM ERMA Merritt-CNP [id number] [facility]         Chintan Pierce, DO  PM&R, PGY-2

## 2025-01-29 NOTE — DISCHARGE INSTR - APPOINTMENTS
Eric Hodge MD  Primary Care Provider  Monday, February 3, 2025 at 11:10 am  [address] [id number] [phone]    Altaf Wells (Orthopedic Surgery)  Wednesday, February 26, 2025 at 10:40 am  [address], [place] [address] [phone]

## 2025-01-30 ENCOUNTER — TELEPHONE (OUTPATIENT)
Dept: HOME HEALTH SERVICES | Facility: HOME HEALTH | Age: 80
End: 2025-01-30
Payer: MEDICARE

## 2025-01-30 ENCOUNTER — DOCUMENTATION (OUTPATIENT)
Dept: HOME HEALTH SERVICES | Facility: HOME HEALTH | Age: 80
End: 2025-01-30
Payer: MEDICARE

## 2025-01-30 NOTE — TELEPHONE ENCOUNTER
Bing Colvin,    I see patient was DC yesterday. Per our secure chat yesterday patient DC with [facility]- SN and [facility] care orders were to be added to [facility] referral. Please let me know when this is completed so referral can be processed and start of care is not delayed for patient.    Thank you,    Intake

## 2025-01-30 NOTE — HH CARE COORDINATION
Home Care received a Referral for Nursing, Physical Therapy, and Occupational Therapy. We have processed the referral for a Start of Care on 02/01/2025.     If you have any questions or concerns, please feel free to contact us at [phone]. Follow the prompts, enter your five digit zip code, and you will be directed to your care team on WEST 3.

## 2025-01-30 NOTE — TELEPHONE ENCOUNTER
Hi Dr. Colvin,    I see SN was added but we still need mejia care orders added.    Thank you!    Intake

## 2025-02-01 ENCOUNTER — HOME CARE VISIT (OUTPATIENT)
Dept: HOME HEALTH SERVICES | Facility: HOME HEALTH | Age: 80
End: 2025-02-01
Payer: MEDICARE

## 2025-02-01 VITALS
SYSTOLIC BLOOD PRESSURE: 102 MMHG | TEMPERATURE: 99.1 F | HEART RATE: 78 BPM | OXYGEN SATURATION: 97 % | RESPIRATION RATE: 16 BRPM | DIASTOLIC BLOOD PRESSURE: 62 MMHG

## 2025-02-01 PROCEDURE — 169592 NO-PAY CLAIM PROCEDURE

## 2025-02-01 PROCEDURE — G0299 HHS/HOSPICE OF RN EA 15 MIN: HCPCS | Mod: HHH

## 2025-02-01 ASSESSMENT — ENCOUNTER SYMPTOMS
DENIES PAIN: 1
LAST BOWEL MOVEMENT: 67236
DIARRHEA: 1
PERSON REPORTING PAIN: PATIENT
APPETITE LEVEL: FAIR
HIGHEST PAIN SEVERITY IN PAST 24 HOURS: 1/10
LOWEST PAIN SEVERITY IN PAST 24 HOURS: 0/10

## 2025-02-01 ASSESSMENT — ACTIVITIES OF DAILY LIVING (ADL)
ENTERING_EXITING_HOME: DEPENDENT
OASIS_M1830: 03

## 2025-02-01 NOTE — HOME HEALTH
pt was in ed for fractured hip. fell at her sister and laws on the 13th, tripped over end table. had surgery. incision was covered with abd for drainge. removed abd, scant drainage. no s/s of infection. photo of incision in chart. steri strips still intact. pt is currently not showering. doing spongebaths. is walking with walker, has been since last year. no falls since being home.  is home with pt at all times, takes care of his sister but shes down the road. pt doesnt report much pain, just disocomfort. maybe a 1 most of the time. does have catheter in place, was placed on 29th. does have appt with urology on the 3rd. is not her first catheter and is knowledgable about catheter care. last bm was yesterday, did have diarrhea but since stopped. urine is clear and yellow. meds and allergies reviewed and updated as needed with pt and , no issues found, takes meds as directed. carries med and allergy list in wallet. lung sounds clear, denies sob but does have productive cough in the am. vitals wnl. will be seen for nursing for catheter care. pt and ot eval to follow.

## 2025-02-01 NOTE — CARE PLAN
Patient has achieved 7/8 LTGs established from initial evaluation. Patient discharged home with spouse at supervision level via ww up to Min-Mod A for IADLs. Family training completed 1/28/25 with spouse. Recommend follow up OT HHC at this time.  Problem: OT Problem  Goal: LTG - Patient will complete grooming with independence.  Outcome: Not met  Goal: LTG - Patient will complete toileting with min assist.  Outcome: Met  Goal: LTG - Patient will complete bathing with min assist.  Outcome: Met  Goal: LTG - Patient will complete UE dressing with supervision.  Outcome: Met  Goal: LTG - Patient will complete LE dressing using adaptive equip as needed with min assist.  Outcome: Met  Goal: LTG - Patient will complete commode transfer via device as needed with min assist.  Outcome: Met  Goal: LTG - Patient will complete tub/shower transfer using DME as needed with min/mod assist.  Outcome: Met  Goal: LTG - Patient will complete functional mobility via device as needed with min assist.  Outcome: Met  Goal: STG - Patient will complete toileting with mod assist.  Outcome: Met  Goal: STG - Patient will complete LE dressing using adaptive equip as needed with mod assist.  Outcome: Met  Goal: STG - Patient will complete commode transfer via device as needed with mod assist.  Outcome: Met  Goal: STG - Patient will complete simple functional mobility via device as needed with mod assist.  Outcome: Met  Goal: STG - Patient will complete bathing with min assist.  Outcome: Met  Goal: STG - Patient will complete UE dressing with set up  Outcome: Met

## 2025-02-01 NOTE — Clinical Note
soc complete. does not have a f/u with surgeon scheduled. encouraged to call his office. photo of incision in chart. sees urology on monday. no issues with catheter.

## 2025-02-03 ENCOUNTER — APPOINTMENT (OUTPATIENT)
Dept: UROLOGY | Facility: CLINIC | Age: 80
End: 2025-02-03
Payer: MEDICARE

## 2025-02-03 ENCOUNTER — HOME CARE VISIT (OUTPATIENT)
Dept: HOME HEALTH SERVICES | Facility: HOME HEALTH | Age: 80
End: 2025-02-03
Payer: MEDICARE

## 2025-02-03 VITALS
SYSTOLIC BLOOD PRESSURE: 89 MMHG | DIASTOLIC BLOOD PRESSURE: 57 MMHG | HEART RATE: 73 BPM | BODY MASS INDEX: 22.18 KG/M2 | WEIGHT: 138 LBS | HEIGHT: 66 IN

## 2025-02-03 DIAGNOSIS — R33.8 ACUTE URINARY RETENTION: Primary | ICD-10-CM

## 2025-02-03 PROCEDURE — 1159F MED LIST DOCD IN RCRD: CPT | Performed by: NURSE PRACTITIONER

## 2025-02-03 PROCEDURE — 51700 IRRIGATION OF BLADDER: CPT | Performed by: NURSE PRACTITIONER

## 2025-02-03 PROCEDURE — 1111F DSCHRG MED/CURRENT MED MERGE: CPT | Performed by: NURSE PRACTITIONER

## 2025-02-03 PROCEDURE — G2211 COMPLEX E/M VISIT ADD ON: HCPCS | Performed by: NURSE PRACTITIONER

## 2025-02-03 PROCEDURE — 3078F DIAST BP <80 MM HG: CPT | Performed by: NURSE PRACTITIONER

## 2025-02-03 PROCEDURE — 1157F ADVNC CARE PLAN IN RCRD: CPT | Performed by: NURSE PRACTITIONER

## 2025-02-03 PROCEDURE — 3074F SYST BP LT 130 MM HG: CPT | Performed by: NURSE PRACTITIONER

## 2025-02-03 PROCEDURE — 99204 OFFICE O/P NEW MOD 45 MIN: CPT | Performed by: NURSE PRACTITIONER

## 2025-02-03 NOTE — PROGRESS NOTES
UROLOGIC INITIAL EVALUATION     PROBLEM LIST:  1. Acute urinary retention             HISTORY OF PRESENT ILLNESS:   Gabriella Haskins is a 79 y.o. with Afib, HTN, anemia, hx bladder sling 2024  Presents today for hospital follow up  Seen accompanied by spouse, Ritchie  Reports doing ok since return home last week  Some discomfort from catheter  Would like this removed  Fatigued, not moving around much  Started a bottle of water this morning, not sure if she finished it  Minimal appetite  No constipation    PAST MEDICAL HISTORY:  Past Medical History:   Diagnosis Date    Arthritis     History of transfusion     Lung nodule        PAST SURGICAL HISTORY:  Past Surgical History:   Procedure Laterality Date    SHOULDER SURGERY      TITANIUM IMPLANT        ALLERGIES:   Allergies   Allergen Reactions    Azithromycin Rash     blisters in mouth    Codeine GI Upset    Conjugated Estrogens Swelling and Unknown     legs swelled    Erythromycin Diarrhea    Ibuprofen GI Upset    Lisinopril Cough    Cephalexin Itching    Doxycycline Itching    Indomethacin Itching    Bactrim [Sulfamethoxazole-Trimethoprim] Hives    Amoxicillin Rash        MEDICATIONS:   Current Outpatient Medications on File Prior to Visit   Medication Sig Dispense Refill    acetaminophen (Tylenol) 325 mg tablet Take 2 tablets (650 mg) by mouth every 4 hours if needed for moderate pain (4 - 6), headaches or fever (temp greater than 38.0 C). 30 tablet 0    ascorbic acid, vitamin C, 500 mg capsule Take 1 tablet by mouth once daily.      carboxymethylcellulose (TheraTears) 0.25 % ophthalmic solution Administer 1 drop into both eyes 4 times a day as needed for dry eyes.      cholecalciferol (Vitamin D-3) 25 MCG (1000 UT) capsule Take 1 capsule (25 mcg) by mouth once daily.      cyanocobalamin (Vitamin B-12) 1,000 mcg/mL injection Inject 1 mL (1,000 mcg) into the muscle every 14 (fourteen) days.      cyclobenzaprine (Flexeril) 5 mg tablet Take 1 tablet (5 mg) by mouth  3 times a day as needed for muscle spasms. 20 tablet 0    levocetirizine (Xyzal) 5 mg tablet Take 1 tablet (5 mg) by mouth once daily in the evening.      levothyroxine (Synthroid, Levoxyl) 100 mcg tablet Take 1 tablet (100 mcg) by mouth early in the morning.. Take on an empty stomach at the same time each day, either 30 to 60 minutes prior to breakfast      metoprolol tartrate (Lopressor) 25 mg tablet Take 1 tablet (25 mg) by mouth 2 times a day. 60 tablet 0    omega-3/dha/epa/fish oil (OMEGA-3 FISH OIL ORAL) Take 1 tablet by mouth once daily.      oxyCODONE (Roxicodone) 5 mg immediate release tablet Take 1 tablet (5 mg) by mouth every 6 hours if needed for severe pain (7 - 10). 15 tablet 0    polyethylene glycol (Glycolax, Miralax) 17 gram/dose powder Mix 1 capful (17 g) of powder with 4 to 8 ounces of water or juice and drink once daily. 510 g 0    rivaroxaban (Xarelto) 20 mg tablet Take 1 tablet (20 mg) by mouth once daily in the evening. Take with meals. Take with food. 30 tablet 0    sennosides (Senokot) 8.6 mg tablet Take 2 tablets (17.2 mg) by mouth once daily at bedtime. 60 tablet 0    triamcinolone (Nasacort) 55 mcg nasal inhaler Administer 2 sprays into each nostril once daily.      vit C,H-Kb-shsfn-lutein-zeaxan (PreserVision AREDS-2) 250-90-40-1 mg capsule Take 2 capsules by mouth.      [DISCONTINUED] clindamycin (Cleocin) 150 mg capsule Take 4 capsules (600 mg) by mouth 1 time if needed. Prior to dental appointments      [DISCONTINUED] rivaroxaban (Xarelto) 20 mg tablet Take 1 tablet (20 mg) by mouth once daily in the evening. Take with meals. Take with food.      [DISCONTINUED] rivaroxaban (Xarelto) 20 mg tablet Take 1 tablet (20 mg) by mouth once daily in the evening. Take with meals. Take with food. 20 tablet 0     No current facility-administered medications on file prior to visit.        SOCIAL HISTORY:  Patient  reports that she has never smoked. She has never used smokeless tobacco. She reports  that she does not currently use alcohol. She reports that she does not use drugs.   Social History     Socioeconomic History    Marital status:      Spouse name: Not on file    Number of children: Not on file    Years of education: Not on file    Highest education level: Not on file   Occupational History    Not on file   Tobacco Use    Smoking status: Never    Smokeless tobacco: Never   Substance and Sexual Activity    Alcohol use: Not Currently    Drug use: Never    Sexual activity: Defer   Other Topics Concern    Not on file   Social History Narrative    Not on file     Social Drivers of Health     Financial Resource Strain: Low Risk  (1/17/2025)    Overall Financial Resource Strain (CARDIA)     Difficulty of Paying Living Expenses: Not hard at all   Food Insecurity: No Food Insecurity (1/17/2025)    Hunger Vital Sign     Worried About Running Out of Food in the Last Year: Never true     Ran Out of Food in the Last Year: Never true   Transportation Needs: No Transportation Needs (2/1/2025)    OASIS : Transportation     Lack of Transportation (Medical): No     Lack of Transportation (Non-Medical): No     Patient Unable or Declines to Respond: No   Physical Activity: Not on file   Stress: Not on file   Social Connections: Feeling Socially Integrated (2/1/2025)    OASIS : Social Isolation     Frequency of experiencing loneliness or isolation: Rarely   Intimate Partner Violence: Not At Risk (1/17/2025)    Humiliation, Afraid, Rape, and Kick questionnaire     Fear of Current or Ex-Partner: No     Emotionally Abused: No     Physically Abused: No     Sexually Abused: No   Housing Stability: Low Risk  (1/17/2025)    Housing Stability Vital Sign     Unable to Pay for Housing in the Last Year: No     Number of Times Moved in the Last Year: 0     Homeless in the Last Year: No       FAMILY HISTORY:  No family history on file.    REVIEW OF SYSTEMS:  All systems reviewed, pertinent negatives as noted in HPI.      PHYSICAL EXAM:  Visit Vitals  BP 89/57   Pulse 73     Constitutional: Thin, appears fatigued. No distress.    Head: Normocephalic and atraumatic.    Neck: Normal range of motion.     Pulmonary/Chest: Effort normal. No respiratory distress.   Abdominal: Non-distended.  : See below.  Integumentary: Pale. No rash or lesions visualized.  Musculoskeletal: Normal gait, ambulates with walker.    Neurological: Alert, grossly intact.  Psychiatric: Normal mood and affect. Thought content normal.      LABORATORY REVIEW:   Lab Results   Component Value Date    BUN 10 01/28/2025    CREATININE 0.67 01/28/2025    EGFR 90 01/28/2025     01/28/2025    K 4.5 01/28/2025     01/28/2025    CO2 26 01/28/2025    CALCIUM 8.8 01/28/2025      Lab Results   Component Value Date    WBC 7.7 01/29/2025    RBC 3.51 (L) 01/29/2025    HGB 9.7 (L) 01/29/2025    HCT 34.4 (L) 01/29/2025    MCV 98 01/29/2025    MCH 27.6 01/29/2025    MCHC 28.2 (L) 01/29/2025    RDW 14.4 01/29/2025     01/29/2025    MPV 8.2 10/11/2023           Assessment:      1. Acute urinary retention            Gabriella Haskins is a 79 y.o. with acute urinary retention after surgical repair 1/14/25 of L femoral fracture due to mechanical fall   Catheter placed 1/19 for retention, removed 1/21 then required straight catheterization  Seen by urology 1/28 while at acute rehab, recommended stopping tamsulosin and catheter replacement   Previous hx of retention during hospitalization   Reportedly completed UDS, results not available  Bladder sling 9/23/24, hx hysterectomy  Urine culture 1/24/25 + E coli, completed course of nitrofurantoin    BP low  Radial pulse steady  Appears pale, spouse confirms  Has appointment with PCP later this week    Equivocal TOV; 200 mL instilled, 50 mL voided + stool  Prefers to return home to hydrate, attempt full void     Plan:   RTC in 2 weeks for reassessment or sooner if needed  Encouraged to contact us in the interim with any  questions, concerns

## 2025-02-04 ENCOUNTER — HOME CARE VISIT (OUTPATIENT)
Dept: HOME HEALTH SERVICES | Facility: HOME HEALTH | Age: 80
End: 2025-02-04
Payer: MEDICARE

## 2025-02-04 VITALS — DIASTOLIC BLOOD PRESSURE: 58 MMHG | SYSTOLIC BLOOD PRESSURE: 102 MMHG

## 2025-02-04 PROCEDURE — G0151 HHCP-SERV OF PT,EA 15 MIN: HCPCS | Mod: HHH

## 2025-02-04 ASSESSMENT — ENCOUNTER SYMPTOMS
PERSON REPORTING PAIN: PATIENT
PAIN LOCATION: LEFT HIP
HIGHEST PAIN SEVERITY IN PAST 24 HOURS: 3/10
PAIN LOCATION - PAIN SEVERITY: 1/10
LOWEST PAIN SEVERITY IN PAST 24 HOURS: 0/10
PAIN LOCATION - EXACERBATING FACTORS: ACTIVITY
OCCASIONAL FEELINGS OF UNSTEADINESS: 0
SUBJECTIVE PAIN PROGRESSION: GRADUALLY IMPROVING
PAIN: 1

## 2025-02-05 ENCOUNTER — HOME CARE VISIT (OUTPATIENT)
Dept: HOME HEALTH SERVICES | Facility: HOME HEALTH | Age: 80
End: 2025-02-05
Payer: MEDICARE

## 2025-02-05 VITALS — DIASTOLIC BLOOD PRESSURE: 58 MMHG | TEMPERATURE: 97 F | HEART RATE: 62 BPM | SYSTOLIC BLOOD PRESSURE: 100 MMHG

## 2025-02-05 LAB
ATRIAL RATE: 85 BPM
P AXIS: 70 DEGREES
P OFFSET: 192 MS
P ONSET: 122 MS
PR INTERVAL: 210 MS
Q ONSET: 227 MS
QRS COUNT: 14 BEATS
QRS DURATION: 78 MS
QT INTERVAL: 362 MS
QTC CALCULATION(BAZETT): 430 MS
QTC FREDERICIA: 406 MS
R AXIS: 25 DEGREES
T AXIS: 79 DEGREES
T OFFSET: 408 MS
VENTRICULAR RATE: 85 BPM

## 2025-02-05 PROCEDURE — G0157 HHC PT ASSISTANT EA 15: HCPCS | Mod: CQ,HHH

## 2025-02-05 PROCEDURE — G0152 HHCP-SERV OF OT,EA 15 MIN: HCPCS | Mod: HHH

## 2025-02-05 ASSESSMENT — ACTIVITIES OF DAILY LIVING (ADL)
HAIR_CARE_ASSESSED: 1
WASHING_LB_CURRENT_FUNCTION: MODERATE ASSIST
PREPARING MEALS: NEEDS ASSISTANCE
LIGHT HOUSEKEEPING: DEPENDENT
LAUNDRY: DEPENDENT
WASHING_HAIR_CURRENT_FUNCTION: MODERATE ASSIST
BATHING_CURRENT_FUNCTION: MINIMUM ASSIST
DRESSING_LB_CURRENT_FUNCTION: MODERATE ASSIST
LAUNDRY ASSESSED: 1
HAIR_CARE_MODERATE_ASSIST: 1
BATHING ASSESSED: 1
TOILETING: CONTACT GUARD ASSIST
TOILETING: 1
HOUSEKEEPING ASSESSED: 1
FEEDING_WITHIN_DEFINED_LIMITS: 1
DRESSING_LB_CURRENT_FUNCTION: MINIMUM ASSIST
WASHING_UPB_CURRENT_FUNCTION: MINIMUM ASSIST
BATHING_CURRENT_FUNCTION: MODERATE ASSIST

## 2025-02-05 ASSESSMENT — ENCOUNTER SYMPTOMS
DENIES PAIN: 1
PAIN: PATIENT DENIES PAIN AT THIS TIME
PAIN: 1
PERSON REPORTING PAIN: PATIENT
HIGHEST PAIN SEVERITY IN PAST 24 HOURS: 3/10
PAIN LOCATION: LEFT LEG
PERSON REPORTING PAIN: PATIENT

## 2025-02-05 ASSESSMENT — PAIN SCALES - PAIN ASSESSMENT IN ADVANCED DEMENTIA (PAINAD)
NEGVOCALIZATION: 0 - NONE.
NEGVOCALIZATION: 0
BODYLANGUAGE: 0 - RELAXED.
TOTALSCORE: 0
FACIALEXPRESSION: 0 - SMILING OR INEXPRESSIVE.
FACIALEXPRESSION: 0
BODYLANGUAGE: 0
CONSOLABILITY: 0 - NO NEED TO CONSOLE.
BREATHING: 0
CONSOLABILITY: 0

## 2025-02-07 ENCOUNTER — HOME CARE VISIT (OUTPATIENT)
Dept: HOME HEALTH SERVICES | Facility: HOME HEALTH | Age: 80
End: 2025-02-07
Payer: MEDICARE

## 2025-02-07 VITALS
HEART RATE: 66 BPM | RESPIRATION RATE: 18 BRPM | OXYGEN SATURATION: 98 % | SYSTOLIC BLOOD PRESSURE: 102 MMHG | TEMPERATURE: 97.3 F | DIASTOLIC BLOOD PRESSURE: 60 MMHG

## 2025-02-07 PROCEDURE — G0299 HHS/HOSPICE OF RN EA 15 MIN: HCPCS | Mod: HHH

## 2025-02-07 ASSESSMENT — ENCOUNTER SYMPTOMS
LAST BOWEL MOVEMENT: 67242
HIGHEST PAIN SEVERITY IN PAST 24 HOURS: 2/10
APPETITE LEVEL: GOOD
LOWEST PAIN SEVERITY IN PAST 24 HOURS: 0/10
PAIN: 1
PERSON REPORTING PAIN: PATIENT

## 2025-02-07 NOTE — Clinical Note
pt has you written on her planner for the 26th, but no time written. dont see any appt on my end. can you have your office call her to schedule once more? thank you [phone]

## 2025-02-07 NOTE — HOME HEALTH
pt seen for routine visit. has follow up with duc on her planner for 2/26, but no time. reached out to md to clarify with pt. catheter removed on monday. no issues with urination reported. last bm yesterday. appetite has been good. no recent falls, ambulates with walker. incision intact and s/s free of infection. pictures and measurements attatched in chart. denies sob, lungs are clear. no changes in meds. vitals wnl. no more skilled nursing need. dced from nursing. will continue to be active with pt.

## 2025-02-08 LAB
ATRIAL RATE: 122 BPM
ATRIAL RATE: 90 BPM
P AXIS: 57 DEGREES
PR INTERVAL: 114 MS
PR INTERVAL: 220 MS
Q ONSET: 249 MS
Q ONSET: 252 MS
QRS COUNT: 14 BEATS
QRS COUNT: 19 BEATS
QRS DURATION: 78 MS
QRS DURATION: 79 MS
QT INTERVAL: 328 MS
QT INTERVAL: 367 MS
QTC CALCULATION(BAZETT): 449 MS
QTC CALCULATION(BAZETT): 460 MS
QTC FREDERICIA: 411 MS
QTC FREDERICIA: 420 MS
R AXIS: -28 DEGREES
R AXIS: -28 DEGREES
T AXIS: 51 DEGREES
T AXIS: 67 DEGREES
T OFFSET: 413 MS
T OFFSET: 436 MS
VENTRICULAR RATE: 118 BPM
VENTRICULAR RATE: 90 BPM

## 2025-02-11 ENCOUNTER — HOME CARE VISIT (OUTPATIENT)
Dept: HOME HEALTH SERVICES | Facility: HOME HEALTH | Age: 80
End: 2025-02-11
Payer: MEDICARE

## 2025-02-11 PROCEDURE — G0157 HHC PT ASSISTANT EA 15: HCPCS | Mod: CQ,HHH

## 2025-02-11 ASSESSMENT — ENCOUNTER SYMPTOMS
PAIN: 1
PAIN LOCATION: LEFT HIP
SUBJECTIVE PAIN PROGRESSION: RAPIDLY IMPROVING
PERSON REPORTING PAIN: PATIENT
HIGHEST PAIN SEVERITY IN PAST 24 HOURS: 2/10

## 2025-02-13 ENCOUNTER — HOME CARE VISIT (OUTPATIENT)
Dept: HOME HEALTH SERVICES | Facility: HOME HEALTH | Age: 80
End: 2025-02-13
Payer: MEDICARE

## 2025-02-13 PROCEDURE — G0157 HHC PT ASSISTANT EA 15: HCPCS | Mod: CQ,HHH

## 2025-02-13 ASSESSMENT — ENCOUNTER SYMPTOMS
HIGHEST PAIN SEVERITY IN PAST 24 HOURS: 2/10
PAIN LOCATION: LEFT HIP
SUBJECTIVE PAIN PROGRESSION: RAPIDLY IMPROVING
PAIN: 1
PERSON REPORTING PAIN: PATIENT

## 2025-02-17 ENCOUNTER — HOME CARE VISIT (OUTPATIENT)
Dept: HOME HEALTH SERVICES | Facility: HOME HEALTH | Age: 80
End: 2025-02-17
Payer: MEDICARE

## 2025-02-17 PROCEDURE — G0157 HHC PT ASSISTANT EA 15: HCPCS | Mod: CQ,HHH

## 2025-02-17 ASSESSMENT — ENCOUNTER SYMPTOMS
PERSON REPORTING PAIN: PATIENT
DENIES PAIN: 1

## 2025-02-19 ENCOUNTER — HOME CARE VISIT (OUTPATIENT)
Dept: HOME HEALTH SERVICES | Facility: HOME HEALTH | Age: 80
End: 2025-02-19
Payer: MEDICARE

## 2025-02-19 PROCEDURE — G0157 HHC PT ASSISTANT EA 15: HCPCS | Mod: CQ,HHH

## 2025-02-24 ENCOUNTER — HOME CARE VISIT (OUTPATIENT)
Dept: HOME HEALTH SERVICES | Facility: HOME HEALTH | Age: 80
End: 2025-02-24
Payer: MEDICARE

## 2025-02-24 PROCEDURE — G0151 HHCP-SERV OF PT,EA 15 MIN: HCPCS | Mod: HHH

## 2025-02-24 ASSESSMENT — ACTIVITIES OF DAILY LIVING (ADL)
OASIS_M1830: 01
HOME_HEALTH_OASIS: 00

## 2025-02-24 ASSESSMENT — ENCOUNTER SYMPTOMS
PERSON REPORTING PAIN: PATIENT
PAIN LOCATION: LEFT HIP
SUBJECTIVE PAIN PROGRESSION: GRADUALLY IMPROVING
OCCASIONAL FEELINGS OF UNSTEADINESS: 0
HIGHEST PAIN SEVERITY IN PAST 24 HOURS: 1/10
LOWEST PAIN SEVERITY IN PAST 24 HOURS: 0/10
PAIN LOCATION - PAIN SEVERITY: 0/10
PAIN: 1

## 2025-02-25 ENCOUNTER — APPOINTMENT (OUTPATIENT)
Dept: UROLOGY | Facility: CLINIC | Age: 80
End: 2025-02-25
Payer: MEDICARE

## 2025-02-25 VITALS — SYSTOLIC BLOOD PRESSURE: 97 MMHG | HEART RATE: 63 BPM | DIASTOLIC BLOOD PRESSURE: 60 MMHG

## 2025-02-25 DIAGNOSIS — R33.8 ACUTE URINARY RETENTION: Primary | ICD-10-CM

## 2025-02-25 PROBLEM — R31.0 FRANK HEMATURIA: Status: ACTIVE | Noted: 2023-09-29

## 2025-02-25 PROCEDURE — 99212 OFFICE O/P EST SF 10 MIN: CPT | Performed by: NURSE PRACTITIONER

## 2025-02-25 PROCEDURE — 1157F ADVNC CARE PLAN IN RCRD: CPT | Performed by: NURSE PRACTITIONER

## 2025-02-25 PROCEDURE — 51798 US URINE CAPACITY MEASURE: CPT | Performed by: NURSE PRACTITIONER

## 2025-02-25 PROCEDURE — 1036F TOBACCO NON-USER: CPT | Performed by: NURSE PRACTITIONER

## 2025-02-25 PROCEDURE — 1111F DSCHRG MED/CURRENT MED MERGE: CPT | Performed by: NURSE PRACTITIONER

## 2025-02-25 PROCEDURE — 3078F DIAST BP <80 MM HG: CPT | Performed by: NURSE PRACTITIONER

## 2025-02-25 PROCEDURE — G2211 COMPLEX E/M VISIT ADD ON: HCPCS | Performed by: NURSE PRACTITIONER

## 2025-02-25 PROCEDURE — 3074F SYST BP LT 130 MM HG: CPT | Performed by: NURSE PRACTITIONER

## 2025-02-25 NOTE — PROGRESS NOTES
UROLOGIC FOLLOW-UP VISIT     PROBLEM LIST:  1. Acute urinary retention             HISTORY OF PRESENT ILLNESS:   Gabriella Haskins is a 79 y.o. with hx acute urinary retention    INTERVAL HISTORY:  Returns today for FUV  Seen accompanied by spouse  Reports doing better  Moving around the house more  Voided well this morning   No urge now  Not drinking much water  BP has been good     PAST MEDICAL HISTORY:  Past Medical History:   Diagnosis Date    A-fib (Multi) 10/05/2023    Arthritis     Cardiac arrest 10/06/2023    Essential hypertension 07/11/2008    Hematuria 09/29/2023    Last Assessment & Plan:    Formatting of this note might be different from the original.   Assessment:    - Reports hematuria x1 day. Gross hematuria on Mccarthy insertion      Plan:    - Monitor CBC    - Urology consult: Plan for cystoscopy once cardiology clear   - Hold anticoagulation.      History of transfusion     Hypercholesterolemia 04/22/2021    Hypothyroidism 07/11/2008    Last Assessment & Plan:    Formatting of this note might be different from the original.   on levothyroxin at home   TSH wnl on 9/28      PLAN   - Continue home dose.      Lung nodule     SDH (subdural hematoma) (Multi) 05/29/2024    Senile osteoporosis 07/11/2008       PAST SURGICAL HISTORY:  Past Surgical History:   Procedure Laterality Date    SHOULDER SURGERY      TITANIUM IMPLANT        ALLERGIES:   Allergies   Allergen Reactions    Azithromycin Rash     blisters in mouth    Codeine GI Upset    Conjugated Estrogens Swelling and Unknown     legs swelled    Erythromycin Diarrhea    Ibuprofen GI Upset    Lisinopril Cough    Cephalexin Itching    Doxycycline Itching    Indomethacin Itching    Bactrim [Sulfamethoxazole-Trimethoprim] Hives    Amoxicillin Rash        MEDICATIONS:   Current Outpatient Medications on File Prior to Visit   Medication Sig Dispense Refill    acetaminophen (Tylenol) 325 mg tablet Take 2 tablets (650 mg) by mouth every 4 hours if needed for  moderate pain (4 - 6), headaches or fever (temp greater than 38.0 C). 30 tablet 0    ascorbic acid, vitamin C, 500 mg capsule Take 1 tablet by mouth once daily.      carboxymethylcellulose (TheraTears) 0.25 % ophthalmic solution Administer 1 drop into both eyes 4 times a day as needed for dry eyes.      cholecalciferol (Vitamin D-3) 25 MCG (1000 UT) capsule Take 1 capsule (25 mcg) by mouth once daily.      cyanocobalamin (Vitamin B-12) 1,000 mcg/mL injection Inject 1 mL (1,000 mcg) into the muscle every 14 (fourteen) days.      cyclobenzaprine (Flexeril) 5 mg tablet Take 1 tablet (5 mg) by mouth 3 times a day as needed for muscle spasms. 20 tablet 0    levocetirizine (Xyzal) 5 mg tablet Take 1 tablet (5 mg) by mouth once daily in the evening.      levothyroxine (Synthroid, Levoxyl) 100 mcg tablet Take 1 tablet (100 mcg) by mouth early in the morning.. Take on an empty stomach at the same time each day, either 30 to 60 minutes prior to breakfast      metoprolol tartrate (Lopressor) 25 mg tablet Take 1 tablet (25 mg) by mouth 2 times a day. 60 tablet 0    omega-3/dha/epa/fish oil (OMEGA-3 FISH OIL ORAL) Take 1 tablet by mouth once daily.      oxyCODONE (Roxicodone) 5 mg immediate release tablet Take 1 tablet (5 mg) by mouth every 6 hours if needed for severe pain (7 - 10). 15 tablet 0    polyethylene glycol (Glycolax, Miralax) 17 gram/dose powder Mix 1 capful (17 g) of powder with 4 to 8 ounces of water or juice and drink once daily. 510 g 0    rivaroxaban (Xarelto) 20 mg tablet Take 1 tablet (20 mg) by mouth once daily in the evening. Take with meals. Take with food. 30 tablet 0    sennosides (Senokot) 8.6 mg tablet Take 2 tablets (17.2 mg) by mouth once daily at bedtime. 60 tablet 0    triamcinolone (Nasacort) 55 mcg nasal inhaler Administer 2 sprays into each nostril once daily.      vit C,O-Sy-uphnp-lutein-zeaxan (PreserVision AREDS-2) 250-90-40-1 mg capsule Take 2 capsules by mouth early in the morning..       No  current facility-administered medications on file prior to visit.        SOCIAL HISTORY:  Patient  reports that she has never smoked. She has never used smokeless tobacco. She reports that she does not currently use alcohol. She reports that she does not use drugs.   Social History     Socioeconomic History    Marital status:      Spouse name: Not on file    Number of children: Not on file    Years of education: Not on file    Highest education level: Not on file   Occupational History    Not on file   Tobacco Use    Smoking status: Never    Smokeless tobacco: Never   Substance and Sexual Activity    Alcohol use: Not Currently    Drug use: Never    Sexual activity: Defer   Other Topics Concern    Not on file   Social History Narrative    Not on file     Social Drivers of Health     Financial Resource Strain: Low Risk  (1/17/2025)    Overall Financial Resource Strain (CARDIA)     Difficulty of Paying Living Expenses: Not hard at all   Food Insecurity: No Food Insecurity (1/17/2025)    Hunger Vital Sign     Worried About Running Out of Food in the Last Year: Never true     Ran Out of Food in the Last Year: Never true   Transportation Needs: No Transportation Needs (2/24/2025)    OASIS : Transportation     Lack of Transportation (Medical): No     Lack of Transportation (Non-Medical): No     Patient Unable or Declines to Respond: No   Physical Activity: Not on file   Stress: Not on file   Social Connections: Feeling Socially Integrated (2/24/2025)    OASIS : Social Isolation     Frequency of experiencing loneliness or isolation: Never   Intimate Partner Violence: Not At Risk (1/17/2025)    Humiliation, Afraid, Rape, and Kick questionnaire     Fear of Current or Ex-Partner: No     Emotionally Abused: No     Physically Abused: No     Sexually Abused: No   Housing Stability: Low Risk  (1/17/2025)    Housing Stability Vital Sign     Unable to Pay for Housing in the Last Year: No     Number of Times Moved in  the Last Year: 0     Homeless in the Last Year: No       FAMILY HISTORY:  No family history on file.    REVIEW OF SYSTEMS:  All systems reviewed, pertinent negatives as noted in HPI.     PHYSICAL EXAM:  Visit Vitals  BP 97/60   Pulse 63     Constitutional: Thin, appears mildly fatigued. No distress.    Head: Normocephalic and atraumatic.    Neck: Normal range of motion.     Pulmonary/Chest: Effort normal. No respiratory distress.   Abdominal: Non-distended.  : See below.  Integumentary: Pale. No rash or lesions visualized.  Musculoskeletal: Normal gait, ambulating independently.    Neurological: Alert, grossly intact.  Psychiatric: Normal mood and affect. Thought content normal.      LABORATORY REVIEW:   Lab Results   Component Value Date    BUN 10 01/28/2025    CREATININE 0.67 01/28/2025    EGFR 90 01/28/2025     01/28/2025    K 4.5 01/28/2025     01/28/2025    CO2 26 01/28/2025    CALCIUM 8.8 01/28/2025      Lab Results   Component Value Date    WBC 7.7 01/29/2025    RBC 3.51 (L) 01/29/2025    HGB 9.7 (L) 01/29/2025    HCT 34.4 (L) 01/29/2025    MCV 98 01/29/2025    MCH 27.6 01/29/2025    MCHC 28.2 (L) 01/29/2025    RDW 14.4 01/29/2025     01/29/2025    MPV 8.2 10/11/2023             Assessment:      1. Acute urinary retention            Gabriella Haskins is a 79 y.o. with acute urinary retention after surgical repair 1/14/25 of L femoral fracture due to mechanical fall   Bladder sling 9/23/24, hx hysterectomy  Catheter placed 1/19 for retention, removed 1/21 then required straight catheterization  Seen by urology 1/28 while at acute rehab, recommended stopping tamsulosin and catheter replacement   Previous hx of retention during hospitalization   Reportedly completed UDS, results not available  Urine culture 1/24/25 + E coli, completed course of nitrofurantoin  Equivocal TOV 2/3/25, catheter left out,  Unable to void today  PVR 86 mL    Discussed hydration; try for two 12 oz bottles of  water/day  They will resume checking BP prior to metoprolol  Agreeable to plan as below     Plan:   RTC as needed, encouraged to contact us with any questions, concerns

## 2025-03-07 ENCOUNTER — EVALUATION (OUTPATIENT)
Dept: PHYSICAL THERAPY | Facility: CLINIC | Age: 80
End: 2025-03-07
Payer: MEDICARE

## 2025-03-07 DIAGNOSIS — M25.552 PAIN IN LEFT HIP: ICD-10-CM

## 2025-03-07 PROCEDURE — 97161 PT EVAL LOW COMPLEX 20 MIN: CPT | Mod: GP

## 2025-03-07 PROCEDURE — 97110 THERAPEUTIC EXERCISES: CPT | Mod: GP

## 2025-03-07 ASSESSMENT — PATIENT HEALTH QUESTIONNAIRE - PHQ9
1. LITTLE INTEREST OR PLEASURE IN DOING THINGS: NOT AT ALL
SUM OF ALL RESPONSES TO PHQ9 QUESTIONS 1 AND 2: 0
2. FEELING DOWN, DEPRESSED OR HOPELESS: NOT AT ALL

## 2025-03-07 NOTE — LETTER
March 7, 2025    Jyotsna Pollock, PT  [address] [facility]  [facility] [address]    Patient: Gabriella Haskins   YOB: 1945   Date of Visit: 3/7/2025       Dear Altaf Wells MD  [address] [place] [address]    The attached plan of care is being sent to you because your patient’s medical reimbursement requires that you certify the plan of care. Your signature is required to allow uninterrupted insurance coverage.      You may indicate your approval by signing below and faxing this form back to us at Dept Fax: [phone].    Please call Dept: [phone] with any questions or concerns.    Thank you for this referral,        Jyotsna Pollock PT  PAR [address] [address]  [address] [facility] [address]    Payer: Payor: MEDICARE / Plan: MEDICARE PART A AND B / Product Type: *No Product type* /                                                                         Date:     Dear Jyotsna Pollock PT,     Re: Ms. Gabriella Haskins, MRN:[id number]    I certify that I have reviewed the attached plan of care and it is medically necessary for Ms. Gabriella Haskins (1945) who is under my care.          ______________________________________                    _________________  Provider name and credentials                                           Date and time                                                                                           Plan of Care 3/7/25   Effective from: 3/7/2025  Effective to: 6/5/2025    Plan ID: [id number]            Participants as of Finalize on 3/7/2025    Name Type Comments Contact Info    Altaf Wells MD Referring Provider  [phone]    Jyotsna Pollock PT Physical Therapist  [phone]       Last Plan Note     Author: Jyotsna Pollock PT Status: Incomplete Last edited: 3/7/2025 10:00 AM       Physical Therapy Evaluation    Patient Name Gabriella Haskins  MRN: [id number]  Today's Date: 3/7/2025  Time  Calculation  Start Time: 1005  Stop Time: 1045  Time Calculation (min): 40 min    Insurance: Payor: MEDICARE / Plan: MEDICARE PART A AND B / Product Type: *No Product type* / $0 applied  -authorization required: no  -number of visits authorized:  -Authorization/certification dates: 3/7/25-6/5/25  Next MD appointment: maybe next week, sometime in March  Visit # 1    Problem List Items Addressed This Visit             ICD-10-CM    Pain in left hip M25.552    Relevant Orders    Follow Up In Physical Therapy       Onset Date: DOS 1/14/25  Referring Provider: Altaf Wells MD  PT Orders: eval and treat  Date of Last Surgery: 1/14/2025  Procedure: Bipolar Left Hip Hemiarthroplasty - Left  Post Op Days: 52       Subjective:    Current Episode of Functional Impairment and/or Pain :  Chief complaint/HPI:  Fell 1/13/25  when she tripped at her sister in laws house when she caught foot on leg of end table with surgery as above 1/14/25  HHC that ended last week  Pt also reports hitting head with previous fall and feels she has a hard time remembering    Pain  Pain assessment 0-10  Pain score: 1-2  Pain location: L hip  Type: achy    Exacerbating Factors: nothing  Relieving Factors: tylenol    Current Medical management:     PMHx: Reviewed medical history form with patient and medical screening assessed.  R shoulder hemiarthroplasty 2004, HTN, anemia, gets shots in B knees     Medications for pain: tylenol     Diagnostic Tests: xrays XRAYS- AP PELVIS LAT LEFT HIP SHOW PROSTHESIS IN GOOD POSITION WITH NO SIGNS OF LOOSENING     Precautions: low fall risk    Functional Limitations: Dressing, Stair negotiation, Working , Driving, and Walking > 10 minutes  Sock aid    Home Living Situation: lives with   Ranch style home  Laundry in basement    Prior Level of Function I amb w/o device    Patient Stated Goal For Therapy amb w/o cane     Occupation: retired    Objective:    Ortho:  AROM hip L flexion 70  AROM L knee 7-94, R  0-102    Strength LLE  knee ext: 3+          flex: 3+  hip flex: 3+        abd:       RLE 4/5    Palpation: well healed inc incision    Gait: I amb w/o device, unsteady gait, wide DAVID, B knees flexed   Step to pattern on stairs with handrail    Outcome Measures:  Other Measures  Lower Extremity Funtional Score (LEFS): 39    5x sit to stand 36 sec with BUE support    Treatment:    PT Evaluation Time Entry  PT Evaluation (Low) Time Entry: 25  minutes    Therapeutic Exercise:                             15 minutes  [id number]  Adductor sets 10x     Bridge 10x  SKC towel assist 10x       Hooklying hip abduction green 10x                             Response to treatment: improved knowledge and understanding of condition  Gabriella verbalized understanding and is in agreement with all goals and plan of care.  Gabriella left session with all questions answered and no increase in symptoms.      Education: Educated on relevant anatomy and expected plan of care  Instructed in initial HEP including reasoning of given exercises and issued written instructions  Requires cueing/re instruction for exercises    Access Code: [email or web address]  URL: [email or web address]  Date: 03/07/2025  Prepared by: Karlene Galiciaar    Exercises  - Supine Hip Adduction Isometric with Ball  - 1 x daily - 7 x weekly - 1 sets - 10 reps - 5 hold  - Hooklying Clamshell with Resistance  - 1 x daily - 7 x weekly - 1 sets - 10 reps - 5 hold  - Supine Bridge  - 1 x daily - 7 x weekly - 1 sets - 10 reps - 5 hold  - Hooklying Single Knee to Chest Stretch  - 1 x daily - 7 x weekly - 1 sets - 10 reps - 5 hold    Assessment:    Impression/Clinical Presentation:  Gabriella Haskins  is a 79 y.o. old patient who participated in a physical therapy evaluation today due to L femur fx.     Gabriella presents with signs and symptoms consistent with s/p L bipolar hemiarthroplasty L hip.  She is also limited by B knee OA and limited knee ROM Gabriella's impairments include:  balance deficits, gait deficits, pain, decreased strength, decreased range of motion, and decreased functional mobility.       Due to these impairments, she has the following functional limitations and participation restrictions:  increased fall risk, difficulty with ambulation, difficulty with stair negotiation, and difficulty with completing/performing some ADLs/IADLs.     Skilled PT   Skilled physical therapy services are appropriate and beneficial in order to achieve measurable and meaningful change in the objective tests and measures. Utilization of skilled physical therapy services will aid in advancing her functional status and attaining her therapy-related goals.     Problem List:  -activity/functional limitations  -Pain L hip  -decreased  ROM  -decreased strength   -decreased flexibility  -decreased knowledge of HEP  -balance    Goals:  STG 2 wks  Compliant with HEP  Dec pain 25%    LTG by discharge  I HEP  Improve functional outcome score to indicate improved functional mobility  Dec pain L hip % on pain scale   Inc AROM L hip flexion to WFL with improved LE dsg  Inc LLE strength 1 grade with improved walking tolerance  I amb with cane or w/o device with improved gait   Safe stair amb    Rehab Potential:  good    Clinical Presentation:    stable/and/or uncomplicated characteristics                                              Level of Complexity: low    Plan:    Therapeutic exercise, Gait training, Home program instruction and progression, Neuromuscular re-education, Therapeutic activities, Self care and home management, Instruction in activity modification, and Cryotherapy  Nustep for soft tissue warmup, ROM/strengthening LE, LE flexibility, CKC activities, gait/stair training, CP/game ready, balance activities     2 x week  until goals met or maximum rehab potential met  Pt is currently scheduled for 6 weeks    Plan of care was designed with input and agreement by the patient           Current  Participants as of 3/7/2025    Name Type Comments Contact Info    Altaf Wells MD Referring Provider  [phone]    Signature pending    Jyotsna Pollock PT Physical Therapist  [phone]    Electronically signed by Jyotsna Pollock PT at 3/7/2025 1042 EST

## 2025-03-07 NOTE — PROGRESS NOTES
Physical Therapy Evaluation    Patient Name Gabriella Haskins  MRN: [id number]  Today's Date: 3/7/2025  Time Calculation  Start Time: 1005  Stop Time: 1045  Time Calculation (min): 40 min    Insurance: Payor: MEDICARE / Plan: MEDICARE PART A AND B / Product Type: *No Product type* / $0 applied  -authorization required: no  -number of visits authorized:  -Authorization/certification dates: 3/7/25-6/5/25  Next MD appointment: maybe next week, sometime in March  Visit # 1    Problem List Items Addressed This Visit             ICD-10-CM    Pain in left hip M25.552    Relevant Orders    Follow Up In Physical Therapy       Onset Date: DOS 1/14/25  Referring Provider: Altaf Wells MD  PT Orders: eval and treat  Date of Last Surgery: 1/14/2025  Procedure: Bipolar Left Hip Hemiarthroplasty - Left  Post Op Days: 52       Subjective:    Current Episode of Functional Impairment and/or Pain :  Chief complaint/HPI:  Fell 1/13/25  when she tripped at her sister in laws house when she caught foot on leg of end table with surgery as above 1/14/25  HHC that ended last week  Pt also reports hitting head with previous fall and feels she has a hard time remembering    Pain  Pain assessment 0-10  Pain score: 1-2  Pain location: L hip  Type: achy    Exacerbating Factors: nothing  Relieving Factors: tylenol    Current Medical management:     PMHx: Reviewed medical history form with patient and medical screening assessed.  R shoulder hemiarthroplasty 2004, HTN, anemia, gets shots in B knees     Medications for pain: tylenol     Diagnostic Tests: xrays XRAYS- AP PELVIS LAT LEFT HIP SHOW PROSTHESIS IN GOOD POSITION WITH NO SIGNS OF LOOSENING     Precautions: low fall risk    Functional Limitations: Dressing, Stair negotiation, Working , Driving, and Walking > 10 minutes  Sock aid    Home Living Situation: lives with   Ranch style home  Laundry in basement    Prior Level of Function I amb w/o device    Patient Stated Goal For Therapy  amb w/o cane     Occupation: retired    Objective:    Ortho:  AROM hip L flexion 70  AROM L knee 7-94, R 0-102    Strength LLE  knee ext: 3+          flex: 3+  hip flex: 3+        abd:       RLE 4/5    Palpation: well healed inc incision    Gait: I amb w/o device, unsteady gait, wide DAVID, B knees flexed   Step to pattern on stairs with handrail    SLS BUE support 7 sec    Outcome Measures:  Other Measures  Lower Extremity Funtional Score (LEFS): 39    5x sit to stand 36 sec with BUE support    Treatment:    PT Evaluation Time Entry  PT Evaluation (Low) Time Entry: 25  minutes    Therapeutic Exercise:                             15 minutes  [id number]  Adductor sets 10x     Bridge 10x  SKC towel assist 10x       Hooklying hip abduction green 10x                             Response to treatment: improved knowledge and understanding of condition  Gabriella verbalized understanding and is in agreement with all goals and plan of care.  Gabriella left session with all questions answered and no increase in symptoms.      Education: Educated on relevant anatomy and expected plan of care  Instructed in initial HEP including reasoning of given exercises and issued written instructions  Requires cueing/re instruction for exercises    Access Code: [email or web address]  URL: [email or web address]  Date: 03/07/2025  Prepared by: Karlene Galiciaar    Exercises  - Supine Hip Adduction Isometric with Ball  - 1 x daily - 7 x weekly - 1 sets - 10 reps - 5 hold  - Hooklying Clamshell with Resistance  - 1 x daily - 7 x weekly - 1 sets - 10 reps - 5 hold  - Supine Bridge  - 1 x daily - 7 x weekly - 1 sets - 10 reps - 5 hold  - Hooklying Single Knee to Chest Stretch  - 1 x daily - 7 x weekly - 1 sets - 10 reps - 5 hold    Assessment:    Impression/Clinical Presentation:  Gabriella Haskins  is a 79 y.o. old patient who participated in a physical therapy evaluation today due to L femur fx.     Gabriella presents with signs and symptoms consistent  with s/p L bipolar hemiarthroplasty L hip.  She is also limited by B knee OA and limited knee ROM Gabriella's impairments include: balance deficits, gait deficits, pain, decreased strength, decreased range of motion, and decreased functional mobility.       Due to these impairments, she has the following functional limitations and participation restrictions:  increased fall risk, difficulty with ambulation, difficulty with stair negotiation, and difficulty with completing/performing some ADLs/IADLs.     Skilled PT   Skilled physical therapy services are appropriate and beneficial in order to achieve measurable and meaningful change in the objective tests and measures. Utilization of skilled physical therapy services will aid in advancing her functional status and attaining her therapy-related goals.     Problem List:  -activity/functional limitations  -Pain L hip  -decreased  ROM  -decreased strength   -decreased flexibility  -decreased knowledge of HEP  -balance    Goals:  STG 2 wks  Compliant with HEP  Dec pain 25%    LTG by discharge  I HEP  Improve functional outcome score to indicate improved functional mobility  Dec pain L hip % on pain scale   Inc AROM L hip flexion to WFL with improved LE dsg  Inc LLE strength 1 grade with improved walking tolerance  I amb with cane or w/o device with improved gait   Safe stair amb  Improve SLS with no falls  Return to driving    Rehab Potential:  good    Clinical Presentation:    stable/and/or uncomplicated characteristics                                              Level of Complexity: low    Plan:    Therapeutic exercise, Gait training, Home program instruction and progression, Neuromuscular re-education, Therapeutic activities, Self care and home management, Instruction in activity modification, and Cryotherapy  Nustep for soft tissue warmup, ROM/strengthening LE, LE flexibility, CKC activities, gait/stair training, CP/game ready, balance activities     2 x  week  until goals met or maximum rehab potential met  Pt is currently scheduled for 6 weeks    Plan of care was designed with input and agreement by the patient

## 2025-03-11 ENCOUNTER — TREATMENT (OUTPATIENT)
Dept: PHYSICAL THERAPY | Facility: CLINIC | Age: 80
End: 2025-03-11
Payer: MEDICARE

## 2025-03-11 ENCOUNTER — HOSPITAL ENCOUNTER (OUTPATIENT)
Dept: RADIOLOGY | Facility: CLINIC | Age: 80
Discharge: HOME | End: 2025-03-11
Payer: MEDICARE

## 2025-03-11 DIAGNOSIS — R91.1 LUNG NODULE: ICD-10-CM

## 2025-03-11 DIAGNOSIS — M25.552 PAIN IN LEFT HIP: ICD-10-CM

## 2025-03-11 DIAGNOSIS — R09.89 HYPERINFLATION OF LUNGS: ICD-10-CM

## 2025-03-11 PROCEDURE — 97110 THERAPEUTIC EXERCISES: CPT | Mod: GP,CQ

## 2025-03-11 PROCEDURE — 71250 CT THORAX DX C-: CPT

## 2025-03-11 NOTE — PROGRESS NOTES
"                                                                                                     Physical Therapy Treatment Note      Patient Name Gabriella Haskins  MRN: [id number]  Today's Date: 03/11/25    Time Calculation  Start Time: 1215  Stop Time: 1255  Time Calculation (min): 40 min    Insurance:  Visit #: 2  Date of Onset: 01/14/25  Payor: MEDICARE / Plan: MEDICARE PART A AND B / Product Type: *No Product type* / $0 applied  -authorization required: no  -number of visits authorized:  -Authorization/certification dates: 3/7/25-6/5/25    General:  Next MD appt: none scheduled    Therapy Diagnoses:   1. Pain in left hip  Follow Up In Physical Therapy          Precautions:   limited by B knee OA and limited knee ROM; low fall risk    Compliant with HEP:  yes    Understanding of HEP: WFL    Subjective:  General:  Feels minimal pain in the L hip unless she does a lot of activity  Had a CT scan this morning    Functional Progress:  Feeling confident with using her SPC; can furniture walk at home a bit    Pain  Pain assessment 0-10  Pain score: 0- minimal depends on activity  Pain location: L hip    Objective:      Therapeutic Exercise    40 minutes    see below  **indicates new exercises or progression  NP=not performed    Neuromuscular Re-education:      minutes    See below  **indicates new exercises or progression  NP=not performed    Therapeutic Activity:      Manual      minutes    Modalities    Electric Stimulation    minutes    Ultrasound    minutes    Vasopneumatic Device    minutes      Gait      minutes    Other     Exercise Log:  Access Code: [email or web address]  NuStep L3 x 6 minutes  Adductor sets 10x2 **     Bridge 10x  SKC towel assist 10x       Hooklying hip abduction green 10x2 **  Sit to standing from raised plinth 10x **    Slantboard stretch 10\" x 5 **  Heel raises 10x **  Side stepping at bar x 2 laps **  Step up fwd/lat 2 inch 10x ea **    Education:  Exercise technique, postural awareness, exercise " progression     Assessment:  skilled intervention: exercises for LE flexibility, ROM, and strength    Patient would continue to benefit from skilled PT to address remaining functional, objective and subjective deficits to allow them to return to full independence with ADLs     Progressed with NuStep warm up to increase soft tissue mobility for improved L hip ROM. Reviewed HEP demonstrating good knowledge and understanding. Initiated 2 inch step ups to increase L LE strength using B UE support. Addressed LE flexibility with seated hamstring stretch and slant board stretch for the gastrocs with good tolerance. Challenged with not using UEs for sit to stand with compensation using posterior knees against plinth. Updated HEP.    Plan:  Next visit add single leg balance and seated hamstring curls

## 2025-03-13 ENCOUNTER — TREATMENT (OUTPATIENT)
Dept: PHYSICAL THERAPY | Facility: CLINIC | Age: 80
End: 2025-03-13
Payer: MEDICARE

## 2025-03-13 ENCOUNTER — OFFICE VISIT (OUTPATIENT)
Dept: PRIMARY CARE | Facility: CLINIC | Age: 80
End: 2025-03-13
Payer: MEDICARE

## 2025-03-13 VITALS
OXYGEN SATURATION: 99 % | BODY MASS INDEX: 21.21 KG/M2 | WEIGHT: 132 LBS | HEIGHT: 66 IN | SYSTOLIC BLOOD PRESSURE: 142 MMHG | DIASTOLIC BLOOD PRESSURE: 76 MMHG | HEART RATE: 70 BPM | TEMPERATURE: 97.7 F

## 2025-03-13 DIAGNOSIS — R91.8 GROUND GLASS OPACITY PRESENT ON IMAGING OF LUNG: Primary | ICD-10-CM

## 2025-03-13 DIAGNOSIS — R91.1 LUNG NODULE: ICD-10-CM

## 2025-03-13 DIAGNOSIS — M25.552 PAIN IN LEFT HIP: ICD-10-CM

## 2025-03-13 PROBLEM — I10 HYPERTENSION: Status: ACTIVE | Noted: 2025-03-13

## 2025-03-13 PROBLEM — I48.0 PAROXYSMAL ATRIAL FIBRILLATION (MULTI): Status: ACTIVE | Noted: 2025-03-13

## 2025-03-13 PROCEDURE — 99212 OFFICE O/P EST SF 10 MIN: CPT | Performed by: NURSE PRACTITIONER

## 2025-03-13 PROCEDURE — 1036F TOBACCO NON-USER: CPT | Performed by: NURSE PRACTITIONER

## 2025-03-13 PROCEDURE — 97110 THERAPEUTIC EXERCISES: CPT | Mod: GP

## 2025-03-13 PROCEDURE — 1157F ADVNC CARE PLAN IN RCRD: CPT | Performed by: NURSE PRACTITIONER

## 2025-03-13 PROCEDURE — 1159F MED LIST DOCD IN RCRD: CPT | Performed by: NURSE PRACTITIONER

## 2025-03-13 PROCEDURE — 1160F RVW MEDS BY RX/DR IN RCRD: CPT | Performed by: NURSE PRACTITIONER

## 2025-03-13 PROCEDURE — 3077F SYST BP >= 140 MM HG: CPT | Performed by: NURSE PRACTITIONER

## 2025-03-13 PROCEDURE — 1126F AMNT PAIN NOTED NONE PRSNT: CPT | Performed by: NURSE PRACTITIONER

## 2025-03-13 PROCEDURE — 3078F DIAST BP <80 MM HG: CPT | Performed by: NURSE PRACTITIONER

## 2025-03-13 RX ORDER — LEVOTHYROXINE SODIUM 112 UG/1
1 TABLET ORAL
COMMUNITY
Start: 2025-03-01

## 2025-03-13 RX ORDER — HYDROCHLOROTHIAZIDE 25 MG/1
25 TABLET ORAL
COMMUNITY
Start: 2025-02-04

## 2025-03-13 ASSESSMENT — ENCOUNTER SYMPTOMS
OCCASIONAL FEELINGS OF UNSTEADINESS: 0
LOSS OF SENSATION IN FEET: 0
DEPRESSION: 0

## 2025-03-13 ASSESSMENT — PAIN SCALES - GENERAL: PAINLEVEL_OUTOF10: 0-NO PAIN

## 2025-03-13 NOTE — PROGRESS NOTES
Subjective   Patient ID: Gabriella Haskins is a 79 y.o. female who presents for Follow-up (Gabriella Haskins has a follow up visit with a CT scan prior./).  HPI79-year-old female presents today for lung nodule clinic.     Never used tobacco products. No personal history of cancer.  Sister had thyroid cancer and endometrial cancer.  Brother had prostate cancer and brain cancer.     CT chest without IV contrast dated 3/11/2025  Some underlying emphysema/COPD changes are redemonstrated without change.  Compared to most recent imaging some subtle areas of ground-glass  opacity/nodularity in the right upper lobe have at least partially  resolved suggesting this was a infectious or inflammatory process.      No significant new or enlarging pulmonary nodules or new areas of  significant consolidation.    CT chest dated 12/6/2024  Lungs are hyperinflated. Biapical scarring. Mild peribronchial  thickening. Area of nodularity in the left upper lobe anteromedially  likely peribronchial thickening and inflammatory in nature. Subtle  reticulonodular infiltrate in the right upper lobe. No pleural  effusions.     5/30/2024 CT Thoracic spine wo IV Contrast   5 mm right upper lobe nodule.      CT abdomen and pelvis 3/13/2020  No acute abnormality of the lung bases.     Review of Systems  Review of systems: Present-feeling well. Not present-chills, fatigue and fever.  Respiratory: Not present-difficulty breathing, cough, bloody sputum.  Cardiovascular: Not present-chest pain, palpitations, dyspnea on exertion.  Objective   LMP  (LMP Unknown)      Physical Exam  Gen.: Mental status-alert. Gen. appearance-cooperative, well groomed and consistent with stated age. Not in acute distress or sickly. Orientation-oriented to time, place, purpose and person. Build and nutrition-well-nourished and well-developed. Hydration-well-hydrated.    Integumentary: Color-normal coloration skin. Skin moisture-normal skin moisture.    Chest and lung exam:  Auscultation-normal breath sounds, no adventitious lung sounds. Chest wall is normal in shape and non-tender.    Cardiovascular: Auscultation: Regular rate and rhythm. Heart sounds-normal heart sounds, S1-S2. No murmurs appreciated.  Assessment/Plan   Diagnoses and all orders for this visit:  Ground glass opacity present on imaging of lung  -     CT chest wo IV contrast; Future  Lung nodule  -     CT chest wo IV contrast; Future

## 2025-03-13 NOTE — PATIENT INSTRUCTIONS
Emphysema changes and improving ground glass appearance/nodularity in the right upper lobe - partially resolved.   Recommend CT Chest 6 months for complete resolution.  She will be notified of results as they become available.     Patient to follow up with PCP re: Stable heart size with moderate-to-severe coronary calcifications.

## 2025-03-13 NOTE — PROGRESS NOTES
"                                                                               Physical Therapy Treatment Note      Patient Name Gabriella Haskins  MRN: [id number]  Today's Date: 03/13/25    Time Calculation  Start Time: 0955  Stop Time: 1040  Time Calculation (min): 45 min    Insurance:  Visit #: 3  Date of Onset: 01/14/25  Payor: MEDICARE / Plan: MEDICARE PART A AND B / Product Type: *No Product type* / $0 applied  -authorization required: no  -number of visits authorized:  -Authorization/certification dates: 3/7/25-6/5/25   POC not signed    General:  Next MD appt: none scheduled  Referring Provider: Altaf Wells MD     Therapy Diagnoses:   1. Pain in left hip  Follow Up In Physical Therapy          Precautions:   limited by B knee OA and limited knee ROM; low fall risk    Compliant with HEP:  yes    Understanding of HEP: WFL    Subjective:  General:  HEP going well  Reports no falls    Functional Progress:  Amb with straight cane   assists with LE dsg    Pain  Pain assessment 0-10  Pain score: 0  Pain location: L hip    Objective:  Therapeutic Exercise  45 minutes  see below  **indicates new exercises or progression  NP=not performed    Neuromuscular Re-education:    minutes  See below  **indicates new exercises or progression  NP=not performed    Other   AAROM L hip flexion 75    Exercise Log:  Access Code: [email or web address]  NuStep L3 x 6 minutes  Adductor sets 10x2    Bridge 10x  SKC towel assist 10x       Hooklying hip abduction green 10x2  Sit to standing from raised plinth 10x   Seated hamstring curl green 10x2 **  LAQ 2# 10x2 **     Slantboard stretch 10\" x 5   Heel raises 10x   Side stepping at bar x 2 laps   Step up fwd/lat 2 inch 10x ea   Step down 2\" 5x **  SLS 10 sec 10x **    Education:  Instructed in progression of exercises    Assessment:  skilled intervention: exercise progression for strength/balance    Patient would continue to benefit from skilled PT to address remaining functional, objective " "and subjective deficits to allow them to return to full independence with ADLs     amb with straight cane with wide DAVID and slightly unsteady  Progressed LE strengthening and balance activities  Challenged with step downs but able to complete with limited reps    Plan:  Standing hip abduction  Attempt 4\" step ups    "

## 2025-03-17 ENCOUNTER — TREATMENT (OUTPATIENT)
Dept: PHYSICAL THERAPY | Facility: CLINIC | Age: 80
End: 2025-03-17
Payer: MEDICARE

## 2025-03-17 DIAGNOSIS — M25.552 PAIN IN LEFT HIP: ICD-10-CM

## 2025-03-17 PROCEDURE — 97110 THERAPEUTIC EXERCISES: CPT | Mod: GP

## 2025-03-17 NOTE — PROGRESS NOTES
"                                                Physical Therapy Treatment Note      Patient Name Gabriella Haskins  MRN: [id number]  Today's Date: 03/17/25    Time Calculation  Start Time: 1025  Stop Time: 1110  Time Calculation (min): 45 min    Insurance:  Visit #: 4  Date of Onset: 01/14/25 DOS  Payor: MEDICARE / Plan: MEDICARE PART A AND B / Product Type: *No Product type* / $0 applied  -authorization required: no  -number of visits authorized:  -Authorization/certification dates: 3/7/25-6/5/25   POC  signed    General:  Next MD appt: none scheduled  Referring Provider: Altaf Wells MD     Therapy Diagnoses:   1. Pain in left hip  Follow Up In Physical Therapy        Precautions:   limited by B knee OA and limited knee ROM; low fall risk    Compliant with HEP:  yes    Understanding of HEP: WFL    Subjective:  General:  Arrives with mild L hip pain amb with straight cane  Advised pt per Dr Wells progress note she is to follow up with him at end of April    Functional Progress:  Not driving yet  Cont to use sock aid-does R foot independently but needs assist for L foot  Walking household distances-sometimes forgets cane  Hasn't been to basement yet-8 steps    Pain  Pain assessment 0-10  Pain score: 1  Pain location: L hip    Objective:  Therapeutic Exercise  45 minutes  see below  **indicates new exercises or progression  NP=not performed    Neuromuscular Re-education:    minutes  See below  **indicates new exercises or progression  NP=not performed    Other   AROM L hip flexion 82  L knee flexion 100    Exercise Log:  Access Code: [email or web address]  NuStep L3  7'  Adductor sets 10x2    Bridge 10x2  SKC towel assist 10x       Hooklying hip abduction green 10x2  Sit to standing from raised plinth 10x   Seated hamstring curl green 10x2   LAQ 2# 10x2   Supine marching 10x **     Slantboard stretch 10\" x 5   Heel raises 10x  airex **  SLS airex 10 sec 10x  Side stepping at bar x 2 laps   Step up fwd/lat 2\" 10x   Step down 2\" " "5x     Education:  Instructed in progression of exercises    Assessment:  skilled intervention: exercise progression for strength/function    Patient would continue to benefit from skilled PT to address remaining functional, objective and subjective deficits to allow them to return to full independence with ADLs     Demonstrating inc confidence/stability with amb with cane however still with wide DAVID  Progressed balance activities to compliant surface  Unable to progress to 4\" step exercises-too difficult    Plan:  Standing hip abd/ext  Add tband to supine marching    "

## 2025-03-20 ENCOUNTER — TREATMENT (OUTPATIENT)
Dept: PHYSICAL THERAPY | Facility: CLINIC | Age: 80
End: 2025-03-20
Payer: MEDICARE

## 2025-03-20 DIAGNOSIS — M25.552 PAIN IN LEFT HIP: ICD-10-CM

## 2025-03-20 PROCEDURE — 97110 THERAPEUTIC EXERCISES: CPT | Mod: GP

## 2025-03-20 PROCEDURE — 97112 NEUROMUSCULAR REEDUCATION: CPT | Mod: GP

## 2025-03-20 NOTE — PROGRESS NOTES
"                                                Physical Therapy Treatment Note      Patient Name Gabriella Haskins  MRN: [id number]  Today's Date: 03/20/25    Time Calculation  Start Time: 1000  Stop Time: 1040  Time Calculation (min): 40 min    Insurance:  Visit #: 5  Date of Onset: 01/14/25 DOS  Payor: MEDICARE / Plan: MEDICARE PART A AND B / Product Type: *No Product type* / $0 applied  -authorization required: no  -number of visits authorized:  -Authorization/certification dates: 3/7/25-6/5/25   POC  signed    General:  Next MD appt: none scheduled  Referring Provider: Altaf Wells MD     Therapy Diagnoses:   1. Pain in left hip  Follow Up In Physical Therapy        Precautions:   limited by B knee OA and limited knee ROM; low fall risk    Compliant with HEP:  yes    Understanding of HEP: WFL      Subjective:  General:  No pain/ no falls    Functional Progress:  No cane at times at home  Still hasn't driven or gone to basement    Pain  Pain assessment 0-10  Pain score: 0  Pain location: L hip    Objective:  Therapeutic Exercise  30 minutes  see below  **indicates new exercises or progression  NP=not performed    Neuromuscular Re-education:  10 minutes  See below  **indicates new exercises or progression  NP=not performed    Other     Exercise Log:  Access Code: [email or web address]  NuStep L3  7'  Adductor sets 10x2    Bridge 10x2  SK towel assist 10x       Hooklying hip abduction green 10x2  Sit to standing from raised plinth 10x   Seated hamstring curl green 10x2   LAQ 2# 10x2   Supine marching 10x  green **     Slantboard stretch 10\" x 5   Heel raises 10x  airex   SLS airex 10 sec 10x  Side stepping at bar x 2 laps   Heel toe/retro walk 3 **  Step up fwd/lat 2\" 10x   Step down 2\" 5x   Standing hip abd/ext 2# 10x2 **    Education:  Instructed in progression of exercises    Assessment:  skilled intervention: exercise progression for strength    Patient would continue to benefit from skilled PT to address remaining " "functional, objective and subjective deficits to allow them to return to full independence with ADLs     Progressed LE strengthening and balance activities  Inc confidence with amb and dec reliance on cane however still walks with wide DAVID    Plan:  Re-Attempt 4\" step    "

## 2025-03-24 ENCOUNTER — TREATMENT (OUTPATIENT)
Dept: PHYSICAL THERAPY | Facility: CLINIC | Age: 80
End: 2025-03-24
Payer: MEDICARE

## 2025-03-24 DIAGNOSIS — M25.552 PAIN IN LEFT HIP: ICD-10-CM

## 2025-03-24 PROCEDURE — 97110 THERAPEUTIC EXERCISES: CPT | Mod: GP

## 2025-03-24 PROCEDURE — 97112 NEUROMUSCULAR REEDUCATION: CPT | Mod: GP

## 2025-03-24 NOTE — PROGRESS NOTES
"                                                Physical Therapy Treatment Note      Patient Name Gabriella Haskins  MRN: [id number]  Today's Date: 03/24/25    Time Calculation  Start Time: 1030  Stop Time: 1110  Time Calculation (min): 40 min    Insurance:  Visit #: 6  Date of Onset: 01/14/25 DOS  Payor: MEDICARE / Plan: MEDICARE PART A AND B / Product Type: *No Product type* / $0 applied  -authorization required: no  -number of visits authorized:  -Authorization/certification dates: 3/7/25-6/5/25   POC  signed    General:  Next MD appt: none scheduled  Referring Provider: Altaf Wells MD     Therapy Diagnoses:   1. Pain in left hip  Follow Up In Physical Therapy          Precautions:   limited by B knee OA and limited knee ROM; low fall risk    Compliant with HEP:  yes    Understanding of HEP: WFL    Subjective:  General:  Cont to report  no pain    Functional Progress:  Intermittent use of cane in house/cane in community    Pain  Pain assessment 0-10  Pain score: 0  Pain location: L hip    Objective:  Therapeutic Exercise  30 minutes  see below  **indicates new exercises or progression  NP=not performed    Neuromuscular Re-education:  10 minutes  See below  **indicates new exercises or progression  NP=not performed      Other     Exercise Log:  Access Code: [email or web address]  NuStep L3  10'  Adductor sets 10x2    Bridge 10x2  SKC  10x       Hooklying hip abduction green 10x2  Sit to standing from raised plinth 10x   Seated hamstring curl green 10x2   LAQ 2# 10x2   Supine marching 10x  green      Slantboard stretch 10\" x 5   Heel raises 10x  airex   SLS airex 10 sec 10x  Side stepping at bar x 3 laps airex beam **  Heel toe/retro walk 3x airex beam **  Step up fwd/lat 4\" 5x  with cg and BUE support **  Step down 2\" 5x   Standing hip abd/ext 2# 10x2     Education:  Instructed in progression of exercises    Assessment:  skilled intervention: exercise progression for function/strength    Patient would continue to benefit " "from skilled PT to address remaining functional, objective and subjective deficits to allow them to return to full independence with ADLs     Cont to amb with inc speed/confidence and dec reliance on cane   Progressed dynamic balance to compliant surface  Step up progression to 4\" step somewhat limited by L knee pain    Plan:  Tband side stepping  "

## 2025-03-26 ENCOUNTER — TREATMENT (OUTPATIENT)
Dept: PHYSICAL THERAPY | Facility: CLINIC | Age: 80
End: 2025-03-26
Payer: MEDICARE

## 2025-03-26 DIAGNOSIS — M25.552 PAIN IN LEFT HIP: ICD-10-CM

## 2025-03-26 PROCEDURE — 97110 THERAPEUTIC EXERCISES: CPT | Mod: GP,CQ

## 2025-03-26 PROCEDURE — 97112 NEUROMUSCULAR REEDUCATION: CPT | Mod: GP,CQ

## 2025-03-26 NOTE — PROGRESS NOTES
"                                                Physical Therapy Treatment Note      Patient Name Gabriella Haskins  MRN: [id number]  Today's Date: 03/26/25    Time Calculation  Start Time: 1108  Stop Time: 1153  Time Calculation (min): 45 min    Insurance:  Visit #: 7  Date of Onset: 01/14/25 DOS  Payor: MEDICARE / Plan: MEDICARE PART A AND B / Product Type: *No Product type* / $0 applied  -authorization required: no  -number of visits authorized:  -Authorization/certification dates: 3/7/25-6/5/25   POC  signed    General:  Next MD appt: none scheduled  Referring Provider: Altaf Wells MD     Therapy Diagnoses:   1. Pain in left hip  Follow Up In Physical Therapy        Precautions:   limited by B knee OA and limited knee ROM; low fall risk    Compliant with HEP:  yes    Understanding of HEP: WFL    Subjective:  General:  Has some mild pain in the L hip when sitting in certain chairs, but it goes away once she gets up.    Functional Progress:  Forgetting to use the cane sometimes in the house    Pain  Pain assessment 0-10  Pain score: 0  Pain location: L hip    Objective:  Therapeutic Exercise  30 minutes  see below  **indicates new exercises or progression  NP=not performed    Neuromuscular Re-education:  15 minutes  See below  **indicates new exercises or progression  NP=not performed      Other     Exercise Log:  Access Code: [email or web address]  NuStep L3 x 10'  Adductor sets 10x2    Bridge 10x2  SKC  10x       Hooklying hip abduction green 10x2  Sit to standing from raised plinth 10x   Seated hamstring curl green 10x2   LAQ 2# 10x2   Supine marching 10x2  green **     Slantboard stretch 10\" x 5   Heel raises 10x  airex   SLS airex 10 sec 10x  Side stepping at bar x 3 laps airex beam   Heel toe/retro walk 3x airex beam   Step up fwd/lat 4\" 5x  with cga and BUE support   Step down 2\" 10x **   Standing hip abd/ext 2# 10x2   T-band side stepping x 3 laps red **    Education:  Exercise technique, postural awareness, exercise " progression     Assessment:  skilled intervention: exercise progression for LE function/strength    Patient would continue to benefit from skilled PT to address remaining functional, objective and subjective deficits to allow them to return to full independence with ADLs     Patient presented to session with no significant c/o pain in the L hip. Progressed with resisted theraband side stepping at the bar with verbal cues given for maintaining proper LE alignment with good tolerance - added to HEP. Still requires CGA during 4 inch step ups fwd/lat secondary to decreased strength in the L LE and OA in the B knees.    Plan:  Monster walk NV

## 2025-03-31 ENCOUNTER — TREATMENT (OUTPATIENT)
Dept: PHYSICAL THERAPY | Facility: CLINIC | Age: 80
End: 2025-03-31
Payer: MEDICARE

## 2025-03-31 DIAGNOSIS — M25.552 PAIN IN LEFT HIP: ICD-10-CM

## 2025-03-31 PROCEDURE — 97112 NEUROMUSCULAR REEDUCATION: CPT | Mod: GP

## 2025-03-31 PROCEDURE — 97110 THERAPEUTIC EXERCISES: CPT | Mod: GP

## 2025-03-31 NOTE — PROGRESS NOTES
"                                                Physical Therapy Treatment Note      Patient Name Gbariella Haskins  MRN: [id number]  Today's Date: 03/31/25         Insurance:  Visit #: 8  Date of Onset: 01/14/25 DOS  Payor: MEDICARE / Plan: MEDICARE PART A AND B / Product Type: *No Product type* / $0 applied  -authorization required: no  -number of visits authorized:  -Authorization/certification dates: 3/7/25-6/5/25   POC  signed    General:  Next MD appt: none scheduled  Referring Provider: Altaf Wells MD     Therapy Diagnoses:   1. Pain in left hip  Follow Up In Physical Therapy          Precautions:   limited by B knee OA and limited knee ROM; low fall risk    Compliant with HEP:  yes    Understanding of HEP: WFL    Subjective:  General:  Arrived with no pain    Functional Progress:  Going more w/o cane indoors  Not driving yet    Pain  Pain assessment 0-10  Pain score: 0  Pain location: L hip    Objective:  Therapeutic Exercise    minutes  see below  **indicates new exercises or progression  NP=not performed    Neuromuscular Re-education:    minutes  See below  **indicates new exercises or progression  NP=not performed      Modalities   Cold Pack                          minutes      Other   AROM L hip flexion 85   AAROM 90  L knee flexion 100    Exercise Log:  Access Code: [email or web address]  NuStep L3 x 10'  Adductor sets 10x2  (HEP)  Bridge 10x2  SKC  10x       Hooklying hip abduction blue 10x2 **  Sit to standing from raised plinth 10x   Seated hamstring curl blue 10x2  **  LAQ 2# 10x2   Supine marching 10x2  blue **  TKE blue 10x2 **  SLR LLE 5x2  **     Slantboard stretch 10\" x 5   Heel raises 10x  airex   SLS airex 10 sec 5x  BLE tapping 4 corners **  Side stepping at bar x 3 laps airex beam   Heel toe/retro walk 3x airex beam   Step up fwd/lat 4\" 5x  with cga and BUE support   Step down 2\" 10x NP  Standing hip abd/ext 2# 10x2   T-band side stepping x 3 laps red   Side stepping over low hurdles 3x **  Stepping " on/off airex (2 airex on ground) F/L  3x **    Education:  Instructed in progression of exercises  Issued blue tband to HEP    Assessment:  skilled intervention: exercise progression for balance    Patient would continue to benefit from skilled PT to address remaining functional, objective and subjective deficits to allow them to return to full independence with ADLs     Progressed with dynamic balance activities and LE strengthening  Steps cont to be a challenge due to L knee buckling/pain  Stopped step downs due to degree of difficulty and unlikely that pt will do reciprocal stairs due to L knee OA    Plan:  Inc free weights to tolerance

## 2025-04-03 ENCOUNTER — TREATMENT (OUTPATIENT)
Dept: PHYSICAL THERAPY | Facility: CLINIC | Age: 80
End: 2025-04-03
Payer: MEDICARE

## 2025-04-03 DIAGNOSIS — M25.552 PAIN IN LEFT HIP: ICD-10-CM

## 2025-04-03 PROCEDURE — 97112 NEUROMUSCULAR REEDUCATION: CPT | Mod: GP

## 2025-04-03 PROCEDURE — 97110 THERAPEUTIC EXERCISES: CPT | Mod: GP

## 2025-04-03 NOTE — PROGRESS NOTES
"                                                Physical Therapy Treatment Note      Patient Name Gabriella Haskins  MRN: [id number]  Today's Date: 04/03/25    Time Calculation  Start Time: 1040  Stop Time: 1125  Time Calculation (min): 45 min    Insurance:  Visit #: 9  Date of Onset: 01/14/25 DOS  Payor: MEDICARE / Plan: MEDICARE PART A AND B / Product Type: *No Product type* / $0 applied  -authorization required: no  -number of visits authorized:  -Authorization/certification dates: 3/7/25-6/5/25   POC  signed    General:  Next MD appt: none scheduled  Referring Provider: Altaf Wells MD     Therapy Diagnoses:   1. Pain in left hip  Follow Up In Physical Therapy          Precautions:   limited by B knee OA and limited knee ROM; low fall risk    Compliant with HEP:  yes    Understanding of HEP: WFL    Subjective:  General:  No pain    Functional Progress:  Walking w/o cane at home this morning and felt good about it and felt she had good balance  Not driving yet    Pain  Pain assessment 0-10  Pain score: 0  Pain location: L hip    Objective:  Therapeutic Exercise  30 minutes  see below  **indicates new exercises or progression  NP=not performed    Neuromuscular Re-education:  15 minutes  See below  **indicates new exercises or progression  NP=not performed      Other     Exercise Log:  Access Code: [email or web address]  NuStep L3 x 10'  Adductor sets 10x2  (HEP)  Bridge 10x2  SKC  10x       Hooklying hip abduction blue 10x2   Sit to standing from raised plinth 10x   Seated hamstring curl blue 10x2    LAQ 3# 10x2 **  Supine marching 10x2  blue   TKE blue 10x2   SLR LLE 5x2       Slantboard stretch 10\" x 5   Heel raises 10x  airex   SLS airex 10x   BLE tapping 4 corners   Side stepping at bar x 3 laps airex beam   Heel toe/retro walk 3x airex beam   Step up fwd/lat 4\" 5x  with cga and BUE support   Step down 2\" 10x NP  Standing hip abd/ext 3# 10x2 **  T-band side stepping x 3 laps red   Side stepping over low hurdles 3x "   Stepping on/off airex (2 airex on ground) F/L  3x     Education:  Instructed in progression of exercises    Assessment:  skilled intervention: exercise progression for strength    Patient would continue to benefit from skilled PT to address remaining functional, objective and subjective deficits to allow them to return to full independence with ADLs     Progressed with inc resistance for LE strengthening    Plan:  Update LEFS/reasmt

## 2025-04-07 ENCOUNTER — TREATMENT (OUTPATIENT)
Dept: PHYSICAL THERAPY | Facility: CLINIC | Age: 80
End: 2025-04-07
Payer: MEDICARE

## 2025-04-07 DIAGNOSIS — M25.552 PAIN IN LEFT HIP: ICD-10-CM

## 2025-04-07 PROCEDURE — 97112 NEUROMUSCULAR REEDUCATION: CPT | Mod: GP

## 2025-04-07 PROCEDURE — 97110 THERAPEUTIC EXERCISES: CPT | Mod: GP

## 2025-04-07 NOTE — PROGRESS NOTES
"                                                Physical Therapy Treatment Note      Patient Name Gabriella Haskins  MRN: [id number]  Today's Date: 04/07/25    Time Calculation  Start Time: 1025  Stop Time: 1110  Time Calculation (min): 45 min    Insurance:  Visit #: 10  Date of Onset: 01/14/25 DOS  Payor: MEDICARE / Plan: MEDICARE PART A AND B / Product Type: *No Product type* / $0 applied  -authorization required: no  -number of visits authorized:  -Authorization/certification dates: 3/7/25-6/5/25   POC  signed  Last reasmt 4/7/25    General:  Next MD appt: none scheduled  Referring Provider: Altaf Wells MD     Therapy Diagnoses:   1. Pain in left hip  Follow Up In Physical Therapy          Precautions:   limited by B knee OA and limited knee ROM; low fall risk    Compliant with HEP:  yes    Understanding of HEP: WFL    Subjective:  General:  No pain    Functional Progress:  Cont to do more walking w/o cane  Stairs remain difficult due to knee pain    Pain  Pain assessment 0-10  Pain score: 0  Pain location: L hip    Objective:  Therapeutic Exercise  30 minutes  see below  **indicates new exercises or progression  NP=not performed    Neuromuscular Re-education:  15 minutes  See below  **indicates new exercises or progression  NP=not performed      Outcome Measures:  Other Measures  Lower Extremity Funtional Score (LEFS): 42       Other   AROM hip L flexion 89  AROM L knee flexion 105     Strength LLE  knee ext: 4          flex: 4  hip flex: 4-        abd: 3+       RLE 4/5     SLS BUE support 10 sec     Exercise Log:  Access Code: [email or web address]  NuStep L3 x 10'  Adductor sets 10x2  (HEP)  Bridge 10x2  SKC  10x       Hooklying hip abduction blue 10x2   Sit to standing from raised plinth 10x   Seated hamstring curl blue 10x2    LAQ 3# 10x2   Supine marching 10x2  blue   TKE blue 10x2   SLR LLE 5x2       Slantboard stretch 10\" x 5   Heel raises 10x  airex   SLS airex 10x   BLE tapping 4 corners   Side stepping at bar x 3 " "laps airex beam   Heel toe/retro walk 3x airex beam   Step up fwd/lat 4\" 5x  with cga and BUE support   Step down 2\" 10x NP  Standing hip abd/ext 3# 10x2   T-band side stepping x 3 laps red   Side stepping over low hurdles 3x   Stepping on/off airex (2 airex on ground) F/L  3x     Education:  Instructed in progression of exercises    Assessment:  skilled intervention: exercise progression for strength  Reasmt for report period -4/7/25    Patient would continue to benefit from skilled PT to address remaining functional, objective and subjective deficits to allow them to return to full independence with ADLs     Less fatigue with SLR today-easily completed 10 reps  Progressing with inc ROM/strength/balance    Plan:  Inc free weights     "

## 2025-04-11 ENCOUNTER — TREATMENT (OUTPATIENT)
Dept: PHYSICAL THERAPY | Facility: CLINIC | Age: 80
End: 2025-04-11
Payer: MEDICARE

## 2025-04-11 DIAGNOSIS — M25.552 PAIN IN LEFT HIP: ICD-10-CM

## 2025-04-11 PROCEDURE — 97112 NEUROMUSCULAR REEDUCATION: CPT | Mod: GP,CQ

## 2025-04-11 PROCEDURE — 97110 THERAPEUTIC EXERCISES: CPT | Mod: GP,CQ

## 2025-04-11 NOTE — PROGRESS NOTES
"                                                Physical Therapy Treatment Note      Patient Name Gabriella Haskins  MRN: [id number]  Today's Date: 04/11/25    Time Calculation  Start Time: 1042  Stop Time: 1130  Time Calculation (min): 48 min    Insurance:  Visit #: 11  Date of Onset: 01/14/25 DOS  Payor: MEDICARE / Plan: MEDICARE PART A AND B / Product Type: *No Product type* / $0 applied  -authorization required: no  -number of visits authorized:  -Authorization/certification dates: 3/7/25-6/5/25   POC  signed  Last reasmt 4/7/25    General:  Next MD appt: none scheduled  Referring Provider: Altaf Wells MD     Therapy Diagnoses:   1. Pain in left hip  Follow Up In Physical Therapy        Precautions:   limited by B knee OA and limited knee ROM; low fall risk    Compliant with HEP:  yes    Understanding of HEP: WFL    Subjective:  General:  Doing okay today was a little sore yesterday was standing a lot making cookies.    Functional Progress:  Has difficulty with getting shoes on and to tie them    Pain  Pain assessment 0-10  Pain score: soreness  Pain location: L hip    Objective:  Therapeutic Exercise  33 minutes  see below  **indicates new exercises or progression  NP=not performed    Neuromuscular Re-education:  15 minutes  See below  **indicates new exercises or progression  NP=not performed      Outcome Measures:    Other      Exercise Log:  Access Code: [email or web address]  NuStep L3 x 10'  Adductor sets 10x2  (HEP)  Bridge 10x2  SKC  10x       Hooklying hip abduction blue 10x2   Sit to standing from raised plinth 10x   Seated hamstring curl blue 10x2    LAQ 4# 10x2 **  Supine marching 10x2  blue   TKE blue 10x2   SLR LLE 5x2       Slantboard stretch 10\" x 5   Heel raises 10x  airex   SLS airex 10x   BLE tapping 4 corners   Side stepping at bar x 3 laps airex beam   Heel toe/retro walk 3x airex beam   Step up fwd/lat 4\" 5x  with cga and BUE support   Step down 2\" 10x  Standing hip abd/ext 4# 10x2 **  T-band side " stepping x 3 laps red NV  Side stepping over low hurdles 3x   Stepping on/off airex (2 airex on ground) F/L  3x NV    Education:  Exercise technique, postural awareness, exercise progression     Assessment:  skilled intervention: exercise progression for strength    Patient would continue to benefit from skilled PT to address remaining functional, objective and subjective deficits to allow them to return to full independence with ADLs     Patient presented to session with L hip soreness. Able to increase ankle weights for LAQ and standing hip abd/ext without issue. Balance and stability are challenged on compliant surfaces, but uses B UE to support.    Plan:  Shuttle leg press

## 2025-04-14 ENCOUNTER — TREATMENT (OUTPATIENT)
Dept: PHYSICAL THERAPY | Facility: CLINIC | Age: 80
End: 2025-04-14
Payer: MEDICARE

## 2025-04-14 DIAGNOSIS — M25.552 PAIN IN LEFT HIP: ICD-10-CM

## 2025-04-14 PROCEDURE — 97112 NEUROMUSCULAR REEDUCATION: CPT | Mod: GP

## 2025-04-14 PROCEDURE — 97110 THERAPEUTIC EXERCISES: CPT | Mod: GP

## 2025-04-14 NOTE — PROGRESS NOTES
"                                                Physical Therapy Treatment Note      Patient Name Gabriella Haskins  MRN: [id number]  Today's Date: 04/14/25    Time Calculation  Start Time: 1030  Stop Time: 1110  Time Calculation (min): 40 min    Insurance:  Visit #: 12  Date of Onset: 01/14/25 DOS  Payor: MEDICARE / Plan: MEDICARE PART A AND B / Product Type: *No Product type* / $0 applied  -authorization required: no  -number of visits authorized:  -Authorization/certification dates: 3/7/25-6/5/25   POC  signed  Last reasmt 4/7/25    General:  Next MD appt: none scheduled  Referring Provider: Altaf Wells MD     Therapy Diagnoses:   1. Pain in left hip  Follow Up In Physical Therapy          Precautions:   limited by B knee OA and limited knee ROM; low fall risk    Compliant with HEP:  yes    Understanding of HEP: WFL      Subjective:  General:  No pain  Functional Progress:  Using cane less and less  Stiff if she sits too long    Pain  Pain assessment 0-10  Pain score: 0  Pain location: L hip    Objective:  Therapeutic Exercise  30 minutes  see below  **indicates new exercises or progression  NP=not performed    Neuromuscular Re-education:  10 minutes  See below  **indicates new exercises or progression  NP=not performed    Other     Exercise Log:  Access Code: [email or web address]  NuStep L3 x 10'  Bridge 10x2  SKC  10x       Hooklying hip abduction black 10x2 **  Sit to standing from raised plinth 10x   Seated hamstring curl black 10x2  **  LAQ 4# 10x2   Supine marching 10x2  black **  TKE blue 10x2   SLR LLE 10x2       Slantboard stretch 10\" x 5   Heel raises 10x  airex   SLS airex 10x   BLE tapping 4 corners   Side stepping at bar x 3 laps airex beam   Heel toe/retro walk 3x airex beam   Step up fwd/lat 4\" 5x  with cga and BUE support   Step down 2\" 10x NP  Standing hip abd/ext 4# 10x2   T-band side stepping x 3 laps red   Side stepping over low hurdles 3x NP  Stepping on/off airex (2 airex on ground) F/L  3x "     Education:  Instructed in progression of exercises    Assessment:  skilled intervention: exercise progression for strength    Patient would continue to benefit from skilled PT to address remaining functional, objective and subjective deficits to allow them to return to full independence with ADLs     Progressed with inc tband resistance  Now able to complete 10 reps/2 sets with inc control    Plan:  1 more visit then discharge to HEP

## 2025-04-17 ENCOUNTER — TREATMENT (OUTPATIENT)
Dept: PHYSICAL THERAPY | Facility: CLINIC | Age: 80
End: 2025-04-17
Payer: MEDICARE

## 2025-04-17 DIAGNOSIS — M25.552 PAIN IN LEFT HIP: ICD-10-CM

## 2025-04-17 PROCEDURE — 97110 THERAPEUTIC EXERCISES: CPT | Mod: GP

## 2025-04-17 PROCEDURE — 97112 NEUROMUSCULAR REEDUCATION: CPT | Mod: GP

## 2025-04-17 NOTE — PROGRESS NOTES
"                                                Physical Therapy Treatment Note      Patient Name Gabriella Haskins  MRN: [id number]  Today's Date: 04/17/25    Time Calculation  Start Time: 1040  Stop Time: 1120  Time Calculation (min): 40 min    Insurance:  Visit #: 13  Date of Onset: 01/14/25 DOS  Payor: MEDICARE / Plan: MEDICARE PART A AND B / Product Type: *No Product type* / $0 applied  -authorization required: no  -number of visits authorized:  -Authorization/certification dates: 3/7/25-6/5/25   POC  signed  Last reasmt 4/7/25    General:  Next MD appt: May 2025  Referring Provider: Altaf Wells MD     Therapy Diagnoses:   1. Pain in left hip  Follow Up In Physical Therapy          Precautions:   limited by B knee OA and limited knee ROM; low fall risk    Compliant with HEP:  yes    Understanding of HEP: WFL    Subjective:  General:  No pain  Uses cane about 50-75% of time especially when first getting up from sitting  L knee pain intermittently-considering injection when she sees ortho    Functional Progress:  Went down steps at home with  nearby    Pain  Pain assessment 0-10  Pain score: 0  Pain location: L hip    Objective:  Therapeutic Exercise  30 minutes  see below  **indicates new exercises or progression  NP=not performed    Neuromuscular Re-education:  10 minutes  See below  **indicates new exercises or progression  NP=not performed    Outcome Measures:  Other Measures  Lower Extremity Funtional Score (LEFS): 47       Other   AROM hip L flexion 90  AROM L knee flexion 105     Strength LLE  knee ext: 4+          flex: 4+  hip flex: 4+        abd: 4       RLE 4/5     SLS 2 finger  support 20 sec    Exercise Log:  Access Code: [email or web address]  NuStep L3 x 10'  Bridge 10x2  SKC  10x       Hooklying hip abduction black 10x2   Sit to standing from raised plinth 10x   Seated hamstring curl black 10x2    LAQ 4# 10x2   Supine marching 10x2  black   TKE blue 10x2   SLR LLE 10x2       Slantboard stretch 10\" x 5 " "  Heel raises 10x  airex   SLS airex 10x   BLE tapping 4 corners   Side stepping at bar x 3 laps airex beam   Heel toe/retro walk 3x airex beam   Step up fwd/lat 4\" 5x  with cga and BUE support   Step down 2\" 10x NP  Standing hip abd/ext 4# 10x2   T-band side stepping x 3 laps red   Side stepping over low hurdles 3x NP  Stepping on/off airex (2 airex on ground) F/L  3x     Education:  Review of HEP and progression    Assessment:  skilled intervention: education for HEP  Reasmt for report period 4/7/25-4/17/25    LTG I HEP (met)  Improve functional outcome score to indicate improved functional mobility (met)  Dec pain L hip % on pain scale  (met)  Inc AROM L hip flexion to WFL with improved LE dsg (met)  Inc LLE strength 1 grade with improved walking tolerance (met)  I amb with cane or w/o device with improved gait  (met)  Safe stair amb (met)  Improve SLS with no falls (met)  Return to driving (hasn't tried yet)    Plan:  Discharge to [facility] for report period 3/7/25-4/17/25    "

## 2025-07-27 ENCOUNTER — HOSPITAL ENCOUNTER (INPATIENT)
Facility: HOSPITAL | Age: 80
LOS: 2 days | Discharge: HOME HEALTH | DRG: 557 | End: 2025-07-30
Attending: EMERGENCY MEDICINE | Admitting: INTERNAL MEDICINE
Payer: MEDICARE

## 2025-07-27 DIAGNOSIS — M25.572 ACUTE LEFT ANKLE PAIN: Primary | ICD-10-CM

## 2025-07-27 DIAGNOSIS — E55.9 VITAMIN D DEFICIENCY: ICD-10-CM

## 2025-07-27 PROCEDURE — 99285 EMERGENCY DEPT VISIT HI MDM: CPT | Performed by: EMERGENCY MEDICINE

## 2025-07-27 ASSESSMENT — PAIN DESCRIPTION - LOCATION: LOCATION: ANKLE

## 2025-07-27 ASSESSMENT — PAIN SCALES - GENERAL: PAINLEVEL_OUTOF10: 4

## 2025-07-27 ASSESSMENT — PAIN DESCRIPTION - DESCRIPTORS: DESCRIPTORS: NAGGING

## 2025-07-27 ASSESSMENT — PAIN DESCRIPTION - PROGRESSION: CLINICAL_PROGRESSION: GRADUALLY WORSENING

## 2025-07-27 ASSESSMENT — PAIN DESCRIPTION - ORIENTATION: ORIENTATION: LEFT

## 2025-07-27 ASSESSMENT — PAIN DESCRIPTION - FREQUENCY: FREQUENCY: CONSTANT/CONTINUOUS

## 2025-07-27 ASSESSMENT — PAIN DESCRIPTION - ONSET: ONSET: GRADUAL

## 2025-07-27 ASSESSMENT — PAIN - FUNCTIONAL ASSESSMENT: PAIN_FUNCTIONAL_ASSESSMENT: 0-10

## 2025-07-27 ASSESSMENT — PAIN DESCRIPTION - PAIN TYPE: TYPE: ACUTE PAIN

## 2025-07-28 ENCOUNTER — APPOINTMENT (OUTPATIENT)
Dept: RADIOLOGY | Facility: HOSPITAL | Age: 80
DRG: 557 | End: 2025-07-28
Payer: MEDICARE

## 2025-07-28 ENCOUNTER — APPOINTMENT (OUTPATIENT)
Dept: CARDIOLOGY | Facility: HOSPITAL | Age: 80
DRG: 557 | End: 2025-07-28
Payer: MEDICARE

## 2025-07-28 PROBLEM — M25.572 ACUTE LEFT ANKLE PAIN: Status: ACTIVE | Noted: 2025-07-28

## 2025-07-28 LAB
ALBUMIN SERPL BCP-MCNC: 3.2 G/DL (ref 3.4–5)
ALBUMIN SERPL BCP-MCNC: 3.8 G/DL (ref 3.4–5)
ALP SERPL-CCNC: 79 U/L (ref 33–136)
ALT SERPL W P-5'-P-CCNC: 5 U/L (ref 7–45)
ANION GAP SERPL CALC-SCNC: 12 MMOL/L (ref 10–20)
ANION GAP SERPL CALC-SCNC: 16 MMOL/L (ref 10–20)
ANION GAP SERPL CALC-SCNC: 16 MMOL/L (ref 10–20)
AST SERPL W P-5'-P-CCNC: 23 U/L (ref 9–39)
BASOPHILS # BLD AUTO: 0.1 X10*3/UL (ref 0–0.1)
BASOPHILS NFR BLD AUTO: 0.6 %
BILIRUB SERPL-MCNC: 0.6 MG/DL (ref 0–1.2)
BNP SERPL-MCNC: 31 PG/ML (ref 0–99)
BUN SERPL-MCNC: 12 MG/DL (ref 6–23)
BUN SERPL-MCNC: 13 MG/DL (ref 6–23)
BUN SERPL-MCNC: 15 MG/DL (ref 6–23)
CALCIUM SERPL-MCNC: 9.5 MG/DL (ref 8.6–10.3)
CALCIUM SERPL-MCNC: 9.7 MG/DL (ref 8.6–10.3)
CALCIUM SERPL-MCNC: 9.9 MG/DL (ref 8.6–10.3)
CARDIAC TROPONIN I PNL SERPL HS: 5 NG/L (ref 0–13)
CHLORIDE SERPL-SCNC: 93 MMOL/L (ref 98–107)
CHLORIDE SERPL-SCNC: 94 MMOL/L (ref 98–107)
CHLORIDE SERPL-SCNC: 96 MMOL/L (ref 98–107)
CHLORIDE UR-SCNC: 48 MMOL/L
CHLORIDE/CREATININE (MMOL/G) IN URINE: 77 MMOL/G CREAT (ref 38–318)
CO2 SERPL-SCNC: 25 MMOL/L (ref 21–32)
CO2 SERPL-SCNC: 27 MMOL/L (ref 21–32)
CO2 SERPL-SCNC: 32 MMOL/L (ref 21–32)
CREAT SERPL-MCNC: 0.71 MG/DL (ref 0.5–1.05)
CREAT SERPL-MCNC: 0.84 MG/DL (ref 0.5–1.05)
CREAT SERPL-MCNC: 0.9 MG/DL (ref 0.5–1.05)
CREAT UR-MCNC: 62.6 MG/DL (ref 20–320)
CRP SERPL-MCNC: 14.41 MG/DL
EGFRCR SERPLBLD CKD-EPI 2021: 65 ML/MIN/1.73M*2
EGFRCR SERPLBLD CKD-EPI 2021: 71 ML/MIN/1.73M*2
EGFRCR SERPLBLD CKD-EPI 2021: 87 ML/MIN/1.73M*2
EOSINOPHIL # BLD AUTO: 0.02 X10*3/UL (ref 0–0.4)
EOSINOPHIL NFR BLD AUTO: 0.1 %
ERYTHROCYTE [DISTWIDTH] IN BLOOD BY AUTOMATED COUNT: 13.4 % (ref 11.5–14.5)
ERYTHROCYTE [DISTWIDTH] IN BLOOD BY AUTOMATED COUNT: 13.8 % (ref 11.5–14.5)
ERYTHROCYTE [SEDIMENTATION RATE] IN BLOOD BY WESTERGREN METHOD: 67 MM/H (ref 0–30)
GLUCOSE SERPL-MCNC: 103 MG/DL (ref 74–99)
GLUCOSE SERPL-MCNC: 112 MG/DL (ref 74–99)
GLUCOSE SERPL-MCNC: 98 MG/DL (ref 74–99)
HCT VFR BLD AUTO: 38.2 % (ref 36–46)
HCT VFR BLD AUTO: 38.9 % (ref 36–46)
HGB BLD-MCNC: 11.7 G/DL (ref 12–16)
HGB BLD-MCNC: 12.1 G/DL (ref 12–16)
IMM GRANULOCYTES # BLD AUTO: 0.6 X10*3/UL (ref 0–0.5)
IMM GRANULOCYTES NFR BLD AUTO: 3.4 % (ref 0–0.9)
INR PPP: 1.3 (ref 0.9–1.1)
LYMPHOCYTES # BLD AUTO: 1.63 X10*3/UL (ref 0.8–3)
LYMPHOCYTES NFR BLD AUTO: 9.3 %
MCH RBC QN AUTO: 26.8 PG (ref 26–34)
MCH RBC QN AUTO: 27.3 PG (ref 26–34)
MCHC RBC AUTO-ENTMCNC: 30.6 G/DL (ref 32–36)
MCHC RBC AUTO-ENTMCNC: 31.1 G/DL (ref 32–36)
MCV RBC AUTO: 87 FL (ref 80–100)
MCV RBC AUTO: 88 FL (ref 80–100)
MONOCYTES # BLD AUTO: 0.93 X10*3/UL (ref 0.05–0.8)
MONOCYTES NFR BLD AUTO: 5.3 %
NEUTROPHILS # BLD AUTO: 14.23 X10*3/UL (ref 1.6–5.5)
NEUTROPHILS NFR BLD AUTO: 81.3 %
NRBC BLD-RTO: 0 /100 WBCS (ref 0–0)
NRBC BLD-RTO: 0 /100 WBCS (ref 0–0)
OSMOLALITY SERPL: 286 MOSM/KG (ref 280–300)
PHOSPHATE SERPL-MCNC: 3.7 MG/DL (ref 2.5–4.9)
PLATELET # BLD AUTO: 299 X10*3/UL (ref 150–450)
PLATELET # BLD AUTO: 320 X10*3/UL (ref 150–450)
POTASSIUM SERPL-SCNC: 2.8 MMOL/L (ref 3.5–5.3)
POTASSIUM SERPL-SCNC: 3.2 MMOL/L (ref 3.5–5.3)
POTASSIUM SERPL-SCNC: 4.3 MMOL/L (ref 3.5–5.3)
POTASSIUM UR-SCNC: 23 MMOL/L
POTASSIUM/CREAT UR-RTO: 37 MMOL/G CREAT
PROT SERPL-MCNC: 7.9 G/DL (ref 6.4–8.2)
PROTHROMBIN TIME: 14.9 SECONDS (ref 9.8–12.4)
RBC # BLD AUTO: 4.37 X10*6/UL (ref 4–5.2)
RBC # BLD AUTO: 4.44 X10*6/UL (ref 4–5.2)
SODIUM SERPL-SCNC: 130 MMOL/L (ref 136–145)
SODIUM SERPL-SCNC: 135 MMOL/L (ref 136–145)
SODIUM SERPL-SCNC: 136 MMOL/L (ref 136–145)
SODIUM UR-SCNC: 50 MMOL/L
SODIUM/CREAT UR-RTO: 80 MMOL/G CREAT
URATE SERPL-MCNC: 7.2 MG/DL (ref 2.3–6.7)
WBC # BLD AUTO: 13.1 X10*3/UL (ref 4.4–11.3)
WBC # BLD AUTO: 17.5 X10*3/UL (ref 4.4–11.3)

## 2025-07-28 PROCEDURE — 93971 EXTREMITY STUDY: CPT

## 2025-07-28 PROCEDURE — 73721 MRI JNT OF LWR EXTRE W/O DYE: CPT | Mod: LEFT SIDE | Performed by: RADIOLOGY

## 2025-07-28 PROCEDURE — 2500000002 HC RX 250 W HCPCS SELF ADMINISTERED DRUGS (ALT 637 FOR MEDICARE OP, ALT 636 FOR OP/ED): Performed by: INTERNAL MEDICINE

## 2025-07-28 PROCEDURE — 93005 ELECTROCARDIOGRAM TRACING: CPT

## 2025-07-28 PROCEDURE — 82565 ASSAY OF CREATININE: CPT | Performed by: INTERNAL MEDICINE

## 2025-07-28 PROCEDURE — 80048 BASIC METABOLIC PNL TOTAL CA: CPT | Mod: CCI

## 2025-07-28 PROCEDURE — 2500000004 HC RX 250 GENERAL PHARMACY W/ HCPCS (ALT 636 FOR OP/ED): Performed by: INTERNAL MEDICINE

## 2025-07-28 PROCEDURE — 73721 MRI JNT OF LWR EXTRE W/O DYE: CPT | Mod: LT

## 2025-07-28 PROCEDURE — 83930 ASSAY OF BLOOD OSMOLALITY: CPT | Mod: PARLAB | Performed by: INTERNAL MEDICINE

## 2025-07-28 PROCEDURE — 2500000001 HC RX 250 WO HCPCS SELF ADMINISTERED DRUGS (ALT 637 FOR MEDICARE OP): Performed by: INTERNAL MEDICINE

## 2025-07-28 PROCEDURE — 36415 COLL VENOUS BLD VENIPUNCTURE: CPT | Performed by: EMERGENCY MEDICINE

## 2025-07-28 PROCEDURE — 85610 PROTHROMBIN TIME: CPT | Performed by: EMERGENCY MEDICINE

## 2025-07-28 PROCEDURE — 84484 ASSAY OF TROPONIN QUANT: CPT | Performed by: EMERGENCY MEDICINE

## 2025-07-28 PROCEDURE — 86140 C-REACTIVE PROTEIN: CPT | Performed by: EMERGENCY MEDICINE

## 2025-07-28 PROCEDURE — 80053 COMPREHEN METABOLIC PANEL: CPT | Performed by: EMERGENCY MEDICINE

## 2025-07-28 PROCEDURE — 83880 ASSAY OF NATRIURETIC PEPTIDE: CPT | Performed by: EMERGENCY MEDICINE

## 2025-07-28 PROCEDURE — 82436 ASSAY OF URINE CHLORIDE: CPT | Performed by: INTERNAL MEDICINE

## 2025-07-28 PROCEDURE — 73610 X-RAY EXAM OF ANKLE: CPT | Mod: LT

## 2025-07-28 PROCEDURE — 85025 COMPLETE CBC W/AUTO DIFF WBC: CPT | Performed by: EMERGENCY MEDICINE

## 2025-07-28 PROCEDURE — 73610 X-RAY EXAM OF ANKLE: CPT | Mod: LEFT SIDE | Performed by: STUDENT IN AN ORGANIZED HEALTH CARE EDUCATION/TRAINING PROGRAM

## 2025-07-28 PROCEDURE — 83935 ASSAY OF URINE OSMOLALITY: CPT | Mod: PARLAB | Performed by: INTERNAL MEDICINE

## 2025-07-28 PROCEDURE — 1200000002 HC GENERAL ROOM WITH TELEMETRY DAILY

## 2025-07-28 PROCEDURE — 93971 EXTREMITY STUDY: CPT | Performed by: STUDENT IN AN ORGANIZED HEALTH CARE EDUCATION/TRAINING PROGRAM

## 2025-07-28 PROCEDURE — 99223 1ST HOSP IP/OBS HIGH 75: CPT | Performed by: INTERNAL MEDICINE

## 2025-07-28 PROCEDURE — 84550 ASSAY OF BLOOD/URIC ACID: CPT | Performed by: INTERNAL MEDICINE

## 2025-07-28 PROCEDURE — 85027 COMPLETE CBC AUTOMATED: CPT | Performed by: INTERNAL MEDICINE

## 2025-07-28 PROCEDURE — 73718 MRI LOWER EXTREMITY W/O DYE: CPT | Mod: LT

## 2025-07-28 PROCEDURE — 85652 RBC SED RATE AUTOMATED: CPT | Performed by: EMERGENCY MEDICINE

## 2025-07-28 PROCEDURE — 2500000001 HC RX 250 WO HCPCS SELF ADMINISTERED DRUGS (ALT 637 FOR MEDICARE OP): Performed by: EMERGENCY MEDICINE

## 2025-07-28 RX ORDER — METOPROLOL TARTRATE 25 MG/1
25 TABLET, FILM COATED ORAL 2 TIMES DAILY
Status: DISCONTINUED | OUTPATIENT
Start: 2025-07-28 | End: 2025-07-30 | Stop reason: HOSPADM

## 2025-07-28 RX ORDER — ASCORBIC ACID 500 MG
500 TABLET ORAL DAILY
COMMUNITY
Start: 2025-06-16

## 2025-07-28 RX ORDER — HYDROCHLOROTHIAZIDE 25 MG/1
25 TABLET ORAL DAILY
Status: DISCONTINUED | OUTPATIENT
Start: 2025-07-28 | End: 2025-07-30 | Stop reason: HOSPADM

## 2025-07-28 RX ORDER — OXYCODONE AND ACETAMINOPHEN 5; 325 MG/1; MG/1
1 TABLET ORAL ONCE
Refills: 0 | Status: COMPLETED | OUTPATIENT
Start: 2025-07-28 | End: 2025-07-28

## 2025-07-28 RX ORDER — LEVOFLOXACIN 750 MG/1
750 TABLET, FILM COATED ORAL DAILY
COMMUNITY
Start: 2025-07-24 | End: 2025-07-30 | Stop reason: HOSPADM

## 2025-07-28 RX ORDER — PREDNISONE 20 MG/1
40 TABLET ORAL DAILY
Status: DISCONTINUED | OUTPATIENT
Start: 2025-07-28 | End: 2025-07-28

## 2025-07-28 RX ORDER — CETIRIZINE HYDROCHLORIDE 10 MG/1
10 TABLET ORAL DAILY
Status: DISCONTINUED | OUTPATIENT
Start: 2025-07-28 | End: 2025-07-30 | Stop reason: HOSPADM

## 2025-07-28 RX ORDER — CYCLOBENZAPRINE HCL 10 MG
5 TABLET ORAL 3 TIMES DAILY PRN
Status: DISCONTINUED | OUTPATIENT
Start: 2025-07-28 | End: 2025-07-30 | Stop reason: HOSPADM

## 2025-07-28 RX ORDER — POTASSIUM CHLORIDE 14.9 MG/ML
20 INJECTION INTRAVENOUS
Status: DISCONTINUED | OUTPATIENT
Start: 2025-07-28 | End: 2025-07-28

## 2025-07-28 RX ORDER — PREDNISONE 20 MG/1
40 TABLET ORAL DAILY
Status: DISCONTINUED | OUTPATIENT
Start: 2025-07-28 | End: 2025-07-30 | Stop reason: HOSPADM

## 2025-07-28 RX ORDER — POTASSIUM CHLORIDE 1.5 G/1.58G
40 POWDER, FOR SOLUTION ORAL ONCE
Status: DISCONTINUED | OUTPATIENT
Start: 2025-07-28 | End: 2025-07-28

## 2025-07-28 RX ORDER — DONEPEZIL HYDROCHLORIDE 5 MG/1
1 TABLET, FILM COATED ORAL
COMMUNITY
Start: 2025-07-15

## 2025-07-28 RX ORDER — LEVOTHYROXINE SODIUM 112 UG/1
112 TABLET ORAL DAILY
Status: DISCONTINUED | OUTPATIENT
Start: 2025-07-28 | End: 2025-07-30 | Stop reason: HOSPADM

## 2025-07-28 RX ORDER — POLYETHYLENE GLYCOL 3350 17 G/17G
17 POWDER, FOR SOLUTION ORAL DAILY PRN
Status: DISCONTINUED | OUTPATIENT
Start: 2025-07-28 | End: 2025-07-30 | Stop reason: HOSPADM

## 2025-07-28 RX ORDER — POTASSIUM CHLORIDE 14.9 MG/ML
20 INJECTION INTRAVENOUS
Status: DISPENSED | OUTPATIENT
Start: 2025-07-28 | End: 2025-07-28

## 2025-07-28 RX ORDER — ACETAMINOPHEN 325 MG/1
650 TABLET ORAL EVERY 6 HOURS PRN
Status: DISCONTINUED | OUTPATIENT
Start: 2025-07-28 | End: 2025-07-30 | Stop reason: HOSPADM

## 2025-07-28 RX ORDER — CEFTRIAXONE 1 G/50ML
1 INJECTION, SOLUTION INTRAVENOUS EVERY 24 HOURS
Status: DISCONTINUED | OUTPATIENT
Start: 2025-07-28 | End: 2025-07-30 | Stop reason: HOSPADM

## 2025-07-28 RX ORDER — ENOXAPARIN SODIUM 100 MG/ML
40 INJECTION SUBCUTANEOUS EVERY 24 HOURS
Status: DISCONTINUED | OUTPATIENT
Start: 2025-07-28 | End: 2025-07-30 | Stop reason: HOSPADM

## 2025-07-28 RX ADMIN — CEFTRIAXONE 1 G: 1 INJECTION, SOLUTION INTRAVENOUS at 06:38

## 2025-07-28 RX ADMIN — LEVOTHYROXINE SODIUM 112 MCG: 112 TABLET ORAL at 06:38

## 2025-07-28 RX ADMIN — METOPROLOL TARTRATE 25 MG: 25 TABLET, FILM COATED ORAL at 10:07

## 2025-07-28 RX ADMIN — ACETAMINOPHEN 650 MG: 325 TABLET ORAL at 10:17

## 2025-07-28 RX ADMIN — PREDNISONE 40 MG: 20 TABLET ORAL at 06:38

## 2025-07-28 RX ADMIN — CETIRIZINE HYDROCHLORIDE 10 MG: 10 TABLET, FILM COATED ORAL at 10:07

## 2025-07-28 RX ADMIN — OXYCODONE HYDROCHLORIDE AND ACETAMINOPHEN 1 TABLET: 5; 325 TABLET ORAL at 02:07

## 2025-07-28 RX ADMIN — POTASSIUM CHLORIDE 20 MEQ: 14.9 INJECTION, SOLUTION INTRAVENOUS at 15:24

## 2025-07-28 SDOH — HEALTH STABILITY: MENTAL HEALTH: HOW MANY DRINKS CONTAINING ALCOHOL DO YOU HAVE ON A TYPICAL DAY WHEN YOU ARE DRINKING?: PATIENT DOES NOT DRINK

## 2025-07-28 SDOH — SOCIAL STABILITY: SOCIAL INSECURITY: WITHIN THE LAST YEAR, HAVE YOU BEEN HUMILIATED OR EMOTIONALLY ABUSED IN OTHER WAYS BY YOUR PARTNER OR EX-PARTNER?: NO

## 2025-07-28 SDOH — SOCIAL STABILITY: SOCIAL INSECURITY: ARE THERE ANY APPARENT SIGNS OF INJURIES/BEHAVIORS THAT COULD BE RELATED TO ABUSE/NEGLECT?: NO

## 2025-07-28 SDOH — HEALTH STABILITY: MENTAL HEALTH: HOW OFTEN DO YOU HAVE SIX OR MORE DRINKS ON ONE OCCASION?: NEVER

## 2025-07-28 SDOH — ECONOMIC STABILITY: FOOD INSECURITY: WITHIN THE PAST 12 MONTHS, THE FOOD YOU BOUGHT JUST DIDN'T LAST AND YOU DIDN'T HAVE MONEY TO GET MORE.: NEVER TRUE

## 2025-07-28 SDOH — SOCIAL STABILITY: SOCIAL INSECURITY: WITHIN THE LAST YEAR, HAVE YOU BEEN AFRAID OF YOUR PARTNER OR EX-PARTNER?: NO

## 2025-07-28 SDOH — HEALTH STABILITY: MENTAL HEALTH: HOW OFTEN DO YOU HAVE A DRINK CONTAINING ALCOHOL?: NEVER

## 2025-07-28 SDOH — ECONOMIC STABILITY: INCOME INSECURITY: IN THE PAST 12 MONTHS HAS THE ELECTRIC, GAS, OIL, OR WATER COMPANY THREATENED TO SHUT OFF SERVICES IN YOUR HOME?: NO

## 2025-07-28 SDOH — SOCIAL STABILITY: SOCIAL INSECURITY: DO YOU FEEL UNSAFE GOING BACK TO THE PLACE WHERE YOU ARE LIVING?: NO

## 2025-07-28 SDOH — SOCIAL STABILITY: SOCIAL INSECURITY: DO YOU FEEL ANYONE HAS EXPLOITED OR TAKEN ADVANTAGE OF YOU FINANCIALLY OR OF YOUR PERSONAL PROPERTY?: NO

## 2025-07-28 SDOH — ECONOMIC STABILITY: FOOD INSECURITY: WITHIN THE PAST 12 MONTHS, YOU WORRIED THAT YOUR FOOD WOULD RUN OUT BEFORE YOU GOT THE MONEY TO BUY MORE.: NEVER TRUE

## 2025-07-28 SDOH — SOCIAL STABILITY: SOCIAL INSECURITY: HAS ANYONE EVER THREATENED TO HURT YOUR FAMILY OR YOUR PETS?: NO

## 2025-07-28 SDOH — SOCIAL STABILITY: SOCIAL INSECURITY: HAVE YOU HAD ANY THOUGHTS OF HARMING ANYONE ELSE?: NO

## 2025-07-28 SDOH — SOCIAL STABILITY: SOCIAL INSECURITY: DOES ANYONE TRY TO KEEP YOU FROM HAVING/CONTACTING OTHER FRIENDS OR DOING THINGS OUTSIDE YOUR HOME?: NO

## 2025-07-28 SDOH — SOCIAL STABILITY: SOCIAL INSECURITY: ARE YOU OR HAVE YOU BEEN THREATENED OR ABUSED PHYSICALLY, EMOTIONALLY, OR SEXUALLY BY ANYONE?: NO

## 2025-07-28 SDOH — SOCIAL STABILITY: SOCIAL INSECURITY: HAVE YOU HAD THOUGHTS OF HARMING ANYONE ELSE?: NO

## 2025-07-28 SDOH — SOCIAL STABILITY: SOCIAL INSECURITY: WERE YOU ABLE TO COMPLETE ALL THE BEHAVIORAL HEALTH SCREENINGS?: YES

## 2025-07-28 SDOH — SOCIAL STABILITY: SOCIAL INSECURITY: ABUSE: ADULT

## 2025-07-28 ASSESSMENT — COGNITIVE AND FUNCTIONAL STATUS - GENERAL
DRESSING REGULAR LOWER BODY CLOTHING: A LOT
MOVING FROM LYING ON BACK TO SITTING ON SIDE OF FLAT BED WITH BEDRAILS: A LITTLE
PERSONAL GROOMING: A LOT
MOVING TO AND FROM BED TO CHAIR: A LOT
PATIENT BASELINE BEDBOUND: NO
TOILETING: A LOT
TOILETING: A LITTLE
STANDING UP FROM CHAIR USING ARMS: A LITTLE
WALKING IN HOSPITAL ROOM: A LOT
DRESSING REGULAR UPPER BODY CLOTHING: A LITTLE
DRESSING REGULAR UPPER BODY CLOTHING: A LOT
PERSONAL GROOMING: A LITTLE
MOVING TO AND FROM BED TO CHAIR: A LITTLE
MOBILITY SCORE: 10
TURNING FROM BACK TO SIDE WHILE IN FLAT BAD: A LOT
CLIMB 3 TO 5 STEPS WITH RAILING: TOTAL
DAILY ACTIVITIY SCORE: 13
DRESSING REGULAR LOWER BODY CLOTHING: A LITTLE
TURNING FROM BACK TO SIDE WHILE IN FLAT BAD: A LITTLE
EATING MEALS: A LITTLE
HELP NEEDED FOR BATHING: A LOT
DAILY ACTIVITIY SCORE: 18
STANDING UP FROM CHAIR USING ARMS: A LOT
EATING MEALS: A LITTLE
MOBILITY SCORE: 16
CLIMB 3 TO 5 STEPS WITH RAILING: A LOT
WALKING IN HOSPITAL ROOM: TOTAL
MOVING FROM LYING ON BACK TO SITTING ON SIDE OF FLAT BED WITH BEDRAILS: A LOT
HELP NEEDED FOR BATHING: A LITTLE

## 2025-07-28 ASSESSMENT — ACTIVITIES OF DAILY LIVING (ADL)
WALKS IN HOME: NEEDS ASSISTANCE
TOILETING: NEEDS ASSISTANCE
ASSISTIVE_DEVICE: WALKER;CANE
JUDGMENT_ADEQUATE_SAFELY_COMPLETE_DAILY_ACTIVITIES: YES
HEARING - LEFT EAR: DIFFICULTY WITH NOISE
DRESSING YOURSELF: INDEPENDENT
BATHING: INDEPENDENT
LACK_OF_TRANSPORTATION: NO
FEEDING YOURSELF: INDEPENDENT
ADEQUATE_TO_COMPLETE_ADL: YES
PATIENT'S MEMORY ADEQUATE TO SAFELY COMPLETE DAILY ACTIVITIES?: YES
HEARING - RIGHT EAR: DIFFICULTY WITH NOISE
GROOMING: INDEPENDENT

## 2025-07-28 ASSESSMENT — ENCOUNTER SYMPTOMS
HEMATOLOGIC/LYMPHATIC NEGATIVE: 1
CONSTITUTIONAL NEGATIVE: 1
COUGH: 1
ARTHRALGIAS: 1
EYES NEGATIVE: 1
ENDOCRINE NEGATIVE: 1
PSYCHIATRIC NEGATIVE: 1
GASTROINTESTINAL NEGATIVE: 1
JOINT SWELLING: 1
CARDIOVASCULAR NEGATIVE: 1

## 2025-07-28 ASSESSMENT — LIFESTYLE VARIABLES
HOW OFTEN DO YOU HAVE 6 OR MORE DRINKS ON ONE OCCASION: NEVER
AUDIT-C TOTAL SCORE: 0
SKIP TO QUESTIONS 9-10: 1
HOW OFTEN DO YOU HAVE A DRINK CONTAINING ALCOHOL: NEVER
SKIP TO QUESTIONS 9-10: 1
SUBSTANCE_ABUSE_PAST_12_MONTHS: NO
AUDIT-C TOTAL SCORE: 0
AUDIT-C TOTAL SCORE: 0
PRESCIPTION_ABUSE_PAST_12_MONTHS: NO
HOW MANY STANDARD DRINKS CONTAINING ALCOHOL DO YOU HAVE ON A TYPICAL DAY: PATIENT DOES NOT DRINK

## 2025-07-28 ASSESSMENT — PAIN DESCRIPTION - PAIN TYPE: TYPE: ACUTE PAIN

## 2025-07-28 ASSESSMENT — PAIN DESCRIPTION - ORIENTATION
ORIENTATION: LEFT
ORIENTATION: LEFT

## 2025-07-28 ASSESSMENT — PAIN DESCRIPTION - ONSET: ONSET: ONGOING

## 2025-07-28 ASSESSMENT — PAIN DESCRIPTION - LOCATION
LOCATION: FOOT
LOCATION: FOOT

## 2025-07-28 ASSESSMENT — PAIN DESCRIPTION - FREQUENCY: FREQUENCY: CONSTANT/CONTINUOUS

## 2025-07-28 ASSESSMENT — PAIN DESCRIPTION - DESCRIPTORS
DESCRIPTORS: SQUEEZING;THROBBING
DESCRIPTORS: SQUEEZING;THROBBING

## 2025-07-28 ASSESSMENT — PAIN SCALES - GENERAL
PAINLEVEL_OUTOF10: 0 - NO PAIN
PAINLEVEL_OUTOF10: 10 - WORST POSSIBLE PAIN
PAINLEVEL_OUTOF10: 0 - NO PAIN
PAINLEVEL_OUTOF10: 10 - WORST POSSIBLE PAIN

## 2025-07-28 NOTE — PROGRESS NOTES
Gabriella Haskins is a 80 y.o. female on day 0 of admission presenting with Acute left ankle pain.      SUBJECTIVE     Patient evaluated this morning and found to be resting comfortably in bed.  Patient reports she first noticed odd sensation in her left foot s/p starting Levaquin which progressively worsened and is currently unable to bear weight on the foot.  She currently feels no pain at rest.    OBJECTIVE     Vitals:    07/28/25 0700 07/28/25 0800 07/28/25 1007 07/28/25 1200   BP: 139/65 141/67 128/66 137/69   BP Location:       Patient Position:       Pulse: 65 65 72 66   Resp:  18 17 16   Temp:       TempSrc:       SpO2: 99% 98% 97% (!) 93%   Weight:       Height:          Results from last 7 days   Lab Units 07/28/25  0858 07/28/25  0053   WBC AUTO x10*3/uL 13.1* 17.5*   HEMOGLOBIN g/dL 11.7* 12.1   HEMATOCRIT % 38.2 38.9   PLATELETS AUTO x10*3/uL 299 320   NEUTROS PCT AUTO %  --  81.3   LYMPHS PCT AUTO %  --  9.3   MONOS PCT AUTO %  --  5.3   EOS PCT AUTO %  --  0.1     Results from last 7 days   Lab Units 07/28/25  0858 07/28/25  0053   SODIUM mmol/L 135* 130*   POTASSIUM mmol/L 2.8* 4.3   CHLORIDE mmol/L 94* 93*   CO2 mmol/L 32 25   BUN mg/dL 12 13   CREATININE mg/dL 0.84 0.90   CALCIUM mg/dL 9.5 9.7   PROTEIN TOTAL g/dL  --  7.9   BILIRUBIN TOTAL mg/dL  --  0.6   ALK PHOS U/L  --  79   ALT U/L  --  5*   AST U/L  --  23   GLUCOSE mg/dL 103* 98       Scheduled Medications  Scheduled Medications[1]   Physical Exam    Constitutional: Well developed, A&Ox3, no acute distress, alert and cooperative  Eyes: EOMI, clear sclera  Respiratory/Thorax: CTAB, good chest expansion  Cardiovascular: Regular rate, regular rhythm  Gastrointestinal: Nondistended, soft, non-tender  Extremities: normal extremities, no cyanosis 1+ nonpitting edema left foot/ankle  Skin: Warm and dry    Pertinent Imaging    MR foot left wo IV contrast: Pending  MR ankle left wo IV contrast:  1.  Small tibiotalar, subtalar, and talonavicular joint  effusions  that are nonspecific. No Payal synovial soft tissue edema to suggest  septic arthritis.  2. Moderate distal posterior tibialis tendinosis.  3. Split tear of the peroneus brevis at the level of the lateral  malleolus with distal reconstitution.  4. Mild-to-moderate mid/distal Achilles tendinosis. Large Achilles  insertional enthesophyte  5. Plantar calcaneal spur with findings suggesting plantar fasciitis.    ASSESSMENT & PLAN     Daily Progress  -MRI L Foot scheduled  -Discontinued Levoquin and pt started on Ceftriaxone  -Continuing to monitor for allergic reactions    ASSESSMENT & PLAN  #L Foot ankle swelling, C/f med induced tendonitis, septic arthritis R/o   PLAN:  -Stop Levoquin, switch to ceftriaxone for pneumonia Rx & monitor for Sfx. Pt has allergy to peicillins + keflex  -Ortho consulted to r/o septic arthritis low suspicion  -PT/OT. Can't take NSAIDs for allergy. Try prednisone 40 mg  -gout history - Uric acid pend  -MRI ordered    #Hypokalemia w/ Hypomagnesemia  -K: 2.8 now up to 3.4  -M.55   PLAN:  -Replaced with IV 60 mEq Potassium on 25  -Replaced with PO 50 mEq Potassium on 25  -Replaced with PO Mag-Ox 400mg on 25  -Will continue to monitor    #Hyponatremia  -good neuro, was on hydrochlorothiazide  -likely 2/2 dec PO intake or pain  PLAN:  -hold hydrochlorothiazide temporarily. C/W monitoring, urine electrolytes, osm pending    #Possible Pneumonia   PLAN:  -pt received 3-4 days levo, will stop & s/w ceftriaxone, monitor allergies  -no azithromycin, pt allergic to it    Chronic Problems:    # Allergic rhinitis:  - Continue cetirizine, 10 mg, oral, Daily     #HTN/AFIB:  - hold hydrochlorothiazide, 25 mg, oral, Daily, continue metoprolol tartrate, 25 mg, oral, BID  -Patient is not on DOAC due to history of subdural hematoma.     #Hypothyroidism:  -Continue levothyroxine, 112 mcg, oral, Daily    Diet: Regular  DVT ppx: Lovenox  IVF: IVF PRN  Consults: Podiatry, PT/OT  Dispo:  Admit to floor    Jon Crespo MD  PGY-1, Internal Medicine  Please SecureChat for any further questions  This is a preliminary note, please await attending attestation for final A/P         [1] cefTRIAXone, 1 g, intravenous, q24h  cetirizine, 10 mg, oral, Daily  enoxaparin, 40 mg, subcutaneous, q24h  [Held by provider] hydroCHLOROthiazide, 25 mg, oral, Daily  levothyroxine, 112 mcg, oral, Daily  metoprolol tartrate, 25 mg, oral, BID  pneumoc 20-maurizio conj-dip cr(PF), 0.5 mL, intramuscular, During hospitalization  potassium chloride, 20 mEq, intravenous, q2h  predniSONE, 40 mg, oral, Daily

## 2025-07-28 NOTE — CONSULTS
Reason For Consult  Left ankle pain    History Of Present Illness  Gabriella Haskins is a 80 y.o. female presenting with a 2-day history of left ankle pain and swelling.  She denies any history of injury.  The patient has been on levofloxacin since being diagnosed with pneumonia last week.  The patient denies any current fevers, chills, nausea or vomiting.  She has a remote history of gout.     Past Medical History  She has a past medical history of A-fib (Multi) (10/05/2023), Arthritis, Cardiac arrest (10/06/2023), Essential hypertension (07/11/2008), Hematuria (09/29/2023), History of transfusion, Hypercholesterolemia (04/22/2021), Hypothyroidism (07/11/2008), Lung nodule, SDH (subdural hematoma) (Multi) (05/29/2024), and Senile osteoporosis (07/11/2008).    Surgical History  She has a past surgical history that includes Shoulder surgery.     Social History  She reports that she has never smoked. She has never used smokeless tobacco. She reports that she does not currently use alcohol. She reports that she does not use drugs.    Family History  Family History[1]     Allergies  Azithromycin, Codeine, Conjugated estrogens, Erythromycin, Ibuprofen, Lisinopril, Cephalexin, Doxycycline, Indomethacin, Bactrim [sulfamethoxazole-trimethoprim], and Amoxicillin    Review of Systems  Negative except for the above     Physical Exam  The patient appears her stated age.  She is in no apparent distress.  Inspection of the left ankle reveals some mild swelling.  There is no erythema or increased warmth.  There is some mild tenderness to palpation over the anterior ankle joint but more tenderness over the lateral midfoot region.  She is able to range the ankle with some discomfort.  The patient is able to plantarflex and dorsiflex the ankle against maximal resistance.  The foot is warm and well-perfused.     Last Recorded Vitals  Blood pressure 139/65, pulse 65, temperature 37.4 °C (99.3 °F), temperature source Temporal, resp. rate  "20, height 1.676 m (5' 6\"), weight 57.6 kg (127 lb), SpO2 99%.    Relevant Results  XR ankle left 3+ views  Result Date: 7/28/2025  Interpreted By:  Bubba Bacon, STUDY: XR ANKLE LEFT 3+ VIEWS; ;  7/28/2025 2:20 am   INDICATION: Signs/Symptoms:ankle pain.   COMPARISON: None.   ACCESSION NUMBER(S): [id number]   ORDERING CLINICIAN: VERONICA HINES   FINDINGS: Ankle mortise is maintained without evidence of fracture or dislocation. There is plantar calcaneal spurring. Calcification at the Achilles tendon insertion suggestive of Achilles tendon tendinosis.       No evidence of acute fracture or dislocation.   Suggestion of Achilles tendon tendinosis.   Signed by: Bubba Bacon 7/28/2025 2:36 AM Dictation workstation:   [id number]    Lower extremity venous duplex left  Result Date: 7/28/2025  Interpreted By:  Bubba Bacon, STUDY: VAS US LOWER EXTREMITY VENOUS DUPLEX LEFT  7/28/2025 1:13 am   INDICATION: 81 y/o   F with  Signs/Symptoms:LLE swelling. LMP:  Unknown.       COMPARISON: None.   ACCESSION NUMBER(S): [id number]   ORDERING CLINICIAN: VERONICA HINES   TECHNIQUE: Routine ultrasound of the left lower extremity was performed with duplex Doppler (color and spectral) evaluation.   Static images were obtained for remote interpretation.   FINDINGS: THIGH VEINS:  The common femoral, femoral, popliteal, proximal medial saphenous, and deep femoral veins are patent and free of thrombus. The veins are normally compressible.  They demonstrate normal phasic flow and augmentation response.   CALF VEINS: Calf veins were not well visualized.       No deep venous thrombosis of the  left extremity.   MACRO: None   Signed by: Bubba Bacon 7/28/2025 1:43 AM Dictation workstation:   [id number]     Results for orders placed or performed during the hospital encounter of 07/27/25 (from the past 24 hours)   CBC and Auto Differential   Result Value Ref Range    WBC 17.5 (H) 4.4 - 11.3 x10*3/uL    nRBC 0.0 0.0 - 0.0 /100 WBCs    RBC " 4.44 4.00 - 5.20 x10*6/uL    Hemoglobin 12.1 12.0 - 16.0 g/dL    Hematocrit 38.9 36.0 - 46.0 %    MCV 88 80 - 100 fL    MCH 27.3 26.0 - 34.0 pg    MCHC 31.1 (L) 32.0 - 36.0 g/dL    RDW 13.8 11.5 - 14.5 %    Platelets 320 150 - 450 x10*3/uL    Neutrophils % 81.3 40.0 - 80.0 %    Immature Granulocytes %, Automated 3.4 (H) 0.0 - 0.9 %    Lymphocytes % 9.3 13.0 - 44.0 %    Monocytes % 5.3 2.0 - 10.0 %    Eosinophils % 0.1 0.0 - 6.0 %    Basophils % 0.6 0.0 - 2.0 %    Neutrophils Absolute 14.23 (H) 1.60 - 5.50 x10*3/uL    Immature Granulocytes Absolute, Automated 0.60 (H) 0.00 - 0.50 x10*3/uL    Lymphocytes Absolute 1.63 0.80 - 3.00 x10*3/uL    Monocytes Absolute 0.93 (H) 0.05 - 0.80 x10*3/uL    Eosinophils Absolute 0.02 0.00 - 0.40 x10*3/uL    Basophils Absolute 0.10 0.00 - 0.10 x10*3/uL   Comprehensive metabolic panel   Result Value Ref Range    Glucose 98 74 - 99 mg/dL    Sodium 130 (L) 136 - 145 mmol/L    Potassium 4.3 3.5 - 5.3 mmol/L    Chloride 93 (L) 98 - 107 mmol/L    Bicarbonate 25 21 - 32 mmol/L    Anion Gap 16 10 - 20 mmol/L    Urea Nitrogen 13 6 - 23 mg/dL    Creatinine 0.90 0.50 - 1.05 mg/dL    eGFR 65 >60 mL/min/1.73m*2    Calcium 9.7 8.6 - 10.3 mg/dL    Albumin 3.8 3.4 - 5.0 g/dL    Alkaline Phosphatase 79 33 - 136 U/L    Total Protein 7.9 6.4 - 8.2 g/dL    AST 23 9 - 39 U/L    Bilirubin, Total 0.6 0.0 - 1.2 mg/dL    ALT 5 (L) 7 - 45 U/L   Troponin I, High Sensitivity   Result Value Ref Range    Troponin I, High Sensitivity 5 0 - 13 ng/L   B-Type Natriuretic Peptide   Result Value Ref Range    BNP 31 0 - 99 pg/mL   C-reactive protein   Result Value Ref Range    C-Reactive Protein 14.41 (H) <1.00 mg/dL   Protime-INR   Result Value Ref Range    Protime 14.9 (H) 9.8 - 12.4 seconds    INR 1.3 (H) 0.9 - 1.1   Sedimentation rate, automated   Result Value Ref Range    Sedimentation Rate 67 (H) 0 - 30 mm/h         Assessment/Plan     The patient's physical exam is not consistent with left ankle septic arthritis  but this cannot be completely excluded as she has elevated white count, CRP and ESR.  A tendon injury from her recent levofloxacin is also on the differential.  I recommend a MRI of the left foot and ankle.  The patient may require an ankle aspiration if there is a fluid collection on the MRI.        Cj Tran MD         [1]   Family History  Problem Relation Name Age of Onset    No Known Problems Mother      No Known Problems Father      Tuberculosis Father's Brother      Diabetes Maternal Grandfather      Tuberculosis Paternal Grandfather

## 2025-07-28 NOTE — H&P
Chief Complaint   Patient presents with    Leg Swelling     Lower left leg swelling that started two days agho. Pt is currently on Levoflaxicin for Pneumonia.       LUIS ARMANDO Haskins is a 80 y.o. female with a PMHx of osteoporosis, OA, HTN, HLD, vitamin D deficiency, hypothyroidism, A-fib (s/p cardioversion 2023), basal cell carcinoma, history of subdural hematoma, gout, B12 deficiency who presented to [facility], [place] on 9/28/2025 with a chief complaint of left leg pain and swelling.  The patient was recently diagnosed with pneumonia, and was started on levofloxacin for 1 week.  On the second day of levofloxacin course, started to have funny sensation in her left foot.  This sensation started started to worsen and became painful, and swelling of the foot.  She was having difficulty bearing weight in her left foot.  Her symptoms are mainly on the left side, she denies having any complaints on the right.  She did read the back of the levofloxacin pack and was concerned about her tendon.  She denies feeling any popping sensation.  She also denied any trauma to the foot.  She was still having some residual symptoms from pneumonia, including fatigue and cough which was improving with antibiotics. She denies any headaches, change in vision, difficulty swallowing, change in hearing,  chest pain, SOB, palpitation,abdominal pain, weight change, change in bowel habits/urine, j    ROS: 10 point review of systems negative with the exception of above.    ED Course:  X-ray foot negative for acute fracture, showing Achilles tendon calcification with suggestion of Achilles tendon tendinosis.  Duplex ultrasound negative for deep venous thrombosis.  For leukocytosis 17.5, hyponatremia 130, CRP 14.4, ESR 67  ED Triage Vitals [07/27/25 2223]   Temperature Heart Rate Respirations BP   37.4 °C (99.3 °F) 89 18 124/66      Pulse Ox Temp Source Heart Rate Source Patient Position   94 % Temporal Monitor Sitting      BP Location FiO2 (%)      Right arm --         Labs:  Abnormal Labs Reviewed   CBC WITH AUTO DIFFERENTIAL - Abnormal; Notable for the following components:       Result Value    WBC 17.5 (*)     MCHC 31.1 (*)     Immature Granulocytes %, Automated 3.4 (*)     Neutrophils Absolute 14.23 (*)     Immature Granulocytes Absolute, Automated 0.60 (*)     Monocytes Absolute 0.93 (*)     All other components within normal limits   COMPREHENSIVE METABOLIC PANEL - Abnormal; Notable for the following components:    Sodium 130 (*)     Chloride 93 (*)     ALT 5 (*)     All other components within normal limits   PROTIME-INR - Abnormal; Notable for the following components:    Protime 14.9 (*)     INR 1.3 (*)     All other components within normal limits   C-REACTIVE PROTEIN - Abnormal; Notable for the following components:    C-Reactive Protein 14.41 (*)     All other components within normal limits   SEDIMENTATION RATE, AUTOMATED - Abnormal; Notable for the following components:    Sedimentation Rate 67 (*)     All other components within normal limits        No orders to display       XR ankle left 3+ views   Final Result   No evidence of acute fracture or dislocation.        Suggestion of Achilles tendon tendinosis.        Signed by: Bubba Bacon 7/28/2025 2:36 AM   Dictation workstation:   [id number]      Lower extremity venous duplex left   Final Result   No deep venous thrombosis of the  left extremity.        MACRO:   None        Signed by: Bubba Bacon 7/28/2025 1:43 AM   Dictation workstation:   [id number]        XR ankle left 3+ views   Final Result   No evidence of acute fracture or dislocation.        Suggestion of Achilles tendon tendinosis.        Signed by: Bubba Bacon 7/28/2025 2:36 AM   Dictation workstation:   [id number]      Lower extremity venous duplex left   Final Result   No deep venous thrombosis of the  left extremity.        MACRO:   None        Signed by: Bubba Bacon 7/28/2025 1:43 AM   Dictation workstation:    "[id number]              Intervention: In ED, patient received   Medications   polyethylene glycol (Glycolax, Miralax) packet 17 g (has no administration in time range)   hydroCHLOROthiazide (HYDRODiuril) tablet 25 mg ( oral Dose Auto Held 8/1/25 0900)   cetirizine (ZyrTEC) tablet 10 mg (has no administration in time range)   levothyroxine (Synthroid, Levoxyl) tablet 112 mcg (has no administration in time range)   metoprolol tartrate (Lopressor) tablet 25 mg (has no administration in time range)   acetaminophen (Tylenol) tablet 650 mg (has no administration in time range)   lubricating eye drops ophthalmic solution 2 drop (has no administration in time range)   cyclobenzaprine (Flexeril) tablet 5 mg (has no administration in time range)   cefTRIAXone (Rocephin) 1 g in dextrose (iso) IV 50 mL (has no administration in time range)   predniSONE (Deltasone) tablet 40 mg (has no administration in time range)   oxyCODONE-acetaminophen (Percocet) 5-325 mg per tablet 1 tablet (1 tablet oral Given 7/28/25 0207)      Patient was then transferred to the floor for further management      Meds:   Modified Medications    No medications on file       Follows up with Dr. Eric Hodge MD      Past Medical History   Medical History[1]   Surgical History   Surgical History[2]  Family History   Family History[3]  Social History     Tobacco Use: Low Risk  (7/28/2025)    Patient History     Smoking Tobacco Use: Never     Smokeless Tobacco Use: Never     Passive Exposure: Not on file      Social History     Substance and Sexual Activity   Alcohol Use Not Currently      Allergies   RX Allergies[4]   Meds    Scheduled medications  Scheduled Medications[5]  Continuous medications  Continuous Medications[6]  PRN medications  PRN Medications[7]   Objective     Vitals  Visit Vitals  /60   Pulse 59   Temp 37.4 °C (99.3 °F) (Temporal)   Resp 20   Ht 1.676 m (5' 6\")   Wt 57.6 kg (127 lb)   LMP  (LMP Unknown)   SpO2 100% "   BMI 20.50 kg/m²   OB Status Postmenopausal   Smoking Status Never   BSA 1.64 m²        Review of Systems   Constitutional: Negative.    HENT: Negative.     Eyes: Negative.    Respiratory:  Positive for cough.    Cardiovascular: Negative.    Gastrointestinal: Negative.    Endocrine: Negative.    Genitourinary: Negative.    Musculoskeletal:  Positive for arthralgias, gait problem and joint swelling.   Skin: Negative.    Hematological: Negative.    Psychiatric/Behavioral: Negative.       Temperature:  [37.4 °C (99.3 °F)] 37.4 °C (99.3 °F)  Heart Rate:  [59-90] 59  Respirations:  [14-20] 20  BP: (115-168)/(59-82) 122/60  No intake/output data recorded.  No intake/output data recorded.  Physical Exam  Constitutional:       General: She is not in acute distress.     Appearance: Normal appearance. She is normal weight.   HENT:      Head: Normocephalic and atraumatic.      Nose: Nose normal.      Mouth/Throat:      Mouth: Mucous membranes are moist.      Pharynx: Oropharynx is clear.     Eyes:      Extraocular Movements: Extraocular movements intact.      Pupils: Pupils are equal, round, and reactive to light.       Cardiovascular:      Rate and Rhythm: Normal rate and regular rhythm.      Pulses: Normal pulses.      Heart sounds: Normal heart sounds.   Pulmonary:      Effort: Pulmonary effort is normal.      Breath sounds: Normal breath sounds.   Abdominal:      General: Abdomen is flat.      Palpations: Abdomen is soft.     Musculoskeletal:         General: Normal range of motion.      Cervical back: Normal range of motion and neck supple.      Left lower leg: Edema present.      Comments: Mild swelling of the left ankle/foot, with tenderness on palpation      Skin:     General: Skin is warm and dry.     Neurological:      General: No focal deficit present.      Mental Status: She is alert and oriented to person, place, and time. Mental status is at baseline.     Psychiatric:         Mood and Affect: Mood normal.         " Behavior: Behavior normal.       Assessment & Plan  Acute left ankle pain          I/Os  No intake or output data in the 24 hours ending 07/28/25 0638      Labs:   Results from last 72 hours   Lab Units 07/28/25  0053   SODIUM mmol/L 130*   POTASSIUM mmol/L 4.3   CHLORIDE mmol/L 93*   CO2 mmol/L 25   BUN mg/dL 13   CREATININE mg/dL 0.90   GLUCOSE mg/dL 98   CALCIUM mg/dL 9.7   ANION GAP mmol/L 16   EGFR mL/min/1.73m*2 65      Results from last 72 hours   Lab Units 07/28/25  0053   WBC AUTO x10*3/uL 17.5*   HEMOGLOBIN g/dL 12.1   HEMATOCRIT % 38.9   PLATELETS AUTO x10*3/uL 320   NEUTROS PCT AUTO % 81.3   LYMPHS PCT AUTO % 9.3   MONOS PCT AUTO % 5.3   EOS PCT AUTO % 0.1      Lab Results   Component Value Date    CALCIUM 9.7 07/28/2025      Lab Results   Component Value Date    CRP 14.41 (H) 07/28/2025      [unfilled]     Micro/ID:   No results found for the last 90 days.                   No lab exists for component: \"AGALPCRNB\"   .ID  Lab Results   Component Value Date    URINECULTURE >=100,000 CFU/mL Escherichia coli (A) 01/24/2025     Images    XR ankle left 3+ views  Narrative: Interpreted By:  Bubba Bacon,   STUDY:  XR ANKLE LEFT 3+ VIEWS; ;  7/28/2025 2:20 am      INDICATION:  Signs/Symptoms:ankle pain.      COMPARISON:  None.      ACCESSION NUMBER(S):  [id number]      ORDERING CLINICIAN:  VERONICA HINES      FINDINGS:  Ankle mortise is maintained without evidence of fracture or  dislocation. There is plantar calcaneal spurring. Calcification at  the Achilles tendon insertion suggestive of Achilles tendon  tendinosis.      Impression: No evidence of acute fracture or dislocation.      Suggestion of Achilles tendon tendinosis.      Signed by: Bubba Bacon 7/28/2025 2:36 AM  Dictation workstation:   [id number]  Lower extremity venous duplex left  Narrative: Interpreted By:  Bubba Bacon,   STUDY:  [facility] US LOWER EXTREMITY VENOUS DUPLEX LEFT  7/28/2025 1:13 am      INDICATION:  79 y/o   F with  " Signs/Symptoms:LLE swelling. LMP:  Unknown.              COMPARISON:  None.      ACCESSION NUMBER(S):  [id number]      ORDERING CLINICIAN:  VERONICA HINES      TECHNIQUE:  Routine ultrasound of the left lower extremity was performed with  duplex Doppler (color and spectral) evaluation.   Static images were  obtained for remote interpretation.      FINDINGS:  THIGH VEINS:  The common femoral, femoral, popliteal, proximal medial  saphenous, and deep femoral veins are patent and free of thrombus.  The veins are normally compressible.  They demonstrate normal phasic  flow and augmentation response.      CALF VEINS: Calf veins were not well visualized.      Impression: No deep venous thrombosis of the  left extremity.      MACRO:  None      Signed by: Bubba Bacon 7/28/2025 1:43 AM  Dictation workstation:   [id number]    Assessment and Plan    Gabriella Haskins is a 80 y.o. female admitted for left foot/ankle swelling    Acute Medical Issues   #Left foot/ankle swelling:  #Concern for medication induced tendonitis:  #RO septic arthritis:  - Patient with left ankle/foot swelling and pain following 2 days of levofloxacin.  -ESR, CRP elevated with leukocytosis  - Stop levofloxacin, switch to ceftriaxone for pneumonia treatment; watch for side effects as the patient has wide spectrum allergies including penicillins and Keflex.  - Orthopedic surgery consulted from the ED to rule out infectious arthritis; less likely.  - PT OT, patient cannot take NSAIDs due to allergy, will try prednisone 40 mg and monitor for response.  - Remote history of gout; uric acid ordered; pending  -Tendon injury on the differential, negative Johnson test , patient can dorsiflex and plantar flex her foot  - Consider MRI if needed.      #Hyponatremia:  - Patient is neurologically intact, she was on HCTZ.  -Can be secondary to decreased intake or pain.  -Hold HCTZ temporarily, continue to monitor, urine electrolytes and osmolarity  pending.      #Possible pneumonia:  - Patient received 3 to 4 days of levofloxacin, will stop and switch to ceftriaxone, watch for allergies.  - Will not start azithromycin as the patient is allergic.      Chronic Medical Issues   # Allergic rhinitis:  - Continue cetirizine, 10 mg, oral, Daily    #HTN/AFIB:  - hold hydrochlorothiazide, 25 mg, oral, Daily, continue metoprolol tartrate, 25 mg, oral, BID  -Patient is not on DOAC due to history of subdural hematoma.    #Hypothyroidism:  -Continue levothyroxine, 112 mcg, oral, Daily        F: PO intake & IVF PRN  E: Replete as needed  N: Regular diet  DVT ppx: Lovenox subcutaneous  Antibiotics:  Tubes/Lines/Drains:   Oxygenation:     Code Status: Full Code   Emergency Contact: Extended Emergency Contact Information  Primary Emergency Contact: COLETTEANGELA  Address: [address] [place], [place] [id number] [facility]  Home Phone: [phone]  Work Phone: [phone]  Mobile Phone: [phone]  Relation: Spouse   needed? No     Disposition: 80 y.o.female admitted for left ankle/ foot swelling and pain, anticipate LOS <2 midnights.         Hank Dexter, DO  Internal Medicine, Hospitalist   [facility]         [1]   Past Medical History:  Diagnosis Date    A-fib (Multi) 10/05/2023    Arthritis     Cardiac arrest 10/06/2023    Essential hypertension 07/11/2008    Hematuria 09/29/2023    Last Assessment & Plan:    Formatting of this note might be different from the original.   Assessment:    - Reports hematuria x1 day. Gross hematuria on Mccarthy insertion      Plan:    - Monitor CBC    - Urology consult: Plan for cystoscopy once cardiology clear   - Hold anticoagulation.      History of transfusion     Hypercholesterolemia 04/22/2021    Hypothyroidism 07/11/2008    Last Assessment & Plan:    Formatting of this note might be different from the original.   on levothyroxin at home   TSH wnl on 9/28      PLAN   - Continue home dose.      Lung  nodule     SDH (subdural hematoma) (Multi) 05/29/2024    Senile osteoporosis 07/11/2008   [2]   Past Surgical History:  Procedure Laterality Date    SHOULDER SURGERY      TITANIUM IMPLANT   [3]   Family History  Problem Relation Name Age of Onset    No Known Problems Mother      No Known Problems Father      Tuberculosis Father's Brother      Diabetes Maternal Grandfather      Tuberculosis Paternal Grandfather     [4]   Allergies  Allergen Reactions    Azithromycin Rash     blisters in mouth    Codeine GI Upset    Conjugated Estrogens Swelling and Unknown     legs swelled    Erythromycin Diarrhea    Ibuprofen GI Upset    Lisinopril Cough    Cephalexin Itching    Doxycycline Itching    Indomethacin Itching    Bactrim [Sulfamethoxazole-Trimethoprim] Hives    Amoxicillin Rash   [5] cefTRIAXone, 1 g, intravenous, q24h  cetirizine, 10 mg, oral, Daily  [Held by provider] hydroCHLOROthiazide, 25 mg, oral, Daily  levothyroxine, 112 mcg, oral, Daily  metoprolol tartrate, 25 mg, oral, BID  predniSONE, 40 mg, oral, Daily     [6]    [7] PRN medications: acetaminophen, cyclobenzaprine, lubricating eye drops, polyethylene glycol

## 2025-07-28 NOTE — ED PROVIDER NOTES
"Limitations to History: None     HPI:      Gabriella Haskins is a 80 y.o. who presents to the ED with left leg pain and swelling.  Patient presents to the emergency department after she was diagnosed with pneumonia earlier in the week, has been on levofloxacin since then started having pain in her left foot today in which she is having difficulty bearing weight.  She read the back of the insert from the levofloxacin and was concerned about her tendon although denies any popping sensation or injury to the foot.  She thinks her left lower extremity is swollen compared to baseline.  Patient never had a blood clot before.  Denies any fevers redness or trauma to the area.  She denies any shortness of breath or chest pain.  Her symptoms from the pneumonia which included fatigue and cough, have been improving on the antibiotics.    She is taken Tylenol at home without any relief.  She does not take ibuprofen secondary to a gastritis history.  The patient says she did not have any trauma.    ------------------------------------------------------------------------------------------------------------------------------------------    VS: /70   Pulse 81   Temp 37.4 °C (99.3 °F) (Temporal)   Resp 16   Ht 1.676 m (5' 6\")   Wt 57.6 kg (127 lb)   LMP  (LMP Unknown)   SpO2 96%   BMI 20.50 kg/m²     Physical Exam:  Gen: Alert, NAD  Head/Neck: NCAT, neck w/ FROM  Eyes: EOMI, PERRL, anicteric sclerae, noninjected conjunctivae  Mouth:  MMM, no OP lesions noted  Heart: RRR no MRG  Lungs: CTA b/l no RRW, no increased work of breathing  Abdomen: soft, NT, ND, no HSM, no palpable masses  Musculoskeletal: Left ankle pain with active pain with range of motion, and pain with passive ROM, tenderness palpation over the ankle and distal part of the tibia however no erythema or swelling of the joint itself is noted, no obvious joint effusion  Extremities: WWP, +2 pulses in the bilateral lower extremities, +1 nonpitting edema to the " left lower extremity when compared to the right, no calf pain but the patient is quite tender over the ankle and distal tibia  Neurologic: Alert, symmetrical facies, phonates clearly, moves all extremities equally, responsive to touch  Skin: no rashes noted  Psychological: calm, no SI/HI      ------------------------------------------------------------------------------------------------------------------------------------------    Medical Decision Making: Patient presents with left ankle pain.  Lower extremity DVT ultrasound negative although extremity is swollen.  No erythema but some concern for septic arthritis given the patient's ESR and CRP are elevated along with a white count of 17, bedside ultrasound was used and I do not see an effusion that I can tap.  I also think a post viral arthritis is a possibility as a diagnosis, the patient is having difficulty walking.  Other than the white count of 17 does not meet SIRS criteria so will not place on antibiotics currently we will admit the patient to the hospital for pain control and also evaluation by Ortho for her ankle pain.  X-ray shows some possible tendinitis and given her levofloxacin use it could be related to this.  I do not think she is an Achilles tendon rupture at this time.    ED Course as of 07/28/25 0258 Mon Jul 28, 2025   0127 Sinus rhythm with a rate of 82, normal axis intervals ST segments and T waves otherwise EKG interpreted by me at 1:27 AM [RG]   0134 Bedside ultrasound of the patient's left ankle does not reveal a significant ankle effusion [RG]      ED Course User Index  [RG] Chalino Kennedy MD         Diagnoses as of 07/28/25 0258   Acute left ankle pain       Medications   polyethylene glycol (Glycolax, Miralax) packet 17 g (has no administration in time range)   oxyCODONE-acetaminophen (Percocet) 5-325 mg per tablet 1 tablet (1 tablet oral Given 7/28/25 0207)         Discussion of Management with Other Providers:   I discussed the  patient/results with: Admitting team       Chalino Kennedy MD  07/28/25 0259

## 2025-07-28 NOTE — ED NOTES
Pt is resting in bed asleep with equal chest rise and fall noted. Respirations regular easy unlabored on room air. Skin pink warm and dry. NAD noted during rounding. X2 side rails up, bed is locked and in lowest position. Call light and personal items within reach. No further needs voiced at this time, care ongoing.      at bedside.      Roberta Borja RN  07/28/25 8982

## 2025-07-28 NOTE — ED NOTES
Pt is resting in bed asleep with equal chest rise and fall noted. Respirations regular easy unlabored on 2Ls via NC. Skin pink warm and dry. NAD noted during rounding. X2 side rails up, bed is locked and in lowest position. Call light and personal items within reach. No further needs voiced at this time, care ongoing.        Roberta Borja RN  07/28/25 6308

## 2025-07-28 NOTE — PROGRESS NOTES
Physical Therapy                 Therapy Communication Note    Patient Name: Gabriella Haskins  MRN: [id number]  Department: [facility] ED  Room: [place] [address]  Today's Date: 7/28/2025     Discipline: Physical Therapy    Missed Visit: PT Missed Visit: Yes   Missed Visit Reason:  PT on hold await MRI results/ortho review & recommendations.

## 2025-07-28 NOTE — PROGRESS NOTES
Occupational Therapy                 Therapy Communication Note    Patient Name: Gabriella Haskins  MRN: [id number]  Department: [facility] ED  Room: [place] [id number]  Today's Date: 7/28/2025     Discipline: Occupational Therapy    Missed Visit:       Missed Visit Reason:  OT Hold -await MRI results/ortho review & recommendations.     Missed Time: Cancel    Comment:

## 2025-07-28 NOTE — ED NOTES
Pt is resting in bed asleep with equal chest rise and fall noted. Respirations regular easy unlabored on room air. Skin pink warm and dry. NAD noted during rounding. X2 side rails up, bed is locked and in lowest position. Call light and personal items within reach. No further needs voiced at this time, care ongoing.        Roberta Borja RN  07/28/25 6188

## 2025-07-29 ENCOUNTER — APPOINTMENT (OUTPATIENT)
Dept: RADIOLOGY | Facility: HOSPITAL | Age: 80
DRG: 557 | End: 2025-07-29
Payer: MEDICARE

## 2025-07-29 LAB
ANION GAP SERPL CALC-SCNC: 18 MMOL/L (ref 10–20)
ATRIAL RATE: 84 BPM
BUN SERPL-MCNC: 18 MG/DL (ref 6–23)
CALCIUM SERPL-MCNC: 9.7 MG/DL (ref 8.6–10.3)
CHLORIDE SERPL-SCNC: 96 MMOL/L (ref 98–107)
CO2 SERPL-SCNC: 29 MMOL/L (ref 21–32)
CREAT SERPL-MCNC: 0.74 MG/DL (ref 0.5–1.05)
EGFRCR SERPLBLD CKD-EPI 2021: 82 ML/MIN/1.73M*2
ERYTHROCYTE [DISTWIDTH] IN BLOOD BY AUTOMATED COUNT: 13.6 % (ref 11.5–14.5)
FLUAV RNA RESP QL NAA+PROBE: NOT DETECTED
FLUBV RNA RESP QL NAA+PROBE: NOT DETECTED
GLUCOSE SERPL-MCNC: 99 MG/DL (ref 74–99)
HCT VFR BLD AUTO: 35.7 % (ref 36–46)
HGB BLD-MCNC: 11.3 G/DL (ref 12–16)
MAGNESIUM SERPL-MCNC: 1.55 MG/DL (ref 1.6–2.4)
MCH RBC QN AUTO: 27.6 PG (ref 26–34)
MCHC RBC AUTO-ENTMCNC: 31.7 G/DL (ref 32–36)
MCV RBC AUTO: 87 FL (ref 80–100)
NRBC BLD-RTO: 0 /100 WBCS (ref 0–0)
OSMOLALITY UR: 314 MOSM/KG (ref 200–1200)
P AXIS: 43 DEGREES
PLATELET # BLD AUTO: 364 X10*3/UL (ref 150–450)
POTASSIUM SERPL-SCNC: 3.4 MMOL/L (ref 3.5–5.3)
PR INTERVAL: 160 MS
Q ONSET: 252 MS
QRS COUNT: 14 BEATS
QRS DURATION: 87 MS
QT INTERVAL: 401 MS
QTC CALCULATION(BAZETT): 469 MS
QTC FREDERICIA: 445 MS
R AXIS: 13 DEGREES
RBC # BLD AUTO: 4.09 X10*6/UL (ref 4–5.2)
RSV RNA RESP QL NAA+PROBE: NOT DETECTED
SARS-COV-2 RNA RESP QL NAA+PROBE: NOT DETECTED
SODIUM SERPL-SCNC: 140 MMOL/L (ref 136–145)
T AXIS: 42 DEGREES
T OFFSET: 452 MS
VENTRICULAR RATE: 82 BPM
WBC # BLD AUTO: 20.7 X10*3/UL (ref 4.4–11.3)

## 2025-07-29 PROCEDURE — 85027 COMPLETE CBC AUTOMATED: CPT

## 2025-07-29 PROCEDURE — 2500000004 HC RX 250 GENERAL PHARMACY W/ HCPCS (ALT 636 FOR OP/ED): Performed by: ORTHOPAEDIC SURGERY

## 2025-07-29 PROCEDURE — 80048 BASIC METABOLIC PNL TOTAL CA: CPT

## 2025-07-29 PROCEDURE — 2500000004 HC RX 250 GENERAL PHARMACY W/ HCPCS (ALT 636 FOR OP/ED)

## 2025-07-29 PROCEDURE — 99232 SBSQ HOSP IP/OBS MODERATE 35: CPT | Performed by: STUDENT IN AN ORGANIZED HEALTH CARE EDUCATION/TRAINING PROGRAM

## 2025-07-29 PROCEDURE — 36415 COLL VENOUS BLD VENIPUNCTURE: CPT

## 2025-07-29 PROCEDURE — 1200000002 HC GENERAL ROOM WITH TELEMETRY DAILY

## 2025-07-29 PROCEDURE — 0SJG3ZZ INSPECTION OF LEFT ANKLE JOINT, PERCUTANEOUS APPROACH: ICD-10-PCS | Performed by: ORTHOPAEDIC SURGERY

## 2025-07-29 PROCEDURE — 71046 X-RAY EXAM CHEST 2 VIEWS: CPT

## 2025-07-29 PROCEDURE — 87637 SARSCOV2&INF A&B&RSV AMP PRB: CPT

## 2025-07-29 PROCEDURE — 2500000002 HC RX 250 W HCPCS SELF ADMINISTERED DRUGS (ALT 637 FOR MEDICARE OP, ALT 636 FOR OP/ED): Performed by: INTERNAL MEDICINE

## 2025-07-29 PROCEDURE — 83735 ASSAY OF MAGNESIUM: CPT

## 2025-07-29 PROCEDURE — 2500000004 HC RX 250 GENERAL PHARMACY W/ HCPCS (ALT 636 FOR OP/ED): Performed by: INTERNAL MEDICINE

## 2025-07-29 PROCEDURE — 2500000001 HC RX 250 WO HCPCS SELF ADMINISTERED DRUGS (ALT 637 FOR MEDICARE OP): Performed by: INTERNAL MEDICINE

## 2025-07-29 PROCEDURE — 71046 X-RAY EXAM CHEST 2 VIEWS: CPT | Performed by: RADIOLOGY

## 2025-07-29 PROCEDURE — 2500000001 HC RX 250 WO HCPCS SELF ADMINISTERED DRUGS (ALT 637 FOR MEDICARE OP)

## 2025-07-29 PROCEDURE — 97166 OT EVAL MOD COMPLEX 45 MIN: CPT | Mod: GO

## 2025-07-29 PROCEDURE — 97161 PT EVAL LOW COMPLEX 20 MIN: CPT | Mod: GP

## 2025-07-29 RX ORDER — LANOLIN ALCOHOL/MO/W.PET/CERES
400 CREAM (GRAM) TOPICAL DAILY
Status: DISCONTINUED | OUTPATIENT
Start: 2025-07-29 | End: 2025-07-30 | Stop reason: HOSPADM

## 2025-07-29 RX ORDER — POTASSIUM CHLORIDE 1.5 G/1.58G
40 POWDER, FOR SOLUTION ORAL ONCE
Status: COMPLETED | OUTPATIENT
Start: 2025-07-29 | End: 2025-07-29

## 2025-07-29 RX ORDER — LIDOCAINE HYDROCHLORIDE 10 MG/ML
5 INJECTION, SOLUTION EPIDURAL; INFILTRATION; INTRACAUDAL; PERINEURAL ONCE
Status: COMPLETED | OUTPATIENT
Start: 2025-07-29 | End: 2025-07-29

## 2025-07-29 RX ADMIN — CEFTRIAXONE 1 G: 1 INJECTION, SOLUTION INTRAVENOUS at 05:57

## 2025-07-29 RX ADMIN — CETIRIZINE HYDROCHLORIDE 10 MG: 10 TABLET, FILM COATED ORAL at 10:17

## 2025-07-29 RX ADMIN — LEVOTHYROXINE SODIUM 112 MCG: 112 TABLET ORAL at 05:58

## 2025-07-29 RX ADMIN — MAGNESIUM OXIDE TAB 400 MG (241.3 MG ELEMENTAL MG) 1 TABLET: 400 (241.3 MG) TAB at 10:18

## 2025-07-29 RX ADMIN — ENOXAPARIN SODIUM 40 MG: 100 INJECTION SUBCUTANEOUS at 05:58

## 2025-07-29 RX ADMIN — POTASSIUM CHLORIDE 40 MEQ: 1.5 POWDER, FOR SOLUTION ORAL at 10:17

## 2025-07-29 RX ADMIN — PREDNISONE 40 MG: 20 TABLET ORAL at 10:17

## 2025-07-29 RX ADMIN — LIDOCAINE HYDROCHLORIDE 50 MG: 10 INJECTION, SOLUTION EPIDURAL; INFILTRATION; INTRACAUDAL; PERINEURAL at 07:52

## 2025-07-29 RX ADMIN — METOPROLOL TARTRATE 25 MG: 25 TABLET, FILM COATED ORAL at 10:18

## 2025-07-29 RX ADMIN — METOPROLOL TARTRATE 25 MG: 25 TABLET, FILM COATED ORAL at 20:35

## 2025-07-29 ASSESSMENT — COGNITIVE AND FUNCTIONAL STATUS - GENERAL
PERSONAL GROOMING: A LITTLE
MOBILITY SCORE: 14
TURNING FROM BACK TO SIDE WHILE IN FLAT BAD: A LITTLE
MOVING TO AND FROM BED TO CHAIR: A LITTLE
WALKING IN HOSPITAL ROOM: A LOT
STANDING UP FROM CHAIR USING ARMS: A LITTLE
DAILY ACTIVITIY SCORE: 19
HELP NEEDED FOR BATHING: A LOT
TOILETING: TOTAL
STANDING UP FROM CHAIR USING ARMS: A LITTLE
CLIMB 3 TO 5 STEPS WITH RAILING: TOTAL
TURNING FROM BACK TO SIDE WHILE IN FLAT BAD: A LITTLE
TOILETING: A LITTLE
HELP NEEDED FOR BATHING: A LITTLE
MOBILITY SCORE: 17
DRESSING REGULAR LOWER BODY CLOTHING: A LOT
CLIMB 3 TO 5 STEPS WITH RAILING: TOTAL
DRESSING REGULAR UPPER BODY CLOTHING: A LITTLE
WALKING IN HOSPITAL ROOM: A LITTLE
DRESSING REGULAR UPPER BODY CLOTHING: A LITTLE
DAILY ACTIVITIY SCORE: 16
MOVING TO AND FROM BED TO CHAIR: A LOT
MOVING FROM LYING ON BACK TO SITTING ON SIDE OF FLAT BED WITH BEDRAILS: A LITTLE
DRESSING REGULAR LOWER BODY CLOTHING: A LITTLE

## 2025-07-29 ASSESSMENT — PAIN SCALES - GENERAL: PAINLEVEL_OUTOF10: 1

## 2025-07-29 ASSESSMENT — PAIN - FUNCTIONAL ASSESSMENT: PAIN_FUNCTIONAL_ASSESSMENT: 0-10

## 2025-07-29 NOTE — PROGRESS NOTES
Occupational Therapy    Evaluation    Patient Name: Gabriella Haskins  MRN: [id number]  Today's Date: 7/29/2025  Time Calculation  Start Time: 1315  Stop Time: 1331  Time Calculation (min): 16 min  906/906-A    Assessment  IP OT Assessment  OT Assessment: This hospitalization 7/27/2025:  presented to [facility] on 9/28/2025 with a chief complaint of left leg pain and swelling. HPI: recently diagnosed with pneumonia, and was started on levofloxacin for 1 week. On the second day of levofloxacin course, started to have funny sensation in her left foot:  worsen and became painful with swelling; difficulty bearing weight.  ** 7/29/2025 left ankle aspirated per ortho.  Patient would benefit from further OT to address ADL's and functional transfers/mobility due to generalized weakness and decline in baseline function.  Prognosis: Good  End of Session Communication: Bedside nurse  End of Session Patient Position: Bed, 2 rail up (call-light within reach)    Plan:  Treatment Interventions: ADL retraining, Functional transfer training, Patient/family training, Equipment evaluation/education, Compensatory technique education, Endurance training (energy conservation / pursed-lip breathing techniques training)  OT Frequency: 5 times per week  OT Discharge Recommendations: High intensity level of continued care  OT - OK to Discharge: Yes (to next level of care when medically cleared by physician/medical team)    Subjective   Current Problem:  1. Acute left ankle pain          General:  General  Reason for Referral: ADL, safety assessment, functional cog. eval.  Referred By: Hank Dexter DO  Past Medical History Relevant to Rehab: osteoporosis, OA, HTN, HLD, vitamin D deficiency, hypothyroidism, A-fib (s/p cardioversion 2023), basal cell carcinoma, history of subdural hematoma, gout, B12 deficiency  Family/Caregiver Present: Yes ()  Co-Treatment: PT  Co-Treatment Reason: Co-eval to maximize safety during mobility  Prior to  Session Communication: Bedside nurse (who confirmed that patient is medically stable to participate in this OT session)  Patient Position Received: Bed, 2 rail up, Alarm off, not on at start of session  General Comment: Patient seen bedside; cooperative, motivated    Precautions:  LE Weight Bearing Status: Weight Bearing as Tolerated (LLE)  Medical Precautions: Fall precautions, Oxygen therapy device and L/min (nasal cannula)  Precautions Comment: external cath    Pain:  Pain Assessment  Pain Assessment: 0-10  0-10 (Numeric) Pain Score: 1  Pain Type: Acute pain  Pain Location: Ankle  Pain Orientation: Left    Objective   Cognition:  Overall Cognitive Status: Within Functional Limits     Home Living:  Type of Home: House  Lives With: Spouse  Home Adaptive Equipment: Cane, Walker rolling or standard  Home Layout: One level, Laundry in basement (railing for basement stairs)  Home Access: Stairs to enter without rails (1+2 steps)  Bathroom Shower/Tub: Tub/shower unit  Bathroom Toilet:  (higher built height)  Bathroom Equipment: Bedside commode, Shower chair with back, Grab bars in shower, Hand-held shower hose  Home Living Comments: sleeps in regular bed     Prior Function:  Level of [place]: Independent with ADLs and functional transfers, Independent with homemaking with ambulation (if able with homemaking otherwise  also does)  Ambulatory Assistance: Independent (wheeled walker)  Hand Dominance: Right  Prior Function Comments:  drives, shops; history of fall in January resulting in hip fracture per     ADL:  LE Dressing Assistance: Moderate  Toileting Assistance with Device: Total  ADL Comments: Estimated above assist due to generalized weakness, limited standing, decrease activity tolerance.  To further address in OT sessions using assistive techniques/adaptive equipment as needed    Activity Tolerance:  Endurance: Decreased tolerance for upright activites    Bed Mobility/Transfers:   Bed  Mobility  Bed Mobility: Yes  Bed Mobility 1  Bed Mobility 1: Supine to sitting, Sitting to supine  Level of Assistance 1: Minimal verbal cues (SBA)  Bed Mobility Comments 1: HOB elevated when sat EOB  Transfers  Transfer: Yes  Transfer 1  Transfer From 1: Sit to, Stand to  Transfer to 1: Bed  Transfer Device 1: Walker  Transfer Level of Assistance 1: Minimum assistance, Moderate verbal cues    Ambulation/Gait Training:  Functional Mobility  Functional Mobility Performed: Yes (Required instructions/cues for all mobility tasks to promote safety including proper hand placements during transfers)  Functional Mobility 1  Device 1: Rolling walker  Assistance 1: Moderate assistance, Moderate verbal cues  Comments 1: performed sidesteps along hospital bed    Sitting Balance:  Static Sitting Balance  Static Sitting-Level of Assistance: Distant supervision    Standing Balance:  Static Standing Balance  Static Standing-Level of Assistance: Moderate assistance (fair (+))    Sensation:  Light Touch: No apparent deficits    Extremities: RUE   RUE : Within Functional Limits (grossly observed during mobility tasks) and LUE   LUE: Within Functional Limits (grossly observed during mobility tasks)    Outcome Measures: [facility] Daily Activity  Putting on and taking off regular lower body clothing: A lot  Bathing (including washing, rinsing, drying): A lot  Putting on and taking off regular upper body clothing: A little  Toileting, which includes using toilet, bedpan or urinal: Total  Taking care of personal grooming such as brushing teeth: None  Eating Meals: None  Daily Activity - Total Score: 16     EDUCATION:  Education Documentation  Mobility Training, taught by Cinthya Segura, OT at 7/29/2025  1:38 PM.  Learner: Patient  Readiness: Acceptance  Method: Explanation  Response: Verbalizes Understanding, Demonstrated Understanding, Needs Reinforcement    Goals:   Encounter Problems       Encounter Problems (Active)       OT Goals        Patient will complete upper/lower body bathing/dressing; toileting with modified independence using assistive techniques/adaptive equipment as needed  (Progressing)       Start:  07/29/25    Expected End:  08/12/25            Patient will perform bed mobility and functional transfers safely and independently: bed, chair, commode using DME as needed  (Progressing)       Start:  07/29/25    Expected End:  08/12/25            Patient will tolerate standing for 5 mins. and show overall good (-) standing balance during ADL's and functional transfers/mobility  (Progressing)       Start:  07/29/25    Expected End:  08/12/25            Patient will apply energy conservation/pursed-lip breathing techniques to ADL's and functional transfers with minimal cues  (Progressing)       Start:  07/29/25    Expected End:  08/12/25

## 2025-07-29 NOTE — PROGRESS NOTES
Physical Therapy    Physical Therapy Evaluation    Patient Name: Gabriella Haskins  MRN: [id number]  Today's Date: 7/29/2025   Time Calculation  Start Time: 1316  Stop Time: 1331  Time Calculation (min): 15 min  906/906-A    Assessment/Plan   PT Assessment  PT Assessment Results: Decreased strength, Decreased endurance, Impaired balance, Decreased mobility  Rehab Prognosis: Good  Barriers to Discharge Home: Physical needs  Physical Needs: Stair navigation into home limited by function/safety, Ambulating household distances limited by function/safety, High falls risk due to function or environment  End of Session Communication: Bedside nurse  Assessment Comment: Pt demonstrates impaired mobility throughout the session requiring increased level of assistance. Pt not at baseline and will benefit from further acute PT services and therapy s/p discharge to regain function and independence.  End of Session Patient Position: Bed, 3 rail up, Alarm off, not on at start of session (all needs in reach and no complaints noted,  present)  IP OR SWING BED PT PLAN  Inpatient or Swing Bed: Inpatient  PT Plan  Treatment/Interventions: Bed mobility, Transfer training, Gait training  PT Plan: Ongoing PT  PT Frequency: 4 times per week  PT Discharge Recommendations: High intensity level of continued care  PT Recommended Transfer Status: Assist x1  PT - OK to Discharge: Yes    Subjective     Current Problem:  Problem List[1]    General Visit Information:  General  Reason for Referral: PT Eval and Treat  Referred By: Hank Dexter DO  Past Medical History Relevant to Rehab: 80 y.o. female with a PMHx of osteoporosis, OA, HTN, HLD, vitamin D deficiency, hypothyroidism, A-fib (s/p cardioversion 2023), basal cell carcinoma, history of subdural hematoma, gout, B12 deficiency who presented to [facility] on 9/28/2025 with a chief complaint of left leg pain and swelling.  The patient was recently diagnosed with pneumonia, and was started on  levofloxacin for 1 week.  On the second day of levofloxacin course, started to have funny sensation in her left foot.  This sensation started started to worsen and became painful, and swelling of the foot.  She was having difficulty bearing weight in her left foot.  Family/Caregiver Present: Yes ( present)  Co-Treatment: OT  Co-Treatment Reason: maximize safety and functional mobility  Prior to Session Communication: Bedside nurse  Patient Position Received: Bed, 3 rail up, Alarm off, not on at start of session  General Comment: Pt agreeable to PT.    Home Living:  Home Living  Type of Home: House  Lives With: Spouse  Home Adaptive Equipment: Walker rolling or standard, Cane  Home Layout: One level, Laundry in basement  Home Access:  (3 platform steps to enter, no HR)  Bathroom Shower/Tub: Tub/shower unit  Bathroom Toilet: Handicapped height  Bathroom Equipment: Shower chair with back, Grab bars in shower, Hand-held shower hose  Home Living Comments: std. bed    Prior Level of Function:  Prior Function Per Pt/Caregiver Report  Level of [place]: Independent with ADLs and functional transfers, Independent with homemaking with ambulation (Pt amb with RW, shares IADLs with , (-) drive,  drives)    Precautions:  Precautions  Medical Precautions: Fall precautions (L LE WBAT)    Vital Signs:  Vital Signs  Vital Signs Comment: VSS    Objective     Pain:  Pain Assessment  Pain Assessment:  (L ankle 1/10 at rest, 9/10 with WBing, repositioned for comfort s/p session, RN aware)    Cognition:  Cognition  Overall Cognitive Status: Within Functional Limits    General Assessments:  Activity Tolerance  Endurance: Decreased tolerance for upright activites  Sensation  Light Touch: No apparent deficits  Strength  Strength Comments: B LE ROM WFL, R LE strength WFL, L LE hip and knee strength WFL, ankle 3/5, limited by pain    Static Sitting Balance  Static Sitting-Level of Assistance: Close supervision  Static  Standing Balance  Static Standing-Level of Assistance: Minimum assistance  Dynamic Standing Balance  Dynamic Standing-Level of Assistance: Moderate assistance    Functional Assessments:  Bed Mobility  Bed Mobility: Yes  Bed Mobility 1  Bed Mobility Comments 1: supine <> sitting: SBA  Transfers  Transfer: Yes  Transfer 1  Trials/Comments 1: STS from EOB: min A x 1 with cueing for hand placement  Ambulation/Gait Training  Ambulation/Gait Training Performed: Yes  Ambulation/Gait Training 1  Comments/Distance (ft) 1: Pt was able to amb 3' x 2 side stepping using RW with mod A x 1 with cueing for sequencing and technique     Outcome Measures:  [facility] Basic Mobility  Turning from your back to your side while in a flat bed without using bedrails: A little  Moving from lying on your back to sitting on the side of a flat bed without using bedrails: A little  Moving to and from bed to chair (including a wheelchair): A lot  Standing up from a chair using your arms (e.g. wheelchair or bedside chair): A little  To walk in hospital room: A lot  Climbing 3-5 steps with railing: Total  Basic Mobility - Total Score: 14    Goals:  Encounter Problems       Encounter Problems (Active)       PT Problem       STG - Pt will transition supine <> sitting with SUP  (Progressing)       Start:  07/29/25    Expected End:  08/12/25            STG - Pt will transfer STS with SBA  (Progressing)       Start:  07/29/25    Expected End:  08/12/25            STG - Pt will amb 30' using RW with CGA  (Progressing)       Start:  07/29/25    Expected End:  08/12/25                 Education Documentation  Mobility Training, taught by Chinyere Harper PT at 7/29/2025  1:50 PM.  Learner: Patient  Readiness: Acceptance  Method: Explanation  Response: Verbalizes Understanding  Comment: PT POC    Education Comments  No comments found.           [1]   Patient Active Problem List  Diagnosis    Debility    Hematuria    Leg pain    Gout    A-fib (Multi)    SDH  (subdural hematoma) (Multi)    Acute pyelonephritis    SUZI (acute kidney injury)    Anemia due to vitamin B12 deficiency    Arthritis of both knees    Cystitis    Dislocation, shoulder    Electrolyte abnormality    Elevated troponin    Essential hypertension    Fracture of proximal humerus    Generalized OA    Hypercholesterolemia    Hypothyroidism    Pes anserinus bursitis    Poor venous access    Primary osteoarthritis of left hip    Psoriasis    Senile osteoporosis    Vitamin D deficiency    Blunt trauma    Laceration of left knee    Closed fracture of neck of left femur    Cardiac arrest    Intracranial injury with loss of consciousness (Multi)    Lung nodule    Closed displaced fracture of left femoral neck    Lorenzo hematuria    Pain in left hip    Paroxysmal atrial fibrillation (Multi)    Hypertension    Acute left ankle pain

## 2025-07-29 NOTE — PROGRESS NOTES
Gabriella Haskins is a 80 y.o. female on day 1 of admission presenting with Acute left ankle pain.      SUBJECTIVE     Patient evaluated this morning and found to be resting comfortably in bed.  Patient reports ongoing left heel pain.    OBJECTIVE     Vitals:    07/28/25 2130 07/28/25 2243 07/29/25 0452 07/29/25 0814   BP: 115/59 148/70 133/62 122/58   BP Location:  Right arm  Right arm   Patient Position:  Lying  Lying   Pulse: 67 74  92   Resp:    20   Temp:  36.7 °C (98.1 °F) 36.3 °C (97.3 °F) 35.9 °C (96.6 °F)   TempSrc:  Temporal Temporal Temporal   SpO2: 100% 96% 98% 94%   Weight:       Height:          Results from last 7 days   Lab Units 07/29/25  0504 07/28/25  0858 07/28/25  0053   WBC AUTO x10*3/uL 20.7*   < > 17.5*   HEMOGLOBIN g/dL 11.3*   < > 12.1   HEMATOCRIT % 35.7*   < > 38.9   PLATELETS AUTO x10*3/uL 364   < > 320   NEUTROS PCT AUTO %  --   --  81.3   LYMPHS PCT AUTO %  --   --  9.3   MONOS PCT AUTO %  --   --  5.3   EOS PCT AUTO %  --   --  0.1    < > = values in this interval not displayed.     Results from last 7 days   Lab Units 07/29/25  0504 07/28/25  0858 07/28/25  0053   SODIUM mmol/L 140   < > 130*   POTASSIUM mmol/L 3.4*   < > 4.3   CHLORIDE mmol/L 96*   < > 93*   CO2 mmol/L 29   < > 25   BUN mg/dL 18   < > 13   CREATININE mg/dL 0.74   < > 0.90   CALCIUM mg/dL 9.7   < > 9.7   PROTEIN TOTAL g/dL  --   --  7.9   BILIRUBIN TOTAL mg/dL  --   --  0.6   ALK PHOS U/L  --   --  79   ALT U/L  --   --  5*   AST U/L  --   --  23   GLUCOSE mg/dL 99   < > 98    < > = values in this interval not displayed.       Scheduled Medications  Scheduled Medications[1]   Physical Exam    Constitutional: Well developed, A&Ox3, no acute distress, alert and cooperative  Eyes: EOMI, clear sclera  Respiratory/Thorax: CTAB, good chest expansion  Cardiovascular: Regular rate, regular rhythm  Gastrointestinal: Nondistended, soft, non-tender  Extremities: normal extremities, no cyanosis 1+ nonpitting edema left  foot/ankle  Skin: Warm and dry    Pertinent Imaging    MR foot left wo IV contrast: Split tear of peroneus brevis and mild to moderate mid distal tendinosis with plantar calcaneal spur likely causing plantar fasciitis.  MR ankle left wo IV contrast:  1.  Small tibiotalar, subtalar, and talonavicular joint effusions  that are nonspecific. No Payal synovial soft tissue edema to suggest  septic arthritis.  2. Moderate distal posterior tibialis tendinosis.  3. Split tear of the peroneus brevis at the level of the lateral  malleolus with distal reconstitution.  4. Mild-to-moderate mid/distal Achilles tendinosis. Large Achilles  insertional enthesophyte  5. Plantar calcaneal spur with findings suggesting plantar fasciitis.    XR chest 2 views 2025:  Findings suggestive of a small focus of left lower lobe pneumonia  with a small adjacent parapneumonic effusion.    ASSESSMENT & PLAN     Daily Progress  -MRI L Foot: Split tear of peroneus brevis and mild to moderate mid distal tendinosis with plantar calcaneal spur likely causing plantar fasciitis.  -Orthopedics consulted: They performed bedside I&D for fluid analysis; low suspicion for septic arthritis  - Pending podiatry consult appreciate recommendations!  -Discontinued Levoquin and pt started on Ceftriaxone  -Continuing to monitor for allergic reactions    ASSESSMENT & PLAN  #L Foot ankle swelling, C/f med induced tendonitis, septic arthritis R/o   PLAN:  -Stop Levoquin, switch to ceftriaxone for pneumonia Rx & monitor for Sfx. Pt has allergy to peicillins + keflex  -Ortho consulted to r/o septic arthritis low suspicion  -PT/OT. Can't take NSAIDs for allergy. Try prednisone 40 mg  -gout history - Uric acid pend  -MRI ordered    #Hypokalemia w/ Hypomagnesemia  -K: 2.8 now up to 3.4  -M.55   PLAN:  -Replaced with IV 60 mEq Potassium on 25  -Replaced with PO 50 mEq Potassium on 25  -Replaced with PO Mag-Ox 400mg on 25  -Will continue to  monitor    #Hyponatremia  -good neuro, was on hydrochlorothiazide  -likely 2/2 dec PO intake or pain  PLAN:  -hold hydrochlorothiazide temporarily. C/W monitoring, urine electrolytes, osm pending    #Possible Pneumonia   PLAN:  -pt received 3-4 days levo, will stop & s/w ceftriaxone, monitor allergies  -no azithromycin, pt allergic to it    Chronic Problems:    # Allergic rhinitis:  - Continue cetirizine, 10 mg, oral, Daily     #HTN/AFIB:  - hold hydrochlorothiazide, 25 mg, oral, Daily, continue metoprolol tartrate, 25 mg, oral, BID  -Patient is not on DOAC due to history of subdural hematoma.     #Hypothyroidism:  -Continue levothyroxine, 112 mcg, oral, Daily    Diet: Regular  DVT ppx: Lovenox  IVF: IVF PRN  Consults: Podiatry, PT/OT  Dispo: Admit to floor    Jon Crespo MD  PGY-1, Internal Medicine  Please SecureChat for any further questions  This is a preliminary note, please await attending attestation for final A/P           [1] cefTRIAXone, 1 g, intravenous, q24h  cetirizine, 10 mg, oral, Daily  enoxaparin, 40 mg, subcutaneous, q24h  [Held by provider] hydroCHLOROthiazide, 25 mg, oral, Daily  levothyroxine, 112 mcg, oral, Daily  magnesium oxide, 400 mg of magnesium oxide, oral, Daily  metoprolol tartrate, 25 mg, oral, BID  pneumoc 20-maurizio conj-dip cr(PF), 0.5 mL, intramuscular, During hospitalization  predniSONE, 40 mg, oral, Daily

## 2025-07-29 NOTE — CARE PLAN
The patient's goals for the shift include      The clinical goals for the shift include maintain safety and HDS    Problem: Safety - Adult  Goal: Free from fall injury  Outcome: Progressing     Problem: Chronic Conditions and Co-morbidities  Goal: Patient's chronic conditions and co-morbidity symptoms are monitored and maintained or improved  Outcome: Progressing     Problem: Nutrition  Goal: Nutrient intake appropriate for maintaining nutritional needs  Outcome: Progressing

## 2025-07-29 NOTE — CARE PLAN
Problem: Pain - Adult  Goal: Verbalizes/displays adequate comfort level or baseline comfort level  Outcome: Progressing     Problem: Safety - Adult  Goal: Free from fall injury  Outcome: Progressing     Problem: Discharge Planning  Goal: Discharge to home or other facility with appropriate resources  Outcome: Progressing     Problem: Chronic Conditions and Co-morbidities  Goal: Patient's chronic conditions and co-morbidity symptoms are monitored and maintained or improved  Outcome: Progressing     Problem: Nutrition  Goal: Nutrient intake appropriate for maintaining nutritional needs  Outcome: Progressing   The patient's goals for the shift include      The clinical goals for the shift include remain safe and free from pain    Over the shift, the patient did not make progress toward the following goals. Barriers to progression include patient experiencing left lower leg swelling and pain. Recommendations to address these barriers include remind patient to use call light when in need of assistance.

## 2025-07-29 NOTE — PROGRESS NOTES
"Gabriella Haskins is a 80 y.o. female on day 1 of admission presenting with Acute left ankle pain.    Subjective   The patient reports that her left ankle pain is better overall.       Objective     Physical Exam  Inspection in of the left ankle reveals minimal swelling.  There is no erythema or increased warmth.  There is tenderness to palpation along the anterior ankle.  She is able to plantarflex and dorsiflex the ankle with minimal discomfort.  Last Recorded Vitals  Blood pressure 133/62, pulse 74, temperature 36.3 °C (97.3 °F), temperature source Temporal, resp. rate 20, height 1.676 m (5' 6\"), weight 57.6 kg (127 lb), SpO2 98%.  Intake/Output last 3 Shifts:  I/O last 3 completed shifts:  In: 150 (2.6 mL/kg) [IV Piggyback:150]  Out: - (0 mL/kg)   Weight: 57.6 kg     Relevant Results          This patient currently has cardiac telemetry ordered; if you would like to modify or discontinue the telemetry order, click here to go to the orders activity to modify/discontinue the order.             Results for orders placed or performed during the hospital encounter of 07/27/25 (from the past 24 hours)   Uric Acid   Result Value Ref Range    Uric Acid 7.2 (H) 2.3 - 6.7 mg/dL   Osmolality   Result Value Ref Range    Osmolality, Serum 286 280 - 300 mOsm/kg   CBC   Result Value Ref Range    WBC 13.1 (H) 4.4 - 11.3 x10*3/uL    nRBC 0.0 0.0 - 0.0 /100 WBCs    RBC 4.37 4.00 - 5.20 x10*6/uL    Hemoglobin 11.7 (L) 12.0 - 16.0 g/dL    Hematocrit 38.2 36.0 - 46.0 %    MCV 87 80 - 100 fL    MCH 26.8 26.0 - 34.0 pg    MCHC 30.6 (L) 32.0 - 36.0 g/dL    RDW 13.4 11.5 - 14.5 %    Platelets 299 150 - 450 x10*3/uL   Renal Function Panel   Result Value Ref Range    Glucose 103 (H) 74 - 99 mg/dL    Sodium 135 (L) 136 - 145 mmol/L    Potassium 2.8 (LL) 3.5 - 5.3 mmol/L    Chloride 94 (L) 98 - 107 mmol/L    Bicarbonate 32 21 - 32 mmol/L    Anion Gap 12 10 - 20 mmol/L    Urea Nitrogen 12 6 - 23 mg/dL    Creatinine 0.84 0.50 - 1.05 mg/dL    " eGFR 71 >60 mL/min/1.73m*2    Calcium 9.5 8.6 - 10.3 mg/dL    Phosphorus 3.7 2.5 - 4.9 mg/dL    Albumin 3.2 (L) 3.4 - 5.0 g/dL   Urine electrolytes   Result Value Ref Range    Sodium, Urine Random 50 mmol/L    Sodium/Creatinine Ratio 80 Not established. mmol/g Creat    Potassium, Urine Random 23 mmol/L    Potassium/Creatinine Ratio 37 Not established mmol/g Creat    Chloride, Urine Random 48 mmol/L    Chloride/Creatinine Ratio 77 38 - 318 mmol/g creat    Creatinine, Urine Random 62.6 20.0 - 320.0 mg/dL   Osmolality, urine   Result Value Ref Range    Osmolality, Urine Random 314 200 - 1,200 mOsm/kg   Basic metabolic panel   Result Value Ref Range    Glucose 112 (H) 74 - 99 mg/dL    Sodium 136 136 - 145 mmol/L    Potassium 3.2 (L) 3.5 - 5.3 mmol/L    Chloride 96 (L) 98 - 107 mmol/L    Bicarbonate 27 21 - 32 mmol/L    Anion Gap 16 10 - 20 mmol/L    Urea Nitrogen 15 6 - 23 mg/dL    Creatinine 0.71 0.50 - 1.05 mg/dL    eGFR 87 >60 mL/min/1.73m*2    Calcium 9.9 8.6 - 10.3 mg/dL   CBC   Result Value Ref Range    WBC 20.7 (H) 4.4 - 11.3 x10*3/uL    nRBC 0.0 0.0 - 0.0 /100 WBCs    RBC 4.09 4.00 - 5.20 x10*6/uL    Hemoglobin 11.3 (L) 12.0 - 16.0 g/dL    Hematocrit 35.7 (L) 36.0 - 46.0 %    MCV 87 80 - 100 fL    MCH 27.6 26.0 - 34.0 pg    MCHC 31.7 (L) 32.0 - 36.0 g/dL    RDW 13.6 11.5 - 14.5 %    Platelets 364 150 - 450 x10*3/uL   Basic Metabolic Panel   Result Value Ref Range    Glucose 99 74 - 99 mg/dL    Sodium 140 136 - 145 mmol/L    Potassium 3.4 (L) 3.5 - 5.3 mmol/L    Chloride 96 (L) 98 - 107 mmol/L    Bicarbonate 29 21 - 32 mmol/L    Anion Gap 18 10 - 20 mmol/L    Urea Nitrogen 18 6 - 23 mg/dL    Creatinine 0.74 0.50 - 1.05 mg/dL    eGFR 82 >60 mL/min/1.73m*2    Calcium 9.7 8.6 - 10.3 mg/dL   Magnesium   Result Value Ref Range    Magnesium 1.55 (L) 1.60 - 2.40 mg/dL        MR ankle left wo IV contrast  Result Date: 7/28/2025  Interpreted By:  Ana Stauffer, STUDY: MRI of the  left foot with out IV contrast;  7/28/2025 1:39 pm   INDICATION: Signs/Symptoms:pain, include foot also   COMPARISON: Radiographs of the ankle 07/28/2025   ACCESSION NUMBER(S): [id number]   ORDERING CLINICIAN: SHERYL PRIDE   TECHNIQUE: Multiplanar multisequence MRI of the  left foot was performed without intravenous contrast.   FINDINGS: Tendons and ligaments: There is moderate distal posterior tibialis tendinosis with thickening and increased intermediate fluid signal. There is split tear of the peroneus brevis at the level of the lateral malleolus with distal reconstitution. There is large Achilles insertional enthesophyte. There is mild-to-moderate thickening of the distal Achilles with mild increased intermediate fluid signal suggesting tendinosis The visualized extensor tendons are intact. The visualized flexor tendons are intact. Peroneus longus is intact. The Lisfranc ligament is intact. There is no subluxation or dislocation in the tarsometatarsal articulations. There is thickening of the central band of the plantar fascia.   Joints:  There are small tibiotalar, subtalar, and talonavicular joint effusion. There are no osteochondral lesions identified. No jeb synovial soft tissue edema however to suggest septic arthritis. Mild hallux valgus.   Alignment:  There is normal alignment of the metatarsals. There is no hallux valgus deformity.   Muscles:  Edema and atrophy of the plantar foot muscles suggesting chronic ischemic myopathy.   Bone Marrow:  The bone marrow signal is normal. There is no fracture or contusion. There is no marrow replacing lesions.       1.  Small tibiotalar, subtalar, and talonavicular joint effusions that are nonspecific. No Jeb synovial soft tissue edema to suggest septic arthritis. 2. Moderate distal posterior tibialis tendinosis. 3. Split tear of the peroneus brevis at the level of the lateral malleolus with distal reconstitution. 4. Mild-to-moderate mid/distal Achilles tendinosis. Large Achilles insertional  enthesophyte 5. Plantar calcaneal spur with findings suggesting plantar fasciitis.     Signed by: Ana Stauffer 7/28/2025 4:30 PM Dictation workstation:   [id number]    XR ankle left 3+ views  Result Date: 7/28/2025  Interpreted By:  Bubba Bacon, STUDY: XR ANKLE LEFT 3+ VIEWS; ;  7/28/2025 2:20 am   INDICATION: Signs/Symptoms:ankle pain.   COMPARISON: None.   ACCESSION NUMBER(S): [id number]   ORDERING CLINICIAN: VERONICA HINES   FINDINGS: Ankle mortise is maintained without evidence of fracture or dislocation. There is plantar calcaneal spurring. Calcification at the Achilles tendon insertion suggestive of Achilles tendon tendinosis.       No evidence of acute fracture or dislocation.   Suggestion of Achilles tendon tendinosis.   Signed by: Bubba Bacon 7/28/2025 2:36 AM Dictation workstation:   [id number]    Lower extremity venous duplex left  Result Date: 7/28/2025  Interpreted By:  Bubba Bacon, STUDY: [facility] US LOWER EXTREMITY VENOUS DUPLEX LEFT  7/28/2025 1:13 am   INDICATION: 79 y/o   F with  Signs/Symptoms:LLE swelling. LMP:  Unknown.       COMPARISON: None.   ACCESSION NUMBER(S): [id number]   ORDERING CLINICIAN: VERONICA HINES   TECHNIQUE: Routine ultrasound of the left lower extremity was performed with duplex Doppler (color and spectral) evaluation.   Static images were obtained for remote interpretation.   FINDINGS: THIGH VEINS:  The common femoral, femoral, popliteal, proximal medial saphenous, and deep femoral veins are patent and free of thrombus. The veins are normally compressible.  They demonstrate normal phasic flow and augmentation response.   CALF VEINS: Calf veins were not well visualized.       No deep venous thrombosis of the  left extremity.   MACRO: None   Signed by: Bubba Bacon 7/28/2025 1:43 AM Dictation workstation:   [id number]     Assessment & Plan  Acute left ankle pain    Overall, the patient's left ankle pain is improved.  However, there is tenderness to palpation about  the ankle anteriorly.  The MRI shows small amount of joint fluid within the tibiotalar, talonavicular and subtalar joints and chronic tendinopathies.  The patient still has an elevated white count of 20.7.  Uric acid level is mildly elevated at 7.4.  To rule out septic arthritis I recommended an aspiration.  The risks of the aspiration were reviewed with the patient.  Verbal consent was obtained.  The left ankle was prepped with Betadine.  Then under sterile conditions the skin over the anterior medial ankle was anesthetized with 3 cc of 1% lidocaine.  Then using a 20-gauge needle an aspiration was attempted, but no significant fluid was obtained.  Due to the lack of the large effusion I do feel that septic arthritis is unlikely.  The patient can be weightbearing as tolerated on the left lower extremity.          Cj Tran MD

## 2025-07-29 NOTE — PROGRESS NOTES
07/29/25 1007   Discharge Planning   Living Arrangements Spouse/significant other   Support Systems Spouse/significant other   Type of Residence Private residence   Do you have animals or pets at home? No   Who is requesting discharge planning? Provider   Expected Discharge Disposition Home H   Stroke Family Assessment   Stroke Family Assessment Needed No   Intensity of Service   Intensity of Service 0-30 min     Met with pt this morning, pt admit for acute ankle pain.  WBC elevated.  PTA pt is independent, await MRI and therapy evals.  Tentative plan  is for home with c only if needed for ambulation therapy.  Home address, insurance and PCP verified.  Yamilet Sterling RN [facility]

## 2025-07-30 VITALS
TEMPERATURE: 98.1 F | OXYGEN SATURATION: 97 % | SYSTOLIC BLOOD PRESSURE: 122 MMHG | RESPIRATION RATE: 18 BRPM | DIASTOLIC BLOOD PRESSURE: 64 MMHG | HEART RATE: 66 BPM | WEIGHT: 127 LBS | BODY MASS INDEX: 20.41 KG/M2 | HEIGHT: 66 IN

## 2025-07-30 PROBLEM — M25.572 ACUTE LEFT ANKLE PAIN: Status: RESOLVED | Noted: 2025-07-28 | Resolved: 2025-07-30

## 2025-07-30 LAB
ANION GAP SERPL CALC-SCNC: 10 MMOL/L (ref 10–20)
BUN SERPL-MCNC: 22 MG/DL (ref 6–23)
CALCIUM SERPL-MCNC: 9.3 MG/DL (ref 8.6–10.3)
CHLORIDE SERPL-SCNC: 99 MMOL/L (ref 98–107)
CO2 SERPL-SCNC: 32 MMOL/L (ref 21–32)
CREAT SERPL-MCNC: 0.75 MG/DL (ref 0.5–1.05)
EGFRCR SERPLBLD CKD-EPI 2021: 81 ML/MIN/1.73M*2
ERYTHROCYTE [DISTWIDTH] IN BLOOD BY AUTOMATED COUNT: 13.7 % (ref 11.5–14.5)
GLUCOSE SERPL-MCNC: 122 MG/DL (ref 74–99)
HCT VFR BLD AUTO: 34.9 % (ref 36–46)
HGB BLD-MCNC: 10.5 G/DL (ref 12–16)
MAGNESIUM SERPL-MCNC: 1.64 MG/DL (ref 1.6–2.4)
MCH RBC QN AUTO: 26.8 PG (ref 26–34)
MCHC RBC AUTO-ENTMCNC: 30.1 G/DL (ref 32–36)
MCV RBC AUTO: 89 FL (ref 80–100)
NRBC BLD-RTO: 0 /100 WBCS (ref 0–0)
PLATELET # BLD AUTO: 353 X10*3/UL (ref 150–450)
POTASSIUM SERPL-SCNC: 3.7 MMOL/L (ref 3.5–5.3)
RBC # BLD AUTO: 3.92 X10*6/UL (ref 4–5.2)
SODIUM SERPL-SCNC: 137 MMOL/L (ref 136–145)
WBC # BLD AUTO: 19.5 X10*3/UL (ref 4.4–11.3)

## 2025-07-30 PROCEDURE — 80048 BASIC METABOLIC PNL TOTAL CA: CPT

## 2025-07-30 PROCEDURE — 85027 COMPLETE CBC AUTOMATED: CPT

## 2025-07-30 PROCEDURE — 2500000004 HC RX 250 GENERAL PHARMACY W/ HCPCS (ALT 636 FOR OP/ED)

## 2025-07-30 PROCEDURE — 99239 HOSP IP/OBS DSCHRG MGMT >30: CPT | Performed by: STUDENT IN AN ORGANIZED HEALTH CARE EDUCATION/TRAINING PROGRAM

## 2025-07-30 PROCEDURE — 97535 SELF CARE MNGMENT TRAINING: CPT | Mod: CO,GO

## 2025-07-30 PROCEDURE — 36415 COLL VENOUS BLD VENIPUNCTURE: CPT

## 2025-07-30 PROCEDURE — 2500000004 HC RX 250 GENERAL PHARMACY W/ HCPCS (ALT 636 FOR OP/ED): Performed by: INTERNAL MEDICINE

## 2025-07-30 PROCEDURE — 2500000001 HC RX 250 WO HCPCS SELF ADMINISTERED DRUGS (ALT 637 FOR MEDICARE OP): Performed by: INTERNAL MEDICINE

## 2025-07-30 PROCEDURE — 97116 GAIT TRAINING THERAPY: CPT | Mod: GP,CQ

## 2025-07-30 PROCEDURE — 2500000002 HC RX 250 W HCPCS SELF ADMINISTERED DRUGS (ALT 637 FOR MEDICARE OP, ALT 636 FOR OP/ED): Performed by: INTERNAL MEDICINE

## 2025-07-30 PROCEDURE — 83735 ASSAY OF MAGNESIUM: CPT

## 2025-07-30 RX ORDER — METHYLPREDNISOLONE 4 MG/1
TABLET ORAL
Qty: 21 TABLET | Refills: 0 | Status: CANCELLED | OUTPATIENT
Start: 2025-07-30 | End: 2025-08-04

## 2025-07-30 RX ORDER — LANOLIN ALCOHOL/MO/W.PET/CERES
400 CREAM (GRAM) TOPICAL DAILY
Qty: 30 TABLET | Refills: 2 | Status: SHIPPED | OUTPATIENT
Start: 2025-07-31

## 2025-07-30 RX ORDER — METHYLPREDNISOLONE 4 MG/1
TABLET ORAL
Qty: 21 TABLET | Refills: 0 | Status: SHIPPED | OUTPATIENT
Start: 2025-07-30 | End: 2025-08-05

## 2025-07-30 RX ADMIN — LEVOTHYROXINE SODIUM 112 MCG: 112 TABLET ORAL at 05:38

## 2025-07-30 RX ADMIN — CEFTRIAXONE 1 G: 1 INJECTION, SOLUTION INTRAVENOUS at 05:39

## 2025-07-30 RX ADMIN — CETIRIZINE HYDROCHLORIDE 10 MG: 10 TABLET, FILM COATED ORAL at 10:04

## 2025-07-30 RX ADMIN — ENOXAPARIN SODIUM 40 MG: 100 INJECTION SUBCUTANEOUS at 05:44

## 2025-07-30 RX ADMIN — METOPROLOL TARTRATE 25 MG: 25 TABLET, FILM COATED ORAL at 10:04

## 2025-07-30 RX ADMIN — PREDNISONE 40 MG: 20 TABLET ORAL at 10:04

## 2025-07-30 RX ADMIN — MAGNESIUM OXIDE TAB 400 MG (241.3 MG ELEMENTAL MG) 1 TABLET: 400 (241.3 MG) TAB at 10:04

## 2025-07-30 ASSESSMENT — COGNITIVE AND FUNCTIONAL STATUS - GENERAL
STANDING UP FROM CHAIR USING ARMS: A LITTLE
WALKING IN HOSPITAL ROOM: A LITTLE
TURNING FROM BACK TO SIDE WHILE IN FLAT BAD: A LITTLE
DRESSING REGULAR LOWER BODY CLOTHING: A LITTLE
MOVING FROM LYING ON BACK TO SITTING ON SIDE OF FLAT BED WITH BEDRAILS: A LITTLE
MOBILITY SCORE: 17
MOVING TO AND FROM BED TO CHAIR: A LITTLE
DAILY ACTIVITIY SCORE: 19
TOILETING: A LITTLE
CLIMB 3 TO 5 STEPS WITH RAILING: A LOT
PERSONAL GROOMING: A LITTLE
HELP NEEDED FOR BATHING: A LITTLE
DRESSING REGULAR UPPER BODY CLOTHING: A LITTLE

## 2025-07-30 ASSESSMENT — PAIN - FUNCTIONAL ASSESSMENT: PAIN_FUNCTIONAL_ASSESSMENT: 0-10

## 2025-07-30 ASSESSMENT — PAIN SCALES - GENERAL: PAINLEVEL_OUTOF10: 0 - NO PAIN

## 2025-07-30 ASSESSMENT — ACTIVITIES OF DAILY LIVING (ADL): HOME_MANAGEMENT_TIME_ENTRY: 15

## 2025-07-30 NOTE — CARE PLAN
The patient's goals for the shift include discharge    The clinical goals for the shift include remain safe and free from falls      Problem: Safety - Adult  Goal: Free from fall injury  7/30/2025 1223 by Oumou Raygoza RN  Outcome: Progressing       Problem: Discharge Planning  Goal: Discharge to home or other facility with appropriate resources  7/30/2025 1223 by Oumou Raygoza RN  Outcome: Progressing       Problem: Fall/Injury  Goal: Not fall by end of shift  Outcome: Progressing

## 2025-07-30 NOTE — PROGRESS NOTES
"Gabriella Haskins is a 80 y.o. female on day 2 of admission presenting with Acute left ankle pain.    Subjective   Patient states that her left ankle feels significantly better today       Objective     Physical Exam  Examination of her left ankle reveals minimal swelling.  No erythema no ecchymosis no warmth.  Full range of motion with minimal discomfort.  Last Recorded Vitals  Blood pressure 122/64, pulse 66, temperature 36.7 °C (98.1 °F), resp. rate 18, height 1.676 m (5' 6\"), weight 57.6 kg (127 lb), SpO2 97%.  Intake/Output last 3 Shifts:  I/O last 3 completed shifts:  In: 100 (1.7 mL/kg) [IV Piggyback:100]  Out: - (0 mL/kg)   Weight: 57.6 kg     Relevant Results      Scheduled medications  Scheduled Medications[1]  Continuous medications  Continuous Medications[2]  PRN medications  PRN Medications[3]  Results for orders placed or performed during the hospital encounter of 07/27/25 (from the past 24 hours)   Sars-CoV-2, Influenza A/B and RSV PCR   Result Value Ref Range    Coronavirus 2019, PCR Not Detected Not Detected    Flu A Result Not Detected Not Detected    Flu B Result Not Detected Not Detected    RSV PCR Not Detected Not Detected   CBC   Result Value Ref Range    WBC 19.5 (H) 4.4 - 11.3 x10*3/uL    nRBC 0.0 0.0 - 0.0 /100 WBCs    RBC 3.92 (L) 4.00 - 5.20 x10*6/uL    Hemoglobin 10.5 (L) 12.0 - 16.0 g/dL    Hematocrit 34.9 (L) 36.0 - 46.0 %    MCV 89 80 - 100 fL    MCH 26.8 26.0 - 34.0 pg    MCHC 30.1 (L) 32.0 - 36.0 g/dL    RDW 13.7 11.5 - 14.5 %    Platelets 353 150 - 450 x10*3/uL   Basic Metabolic Panel   Result Value Ref Range    Glucose 122 (H) 74 - 99 mg/dL    Sodium 137 136 - 145 mmol/L    Potassium 3.7 3.5 - 5.3 mmol/L    Chloride 99 98 - 107 mmol/L    Bicarbonate 32 21 - 32 mmol/L    Anion Gap 10 10 - 20 mmol/L    Urea Nitrogen 22 6 - 23 mg/dL    Creatinine 0.75 0.50 - 1.05 mg/dL    eGFR 81 >60 mL/min/1.73m*2    Calcium 9.3 8.6 - 10.3 mg/dL   Magnesium   Result Value Ref Range    Magnesium 1.64 " 1.60 - 2.40 mg/dL          This patient currently has cardiac telemetry ordered; if you would like to modify or discontinue the telemetry order, click here to go to the orders activity to modify/discontinue the order.                 Assessment & Plan  Acute left ankle pain    Patient's left ankle pain is improving.  Attempted aspiration yesterday revealed no fluid aspirated.  Patient continues to improve recommend weightbearing as tolerated left lower extremity.    I spent 10 minutes in the professional and overall care of this patient.      Kade Cobb DO           [1] cefTRIAXone, 1 g, intravenous, q24h  cetirizine, 10 mg, oral, Daily  enoxaparin, 40 mg, subcutaneous, q24h  [Held by provider] hydroCHLOROthiazide, 25 mg, oral, Daily  levothyroxine, 112 mcg, oral, Daily  magnesium oxide, 400 mg of magnesium oxide, oral, Daily  metoprolol tartrate, 25 mg, oral, BID  pneumoc 20-maurizio conj-dip cr(PF), 0.5 mL, intramuscular, During hospitalization  predniSONE, 40 mg, oral, Daily    [2]    [3] PRN medications: acetaminophen, cyclobenzaprine, lubricating eye drops, polyethylene glycol

## 2025-07-30 NOTE — DISCHARGE INSTRUCTIONS
*************************PLEASE FOLLOW THE INSTRUCTIONS BELOW******************************        Take Medrol Dosepak for 6 days as instructed  Follow-up with PCP within 1 to 2 weeks of hospitalization and reevaluate uric acid level along with continued use of hydrochlorothiazide medication  Follow-up with PT/OT strengthening exercises during [facility]  Recommended to weight-bear as tolerated and use walker for ambulation  Take magnesium oxide supplementation daily as instructed

## 2025-07-30 NOTE — PROGRESS NOTES
Pt discharging home with [facility],  list provided, referrals made to [facility], [facility], and [facility].  Yamilet SOTO    [facility] is preference, they will accept  [facility] orders sent.  Yamilet SOTO    1305  [facility] will be within 24-48 hrs.  Yamilet SOTO

## 2025-07-30 NOTE — PROGRESS NOTES
Occupational Therapy    OT Treatment    Patient Name: Gabriella Haskins  MRN: [id number]  Department:   Room: [address] [place] [address]  Today's Date: 7/30/2025  Time Calculation  Start Time: 0907  Stop Time: 0933  Time Calculation (min): 26 min        Assessment:  End of Session Communication: Bedside nurse  End of Session Patient Position: Up in chair, Alarm on     Plan:  Treatment Interventions: ADL retraining, Functional transfer training, Patient/family training, Equipment evaluation/education, Compensatory technique education, Endurance training (energy conservation / pursed-lip breathing techniques training)  OT Frequency: 5 times per week  OT Discharge Recommendations: High intensity level of continued care  OT - OK to Discharge: Yes (to next level of care when medically cleared by physician/medical team)  Treatment Interventions: ADL retraining, Functional transfer training, Patient/family training, Equipment evaluation/education, Compensatory technique education, Endurance training (energy conservation / pursed-lip breathing techniques training)    Subjective   OT Visit Info:     General Visit Info:  General  Co-Treatment: PT  Co-Treatment Reason: maximize safety and functional mobility  Prior to Session Communication: Bedside nurse  Patient Position Received: Bed, 3 rail up, Alarm off, not on at start of session  General Comment: pt pleasant and agreeable to participate in therapy  Precautions:  LE Weight Bearing Status: Weight Bearing as Tolerated  Medical Precautions: Fall precautions  Precautions Comment: tele; piv      Vital Signs Comment: pt initially on 1L O2 though transitioned to room air per physician request. SpO2 92-93% at rest, dropping to 89% with mobility though recovering back to 92-93% after brief sitting rest period and cuing for dbt. pt remaining on room air end of tx session per communication with RN.     Pain:  Pain Assessment  Pain Assessment:  (pt states L ankle pain has improved since previous days;  reports no pain at rest & 2-3/10 with mobility/weight-bearing.)    Objective    Cognition:  Cognition  Overall Cognitive Status: Within Functional Limits       Activities of Daily Living: LE Dressing  LE Dressing: Yes  Pants Level of Assistance: Minimum assistance  LE Dressing Where Assessed: Chair  Functional Standing Tolerance:  Time: 4:00 standing at FWW  Bed Mobility/Transfers: Bed Mobility  Bed Mobility: Yes  Bed Mobility 1  Bed Mobility 1: Supine to sitting  Level of Assistance 1: Close supervision    Transfers  Transfer: Yes  Transfer 1  Technique 1: Sit to stand, Stand to sit  Transfer Device 1: Walker  Transfer Level of Assistance 1:  (CGA from EOB and recliner. Min A from low chair)      Functional Mobility:  Functional Mobility  Functional Mobility Performed: Yes  Functional Mobility 1  Device 1: Rolling walker  Assistance 1: Minimum assistance  Comments 1: pt ambulated in room and out into hallway with one sitting rest break and min vc for increased safety      Outcome Measures:[facility] Daily Activity  Putting on and taking off regular lower body clothing: A little  Bathing (including washing, rinsing, drying): A little  Putting on and taking off regular upper body clothing: A little  Toileting, which includes using toilet, bedpan or urinal: A little  Taking care of personal grooming such as brushing teeth: A little  Eating Meals: None  Daily Activity - Total Score: 19        Education Documentation  Precautions, taught by NORMA Sow at 7/30/2025  3:35 PM.  Learner: Patient  Readiness: Acceptance  Method: Explanation  Response: Verbalizes Understanding    ADL Training, taught by NORMA Sow at 7/30/2025  3:35 PM.  Learner: Patient  Readiness: Acceptance  Method: Explanation  Response: Verbalizes Understanding    Education Comments  No comments found.             Goals:  Encounter Problems       Encounter Problems (Active)       OT Goals       Patient will complete upper/lower body  bathing/dressing; toileting with modified independence using assistive techniques/adaptive equipment as needed  (Progressing)       Start:  07/29/25    Expected End:  08/12/25            Patient will perform bed mobility and functional transfers safely and independently: bed, chair, commode using DME as needed  (Progressing)       Start:  07/29/25    Expected End:  08/12/25            Patient will tolerate standing for 5 mins. and show overall good (-) standing balance during ADL's and functional transfers/mobility  (Progressing)       Start:  07/29/25    Expected End:  08/12/25            Patient will apply energy conservation/pursed-lip breathing techniques to ADL's and functional transfers with minimal cues  (Progressing)       Start:  07/29/25    Expected End:  08/12/25

## 2025-07-30 NOTE — DISCHARGE SUMMARY
Discharge Diagnosis  Acute left ankle pain           Issues Requiring Follow-Up  Split tear of peroneal brevis with left foot tendinopathy    Discharge Meds     Medication List      START taking these medications     magnesium oxide 400 mg (241.3 mg elemental) tablet; Commonly known as:   Mag-Ox; Take 1 tablet by mouth once daily.; Start taking on: July 31, 2025   methylPREDNISolone 4 mg tablets; Commonly known as: Medrol Dospak;   Follow schedule on package instructions     CHANGE how you take these medications     levothyroxine 112 mcg tablet; Commonly known as: Synthroid, Levoxyl;   What changed: Another medication with the same name was removed. Continue   taking this medication, and follow the directions you see here.     CONTINUE taking these medications     acetaminophen 325 mg tablet; Commonly known as: Tylenol; Take 2 tablets   (650 mg) by mouth every 4 hours if needed for moderate pain (4 - 6),   headaches or fever (temp greater than 38.0 C).   cyanocobalamin 1,000 mcg/mL injection; Commonly known as: Vitamin B-12   donepezil 5 mg tablet; Commonly known as: Aricept   hydroCHLOROthiazide 25 mg tablet; Commonly known as: HYDRODiuril   levocetirizine 5 mg tablet; Commonly known as: Xyzal   metoprolol tartrate 25 mg tablet; Commonly known as: Lopressor; Take 1   tablet (25 mg) by mouth 2 times a day.   OMEGA-3 FISH OIL ORAL   PreserVision AREDS-2 250-90-40-1 mg capsule; Generic drug: vit   C,C-Dy-xkawd-lutein-zeaxan   TheraTears 0.25 % ophthalmic solution; Generic drug:   carboxymethylcellulose   triamcinolone 55 mcg nasal inhaler; Commonly known as: Nasacort   Vitamin C 500 mg tablet; Generic drug: ascorbic acid     STOP taking these medications     cyclobenzaprine 5 mg tablet; Commonly known as: Flexeril   levoFLOXacin 750 mg tablet; Commonly known as: Levaquin   Xarelto 20 mg tablet; Generic drug: rivaroxaban       Test Results Pending At Discharge  Pending Labs       No current pending labs.             Hospital Course   Mrs. Gabriella Haskins is a pleasant 80-year-old female w/ PMH of osteoporosis, OA, HTN, HLD, Vit D Def, hypothyroid, A-fib (s/p cardioverted '23), BCC, h/o subdural hematoma presented to [facility] ED on 7/28/25 w/ LLE pain + Swelling.  She was recently Dx w/ PNA in the outpatient setting & started on levoquin for 1 wk. Pt had weird sensation on L foot on day 2 of Levaquin and had difficulty bearing weight on L foot.  She was ordered x-ray of her foot which was negative for acute fractures but remarkable for Achilles tendon calcification suggestive of Achilles tendon tendinosis.  Patient was admitted to the floor for further management.  MRI of her left foot demonstrated a split peroneus brevis tear mild to moderate mid distal tendinosis with plantar calcaneal spur likely causing plantar fasciitis.  We discontinued her Levaquin and IV Rocephin.  Patient has a wide spectrum of drug allergies so I monitored for signs of adverse events and allergies with IV Rocephin.  Patient tolerated well.  Podiatry and orthopedics were consulted and surgery was not recommended at this time but will follow-up with conservative management utilizing PT/OT/weightbearing as tolerated and avoid reinitiation of Levaquin.  Patient is recommended to follow-up with her PCP within 1 to 2 weeks of hospitalization.  Her uric acid level was abnormal so so we held hydrochlorothiazide while admitted however we will resume on discharge.  Patient recommended to follow-up with outpatient PT/OT at home.  Also recommended to follow-up with podiatry and orthopedic surgeon within 2 weeks of hospitalization.  Patient is medically stable and cleared for discharge.      Pertinent Physical Exam At Time of Discharge  Physical Exam    Constitutional: Well developed, A&Ox3, no acute distress, alert and cooperative  Eyes: EOMI, clear sclera  Respiratory/Thorax: CTAB, good chest expansion  Cardiovascular: Regular rate, regular  rhythm  Gastrointestinal: Nondistended, soft, non-tender  Extremities: normal extremities, no cyanosis no edema  Skin: Warm and dry    Outpatient Follow-Up  Future Appointments   Date Time Provider Department Center   9/16/2025 11:30 AM PAR [facility] MOBILE CT [facility] PAR RAD   9/23/2025 11:00 AM Tatianna Jimenez, APRN-CNP [id number] Arturo Crespo MD

## 2025-07-30 NOTE — DOCUMENTATION CLARIFICATION NOTE
"    PATIENT:               LAUREN ALVARENGA  ACCT #:                  [id number]  MRN:                       [id number]  :                       1945  ADMIT DATE:       2025 11:47 PM  DISCH DATE:  RESPONDING PROVIDER #:        [id number]          PROVIDER RESPONSE TEXT:    Pneumonia ruled in for this admission    CDI QUERY TEXT:    Clarification    Instruction:    Based on your assessment of the patient and the clinical information, please provide the requested documentation by clicking on the appropriate radio button and enter any additional information if prompted.    Question: Please further clarify the diagnosis of Pneumonia as    When answering this query, please exercise your independent professional judgment. The fact that a question is being asked, does not imply that any particular answer is desired or expected.    The patient's clinical indicators include:  Clinical Information: 80 y.o. female recently diagnosed with pneumonia, and was started on levofloxacin for 1 week and developed left foot pain.    Clinical Indicators:  H&P:  \"Possible pneumonia:  - Patient received 3 to 4 days of levofloxacin, will stop and switch to ceftriaxone, watch for allergies.  - Will not start azithromycin as the patient is allergic.\"     CXR: \"Findings suggestive of a small focus of left lower lobe pneumonia  with a small adjacent parapneumonic effusion.\"    Vital Signs:  @ 2223 T 37.4, HR 89, RR 18, /66, SPO2 94% on RA   @ 0300 SPO2 87% placed on oxygen at 2L. @ 1200 on Room Air, SPO2 93%    Labs:  CRP 14.41, WBC 17.5   WBC 19.5    Treatment: IV Rocephin 1g q24h (indications comment: Pneumonia), CXR, labs, monitoring vital signs, oxygen    Risk Factors: recent dx. pneumonia, age, hypoxia  Options provided:  -- Pneumonia ruled in for this admission  -- Pneumonia ruled out after workup  -- Other - I will add my own diagnosis  -- Refer to Clinical Documentation Reviewer    Query created by: " Mei Pozo on 7/30/2025 10:21 AM      Electronically signed by:  MANUELA DIXON DO 7/30/2025 11:13 AM

## 2025-07-30 NOTE — PROGRESS NOTES
Physical Therapy    Physical Therapy Treatment    Patient Name: Gabriella Haskins  MRN: [id number]  Department:   Room: [address] [place] [address]  Today's Date: 7/30/2025  Time Calculation  Start Time: 0906  Stop Time: 0933  Time Calculation (min): 27 min         Assessment/Plan   PT Assessment  End of Session Communication: Bedside nurse  End of Session Patient Position: Up in chair, Alarm on     PT Plan  Treatment/Interventions: Bed mobility, Transfer training, Gait training  PT Plan: Ongoing PT  PT Frequency: 4 times per week  PT Discharge Recommendations: High intensity level of continued care  PT Recommended Transfer Status: Assist x1  PT - OK to Discharge: Yes    General Visit Information:   General  Co-Treatment: OT  Co-Treatment Reason: maximize safety and functional mobility  Prior to Session Communication: Bedside nurse  Patient Position Received: Bed, 3 rail up, Alarm off, not on at start of session  General Comment: pt pleasant and agreeable to participate in therapy    Subjective   Precautions:  Precautions  LE Weight Bearing Status:  LLE WBAT  Medical Precautions: Fall precautions  Precautions Comment: tele; piv      Vital Signs Comment: pt initially on 1L O2 though transitioned to room air per physician request.  SpO2 92-93% at rest, dropping to 89% with mobility though recovering back to 92-93% after brief sitting rest period and cuing for dbt.  pt remaining on room air end of tx session per communication with RN.     Objective   Pain:  Pain Assessment  Pain Assessment:  (pt states L ankle pain has improved since previous days; reports no pain at rest & 2-3/10 with mobility/weight-bearing.)    Cognition:  Cognition  Overall Cognitive Status: Within Functional Limits     Treatments:  Therapeutic Exercise  Therapeutic Exercise Performed:  (seated BLE AP and LAQ x10 ea.)    Bed Mobility  Bed Mobility:  (sup > sit with SBA; HOB elevated)    Transfers  Transfer:  (sit <> stand x3 trials with FWW.  CGA from bed and armchair;  min A x 1 from low visitor chair.  cuing for safe hand placement and sequencing.)    Ambulation/Gait Training  Ambulation/Gait Training Performed:  (pt ambulates 3 ft bed > chair.  pt then ambulates addt'l 20 ft x 2 with seated rest break btwn.  FWW and min A x 1 plus chair follow for safety.)    Outcome Measures:  [facility] Basic Mobility  Turning from your back to your side while in a flat bed without using bedrails: A little  Moving from lying on your back to sitting on the side of a flat bed without using bedrails: A little  Moving to and from bed to chair (including a wheelchair): A little  Standing up from a chair using your arms (e.g. wheelchair or bedside chair): A little  To walk in hospital room: A little  Climbing 3-5 steps with railing: A lot  Basic Mobility - Total Score: 17    Education Documentation  Precautions, taught by Gracia Carcamo PTA at 7/30/2025  3:04 PM.  Learner: Patient  Readiness: Acceptance  Method: Explanation  Response: Verbalizes Understanding    Mobility Training, taught by Gracia Carcamo PTA at 7/30/2025  3:04 PM.  Learner: Patient  Readiness: Acceptance  Method: Explanation  Response: Verbalizes Understanding      EDUCATION:       Encounter Problems       Encounter Problems (Active)       PT Problem       STG - Pt will transition supine <> sitting with SUP  (Progressing)       Start:  07/29/25    Expected End:  08/12/25            STG - Pt will transfer STS with SBA  (Progressing)       Start:  07/29/25    Expected End:  08/12/25            STG - Pt will amb 30' using RW with CGA  (Progressing)       Start:  07/29/25    Expected End:  08/12/25

## 2025-07-30 NOTE — DOCUMENTATION CLARIFICATION NOTE
"    PATIENT:               LAUREN ALVARENGA  ACCT #:                  [id number]  MRN:                       [id number]  :                       1945  ADMIT DATE:       2025 11:47 PM  DISCH DATE:  RESPONDING PROVIDER #:        [id number]          PROVIDER RESPONSE TEXT:    Left lower extremity pain multifactorial 2/2 medication induced tendonitis, acute gout flare with septic arthritis ruled out    CDI QUERY TEXT:    Clarification    Instruction:    Based on your assessment of the patient and the clinical information, please provide the requested documentation by clicking on the appropriate radio button and enter any additional information if prompted.    Question: Please further clarify the most likely etiology of Left Lower Extremity Pain on admission after final work up    When answering this query, please exercise your independent professional judgment. The fact that a question is being asked, does not imply that any particular answer is desired or expected.    The patient's clinical indicators include:  Clinical Information: 80 y.o. female recently diagnosed with pneumonia, and was started on levofloxacin for 1 week and developed left foot pain.    Clinical Indicators:    Labs:  Na 130, CRP 14.41, WBC 17.5, Potassium 2.8, Uric Acid 7.2   WBC 19.5    Vital Signs:  @ 2223 T 37.4, HR 89, RR 18, /66, SPO2 94% on RA     H&P: \"Acute Medical Issues  Left foot/ankle swelling:  Concern for medication induced tendonitis\"     Ortho: \"The MRI shows small amount of joint fluid within the tibiotalar, talonavicular and subtalar joints and chronic tendinopathies. The patient still has an elevated white count of 20.7.  Uric acid level is mildly elevated at 7.4.  To rule out septic arthritis I recommended an aspiration.  Due to the lack of the large effusion I do feel that septic arthritis is unlikely.\"     PN: \"L Foot ankle swelling, C/f med induced tendonitis, septic arthritis R/o  Ortho " "consulted to r/o septic arthritis low suspicion\"    7/29 Podiatry Consult: \"Elevated uric acid level in the setting of known  history of gout raises concern for an acute gout flare as a potential contributor to the patient's symptoms.\"    Treatment: Ortho Consult, Podiatry Consult, MRI Left Foot, IV Rocephin 1g q24h, Prednisone 40mg PO daily    Risk Factors: Left LE pain after starting Levaquin  Options provided:  -- Left lower extremity pain multifactorial 2/2 medication induced tendonitis, acute gout flare with septic arthritis ruled out  -- Left lower extremity pain multifactorial 2/2 medication induced tendonitis, acute gout flare and septic arthritis  -- Left lower extremity pain 2/2 medication induced tendonitis only  -- Left lower extremity pain 2/2 acute gout flare only  -- Other - I will add my own diagnosis  -- Refer to Clinical Documentation Reviewer    Query created by: Mei Pozo on 7/30/2025 10:22 AM      Electronically signed by:  MANUELA DIXON DO 7/30/2025 11:13 AM          "

## 2025-07-30 NOTE — CONSULTS
"PODIATRIC MEDICINE & SURGERY - INITIAL CONSULT NOTE    Subjective   Gabriella Haskins is a 80 y.o. female who is on day 1 of admission for Acute left ankle pain.     HPI: Gabriella Haskins is a 80 y.o. female with a PMHx of osteoporosis, OA, HTN, HLD, vitamin D deficiency, hypothyroidism, A-fib (s/p cardioversion 2023), basal cell carcinoma, history of subdural hematoma, gout, B12 deficiency who presented to [facility] on 9/28/2025 with a chief complaint of left leg pain and swelling. The patient was recently diagnosed with pneumonia, and was started on levofloxacin for 1 week. On the second day of levofloxacin course, started to have funny sensation in her left foot. This sensation started started to worsen and became painful, and swelling of the foot. She was having difficulty bearing weight in her left foot. Her symptoms are mainly on the left side, she denies having any complaints on the right. She did read the back of the levofloxacin pack and was concerned about her tendon. She denies feeling any popping sensation. She also denied any trauma to the foot. At this time she rports pain that since admission the pain has improved but states that she still feels mild pain when she's bearing weight. No further complaints.     Review of Systems  Constitutional: Negative.    HENT: Negative.     Eyes: Negative.    Respiratory: Negative.     Cardiovascular: Negative.    Gastrointestinal: Negative.    Endocrine: Negative.    Genitourinary: Negative.    Musculoskeletal: Positive.  Skin: Negative.      Medical History[1]    Social History[2]    Recent Surgeries in Podiatry            No cases to display            RX Allergies[3]    Medications  Scheduled medications  Scheduled Medications[4]  Continuous medications   Continuous Medications[5]   PRN Medications[6]    Objective   Visit Vitals  /66   Pulse 73   Temp 36.3 °C (97.3 °F)   Resp 20   Ht 1.676 m (5' 6\")   Wt 57.6 kg (127 lb)   LMP  (LMP Unknown)   SpO2 91%   BMI " "20.50 kg/m²   OB Status Postmenopausal   Smoking Status Never   BSA 1.64 m²       Physical Exam  Constitutional: NAD and AAOx3.      Vascular: DP and PT pulses are palpable bilateral.  CFT is less than 3 seconds bilateral.  Skin temperature is warm to cool proximal to distal bilateral. Mild edema to Left LE.     Neurologic:  Light touch is intact to the foot bilateral.     Musculoskeletal: Moves all extremities spontaneously. Mild pain with palpation to PT tendon on left LE. Pain with palpation at insertion of the achilles and plantar fascia on left foot.    Dermatologic: Nails 1-5 bilateral are intact.  Skin is supple with normal texture and turgor noted.  Webspaces are clean, dry and intact bilateral.  There are no hyperkeratoses, ulcerations, verruca or other lesions noted.    Lab Results   Component Value Date    WBC 20.7 (H) 07/29/2025    HGB 11.3 (L) 07/29/2025    HCT 35.7 (L) 07/29/2025    MCV 87 07/29/2025     07/29/2025     Lab Results   Component Value Date    GLUCOSE 99 07/29/2025    CALCIUM 9.7 07/29/2025     07/29/2025    K 3.4 (L) 07/29/2025    CO2 29 07/29/2025    CL 96 (L) 07/29/2025    BUN 18 07/29/2025    CREATININE 0.74 07/29/2025     No results found for: \"HGBA1C\"  Lab Results   Component Value Date    SEDRATE 67 (H) 07/28/2025     Lab Results   Component Value Date    CRP 14.41 (H) 07/28/2025      Cultures  No results found for the last 90 days.      IMAGING  Imaging  XR chest 2 views  Result Date: 7/29/2025  Findings suggestive of a small focus of left lower lobe pneumonia with a small adjacent parapneumonic effusion.   Signed by: Dhruv Almanzar 7/29/2025 11:40 AM Dictation workstation:   [id number]    MR ankle left wo IV contrast  Result Date: 7/28/2025  1.  Small tibiotalar, subtalar, and talonavicular joint effusions that are nonspecific. No Payal synovial soft tissue edema to suggest septic arthritis. 2. Moderate distal posterior tibialis tendinosis. 3. Split tear of the peroneus " brevis at the level of the lateral malleolus with distal reconstitution. 4. Mild-to-moderate mid/distal Achilles tendinosis. Large Achilles insertional enthesophyte 5. Plantar calcaneal spur with findings suggesting plantar fasciitis.     Signed by: Ana Stauffer 7/28/2025 4:30 PM Dictation workstation:   [id number]    MR foot left wo IV contrast  Result Date: 7/28/2025  1.  Small tibiotalar, subtalar, and talonavicular joint effusions that are nonspecific. No Payal synovial soft tissue edema to suggest septic arthritis. 2. Moderate distal posterior tibialis tendinosis. 3. Split tear of the peroneus brevis at the level of the lateral malleolus with distal reconstitution. 4. Mild-to-moderate mid/distal Achilles tendinosis. Large Achilles insertional enthesophyte 5. Plantar calcaneal spur with findings suggesting plantar fasciitis.     Signed by: Ana Stauffer 7/28/2025 4:30 PM Dictation workstation:   [id number]    XR ankle left 3+ views  Result Date: 7/28/2025  No evidence of acute fracture or dislocation.   Suggestion of Achilles tendon tendinosis.   Signed by: Bubba Bacon 7/28/2025 2:36 AM Dictation workstation:   [id number]    Lower extremity venous duplex left  Result Date: 7/28/2025  No deep venous thrombosis of the  left extremity.   MACRO: None   Signed by: Bubba Bacon 7/28/2025 1:43 AM Dictation workstation:   [id number]      Cardiology, Vascular, and Other Imaging  ECG 12 lead  Result Date: 7/29/2025  Sinus rhythm        Assessment/Plan   Gabriella Haskins is a 80 y.o. female who is on day 1 of admission for Acute left ankle pain. Podiatry consulted for acute left ankle pain.    #Acute left ankle pain    Reviewed labs, imaging and notes.   -WBC 20.7, Uric acid 7.2, ESR 67, CRP 14.41  - XR (left ankle):  FINDINGS:  Ankle mortise is maintained without evidence of fracture or  dislocation. There is plantar calcaneal spurring. Calcification at  the Achilles tendon insertion suggestive of Achilles  tendon  tendinosis.      IMPRESSION:  No evidence of acute fracture or dislocation.    - MRI (left foot/ankle):  IMPRESSION:  1.  Small tibiotalar, subtalar, and talonavicular joint effusions  that are nonspecific. No Payal synovial soft tissue edema to suggest  septic arthritis.  2. Moderate distal posterior tibialis tendinosis.  3. Split tear of the peroneus brevis at the level of the lateral  malleolus with distal reconstitution.  4. Mild-to-moderate mid/distal Achilles tendinosis. Large Achilles  insertional enthesophyte  5. Plantar calcaneal spur with findings suggesting plantar fasciitis.  -Upon exam, patient denies pain along the lateral ankle when palpating the peroneals and at the insertion of the peroneus brevis tendon of the left foot. Therefore, surgical intervention for the split tear of the peroneus brevis is unlikely at this time.     Plan/Recommendations:  - Orthopedic note reviewed. Left ankle aspiration performed today to rule out septic arthritis.  - Recommending PT at this time for mobility and support.  - Elevated uric acid level in the setting of known  history of gout raises concern for an acute gout flare as a potential contributor to the patient's symptoms.   - Pain management per primary team.   - WBAT to left LE.   - No plan for podiatric surgical intervention at this time. Recommend outpatient follow-up if pain persists at the achilles tendon or plantar fascia insertion.  - Thank you for the consult. Podiatry team will continue to follow.     This is a preliminary note, please await attending attestation for final A/P.     Yun Montes DPM PGY-1  Podiatric Medicine & Surgery        [1]   Past Medical History:  Diagnosis Date    A-fib (Multi) 10/05/2023    Arthritis     Cardiac arrest 10/06/2023    Essential hypertension 07/11/2008    Hematuria 09/29/2023    Last Assessment & Plan:    Formatting of this note might be different from the original.   Assessment:    - Reports hematuria x1  day. Gross hematuria on Mccarthy insertion      Plan:    - Monitor CBC    - Urology consult: Plan for cystoscopy once cardiology clear   - Hold anticoagulation.      History of transfusion     Hypercholesterolemia 04/22/2021    Hypothyroidism 07/11/2008    Last Assessment & Plan:    Formatting of this note might be different from the original.   on levothyroxin at home   TSH wnl on 9/28      PLAN   - Continue home dose.      Lung nodule     SDH (subdural hematoma) (Multi) 05/29/2024    Senile osteoporosis 07/11/2008   [2]   Social History  Tobacco Use    Smoking status: Never    Smokeless tobacco: Never   Substance Use Topics    Alcohol use: Not Currently    Drug use: Never   [3]   Allergies  Allergen Reactions    Azithromycin Rash     blisters in mouth    Codeine GI Upset    Conjugated Estrogens Swelling and Unknown     legs swelled    Erythromycin Diarrhea    Ibuprofen GI Upset    Lisinopril Cough    Cephalexin Itching    Doxycycline Itching    Indomethacin Itching    Bactrim [Sulfamethoxazole-Trimethoprim] Hives    Amoxicillin Rash   [4] cefTRIAXone, 1 g, intravenous, q24h  cetirizine, 10 mg, oral, Daily  enoxaparin, 40 mg, subcutaneous, q24h  [Held by provider] hydroCHLOROthiazide, 25 mg, oral, Daily  levothyroxine, 112 mcg, oral, Daily  magnesium oxide, 400 mg of magnesium oxide, oral, Daily  metoprolol tartrate, 25 mg, oral, BID  pneumoc 20-maurizio conj-dip cr(PF), 0.5 mL, intramuscular, During hospitalization  predniSONE, 40 mg, oral, Daily  [5]    [6] PRN medications: acetaminophen, cyclobenzaprine, lubricating eye drops, polyethylene glycol

## 2025-08-09 LAB
ATRIAL RATE: 84 BPM
P AXIS: 43 DEGREES
PR INTERVAL: 160 MS
Q ONSET: 252 MS
QRS COUNT: 14 BEATS
QRS DURATION: 87 MS
QT INTERVAL: 401 MS
QTC CALCULATION(BAZETT): 469 MS
QTC FREDERICIA: 445 MS
R AXIS: 13 DEGREES
T AXIS: 42 DEGREES
T OFFSET: 452 MS
VENTRICULAR RATE: 82 BPM

## 2025-08-12 ENCOUNTER — OFFICE VISIT (OUTPATIENT)
Dept: PODIATRY | Facility: CLINIC | Age: 80
End: 2025-08-12
Payer: MEDICARE

## 2025-08-12 DIAGNOSIS — M19.072 DJD (DEGENERATIVE JOINT DISEASE), ANKLE AND FOOT, LEFT: ICD-10-CM

## 2025-08-12 DIAGNOSIS — M21.42 BILATERAL PES PLANUS: ICD-10-CM

## 2025-08-12 DIAGNOSIS — M25.572 ACUTE LEFT ANKLE PAIN: ICD-10-CM

## 2025-08-12 DIAGNOSIS — M25.572 SINUS TARSI SYNDROME, LEFT: ICD-10-CM

## 2025-08-12 DIAGNOSIS — R26.89 ANTALGIC GAIT: ICD-10-CM

## 2025-08-12 DIAGNOSIS — L85.3 XEROSIS CUTIS: ICD-10-CM

## 2025-08-12 DIAGNOSIS — M21.41 BILATERAL PES PLANUS: ICD-10-CM

## 2025-08-12 DIAGNOSIS — M79.672 LEFT FOOT PAIN: Primary | ICD-10-CM

## 2025-08-12 PROCEDURE — 1036F TOBACCO NON-USER: CPT | Performed by: PODIATRIST

## 2025-08-12 PROCEDURE — 99204 OFFICE O/P NEW MOD 45 MIN: CPT | Performed by: PODIATRIST

## 2025-08-12 PROCEDURE — 99214 OFFICE O/P EST MOD 30 MIN: CPT | Performed by: PODIATRIST

## 2025-08-12 PROCEDURE — 1160F RVW MEDS BY RX/DR IN RCRD: CPT | Performed by: PODIATRIST

## 2025-08-12 PROCEDURE — 1159F MED LIST DOCD IN RCRD: CPT | Performed by: PODIATRIST

## 2025-08-12 PROCEDURE — 1111F DSCHRG MED/CURRENT MED MERGE: CPT | Performed by: PODIATRIST

## (undated) DEVICE — TIP, SUCTION, SUPER SUCKER, KAM, MINI, CURVED

## (undated) DEVICE — BLADE, SAGITTAL DUAL CUT, 25 X 90 X 1.27

## (undated) DEVICE — BASIN KIT, SINGLE

## (undated) DEVICE — Device

## (undated) DEVICE — SPONGE, LAP, XRAY DECT, SC+RFID, 4X18, STERILE

## (undated) DEVICE — PILLOW, ABDUCTION, MEDIUM

## (undated) DEVICE — DRAPE, SHEET, THREE QUARTER, FAN FOLD, 57 X 77 IN

## (undated) DEVICE — RETRIEVER, SUTURE, HEWSON

## (undated) DEVICE — DRAPE, U-DRAPE, NON STERILE